# Patient Record
Sex: MALE | Race: WHITE | NOT HISPANIC OR LATINO | Employment: FULL TIME | URBAN - NONMETROPOLITAN AREA
[De-identification: names, ages, dates, MRNs, and addresses within clinical notes are randomized per-mention and may not be internally consistent; named-entity substitution may affect disease eponyms.]

---

## 2023-04-03 DIAGNOSIS — E55.9 VITAMIN D DEFICIENCY: ICD-10-CM

## 2023-04-03 DIAGNOSIS — E11.69 TYPE 2 DIABETES MELLITUS WITH OTHER SPECIFIED COMPLICATION, UNSPECIFIED WHETHER LONG TERM INSULIN USE (MULTI): ICD-10-CM

## 2023-04-03 DIAGNOSIS — F32.A DEPRESSION, UNSPECIFIED DEPRESSION TYPE: ICD-10-CM

## 2023-04-03 RX ORDER — MULTIVIT WITH MINERALS/HERBS
1 TABLET ORAL DAILY
COMMUNITY
Start: 2022-10-04

## 2023-04-03 RX ORDER — OMEGA-3 FATTY ACIDS/FISH OIL 340-1000MG
CAPSULE ORAL
COMMUNITY
End: 2023-04-26 | Stop reason: WASHOUT

## 2023-04-03 RX ORDER — AMITRIPTYLINE HYDROCHLORIDE 10 MG/1
10 TABLET, FILM COATED ORAL NIGHTLY
COMMUNITY
Start: 2023-03-06 | End: 2023-04-03 | Stop reason: SDUPTHER

## 2023-04-03 RX ORDER — INSULIN GLARGINE 100 [IU]/ML
110 INJECTION, SOLUTION SUBCUTANEOUS EVERY MORNING
COMMUNITY
Start: 2020-07-13 | End: 2023-04-03 | Stop reason: SDUPTHER

## 2023-04-03 RX ORDER — NYSTATIN 100000 [USP'U]/G
POWDER TOPICAL
COMMUNITY
End: 2023-06-28 | Stop reason: WASHOUT

## 2023-04-03 RX ORDER — INSULIN LISPRO 100 [IU]/ML
INJECTION, SOLUTION INTRAVENOUS; SUBCUTANEOUS
COMMUNITY
Start: 2020-07-13 | End: 2023-05-01 | Stop reason: SDUPTHER

## 2023-04-03 RX ORDER — PAROXETINE 30 MG/1
30 TABLET, FILM COATED ORAL EVERY MORNING
COMMUNITY
Start: 2021-03-16 | End: 2023-04-03 | Stop reason: ALTCHOICE

## 2023-04-03 RX ORDER — ROSUVASTATIN CALCIUM 40 MG/1
40 TABLET, COATED ORAL DAILY
COMMUNITY
Start: 2020-10-27 | End: 2023-05-01 | Stop reason: SDUPTHER

## 2023-04-03 RX ORDER — ACETAMINOPHEN 500 MG
TABLET ORAL
COMMUNITY
End: 2023-04-03 | Stop reason: SDUPTHER

## 2023-04-03 RX ORDER — GLIPIZIDE 10 MG/1
1 TABLET ORAL
COMMUNITY
Start: 2020-10-25 | End: 2023-05-01 | Stop reason: SDUPTHER

## 2023-04-03 RX ORDER — AMITRIPTYLINE HYDROCHLORIDE 25 MG/1
25 TABLET, FILM COATED ORAL NIGHTLY
COMMUNITY
End: 2023-04-03 | Stop reason: SDUPTHER

## 2023-04-03 RX ORDER — AZELASTINE 1 MG/ML
2 SPRAY, METERED NASAL DAILY
COMMUNITY
Start: 2020-11-19

## 2023-04-03 RX ORDER — FENOFIBRATE 200 MG/1
1 CAPSULE ORAL DAILY
COMMUNITY
Start: 2021-06-13 | End: 2023-05-01 | Stop reason: SDUPTHER

## 2023-04-03 RX ORDER — GABAPENTIN 300 MG/1
3 CAPSULE ORAL 3 TIMES DAILY
COMMUNITY
Start: 2020-12-01 | End: 2023-05-01 | Stop reason: SDUPTHER

## 2023-04-03 RX ORDER — ASPIRIN 81 MG/1
1 TABLET ORAL 2 TIMES DAILY
COMMUNITY
Start: 2022-10-04

## 2023-04-03 RX ORDER — METFORMIN HYDROCHLORIDE 1000 MG/1
1 TABLET ORAL EVERY 12 HOURS
COMMUNITY
Start: 2020-07-13 | End: 2023-05-01 | Stop reason: SDUPTHER

## 2023-04-03 RX ORDER — PAROXETINE 30 MG/1
60 TABLET, FILM COATED ORAL EVERY MORNING
COMMUNITY
End: 2023-04-03 | Stop reason: SDUPTHER

## 2023-04-04 RX ORDER — PAROXETINE 30 MG/1
60 TABLET, FILM COATED ORAL EVERY MORNING
Qty: 60 TABLET | Refills: 0 | Status: SHIPPED | OUTPATIENT
Start: 2023-04-04 | End: 2023-05-01 | Stop reason: SDUPTHER

## 2023-04-04 RX ORDER — ACETAMINOPHEN 500 MG
5000 TABLET ORAL DAILY
Qty: 30 TABLET | Refills: 0 | Status: SHIPPED | OUTPATIENT
Start: 2023-04-04 | End: 2023-05-04

## 2023-04-04 RX ORDER — INSULIN GLARGINE 100 [IU]/ML
110 INJECTION, SOLUTION SUBCUTANEOUS EVERY MORNING
Qty: 10 EACH | Refills: 0 | Status: SHIPPED | OUTPATIENT
Start: 2023-04-04 | End: 2023-05-02

## 2023-04-04 RX ORDER — AMITRIPTYLINE HYDROCHLORIDE 10 MG/1
10 TABLET, FILM COATED ORAL NIGHTLY
Qty: 30 TABLET | Refills: 0 | Status: SHIPPED | OUTPATIENT
Start: 2023-04-04 | End: 2023-05-01 | Stop reason: SDUPTHER

## 2023-04-26 ENCOUNTER — OFFICE VISIT (OUTPATIENT)
Dept: PRIMARY CARE | Facility: CLINIC | Age: 53
End: 2023-04-26
Payer: COMMERCIAL

## 2023-04-26 VITALS
BODY MASS INDEX: 43.42 KG/M2 | OXYGEN SATURATION: 93 % | HEART RATE: 74 BPM | WEIGHT: 294 LBS | DIASTOLIC BLOOD PRESSURE: 72 MMHG | SYSTOLIC BLOOD PRESSURE: 118 MMHG

## 2023-04-26 DIAGNOSIS — E78.00 HYPERCHOLESTEREMIA: ICD-10-CM

## 2023-04-26 DIAGNOSIS — Z00.00 ANNUAL PHYSICAL EXAM: Primary | ICD-10-CM

## 2023-04-26 DIAGNOSIS — I25.10 CORONARY ARTERY DISEASE INVOLVING NATIVE CORONARY ARTERY OF NATIVE HEART WITHOUT ANGINA PECTORIS: ICD-10-CM

## 2023-04-26 DIAGNOSIS — E66.01 CLASS 3 SEVERE OBESITY DUE TO EXCESS CALORIES WITH SERIOUS COMORBIDITY AND BODY MASS INDEX (BMI) OF 40.0 TO 44.9 IN ADULT (MULTI): ICD-10-CM

## 2023-04-26 DIAGNOSIS — E11.69 TYPE 2 DIABETES MELLITUS WITH OTHER SPECIFIED COMPLICATION, WITH LONG-TERM CURRENT USE OF INSULIN (MULTI): ICD-10-CM

## 2023-04-26 DIAGNOSIS — R53.83 OTHER FATIGUE: ICD-10-CM

## 2023-04-26 DIAGNOSIS — K21.9 GASTROESOPHAGEAL REFLUX DISEASE WITHOUT ESOPHAGITIS: ICD-10-CM

## 2023-04-26 DIAGNOSIS — F32.A DEPRESSION, UNSPECIFIED DEPRESSION TYPE: ICD-10-CM

## 2023-04-26 DIAGNOSIS — Z79.4 TYPE 2 DIABETES MELLITUS WITH OTHER SPECIFIED COMPLICATION, WITH LONG-TERM CURRENT USE OF INSULIN (MULTI): ICD-10-CM

## 2023-04-26 DIAGNOSIS — E55.9 VITAMIN D DEFICIENCY: ICD-10-CM

## 2023-04-26 PROBLEM — G47.33 OSA ON CPAP: Status: ACTIVE | Noted: 2023-04-26

## 2023-04-26 PROBLEM — I73.9 PAD (PERIPHERAL ARTERY DISEASE) (CMS-HCC): Status: ACTIVE | Noted: 2023-04-26

## 2023-04-26 PROBLEM — K11.7 DISTURBANCE OF SALIVARY SECRETION: Status: ACTIVE | Noted: 2023-04-26

## 2023-04-26 PROBLEM — E11.40 DIABETES MELLITUS WITH NEUROPATHY (MULTI): Status: ACTIVE | Noted: 2023-04-26

## 2023-04-26 PROBLEM — J30.9 ALLERGIC RHINITIS: Status: ACTIVE | Noted: 2023-04-26

## 2023-04-26 PROBLEM — I65.29 CAROTID ATHEROSCLEROSIS: Status: ACTIVE | Noted: 2023-04-26

## 2023-04-26 PROBLEM — E29.1 HYPOGONADISM MALE: Status: ACTIVE | Noted: 2023-04-26

## 2023-04-26 PROBLEM — R49.0 HOARSE VOICE QUALITY: Status: ACTIVE | Noted: 2023-04-26

## 2023-04-26 PROBLEM — R09.82 POSTNASAL DRIP: Status: ACTIVE | Noted: 2023-04-26

## 2023-04-26 PROBLEM — R93.1 ABNORMAL SCREENING CARDIAC CT: Status: ACTIVE | Noted: 2023-04-26

## 2023-04-26 PROBLEM — B37.2 CANDIDAL INTERTRIGO: Status: ACTIVE | Noted: 2023-04-26

## 2023-04-26 PROBLEM — R09.81 NASAL CONGESTION: Status: ACTIVE | Noted: 2023-04-26

## 2023-04-26 PROBLEM — A18.01 POTTS DISEASE: Status: ACTIVE | Noted: 2023-04-26

## 2023-04-26 PROBLEM — I87.8 STASIS, VENOUS: Status: ACTIVE | Noted: 2023-04-26
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
## 2023-04-26 PROCEDURE — 3074F SYST BP LT 130 MM HG: CPT | Performed by: INTERNAL MEDICINE

## 2023-04-26 PROCEDURE — 99396 PREV VISIT EST AGE 40-64: CPT | Performed by: INTERNAL MEDICINE

## 2023-04-26 PROCEDURE — 99215 OFFICE O/P EST HI 40 MIN: CPT | Performed by: INTERNAL MEDICINE

## 2023-04-26 PROCEDURE — 3008F BODY MASS INDEX DOCD: CPT | Performed by: INTERNAL MEDICINE

## 2023-04-26 PROCEDURE — 3078F DIAST BP <80 MM HG: CPT | Performed by: INTERNAL MEDICINE

## 2023-04-26 PROCEDURE — 93000 ELECTROCARDIOGRAM COMPLETE: CPT | Performed by: INTERNAL MEDICINE

## 2023-04-26 PROCEDURE — 1036F TOBACCO NON-USER: CPT | Performed by: INTERNAL MEDICINE

## 2023-04-26 RX ORDER — FLASH GLUCOSE SCANNING READER
EACH MISCELLANEOUS
COMMUNITY
Start: 2022-10-05 | End: 2023-11-28 | Stop reason: ALTCHOICE

## 2023-04-26 RX ORDER — FLASH GLUCOSE SENSOR
KIT MISCELLANEOUS
COMMUNITY
Start: 2023-04-16 | End: 2023-05-01 | Stop reason: SDUPTHER

## 2023-04-26 RX ORDER — ICOSAPENT ETHYL 1 G/1
2 CAPSULE ORAL 2 TIMES DAILY
COMMUNITY
Start: 2023-04-03

## 2023-04-26 NOTE — PROGRESS NOTES
Subjective   Patient ID: Jono Quinn is a 52 y.o. male who presents for yearly physical / Follow-up Shortness of Breath (Increased short of breath, mid march felt fatigue, weakness, lethargic, constipated,/Heavy in chest, feels warm and overheats/shuts down).    HPI  Yearly physical    Prostate 8-22  Colonoscopy 2018 polyp  CT chest lung cancer screening n/a  immunizations rev'd  BMI 43.4    Follow up    Patient complained of shortness of breath fatigue chest heaviness localized lasting for few hours few weeks ago.  There was no relation to exertion or diet.  Patient noted possibly back related.  Resolved spontaneously.    DM type II -insulin-dependent  Continue meds  Follow blood sugars closely.     CAD / Hypercholesterolemia on therapy no side effects    GERD stable on diet    Depression stable on therapy no side effects    Diet and exercise reviewed        Review of Systems   All other systems reviewed and are negative.      Objective   Physical Exam  Vitals reviewed.   Constitutional:       Appearance: Normal appearance. He is obese.   HENT:      Head: Normocephalic and atraumatic.      Mouth/Throat:      Pharynx: No posterior oropharyngeal erythema.   Eyes:      General: No scleral icterus.     Conjunctiva/sclera: Conjunctivae normal.      Pupils: Pupils are equal, round, and reactive to light.   Cardiovascular:      Rate and Rhythm: Normal rate and regular rhythm.      Heart sounds: Normal heart sounds.   Pulmonary:      Effort: No respiratory distress.      Breath sounds: No wheezing.   Abdominal:      General: Abdomen is flat. Bowel sounds are normal. There is no distension.      Palpations: Abdomen is soft. There is no mass.      Tenderness: There is no abdominal tenderness. There is no rebound.   Musculoskeletal:         General: Normal range of motion.      Cervical back: Normal range of motion and neck supple.   Skin:     General: Skin is warm and dry.   Neurological:      General: No focal deficit  present.      Mental Status: He is alert and oriented to person, place, and time. Mental status is at baseline.   Psychiatric:         Mood and Affect: Mood normal.         Behavior: Behavior normal.         Thought Content: Thought content normal.         Judgment: Judgment normal.         Assessment/Plan   Problem List Items Addressed This Visit          Digestive    GERD (gastroesophageal reflux disease)       Endocrine/Metabolic    Vitamin D deficiency    Relevant Orders    Vitamin D 25 hydroxy       Other    Depression     Other Visit Diagnoses       Annual physical exam    -  Primary    Relevant Orders    Comprehensive Metabolic Panel    Coronary artery disease involving native coronary artery of native heart without angina pectoris        Type 2 diabetes mellitus with other specified complication, with long-term current use of insulin (CMS/[facility])        Relevant Orders    Hemoglobin A1C    Albumin , Urine Random    Hypercholesteremia        Relevant Orders    Lipid Panel    Other fatigue        Relevant Orders    CBC and Auto Differential    TSH with reflex to Free T4 if abnormal    Class 3 severe obesity due to excess calories with serious comorbidity and body mass index (BMI) of 40.0 to 44.9 in adult (CMS/[facility])              Yearly physical    Prostate 8-22  Colonoscopy 2018 polyp  CT chest lung cancer screening n/a  immunizations rev'd  BMI 43.4    Follow up    Patient complained of shortness of breath fatigue chest heaviness localized lasting for few hours few weeks ago.  There was no relation to exertion or diet.  Patient noted possibly back related.  Resolved spontaneously.    DM type II -insulin-dependent  Continue meds  Follow blood sugars closely.     CAD / Hypercholesterolemia on therapy no side effects  Follow up cardio  EKG RBBB / old SR 76    GERD stable on diet    Depression stable on therapy no side effects    Check labs  Colonoscopy due after cardio follow up    Diet and exercise  reviewed    Follow up 2 months

## 2023-05-01 DIAGNOSIS — E11.69 TYPE 2 DIABETES MELLITUS WITH OTHER SPECIFIED COMPLICATION, UNSPECIFIED WHETHER LONG TERM INSULIN USE (MULTI): ICD-10-CM

## 2023-05-01 DIAGNOSIS — F32.A DEPRESSION, UNSPECIFIED DEPRESSION TYPE: ICD-10-CM

## 2023-05-01 DIAGNOSIS — I65.29 CAROTID ATHEROSCLEROSIS, UNSPECIFIED LATERALITY: ICD-10-CM

## 2023-05-01 DIAGNOSIS — I73.9 PAD (PERIPHERAL ARTERY DISEASE) (CMS-HCC): ICD-10-CM

## 2023-05-01 RX ORDER — INSULIN GLARGINE 100 [IU]/ML
110 INJECTION, SOLUTION SUBCUTANEOUS EVERY MORNING
Qty: 99 ML | Refills: 3 | Status: CANCELLED | OUTPATIENT
Start: 2023-05-01 | End: 2023-07-30

## 2023-05-02 DIAGNOSIS — F32.A DEPRESSION, UNSPECIFIED DEPRESSION TYPE: ICD-10-CM

## 2023-05-02 RX ORDER — METFORMIN HYDROCHLORIDE 1000 MG/1
1000 TABLET ORAL EVERY 12 HOURS
Qty: 180 TABLET | Refills: 0 | Status: SHIPPED | OUTPATIENT
Start: 2023-05-02 | End: 2023-08-10 | Stop reason: SDUPTHER

## 2023-05-02 RX ORDER — INSULIN DETEMIR 100 [IU]/ML
110 INJECTION, SOLUTION SUBCUTANEOUS EVERY MORNING
Qty: 10 ML | Refills: 12 | Status: SHIPPED | OUTPATIENT
Start: 2023-05-02 | End: 2023-05-03 | Stop reason: SDUPTHER

## 2023-05-02 RX ORDER — FENOFIBRATE 200 MG/1
200 CAPSULE ORAL DAILY
Qty: 90 CAPSULE | Refills: 0 | Status: SHIPPED | OUTPATIENT
Start: 2023-05-02 | End: 2023-07-30

## 2023-05-02 RX ORDER — ROSUVASTATIN CALCIUM 40 MG/1
40 TABLET, COATED ORAL DAILY
Qty: 90 TABLET | Refills: 0 | Status: SHIPPED | OUTPATIENT
Start: 2023-05-02 | End: 2023-07-30

## 2023-05-02 RX ORDER — AMITRIPTYLINE HYDROCHLORIDE 10 MG/1
10 TABLET, FILM COATED ORAL NIGHTLY
Qty: 90 TABLET | Refills: 0 | Status: SHIPPED | OUTPATIENT
Start: 2023-05-02 | End: 2023-05-02 | Stop reason: SDUPTHER

## 2023-05-02 RX ORDER — INSULIN LISPRO 100 [IU]/ML
25 INJECTION, SOLUTION INTRAVENOUS; SUBCUTANEOUS DAILY
Qty: 30 EACH | Refills: 2 | Status: SHIPPED | OUTPATIENT
Start: 2023-05-02 | End: 2023-06-28

## 2023-05-02 RX ORDER — AMITRIPTYLINE HYDROCHLORIDE 10 MG/1
10 TABLET, FILM COATED ORAL NIGHTLY
Qty: 30 TABLET | Refills: 1 | Status: SHIPPED | OUTPATIENT
Start: 2023-05-02 | End: 2023-08-10 | Stop reason: SDUPTHER

## 2023-05-02 RX ORDER — PAROXETINE 30 MG/1
60 TABLET, FILM COATED ORAL EVERY MORNING
Qty: 60 TABLET | Refills: 1 | Status: SHIPPED | OUTPATIENT
Start: 2023-05-02 | End: 2023-10-02

## 2023-05-02 RX ORDER — GLIPIZIDE 10 MG/1
10 TABLET ORAL
Qty: 180 TABLET | Refills: 0 | Status: SHIPPED | OUTPATIENT
Start: 2023-05-02 | End: 2023-08-10 | Stop reason: SDUPTHER

## 2023-05-02 RX ORDER — PAROXETINE 30 MG/1
60 TABLET, FILM COATED ORAL EVERY MORNING
Qty: 180 TABLET | Refills: 0 | Status: SHIPPED | OUTPATIENT
Start: 2023-05-02 | End: 2023-05-02 | Stop reason: SDUPTHER

## 2023-05-02 RX ORDER — FLASH GLUCOSE SENSOR
KIT MISCELLANEOUS
Qty: 6 EACH | Refills: 3 | Status: SHIPPED | OUTPATIENT
Start: 2023-05-02 | End: 2023-10-26

## 2023-05-02 RX ORDER — GABAPENTIN 300 MG/1
900 CAPSULE ORAL 3 TIMES DAILY
Qty: 810 CAPSULE | Refills: 0 | Status: SHIPPED | OUTPATIENT
Start: 2023-05-02 | End: 2023-08-10 | Stop reason: SDUPTHER

## 2023-05-03 DIAGNOSIS — E11.40 TYPE 2 DIABETES MELLITUS WITH DIABETIC NEUROPATHY, UNSPECIFIED WHETHER LONG TERM INSULIN USE (MULTI): ICD-10-CM

## 2023-05-03 DIAGNOSIS — E11.69 TYPE 2 DIABETES MELLITUS WITH OTHER SPECIFIED COMPLICATION, UNSPECIFIED WHETHER LONG TERM INSULIN USE (MULTI): ICD-10-CM

## 2023-05-03 PROBLEM — I15.2 HYPERTENSION ASSOCIATED WITH DIABETES (MULTI): Status: ACTIVE | Noted: 2023-05-03

## 2023-05-03 PROBLEM — E11.42 DIABETIC POLYNEUROPATHY ASSOCIATED WITH TYPE 2 DIABETES MELLITUS (MULTI): Status: ACTIVE | Noted: 2018-01-08

## 2023-05-03 PROBLEM — E78.5 HYPERLIPIDEMIA ASSOCIATED WITH TYPE 2 DIABETES MELLITUS (MULTI): Status: ACTIVE | Noted: 2023-05-03

## 2023-05-03 PROBLEM — K80.00 ACUTE CALCULOUS CHOLECYSTITIS: Status: ACTIVE | Noted: 2018-10-26

## 2023-05-03 PROBLEM — K75.81 NASH (NONALCOHOLIC STEATOHEPATITIS): Status: ACTIVE | Noted: 2023-05-03

## 2023-05-03 PROBLEM — G51.0 BELL'S PALSY: Status: ACTIVE | Noted: 2017-12-21

## 2023-05-03 PROBLEM — E11.59 HYPERTENSION ASSOCIATED WITH DIABETES (MULTI): Status: ACTIVE | Noted: 2023-05-03

## 2023-06-28 ENCOUNTER — OFFICE VISIT (OUTPATIENT)
Dept: PRIMARY CARE | Facility: CLINIC | Age: 53
End: 2023-06-28
Payer: COMMERCIAL

## 2023-06-28 VITALS
BODY MASS INDEX: 43.36 KG/M2 | WEIGHT: 293.6 LBS | OXYGEN SATURATION: 94 % | DIASTOLIC BLOOD PRESSURE: 66 MMHG | HEART RATE: 98 BPM | SYSTOLIC BLOOD PRESSURE: 134 MMHG

## 2023-06-28 DIAGNOSIS — I25.10 CORONARY ARTERY DISEASE INVOLVING NATIVE CORONARY ARTERY OF NATIVE HEART WITHOUT ANGINA PECTORIS: ICD-10-CM

## 2023-06-28 DIAGNOSIS — E66.01 CLASS 3 SEVERE OBESITY DUE TO EXCESS CALORIES WITH SERIOUS COMORBIDITY AND BODY MASS INDEX (BMI) OF 40.0 TO 44.9 IN ADULT (MULTI): ICD-10-CM

## 2023-06-28 DIAGNOSIS — F33.2 MDD (MAJOR DEPRESSIVE DISORDER), RECURRENT SEVERE, WITHOUT PSYCHOSIS (MULTI): ICD-10-CM

## 2023-06-28 DIAGNOSIS — K21.9 GASTROESOPHAGEAL REFLUX DISEASE WITHOUT ESOPHAGITIS: ICD-10-CM

## 2023-06-28 DIAGNOSIS — E78.00 HYPERCHOLESTEREMIA: ICD-10-CM

## 2023-06-28 DIAGNOSIS — E11.311 DIABETIC RETINOPATHY OF BOTH EYES WITH MACULAR EDEMA ASSOCIATED WITH TYPE 2 DIABETES MELLITUS, UNSPECIFIED RETINOPATHY SEVERITY (MULTI): Primary | ICD-10-CM

## 2023-06-28 PROCEDURE — 3075F SYST BP GE 130 - 139MM HG: CPT | Performed by: INTERNAL MEDICINE

## 2023-06-28 PROCEDURE — 3008F BODY MASS INDEX DOCD: CPT | Performed by: INTERNAL MEDICINE

## 2023-06-28 PROCEDURE — 1036F TOBACCO NON-USER: CPT | Performed by: INTERNAL MEDICINE

## 2023-06-28 PROCEDURE — 3078F DIAST BP <80 MM HG: CPT | Performed by: INTERNAL MEDICINE

## 2023-06-28 PROCEDURE — 99215 OFFICE O/P EST HI 40 MIN: CPT | Performed by: INTERNAL MEDICINE

## 2023-06-28 RX ORDER — OMEPRAZOLE 40 MG/1
40 CAPSULE, DELAYED RELEASE ORAL
COMMUNITY
Start: 2019-05-06 | End: 2023-11-28 | Stop reason: ALTCHOICE

## 2023-06-28 RX ORDER — TESTOSTERONE CYPIONATE 200 MG/ML
INJECTION, SOLUTION INTRAMUSCULAR
COMMUNITY
Start: 2020-03-09 | End: 2023-11-28 | Stop reason: ALTCHOICE

## 2023-06-28 RX ORDER — GINKGO BILOBA LEAF EXTRACT 60 MG
200 CAPSULE ORAL
COMMUNITY
Start: 2014-10-30

## 2023-06-28 RX ORDER — INSULIN LISPRO 100 [IU]/ML
INJECTION, SOLUTION INTRAVENOUS; SUBCUTANEOUS
Qty: 90 EACH | Refills: 3 | Status: SHIPPED | OUTPATIENT
Start: 2023-06-28 | End: 2024-01-02 | Stop reason: SDUPTHER

## 2023-06-28 RX ORDER — INSULIN LISPRO 100 [IU]/ML
40 INJECTION, SOLUTION INTRAVENOUS; SUBCUTANEOUS
COMMUNITY
Start: 2019-08-20 | End: 2023-07-13 | Stop reason: SDUPTHER

## 2023-06-28 NOTE — PROGRESS NOTES
Subjective   Patient ID: Jono Quinn is a 53 y.o. male who presents for Diabetes Mellitus, Hyperlipidemia, and Follow-up (Light sensitive, macular degeneration,  DM retinopathy/Stopped drinking [employer] drinks on 6-15-23, ).  Hyperlipidemia    follow up    Pt experienced vision changes today and was dx with retinopathy with macular edema  Retinal specialist needed asap    Patient complained of shortness of breath fatigue  no chest heaviness intermittent.  There was no relation to exertion or diet.     DM type II -insulin-dependent   this am  Continue meds  Follow blood sugars closely.      CAD / Hypercholesterolemia on therapy no side effects  Follow up cardio    GERD stable on diet     Depression stable on therapy no side effects     Check labs  Colonoscopy due after cardio follow up     Diet and exercise reviewed    Review of Systems   All other systems reviewed and are negative.      Objective   /66   Pulse 98   Wt 133 kg (293 lb 9.6 oz)   SpO2 94%   BMI 43.36 kg/m²   Lab Results   Component Value Date    WBC 5.9 08/06/2022    HGB 13.1 (L) 08/06/2022    HCT 39.3 (L) 08/06/2022     08/06/2022    CHOL 179 08/06/2022    TRIG 1,886 (H) 08/06/2022    HDL 27.0 (A) 08/06/2022    LDLDIRECT 32 03/05/2022    ALT 36 08/06/2022    AST 25 08/06/2022     08/06/2022    K 4.7 08/06/2022    CL 99 08/06/2022    CREATININE 1.15 08/06/2022    BUN 22 08/06/2022    CO2 26 08/06/2022    TSH 1.36 08/06/2022    INR 1.1 04/29/2021    HGBA1C 6.3 (A) 08/06/2022           Physical Exam  Vitals reviewed.   Constitutional:       Appearance: Normal appearance. He is obese.   HENT:      Head: Normocephalic and atraumatic.      Mouth/Throat:      Pharynx: No posterior oropharyngeal erythema.   Eyes:      General: No scleral icterus.     Conjunctiva/sclera: Conjunctivae normal.      Pupils: Pupils are equal, round, and reactive to light.   Cardiovascular:      Rate and Rhythm: Normal rate and regular rhythm.      Heart  sounds: Normal heart sounds.   Pulmonary:      Effort: No respiratory distress.      Breath sounds: No wheezing.   Abdominal:      General: Abdomen is flat. Bowel sounds are normal. There is no distension.      Palpations: Abdomen is soft. There is no mass.      Tenderness: There is no abdominal tenderness. There is no rebound.   Musculoskeletal:         General: Normal range of motion.      Cervical back: Normal range of motion and neck supple.   Skin:     General: Skin is warm and dry.   Neurological:      General: No focal deficit present.      Mental Status: He is alert and oriented to person, place, and time. Mental status is at baseline.   Psychiatric:         Mood and Affect: Mood normal.         Behavior: Behavior normal.         Thought Content: Thought content normal.         Judgment: Judgment normal.       Problem List Items Addressed This Visit          Gastrointestinal and Abdominal    GERD (gastroesophageal reflux disease)       Mental Health    Depression     Other Visit Diagnoses       Diabetic retinopathy of both eyes with macular edema associated with type 2 diabetes mellitus, unspecified retinopathy severity (CMS/HCC)    -  Primary    Relevant Orders    Referral to Ophthalmology    Coronary artery disease involving native coronary artery of native heart without angina pectoris        Hypercholesteremia        Class 3 severe obesity due to excess calories with serious comorbidity and body mass index (BMI) of 40.0 to 44.9 in adult (CMS/HCC)              Assessment/Plan     Pt experienced vision changes today and was dx with retinopathy with macular edema  Retinal specialist needed asap / done tomorrow pm    Patient complained of shortness of breath fatigue  no chest heaviness intermittent.  There was no relation to exertion or diet.   Cardio ASAP 7-17-23 / Dr Zavala  To ER if sx occur    DM type II -insulin-dependent   this am  Continue meds  Follow blood sugars closely.      CAD /  Hypercholesterolemia on therapy no side effects  Follow up cardio     GERD stable on diet     Depression stable on therapy no side effects     Check labs  Colonoscopy due after cardio follow up     Diet and exercise reviewed    Follow up 3 weeks

## 2023-07-13 DIAGNOSIS — E11.311 DIABETIC RETINOPATHY OF BOTH EYES WITH MACULAR EDEMA ASSOCIATED WITH TYPE 2 DIABETES MELLITUS, UNSPECIFIED RETINOPATHY SEVERITY (MULTI): ICD-10-CM

## 2023-07-13 DIAGNOSIS — E11.42 DIABETIC POLYNEUROPATHY ASSOCIATED WITH TYPE 2 DIABETES MELLITUS (MULTI): Primary | ICD-10-CM

## 2023-07-13 RX ORDER — INSULIN LISPRO 100 [IU]/ML
40 INJECTION, SOLUTION INTRAVENOUS; SUBCUTANEOUS
Qty: 108 ML | Refills: 0 | Status: SHIPPED | OUTPATIENT
Start: 2023-07-13 | End: 2023-10-16 | Stop reason: SDUPTHER

## 2023-07-17 DIAGNOSIS — E11.43 DIABETIC AUTONOMIC NEUROPATHY ASSOCIATED WITH TYPE 2 DIABETES MELLITUS (MULTI): Primary | ICD-10-CM

## 2023-07-18 RX ORDER — BLOOD-GLUCOSE SENSOR
3 EACH MISCELLANEOUS EVERY 6 HOURS
Qty: 3 EACH | Refills: 3 | Status: SHIPPED | OUTPATIENT
Start: 2023-07-18 | End: 2023-07-22 | Stop reason: SDUPTHER

## 2023-07-19 ENCOUNTER — OFFICE VISIT (OUTPATIENT)
Dept: PRIMARY CARE | Facility: CLINIC | Age: 53
End: 2023-07-19
Payer: COMMERCIAL

## 2023-07-19 VITALS
OXYGEN SATURATION: 98 % | BODY MASS INDEX: 41.85 KG/M2 | DIASTOLIC BLOOD PRESSURE: 84 MMHG | HEART RATE: 92 BPM | SYSTOLIC BLOOD PRESSURE: 124 MMHG | WEIGHT: 283.4 LBS

## 2023-07-19 DIAGNOSIS — I25.10 CORONARY ARTERY DISEASE INVOLVING NATIVE CORONARY ARTERY OF NATIVE HEART WITHOUT ANGINA PECTORIS: ICD-10-CM

## 2023-07-19 DIAGNOSIS — E11.43 DIABETIC AUTONOMIC NEUROPATHY ASSOCIATED WITH TYPE 2 DIABETES MELLITUS (MULTI): ICD-10-CM

## 2023-07-19 DIAGNOSIS — E11.311 DIABETIC RETINOPATHY OF BOTH EYES WITH MACULAR EDEMA ASSOCIATED WITH TYPE 2 DIABETES MELLITUS, UNSPECIFIED RETINOPATHY SEVERITY (MULTI): ICD-10-CM

## 2023-07-19 DIAGNOSIS — E66.01 CLASS 3 SEVERE OBESITY DUE TO EXCESS CALORIES WITH SERIOUS COMORBIDITY AND BODY MASS INDEX (BMI) OF 40.0 TO 44.9 IN ADULT (MULTI): ICD-10-CM

## 2023-07-19 DIAGNOSIS — Z79.4 TYPE 2 DIABETES MELLITUS WITH OTHER SPECIFIED COMPLICATION, WITH LONG-TERM CURRENT USE OF INSULIN (MULTI): Primary | ICD-10-CM

## 2023-07-19 DIAGNOSIS — E78.00 HYPERCHOLESTEREMIA: ICD-10-CM

## 2023-07-19 DIAGNOSIS — E11.69 TYPE 2 DIABETES MELLITUS WITH OTHER SPECIFIED COMPLICATION, WITH LONG-TERM CURRENT USE OF INSULIN (MULTI): Primary | ICD-10-CM

## 2023-07-19 DIAGNOSIS — K21.9 GASTROESOPHAGEAL REFLUX DISEASE WITHOUT ESOPHAGITIS: ICD-10-CM

## 2023-07-19 PROCEDURE — 3079F DIAST BP 80-89 MM HG: CPT | Performed by: INTERNAL MEDICINE

## 2023-07-19 PROCEDURE — 1036F TOBACCO NON-USER: CPT | Performed by: INTERNAL MEDICINE

## 2023-07-19 PROCEDURE — 3074F SYST BP LT 130 MM HG: CPT | Performed by: INTERNAL MEDICINE

## 2023-07-19 PROCEDURE — 3008F BODY MASS INDEX DOCD: CPT | Performed by: INTERNAL MEDICINE

## 2023-07-19 PROCEDURE — 99214 OFFICE O/P EST MOD 30 MIN: CPT | Performed by: INTERNAL MEDICINE

## 2023-07-19 ASSESSMENT — ENCOUNTER SYMPTOMS: DEPRESSION: 1

## 2023-07-19 NOTE — PROGRESS NOTES
Subjective   Patient ID: Jono Quinn is a 53 y.o. male who presents for Med Management, Diabetes Mellitus, Hyperlipidemia, and Depression.  Hyperlipidemia    Depression  Follow up bs    retinopathy with macular edema  Retinal specialist following  Getting eye shots     Patient complained of shortness of breath fatigue  no chest heaviness intermittent.  There was no relation to exertion or diet.   Cardio following / Dr Zavala  Echo and stress test pending     DM type II -insulin-dependent  FBS not checked  this am / no meter  Continue meds  Follow blood sugars closely.  Endo consult     CAD / Hypercholesterolemia on therapy no side effects  Follow up cardio     GERD stable on diet     Depression stable on therapy no side effects     Check labs  Colonoscopy due after cardio follow up     Diet and exercise reviewed    Review of Systems   Psychiatric/Behavioral:  Positive for depression.    All other systems reviewed and are negative.      Objective   /84   Pulse 92   Wt 129 kg (283 lb 6.4 oz)   SpO2 98%   BMI 41.85 kg/m²   Lab Results   Component Value Date    WBC 5.9 08/06/2022    HGB 13.1 (L) 08/06/2022    HCT 39.3 (L) 08/06/2022     08/06/2022    CHOL 179 08/06/2022    TRIG 1,886 (H) 08/06/2022    HDL 27.0 (A) 08/06/2022    LDLDIRECT 32 03/05/2022    ALT 36 08/06/2022    AST 25 08/06/2022     08/06/2022    K 4.7 08/06/2022    CL 99 08/06/2022    CREATININE 1.15 08/06/2022    BUN 22 08/06/2022    CO2 26 08/06/2022    TSH 1.36 08/06/2022    INR 1.1 04/29/2021    HGBA1C 6.3 (A) 08/06/2022           Physical Exam  Vitals reviewed.   Constitutional:       Appearance: Normal appearance. He is obese.   HENT:      Head: Normocephalic and atraumatic.      Mouth/Throat:      Pharynx: No posterior oropharyngeal erythema.   Eyes:      General: No scleral icterus.     Conjunctiva/sclera: Conjunctivae normal.      Pupils: Pupils are equal, round, and reactive to light.   Cardiovascular:      Rate and  Rhythm: Normal rate and regular rhythm.      Heart sounds: Normal heart sounds.   Pulmonary:      Effort: No respiratory distress.      Breath sounds: No wheezing.   Abdominal:      General: Abdomen is flat. Bowel sounds are normal. There is no distension.      Palpations: Abdomen is soft. There is no mass.      Tenderness: There is no abdominal tenderness. There is no rebound.   Musculoskeletal:         General: Normal range of motion.      Cervical back: Normal range of motion and neck supple.   Skin:     General: Skin is warm and dry.   Neurological:      General: No focal deficit present.      Mental Status: He is alert and oriented to person, place, and time. Mental status is at baseline.   Psychiatric:         Mood and Affect: Mood normal.         Behavior: Behavior normal.         Thought Content: Thought content normal.         Judgment: Judgment normal.       Problem List Items Addressed This Visit          Gastrointestinal and Abdominal    GERD (gastroesophageal reflux disease)     Other Visit Diagnoses       Type 2 diabetes mellitus with other specified complication, with long-term current use of insulin (CMS/[facility])    -  Primary    Relevant Orders    Referral to Endocrinology    Diabetic retinopathy of both eyes with macular edema associated with type 2 diabetes mellitus, unspecified retinopathy severity (CMS/[facility])        Coronary artery disease involving native coronary artery of native heart without angina pectoris        Hypercholesteremia        Class 3 severe obesity due to excess calories with serious comorbidity and body mass index (BMI) of 40.0 to 44.9 in adult (CMS/[facility])              Assessment/Plan     Follow up bs    retinopathy with macular edema  Retinal specialist following  Getting eye shots     Patient complained of shortness of breath fatigue  no chest heaviness intermittent.  There was no relation to exertion or diet.   Cardio following / Dr Zavala  Echo and stress test pending     DM  type II -insulin-dependent  FBS not checked  this am / no meter  Freestyle jonny 3 given  Continue meds  Follow blood sugars closely.     CAD / Hypercholesterolemia on therapy no side effects  Follow up cardio     GERD stable on diet     Depression stable on therapy no side effects     Check labs not done  Colonoscopy due after cardio follow up     Diet and exercise reviewed    Follow up 6 weeks / routine

## 2023-07-22 ENCOUNTER — LAB (OUTPATIENT)
Dept: LAB | Facility: LAB | Age: 53
End: 2023-07-22
Payer: COMMERCIAL

## 2023-07-22 DIAGNOSIS — Z00.00 ANNUAL PHYSICAL EXAM: ICD-10-CM

## 2023-07-22 DIAGNOSIS — E11.69 TYPE 2 DIABETES MELLITUS WITH OTHER SPECIFIED COMPLICATION, WITH LONG-TERM CURRENT USE OF INSULIN (MULTI): ICD-10-CM

## 2023-07-22 DIAGNOSIS — Z79.4 TYPE 2 DIABETES MELLITUS WITH OTHER SPECIFIED COMPLICATION, WITH LONG-TERM CURRENT USE OF INSULIN (MULTI): ICD-10-CM

## 2023-07-22 DIAGNOSIS — E11.43 DIABETIC AUTONOMIC NEUROPATHY ASSOCIATED WITH TYPE 2 DIABETES MELLITUS (MULTI): ICD-10-CM

## 2023-07-22 DIAGNOSIS — R53.83 OTHER FATIGUE: ICD-10-CM

## 2023-07-22 DIAGNOSIS — E55.9 VITAMIN D DEFICIENCY: ICD-10-CM

## 2023-07-22 DIAGNOSIS — E78.00 HYPERCHOLESTEREMIA: ICD-10-CM

## 2023-07-22 LAB
ALANINE AMINOTRANSFERASE (SGPT) (U/L) IN SER/PLAS: 22 U/L (ref 10–52)
ALBUMIN (G/DL) IN SER/PLAS: 4.4 G/DL (ref 3.4–5)
ALBUMIN (MG/L) IN URINE: <7 MG/L
ALBUMIN/CREATININE (UG/MG) IN URINE: NORMAL UG/MG CRT (ref 0–30)
ALKALINE PHOSPHATASE (U/L) IN SER/PLAS: 119 U/L (ref 33–120)
ANION GAP IN SER/PLAS: 18 MMOL/L (ref 10–20)
ASPARTATE AMINOTRANSFERASE (SGOT) (U/L) IN SER/PLAS: 23 U/L (ref 9–39)
BASOPHILS (10*3/UL) IN BLOOD BY AUTOMATED COUNT: 0.04 X10E9/L (ref 0–0.1)
BASOPHILS/100 LEUKOCYTES IN BLOOD BY AUTOMATED COUNT: 0.8 % (ref 0–2)
BILIRUBIN TOTAL (MG/DL) IN SER/PLAS: 0.5 MG/DL (ref 0–1.2)
CALCIDIOL (25 OH VITAMIN D3) (NG/ML) IN SER/PLAS: 18 NG/ML
CALCIUM (MG/DL) IN SER/PLAS: 9.3 MG/DL (ref 8.6–10.3)
CARBON DIOXIDE, TOTAL (MMOL/L) IN SER/PLAS: 20 MMOL/L (ref 21–32)
CHLORIDE (MMOL/L) IN SER/PLAS: 101 MMOL/L (ref 98–107)
CHOLESTEROL (MG/DL) IN SER/PLAS: 161 MG/DL (ref 0–199)
CHOLESTEROL IN HDL (MG/DL) IN SER/PLAS: 22.1 MG/DL
CHOLESTEROL/HDL RATIO: 7.3
CREATININE (MG/DL) IN SER/PLAS: 0.85 MG/DL (ref 0.5–1.3)
CREATININE (MG/DL) IN URINE: 42.3 MG/DL (ref 20–370)
EOSINOPHILS (10*3/UL) IN BLOOD BY AUTOMATED COUNT: 0.12 X10E9/L (ref 0–0.7)
EOSINOPHILS/100 LEUKOCYTES IN BLOOD BY AUTOMATED COUNT: 2.3 % (ref 0–6)
ERYTHROCYTE DISTRIBUTION WIDTH (RATIO) BY AUTOMATED COUNT: 15.7 % (ref 11.5–14.5)
ERYTHROCYTE MEAN CORPUSCULAR HEMOGLOBIN CONCENTRATION (G/DL) BY AUTOMATED: 35.6 G/DL (ref 32–36)
ERYTHROCYTE MEAN CORPUSCULAR VOLUME (FL) BY AUTOMATED COUNT: 95 FL (ref 80–100)
ERYTHROCYTES (10*6/UL) IN BLOOD BY AUTOMATED COUNT: 4.15 X10E12/L (ref 4.5–5.9)
ESTIMATED AVERAGE GLUCOSE FOR HBA1C: 146 MG/DL
GFR MALE: >90 ML/MIN/1.73M2
GLUCOSE (MG/DL) IN SER/PLAS: 178 MG/DL (ref 74–99)
HEMATOCRIT (%) IN BLOOD BY AUTOMATED COUNT: 39.3 % (ref 41–52)
HEMOGLOBIN (G/DL) IN BLOOD: 14 G/DL (ref 13.5–17.5)
HEMOGLOBIN A1C/HEMOGLOBIN TOTAL IN BLOOD: 6.7 %
IMMATURE GRANULOCYTES/100 LEUKOCYTES IN BLOOD BY AUTOMATED COUNT: 0.4 % (ref 0–0.9)
LDL: ABNORMAL MG/DL (ref 0–99)
LEUKOCYTES (10*3/UL) IN BLOOD BY AUTOMATED COUNT: 5.2 X10E9/L (ref 4.4–11.3)
LYMPHOCYTES (10*3/UL) IN BLOOD BY AUTOMATED COUNT: 1.83 X10E9/L (ref 1.2–4.8)
LYMPHOCYTES/100 LEUKOCYTES IN BLOOD BY AUTOMATED COUNT: 35.1 % (ref 13–44)
MONOCYTES (10*3/UL) IN BLOOD BY AUTOMATED COUNT: 0.32 X10E9/L (ref 0.1–1)
MONOCYTES/100 LEUKOCYTES IN BLOOD BY AUTOMATED COUNT: 6.1 % (ref 2–10)
NEUTROPHILS (10*3/UL) IN BLOOD BY AUTOMATED COUNT: 2.89 X10E9/L (ref 1.2–7.7)
NEUTROPHILS/100 LEUKOCYTES IN BLOOD BY AUTOMATED COUNT: 55.3 % (ref 40–80)
PLATELETS (10*3/UL) IN BLOOD AUTOMATED COUNT: 231 X10E9/L (ref 150–450)
POTASSIUM (MMOL/L) IN SER/PLAS: 4.3 MMOL/L (ref 3.5–5.3)
PROTEIN TOTAL: 6.5 G/DL (ref 6.4–8.2)
SODIUM (MMOL/L) IN SER/PLAS: 135 MMOL/L (ref 136–145)
THYROTROPIN (MIU/L) IN SER/PLAS BY DETECTION LIMIT <= 0.05 MIU/L: 1.21 MIU/L (ref 0.44–3.98)
TRIGLYCERIDE (MG/DL) IN SER/PLAS: 1570 MG/DL (ref 0–149)
UREA NITROGEN (MG/DL) IN SER/PLAS: 15 MG/DL (ref 6–23)
VLDL: ABNORMAL MG/DL (ref 0–40)

## 2023-07-22 PROCEDURE — 85025 COMPLETE CBC W/AUTO DIFF WBC: CPT

## 2023-07-22 PROCEDURE — 82043 UR ALBUMIN QUANTITATIVE: CPT

## 2023-07-22 PROCEDURE — 84443 ASSAY THYROID STIM HORMONE: CPT

## 2023-07-22 PROCEDURE — 82306 VITAMIN D 25 HYDROXY: CPT

## 2023-07-22 PROCEDURE — 36415 COLL VENOUS BLD VENIPUNCTURE: CPT

## 2023-07-22 PROCEDURE — 80061 LIPID PANEL: CPT

## 2023-07-22 PROCEDURE — 80053 COMPREHEN METABOLIC PANEL: CPT

## 2023-07-22 PROCEDURE — 82570 ASSAY OF URINE CREATININE: CPT

## 2023-07-22 PROCEDURE — 83036 HEMOGLOBIN GLYCOSYLATED A1C: CPT

## 2023-07-22 RX ORDER — BLOOD-GLUCOSE SENSOR
EACH MISCELLANEOUS
Qty: 3 EACH | Refills: 3 | Status: SHIPPED | OUTPATIENT
Start: 2023-07-22 | End: 2023-11-28 | Stop reason: SDUPTHER

## 2023-07-22 RX ORDER — BLOOD-GLUCOSE SENSOR
1 EACH MISCELLANEOUS
Qty: 6 EACH | Refills: 3 | Status: SHIPPED | OUTPATIENT
Start: 2023-07-22 | End: 2023-12-19 | Stop reason: SDUPTHER

## 2023-07-24 NOTE — RESULT ENCOUNTER NOTE
Please call the patient regarding his abnormal result  Triglycerides still too high (1500)  Follow up with cardio as planned  Start vit D3 50,000 units a week #12/ no refill  Continue meds as are  Follow up as scheduled

## 2023-07-27 DIAGNOSIS — E11.40 TYPE 2 DIABETES MELLITUS WITH DIABETIC NEUROPATHY, UNSPECIFIED WHETHER LONG TERM INSULIN USE (MULTI): ICD-10-CM

## 2023-07-27 DIAGNOSIS — I65.29 CAROTID ATHEROSCLEROSIS, UNSPECIFIED LATERALITY: ICD-10-CM

## 2023-07-30 RX ORDER — INSULIN DETEMIR 100 [IU]/ML
INJECTION, SOLUTION SUBCUTANEOUS
Qty: 90 ML | Refills: 0 | Status: SHIPPED | OUTPATIENT
Start: 2023-07-30 | End: 2023-10-03

## 2023-07-30 RX ORDER — FENOFIBRATE 200 MG/1
200 CAPSULE ORAL DAILY
Qty: 90 CAPSULE | Refills: 0 | Status: SHIPPED | OUTPATIENT
Start: 2023-07-30 | End: 2023-10-09

## 2023-07-30 RX ORDER — ROSUVASTATIN CALCIUM 40 MG/1
40 TABLET, COATED ORAL DAILY
Qty: 90 TABLET | Refills: 0 | Status: SHIPPED | OUTPATIENT
Start: 2023-07-30 | End: 2023-10-09

## 2023-08-03 DIAGNOSIS — F32.A DEPRESSION, UNSPECIFIED DEPRESSION TYPE: ICD-10-CM

## 2023-08-04 RX ORDER — PAROXETINE HYDROCHLORIDE 20 MG/1
TABLET, FILM COATED ORAL
Qty: 180 TABLET | Refills: 0 | Status: SHIPPED | OUTPATIENT
Start: 2023-08-04 | End: 2023-08-10

## 2023-08-09 DIAGNOSIS — E11.69 TYPE 2 DIABETES MELLITUS WITH OTHER SPECIFIED COMPLICATION, UNSPECIFIED WHETHER LONG TERM INSULIN USE (MULTI): ICD-10-CM

## 2023-08-09 DIAGNOSIS — I73.9 PAD (PERIPHERAL ARTERY DISEASE) (CMS-HCC): ICD-10-CM

## 2023-08-09 PROBLEM — E11.3413: Status: ACTIVE | Noted: 2023-06-28

## 2023-08-10 ENCOUNTER — OFFICE VISIT (OUTPATIENT)
Dept: PRIMARY CARE | Facility: CLINIC | Age: 53
End: 2023-08-10
Payer: COMMERCIAL

## 2023-08-10 VITALS
OXYGEN SATURATION: 98 % | SYSTOLIC BLOOD PRESSURE: 114 MMHG | WEIGHT: 310.6 LBS | HEART RATE: 98 BPM | DIASTOLIC BLOOD PRESSURE: 66 MMHG | BODY MASS INDEX: 45.87 KG/M2

## 2023-08-10 DIAGNOSIS — E78.00 HYPERCHOLESTEREMIA: ICD-10-CM

## 2023-08-10 DIAGNOSIS — E11.69 TYPE 2 DIABETES MELLITUS WITH OTHER SPECIFIED COMPLICATION, UNSPECIFIED WHETHER LONG TERM INSULIN USE (MULTI): ICD-10-CM

## 2023-08-10 DIAGNOSIS — E66.01 CLASS 3 SEVERE OBESITY DUE TO EXCESS CALORIES WITH SERIOUS COMORBIDITY AND BODY MASS INDEX (BMI) OF 45.0 TO 49.9 IN ADULT (MULTI): ICD-10-CM

## 2023-08-10 DIAGNOSIS — I73.9 PAD (PERIPHERAL ARTERY DISEASE) (CMS-HCC): ICD-10-CM

## 2023-08-10 DIAGNOSIS — F41.9 ANXIETY DISORDER, UNSPECIFIED TYPE: Primary | ICD-10-CM

## 2023-08-10 DIAGNOSIS — E55.9 VITAMIN D DEFICIENCY: ICD-10-CM

## 2023-08-10 DIAGNOSIS — K21.9 GASTROESOPHAGEAL REFLUX DISEASE WITHOUT ESOPHAGITIS: ICD-10-CM

## 2023-08-10 DIAGNOSIS — I25.10 CORONARY ARTERY DISEASE INVOLVING NATIVE CORONARY ARTERY OF NATIVE HEART WITHOUT ANGINA PECTORIS: ICD-10-CM

## 2023-08-10 PROBLEM — R06.02 SOB (SHORTNESS OF BREATH): Status: ACTIVE | Noted: 2023-08-10

## 2023-08-10 PROBLEM — R06.02 SHORT OF BREATH ON EXERTION: Status: ACTIVE | Noted: 2023-08-10

## 2023-08-10 PROBLEM — R07.9 CHEST PAIN: Status: ACTIVE | Noted: 2023-08-10

## 2023-08-10 PROCEDURE — 99215 OFFICE O/P EST HI 40 MIN: CPT | Performed by: INTERNAL MEDICINE

## 2023-08-10 PROCEDURE — 3074F SYST BP LT 130 MM HG: CPT | Performed by: INTERNAL MEDICINE

## 2023-08-10 PROCEDURE — 3044F HG A1C LEVEL LT 7.0%: CPT | Performed by: INTERNAL MEDICINE

## 2023-08-10 PROCEDURE — 3078F DIAST BP <80 MM HG: CPT | Performed by: INTERNAL MEDICINE

## 2023-08-10 PROCEDURE — 1036F TOBACCO NON-USER: CPT | Performed by: INTERNAL MEDICINE

## 2023-08-10 PROCEDURE — 3008F BODY MASS INDEX DOCD: CPT | Performed by: INTERNAL MEDICINE

## 2023-08-10 RX ORDER — ACETAMINOPHEN 500 MG
TABLET ORAL
COMMUNITY
End: 2023-10-17 | Stop reason: SDUPTHER

## 2023-08-10 RX ORDER — METFORMIN HYDROCHLORIDE 1000 MG/1
1000 TABLET ORAL EVERY 12 HOURS
Qty: 180 TABLET | Refills: 0 | Status: SHIPPED | OUTPATIENT
Start: 2023-08-10 | End: 2023-09-05 | Stop reason: SDUPTHER

## 2023-08-10 RX ORDER — AMITRIPTYLINE HYDROCHLORIDE 10 MG/1
10 TABLET, FILM COATED ORAL NIGHTLY
Qty: 90 TABLET | Refills: 0 | Status: SHIPPED | OUTPATIENT
Start: 2023-08-10 | End: 2023-10-17 | Stop reason: SDUPTHER

## 2023-08-10 RX ORDER — GABAPENTIN 300 MG/1
900 CAPSULE ORAL 3 TIMES DAILY
Qty: 810 CAPSULE | Refills: 0 | Status: SHIPPED | OUTPATIENT
Start: 2023-08-10 | End: 2023-09-05 | Stop reason: SDUPTHER

## 2023-08-10 RX ORDER — GLIPIZIDE 10 MG/1
10 TABLET ORAL
Qty: 180 TABLET | Refills: 0 | Status: SHIPPED | OUTPATIENT
Start: 2023-08-10 | End: 2023-11-14

## 2023-08-10 NOTE — PROGRESS NOTES
Subjective   Patient ID: Jono Quinn is a 53 y.o. male who presents for ER Follow-up and Anxiety.  ER Follow-up    Anxiety        Post ER follow up / tests rev'd    Chest pain lt side sharp, no radiation, sharp   No association to diet or exercise  No nausea or diaphoresis  Worsening anxiety lately  Was forgetting elavil    retinopathy with macular edema  Retinal specialist following  Getting eye shots     Patient complained of shortness of breath fatigue  no chest heaviness intermittent.  There was no relation to exertion or diet.   Cardio following / Dr Zavala  Echo and stress test rev'd     DM type II -insulin-dependent   this am  Freestyle jonny 3 given  Continue meds  Follow blood sugars closely.  HBA1C 6.7  7-23     CAD / Hypercholesterolemia on therapy no side effects  Follow up cardio     GERD stable on diet     Depression  on therapy no side effects    Vitamin D deficiency on suplementation     Colonoscopy due after cardio follow up     Diet and exercise reviewed    Review of Systems   All other systems reviewed and are negative.      Objective   /66   Pulse 98   Wt 141 kg (310 lb 9.6 oz)   SpO2 98%   BMI 45.87 kg/m²   Lab Results   Component Value Date    WBC 6.1 08/09/2023    HGB 13.1 (L) 08/09/2023    HCT 39.1 (L) 08/09/2023     08/09/2023    CHOL 161 07/22/2023    TRIG 1570 (H) 07/22/2023    HDL 22.1 (A) 07/22/2023    LDLDIRECT 32 03/05/2022    ALT 22 07/22/2023    AST 23 07/22/2023     08/09/2023    K 4.2 08/09/2023     08/09/2023    CREATININE 0.82 08/09/2023    BUN 12 08/09/2023    CO2 27 08/09/2023    TSH 1.21 07/22/2023    INR 1.0 08/09/2023    HGBA1C 6.7 (A) 07/22/2023           Physical Exam  Vitals reviewed.   Constitutional:       Appearance: Normal appearance. He is obese.   HENT:      Head: Normocephalic and atraumatic.      Mouth/Throat:      Pharynx: No posterior oropharyngeal erythema.   Eyes:      General: No scleral icterus.     Conjunctiva/sclera:  Conjunctivae normal.      Pupils: Pupils are equal, round, and reactive to light.   Cardiovascular:      Rate and Rhythm: Normal rate and regular rhythm.      Heart sounds: Normal heart sounds.   Pulmonary:      Effort: No respiratory distress.      Breath sounds: No wheezing.   Abdominal:      General: Abdomen is flat. Bowel sounds are normal. There is no distension.      Palpations: Abdomen is soft. There is no mass.      Tenderness: There is no abdominal tenderness. There is no rebound.   Musculoskeletal:         General: Normal range of motion.      Cervical back: Normal range of motion and neck supple.   Skin:     General: Skin is warm and dry.   Neurological:      General: No focal deficit present.      Mental Status: He is alert and oriented to person, place, and time. Mental status is at baseline.   Psychiatric:         Mood and Affect: Mood normal.         Behavior: Behavior normal.         Thought Content: Thought content normal.         Judgment: Judgment normal.         Problem List Items Addressed This Visit          Cardiac and Vasculature    PAD (peripheral artery disease) (CMS/[facility])    Relevant Medications    gabapentin (Neurontin) 300 mg capsule       Endocrine/Metabolic    Vitamin D deficiency    Relevant Medications    cholecalciferol (Vitamin D-3) 5,000 Units tablet       Gastrointestinal and Abdominal    GERD (gastroesophageal reflux disease)       Mental Health    Anxiety disorder - Primary    Relevant Medications    amitriptyline (Elavil) 10 mg tablet    Other Relevant Orders    Referral to Psychiatry     Other Visit Diagnoses       Coronary artery disease involving native coronary artery of native heart without angina pectoris        Type 2 diabetes mellitus with other specified complication, unspecified whether long term insulin use (CMS/[facility])        Relevant Medications    empagliflozin (Jardiance) 25 mg    glipiZIDE (Glucotrol) 10 mg tablet    metFORMIN (Glucophage) 1,000 mg tablet     Hypercholesteremia        Class 3 severe obesity due to excess calories with serious comorbidity and body mass index (BMI) of 45.0 to 49.9 in adult (CMS/[facility])              Assessment/Plan       Post ER follow up / tests rev'd    Chest pain lt side sharp, no radiation, sharp   No association to diet or exercise  No nausea or diaphoresis  Worsening anxiety lately  Psych consult  Was forgetting elavil told to restart    retinopathy with macular edema  Retinal specialist following  Getting eye shots     Patient complained of shortness of breath fatigue  no chest heaviness intermittent.  There was no relation to exertion or diet.   Cardio following / Dr Zavala  Echo and stress test rev'd     DM type II -insulin-dependent  FBS not checked  this am / no meter  Freestyle jonny 3 given  Continue meds  Follow blood sugars closely.  HBA1C 6.7  7-23     CAD / Hypercholesterolemia on therapy no side effects  Follow up cardio     GERD stable on diet     Depression  on therapy no side effects    Vitamin D deficiency on suplementation     Colonoscopy due after cardio follow up     Diet and exercise reviewed    Follow up as scheduled

## 2023-08-11 RX ORDER — METFORMIN HYDROCHLORIDE 1000 MG/1
1000 TABLET ORAL EVERY 12 HOURS
Qty: 180 TABLET | Refills: 0 | Status: SHIPPED | OUTPATIENT
Start: 2023-08-11 | End: 2023-10-17 | Stop reason: SDUPTHER

## 2023-08-11 RX ORDER — GABAPENTIN 300 MG/1
900 CAPSULE ORAL 3 TIMES DAILY
Qty: 270 CAPSULE | Refills: 2 | Status: SHIPPED | OUTPATIENT
Start: 2023-08-11 | End: 2023-08-14 | Stop reason: SDUPTHER

## 2023-08-14 ENCOUNTER — PATIENT OUTREACH (OUTPATIENT)
Dept: CARE COORDINATION | Facility: CLINIC | Age: 53
End: 2023-08-14
Payer: COMMERCIAL

## 2023-08-14 NOTE — PROGRESS NOTES
08/14/2023 Outreached patient who went to the ED for chest pain, he was told it is not cardiac in nature but anxiety. He is in the process of finding a provider. No needs at this time hank

## 2023-08-15 RX ORDER — GABAPENTIN 300 MG/1
900 CAPSULE ORAL 3 TIMES DAILY
Qty: 270 CAPSULE | Refills: 2 | Status: SHIPPED | OUTPATIENT
Start: 2023-08-15 | End: 2024-04-22 | Stop reason: SDUPTHER

## 2023-08-24 ENCOUNTER — TELEPHONE (OUTPATIENT)
Dept: PRIMARY CARE | Facility: CLINIC | Age: 53
End: 2023-08-24
Payer: COMMERCIAL

## 2023-08-24 ENCOUNTER — HOSPITAL ENCOUNTER (OUTPATIENT)
Dept: DATA CONVERSION | Facility: HOSPITAL | Age: 53
Discharge: HOME | End: 2023-08-26
Payer: COMMERCIAL

## 2023-08-24 DIAGNOSIS — R51.9 HEADACHE, UNSPECIFIED: ICD-10-CM

## 2023-08-24 DIAGNOSIS — H53.2 DIPLOPIA: ICD-10-CM

## 2023-08-24 DIAGNOSIS — R06.01 ORTHOPNEA: ICD-10-CM

## 2023-08-24 DIAGNOSIS — M79.89 OTHER SPECIFIED SOFT TISSUE DISORDERS: ICD-10-CM

## 2023-08-24 DIAGNOSIS — Z79.84 LONG TERM (CURRENT) USE OF ORAL HYPOGLYCEMIC DRUGS: ICD-10-CM

## 2023-08-24 DIAGNOSIS — Z79.4 LONG TERM (CURRENT) USE OF INSULIN (MULTI): ICD-10-CM

## 2023-08-24 DIAGNOSIS — G47.33 OBSTRUCTIVE SLEEP APNEA (ADULT) (PEDIATRIC): ICD-10-CM

## 2023-08-24 DIAGNOSIS — Z79.82 LONG TERM (CURRENT) USE OF ASPIRIN: ICD-10-CM

## 2023-08-24 DIAGNOSIS — I10 ESSENTIAL (PRIMARY) HYPERTENSION: ICD-10-CM

## 2023-08-24 DIAGNOSIS — H53.47 HETERONYMOUS BILATERAL FIELD DEFECTS: ICD-10-CM

## 2023-08-24 DIAGNOSIS — I25.10 ATHEROSCLEROTIC HEART DISEASE OF NATIVE CORONARY ARTERY WITHOUT ANGINA PECTORIS: ICD-10-CM

## 2023-08-24 DIAGNOSIS — F41.9 ANXIETY DISORDER, UNSPECIFIED: ICD-10-CM

## 2023-08-24 DIAGNOSIS — E11.319 TYPE 2 DIABETES MELLITUS WITH UNSPECIFIED DIABETIC RETINOPATHY WITHOUT MACULAR EDEMA (MULTI): ICD-10-CM

## 2023-08-24 DIAGNOSIS — E11.40 TYPE 2 DIABETES MELLITUS WITH DIABETIC NEUROPATHY, UNSPECIFIED (MULTI): ICD-10-CM

## 2023-08-24 DIAGNOSIS — E66.01 MORBID (SEVERE) OBESITY DUE TO EXCESS CALORIES (MULTI): ICD-10-CM

## 2023-08-24 DIAGNOSIS — Z79.899 OTHER LONG TERM (CURRENT) DRUG THERAPY: ICD-10-CM

## 2023-08-24 LAB
ANION GAP SERPL CALCULATED.3IONS-SCNC: 17 MMOL/L (ref 0–19)
ANTICOAGULANT: NORMAL
APTT PPP: 25.6 SEC (ref 22–32.5)
BASOPHILS # BLD AUTO: 0.05 K/UL (ref 0–0.22)
BASOPHILS NFR BLD AUTO: 0.8 % (ref 0–1)
BUN SERPL-MCNC: 21 MG/DL (ref 8–25)
BUN/CREAT SERPL: 21 RATIO (ref 8–21)
CALCIUM SERPL-MCNC: 9.7 MG/DL (ref 8.5–10.4)
CHLORIDE SERPL-SCNC: 96 MMOL/L (ref 97–107)
CO2 SERPL-SCNC: 21 MMOL/L (ref 24–31)
CREAT SERPL-MCNC: 1 MG/DL (ref 0.4–1.6)
DEPRECATED RDW RBC AUTO: 46.8 FL (ref 37–54)
DIFFERENTIAL METHOD BLD: ABNORMAL
EOSINOPHIL # BLD AUTO: 0.09 K/UL (ref 0–0.45)
EOSINOPHIL NFR BLD: 1.4 % (ref 0–3)
ERYTHROCYTE [DISTWIDTH] IN BLOOD BY AUTOMATED COUNT: 14.5 % (ref 11.7–15)
GFR SERPL CREATININE-BSD FRML MDRD: 89 ML/MIN/1.73 M2
GLUCOSE SERPL-MCNC: 208 MG/DL (ref 65–99)
HBA1C MFR BLD: 6.9 % (ref 4–6)
HCT VFR BLD AUTO: 37.2 % (ref 41–50)
HGB BLD-MCNC: 13.7 GM/DL (ref 13.5–16.5)
HS TROPONIN T DELTA: 0 (ref 0–4)
HS TROPONIN T DELTA: ABNORMAL (ref 0–4)
IMM GRANULOCYTES # BLD AUTO: 0.07 K/UL (ref 0–0.1)
INR PPP: 0.9 (ref 0.86–1.16)
LYMPHOCYTES # BLD AUTO: 2.35 K/UL (ref 1.2–3.2)
LYMPHOCYTES NFR BLD MANUAL: 36.7 % (ref 20–40)
MCH RBC QN AUTO: 32.7 PG (ref 26–34)
MCHC RBC AUTO-ENTMCNC: 36.8 % (ref 31–37)
MCV RBC AUTO: 88.8 FL (ref 80–100)
MONOCYTES # BLD AUTO: 0.58 K/UL (ref 0–0.8)
MONOCYTES NFR BLD MANUAL: 9.1 % (ref 0–8)
NEUTROPHILS # BLD AUTO: 3.26 K/UL
NEUTROPHILS # BLD AUTO: 3.26 K/UL (ref 1.8–7.7)
NEUTROPHILS.IMMATURE NFR BLD: 1.1 % (ref 0–1)
NEUTS SEG NFR BLD: 50.9 % (ref 50–70)
NRBC BLD-RTO: 0 /100 WBC
NT-PROBNP SERPL-MCNC: 36 PG/ML (ref 0–177)
PLATELET # BLD AUTO: 198 K/UL (ref 150–450)
PMV BLD AUTO: 10.6 CU (ref 7–12.6)
POTASSIUM SERPL-SCNC: 3.9 MMOL/L (ref 3.4–5.1)
PROTHROMBIN TIME: 9.8 SEC (ref 9.3–12.7)
RBC # BLD AUTO: 4.19 M/UL (ref 4.5–5.5)
SODIUM SERPL-SCNC: 134 MMOL/L (ref 133–145)
TROPONIN T SERPL-MCNC: 19 NG/L
TROPONIN T SERPL-MCNC: 19 NG/L
WBC # BLD AUTO: 6.4 K/UL (ref 4.5–11)

## 2023-08-24 NOTE — TELEPHONE ENCOUNTER
Jono wu' retina doctor called us to let us know patient is having double vision and upon examination is not eye related she is suggesting that he have a stroke work up. MARTIN advised me to advised the patient to go to ER right away.

## 2023-08-25 LAB
ALBUMIN SERPL-MCNC: 4.5 GM/DL (ref 3.5–5)
ALBUMIN/GLOB SERPL: 1.9 RATIO (ref 1.5–3)
ALP BLD-CCNC: 99 U/L (ref 35–125)
ALT SERPL-CCNC: 21 U/L (ref 5–40)
ANION GAP SERPL CALCULATED.3IONS-SCNC: 16 MMOL/L (ref 0–19)
AST SERPL-CCNC: 23 U/L (ref 5–40)
BILIRUB SERPL-MCNC: 0.5 MG/DL (ref 0.1–1.2)
BUN SERPL-MCNC: 19 MG/DL (ref 8–25)
BUN/CREAT SERPL: 21.1 RATIO (ref 8–21)
CALCIUM SERPL-MCNC: 9.5 MG/DL (ref 8.5–10.4)
CHLORIDE SERPL-SCNC: 101 MMOL/L (ref 97–107)
CO2 SERPL-SCNC: 19 MMOL/L (ref 24–31)
CREAT SERPL-MCNC: 0.9 MG/DL (ref 0.4–1.6)
DEPRECATED RDW RBC AUTO: 48 FL (ref 37–54)
ERYTHROCYTE [DISTWIDTH] IN BLOOD BY AUTOMATED COUNT: 14.8 % (ref 11.7–15)
GFR SERPL CREATININE-BSD FRML MDRD: 101 ML/MIN/1.73 M2
GLOBULIN SER-MCNC: 2.4 G/DL (ref 1.9–3.7)
GLUCOSE BLD STRIP.AUTO-MCNC: 130 MG/DL (ref 65–99)
GLUCOSE BLD STRIP.AUTO-MCNC: 165 MG/DL (ref 65–99)
GLUCOSE BLD STRIP.AUTO-MCNC: 219 MG/DL (ref 65–99)
GLUCOSE BLD STRIP.AUTO-MCNC: 241 MG/DL (ref 65–99)
GLUCOSE SERPL-MCNC: 224 MG/DL (ref 65–99)
HBA1C MFR BLD: 6.8 % (ref 4–6)
HCT VFR BLD AUTO: 37.2 % (ref 41–50)
HGB BLD-MCNC: 12.7 GM/DL (ref 13.5–16.5)
MCH RBC QN AUTO: 30.6 PG (ref 26–34)
MCHC RBC AUTO-ENTMCNC: 34.1 % (ref 31–37)
MCV RBC AUTO: 89.6 FL (ref 80–100)
NRBC BLD-RTO: 0 /100 WBC
PLATELET # BLD AUTO: 179 K/UL (ref 150–450)
PMV BLD AUTO: 11 CU (ref 7–12.6)
POTASSIUM SERPL-SCNC: 4.2 MMOL/L (ref 3.4–5.1)
PROT SERPL-MCNC: 6.9 G/DL (ref 5.9–7.9)
RBC # BLD AUTO: 4.15 M/UL (ref 4.5–5.5)
SODIUM SERPL-SCNC: 136 MMOL/L (ref 133–145)
WBC # BLD AUTO: 6.2 K/UL (ref 4.5–11)

## 2023-08-26 LAB
CRP SERPL-MCNC: 0.4 MG/DL (ref 0–2)
ERYTHROCYTE [SEDIMENTATION RATE] IN BLOOD BY WESTERGREN METHOD: 5 MM/HR (ref 0–20)
GLUCOSE BLD STRIP.AUTO-MCNC: 189 MG/DL (ref 65–99)
GLUCOSE BLD STRIP.AUTO-MCNC: 222 MG/DL (ref 65–99)
VIT B12 SERPL-MCNC: 646 PG/ML (ref 211–946)

## 2023-08-29 LAB
ACHR BIND AB SER-SCNC: NORMAL NMOL/L
ACHR BLOCK AB/ACHR TOTAL SFR SER: NORMAL %

## 2023-08-30 LAB
ACHR MOD AB/ACHR TOTAL SFR SER: NORMAL %
B BURGDOR.VLSE1+PEPC10 AB SER IA-ACNC: NORMAL

## 2023-08-31 RX ORDER — BUSPIRONE HYDROCHLORIDE 15 MG/1
15 TABLET ORAL 3 TIMES DAILY
COMMUNITY
Start: 2023-08-18

## 2023-09-05 ENCOUNTER — OFFICE VISIT (OUTPATIENT)
Dept: PRIMARY CARE | Facility: CLINIC | Age: 53
End: 2023-09-05
Payer: COMMERCIAL

## 2023-09-05 VITALS
WEIGHT: 298.4 LBS | SYSTOLIC BLOOD PRESSURE: 116 MMHG | HEART RATE: 95 BPM | OXYGEN SATURATION: 96 % | DIASTOLIC BLOOD PRESSURE: 72 MMHG | BODY MASS INDEX: 44.07 KG/M2

## 2023-09-05 DIAGNOSIS — E55.9 VITAMIN D DEFICIENCY: ICD-10-CM

## 2023-09-05 DIAGNOSIS — E66.01 CLASS 3 SEVERE OBESITY DUE TO EXCESS CALORIES WITH SERIOUS COMORBIDITY AND BODY MASS INDEX (BMI) OF 40.0 TO 44.9 IN ADULT (MULTI): ICD-10-CM

## 2023-09-05 DIAGNOSIS — K21.9 GASTROESOPHAGEAL REFLUX DISEASE WITHOUT ESOPHAGITIS: ICD-10-CM

## 2023-09-05 DIAGNOSIS — I25.10 CORONARY ARTERY DISEASE INVOLVING NATIVE CORONARY ARTERY OF NATIVE HEART WITHOUT ANGINA PECTORIS: ICD-10-CM

## 2023-09-05 DIAGNOSIS — E11.69 TYPE 2 DIABETES MELLITUS WITH OTHER SPECIFIED COMPLICATION, UNSPECIFIED WHETHER LONG TERM INSULIN USE (MULTI): Primary | ICD-10-CM

## 2023-09-05 DIAGNOSIS — F32.A DEPRESSION, UNSPECIFIED DEPRESSION TYPE: ICD-10-CM

## 2023-09-05 DIAGNOSIS — E78.00 HYPERCHOLESTEREMIA: ICD-10-CM

## 2023-09-05 DIAGNOSIS — E11.311 DIABETIC RETINOPATHY OF BOTH EYES WITH MACULAR EDEMA ASSOCIATED WITH TYPE 2 DIABETES MELLITUS, UNSPECIFIED RETINOPATHY SEVERITY (MULTI): ICD-10-CM

## 2023-09-05 PROCEDURE — 3044F HG A1C LEVEL LT 7.0%: CPT | Performed by: INTERNAL MEDICINE

## 2023-09-05 PROCEDURE — 99214 OFFICE O/P EST MOD 30 MIN: CPT | Performed by: INTERNAL MEDICINE

## 2023-09-05 PROCEDURE — 3074F SYST BP LT 130 MM HG: CPT | Performed by: INTERNAL MEDICINE

## 2023-09-05 PROCEDURE — 1036F TOBACCO NON-USER: CPT | Performed by: INTERNAL MEDICINE

## 2023-09-05 PROCEDURE — 3078F DIAST BP <80 MM HG: CPT | Performed by: INTERNAL MEDICINE

## 2023-09-05 PROCEDURE — 3008F BODY MASS INDEX DOCD: CPT | Performed by: INTERNAL MEDICINE

## 2023-09-05 NOTE — PROGRESS NOTES
Subjective   Patient ID: Jono Quinn is a 53 y.o. male who presents for Med Management, Anxiety, Diabetes Mellitus, and Hyperlipidemia.  Anxiety        Hyperlipidemia    Post ED follow up    Dx CAD / hypercholesterolemia on rx   Cardio following  Cath pending    retinopathy with macular edema  Retinal specialist following  Prism pending  Getting eye shots     DM type II -insulin-dependent   this am  Continue meds  Follow blood sugars closely.  HBA1C 6.7  7-23     GERD stable on diet     Depression  on therapy no side effects     Vitamin D deficiency on suplementation     Colonoscopy due after cardio follow up     Diet and exercise reviewed    Review of Systems   All other systems reviewed and are negative.      Objective   /72   Pulse 95   Wt 135 kg (298 lb 6.4 oz)   SpO2 96%   BMI 44.07 kg/m²   Lab Results   Component Value Date    WBC 6.1 08/09/2023    HGB 13.1 (L) 08/09/2023    HCT 39.1 (L) 08/09/2023     08/09/2023    CHOL 161 07/22/2023    TRIG 1570 (H) 07/22/2023    HDL 22.1 (A) 07/22/2023    LDLDIRECT 32 03/05/2022    ALT 22 07/22/2023    AST 23 07/22/2023     08/09/2023    K 4.2 08/09/2023     08/09/2023    CREATININE 0.82 08/09/2023    BUN 12 08/09/2023    CO2 27 08/09/2023    TSH 1.21 07/22/2023    INR 1.0 08/09/2023    HGBA1C 6.7 (A) 07/22/2023           Physical Exam  Vitals reviewed.   Constitutional:       Appearance: Normal appearance. He is obese.   HENT:      Head: Normocephalic and atraumatic.      Mouth/Throat:      Pharynx: No posterior oropharyngeal erythema.   Eyes:      General: No scleral icterus.     Conjunctiva/sclera: Conjunctivae normal.      Pupils: Pupils are equal, round, and reactive to light.   Cardiovascular:      Rate and Rhythm: Normal rate and regular rhythm.      Heart sounds: Normal heart sounds.   Pulmonary:      Effort: No respiratory distress.      Breath sounds: No wheezing.   Abdominal:      General: Abdomen is flat. Bowel sounds are  normal. There is no distension.      Palpations: Abdomen is soft. There is no mass.      Tenderness: There is no abdominal tenderness. There is no rebound.   Musculoskeletal:         General: Normal range of motion.      Cervical back: Normal range of motion and neck supple.   Skin:     General: Skin is warm and dry.   Neurological:      General: No focal deficit present.      Mental Status: He is alert and oriented to person, place, and time. Mental status is at baseline.   Psychiatric:         Mood and Affect: Mood normal.         Behavior: Behavior normal.         Thought Content: Thought content normal.         Judgment: Judgment normal.       Problem List Items Addressed This Visit          Endocrine/Metabolic    Vitamin D deficiency       Gastrointestinal and Abdominal    GERD (gastroesophageal reflux disease)       Mental Health    Depression     Other Visit Diagnoses       Type 2 diabetes mellitus with other specified complication, unspecified whether long term insulin use (CMS/[facility])    -  Primary    Coronary artery disease involving native coronary artery of native heart without angina pectoris        Hypercholesteremia        Diabetic retinopathy of both eyes with macular edema associated with type 2 diabetes mellitus, unspecified retinopathy severity (CMS/[facility])        Class 3 severe obesity due to excess calories with serious comorbidity and body mass index (BMI) of 40.0 to 44.9 in adult (CMS/[facility])              Assessment/Plan     follow up    CAD / hypercholesterolemia on rx   Cardio following  Needs repatha will defer to cardio  Cath pending    retinopathy with macular edema  Retinal specialist following  Prism pending  Getting eye shots     DM type II -insulin-dependent   this am  Continue meds  Follow blood sugars closely.  HBA1C 6.7  7-23     GERD stable on diet     Depression  on therapy no side effects     Vitamin D deficiency on suplementation     Colonoscopy due after cardio follow up      Diet and exercise reviewed    Follow up 6 weeks

## 2023-09-08 ENCOUNTER — TELEPHONE (OUTPATIENT)
Dept: PRIMARY CARE | Facility: CLINIC | Age: 53
End: 2023-09-08
Payer: COMMERCIAL

## 2023-09-08 LAB — TSI ACT/NOR SER: NORMAL

## 2023-09-08 NOTE — TELEPHONE ENCOUNTER
Patient called for the return to work letter to be emailed to himself.  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    Is there a return to work date for this patient?    Please advise

## 2023-09-12 NOTE — TELEPHONE ENCOUNTER
Per Dr. Gordon, RTW was pending when prism was going to be available thru ophtho     Patient notified and verbalized understanding.

## 2023-09-13 ENCOUNTER — DOCUMENTATION (OUTPATIENT)
Dept: CARE COORDINATION | Facility: CLINIC | Age: 53
End: 2023-09-13
Payer: COMMERCIAL

## 2023-09-13 NOTE — PROGRESS NOTES
Outreach call to patient to check in 30 days after hospital discharge to support smooth transition of care.  Left a voice mail for Jono.  hank

## 2023-09-29 DIAGNOSIS — F32.A DEPRESSION, UNSPECIFIED DEPRESSION TYPE: ICD-10-CM

## 2023-09-29 DIAGNOSIS — E11.40 TYPE 2 DIABETES MELLITUS WITH DIABETIC NEUROPATHY, UNSPECIFIED WHETHER LONG TERM INSULIN USE (MULTI): ICD-10-CM

## 2023-10-02 RX ORDER — PAROXETINE 30 MG/1
30 TABLET, FILM COATED ORAL DAILY
Qty: 30 TABLET | Refills: 0 | Status: SHIPPED | OUTPATIENT
Start: 2023-10-02 | End: 2023-11-14

## 2023-10-03 RX ORDER — INSULIN DETEMIR 100 [IU]/ML
INJECTION, SOLUTION SUBCUTANEOUS
Qty: 30 ML | Refills: 1 | Status: SHIPPED | OUTPATIENT
Start: 2023-10-03 | End: 2023-10-09 | Stop reason: SDUPTHER

## 2023-10-03 NOTE — TELEPHONE ENCOUNTER
PT CALLED AND STATED CT HAS BEEN CANCELED AND THERE IS NOT A RESCHEDULE YET. ALSO, PT SAID [facility]S OFFICE FOUND A NODULE ON THYROID. THERE IS A OFFICE NOTE FROM THEIR OFFICE IN IMAGING FROM 09/07. PT WANTED TO TELL YOU HE IS GETTING PACEMAKER ON 10/18 AT THE [facility].

## 2023-10-04 DIAGNOSIS — E11.40 TYPE 2 DIABETES MELLITUS WITH DIABETIC NEUROPATHY, UNSPECIFIED WHETHER LONG TERM INSULIN USE (MULTI): ICD-10-CM

## 2023-10-04 RX ORDER — INSULIN DETEMIR 100 [IU]/ML
INJECTION, SOLUTION SUBCUTANEOUS
Qty: 90 ML | Refills: 1 | OUTPATIENT
Start: 2023-10-04

## 2023-10-06 PROBLEM — E11.319 DIABETIC RETINOPATHY (MULTI): Status: ACTIVE | Noted: 2023-10-06

## 2023-10-06 PROBLEM — I10 BENIGN ESSENTIAL HYPERTENSION: Status: ACTIVE | Noted: 2023-10-06

## 2023-10-06 PROBLEM — E66.9 DIABETES MELLITUS TYPE 2 IN OBESE: Status: ACTIVE | Noted: 2023-10-06

## 2023-10-06 PROBLEM — E11.69 DIABETES MELLITUS TYPE 2 IN OBESE: Status: ACTIVE | Noted: 2023-10-06

## 2023-10-06 PROBLEM — H53.2 DIPLOPIA: Status: ACTIVE | Noted: 2023-10-06

## 2023-10-07 DIAGNOSIS — I65.29 CAROTID ATHEROSCLEROSIS, UNSPECIFIED LATERALITY: ICD-10-CM

## 2023-10-07 DIAGNOSIS — E11.40 TYPE 2 DIABETES MELLITUS WITH DIABETIC NEUROPATHY, UNSPECIFIED WHETHER LONG TERM INSULIN USE (MULTI): ICD-10-CM

## 2023-10-09 RX ORDER — FENOFIBRATE 200 MG/1
200 CAPSULE ORAL DAILY
Qty: 30 CAPSULE | Refills: 0 | Status: SHIPPED | OUTPATIENT
Start: 2023-10-09 | End: 2023-11-14

## 2023-10-09 RX ORDER — ROSUVASTATIN CALCIUM 40 MG/1
40 TABLET, COATED ORAL DAILY
Qty: 30 TABLET | Refills: 0 | Status: SHIPPED | OUTPATIENT
Start: 2023-10-09 | End: 2023-11-14

## 2023-10-09 RX ORDER — INSULIN DETEMIR 100 [IU]/ML
INJECTION, SOLUTION SUBCUTANEOUS
Qty: 90 ML | Refills: 0 | Status: SHIPPED | OUTPATIENT
Start: 2023-10-09 | End: 2024-04-16 | Stop reason: SDUPTHER

## 2023-10-11 ENCOUNTER — APPOINTMENT (OUTPATIENT)
Dept: PRIMARY CARE | Facility: CLINIC | Age: 53
End: 2023-10-11
Payer: COMMERCIAL

## 2023-10-16 DIAGNOSIS — E11.69 TYPE 2 DIABETES MELLITUS WITH OTHER SPECIFIED COMPLICATION, UNSPECIFIED WHETHER LONG TERM INSULIN USE (MULTI): ICD-10-CM

## 2023-10-16 DIAGNOSIS — E11.42 DIABETIC POLYNEUROPATHY ASSOCIATED WITH TYPE 2 DIABETES MELLITUS (MULTI): ICD-10-CM

## 2023-10-16 DIAGNOSIS — E11.311 DIABETIC RETINOPATHY OF BOTH EYES WITH MACULAR EDEMA ASSOCIATED WITH TYPE 2 DIABETES MELLITUS, UNSPECIFIED RETINOPATHY SEVERITY (MULTI): ICD-10-CM

## 2023-10-17 DIAGNOSIS — E55.9 VITAMIN D DEFICIENCY: ICD-10-CM

## 2023-10-17 DIAGNOSIS — F41.9 ANXIETY DISORDER, UNSPECIFIED TYPE: ICD-10-CM

## 2023-10-17 DIAGNOSIS — E11.69 TYPE 2 DIABETES MELLITUS WITH OTHER SPECIFIED COMPLICATION, UNSPECIFIED WHETHER LONG TERM INSULIN USE (MULTI): ICD-10-CM

## 2023-10-17 RX ORDER — METFORMIN HYDROCHLORIDE 1000 MG/1
1000 TABLET ORAL EVERY 12 HOURS
Qty: 60 TABLET | Refills: 0 | Status: SHIPPED | OUTPATIENT
Start: 2023-10-17 | End: 2023-11-13 | Stop reason: SDUPTHER

## 2023-10-17 RX ORDER — ACETAMINOPHEN 500 MG
5000 TABLET ORAL DAILY
Qty: 90 TABLET | Refills: 0 | Status: SHIPPED | OUTPATIENT
Start: 2023-10-17 | End: 2024-01-15

## 2023-10-17 RX ORDER — AMITRIPTYLINE HYDROCHLORIDE 10 MG/1
10 TABLET, FILM COATED ORAL NIGHTLY
Qty: 30 TABLET | Refills: 0 | Status: SHIPPED | OUTPATIENT
Start: 2023-10-17 | End: 2023-11-28 | Stop reason: SDUPTHER

## 2023-10-17 RX ORDER — INSULIN LISPRO 100 [IU]/ML
40 INJECTION, SOLUTION INTRAVENOUS; SUBCUTANEOUS
Qty: 108 ML | Refills: 0 | Status: SHIPPED | OUTPATIENT
Start: 2023-10-17 | End: 2023-11-28 | Stop reason: SDUPTHER

## 2023-10-17 RX ORDER — EMPAGLIFLOZIN 25 MG/1
25 TABLET, FILM COATED ORAL DAILY
Qty: 30 TABLET | Refills: 0 | Status: SHIPPED | OUTPATIENT
Start: 2023-10-17 | End: 2023-10-26 | Stop reason: SDUPTHER

## 2023-10-23 DIAGNOSIS — F32.A DEPRESSION, UNSPECIFIED DEPRESSION TYPE: ICD-10-CM

## 2023-10-23 DIAGNOSIS — E55.9 VITAMIN D DEFICIENCY: ICD-10-CM

## 2023-10-23 RX ORDER — ACETAMINOPHEN 500 MG
5000 TABLET ORAL DAILY
Qty: 90 TABLET | Refills: 0 | Status: CANCELLED | OUTPATIENT
Start: 2023-10-23 | End: 2024-01-21

## 2023-10-24 ENCOUNTER — PREP FOR PROCEDURE (OUTPATIENT)
Dept: RADIOLOGY | Facility: HOSPITAL | Age: 53
End: 2023-10-24

## 2023-10-24 ENCOUNTER — HOSPITAL ENCOUNTER (OUTPATIENT)
Dept: RADIOLOGY | Facility: HOSPITAL | Age: 53
Discharge: HOME | End: 2023-10-24
Payer: COMMERCIAL

## 2023-10-24 VITALS
BODY MASS INDEX: 41.52 KG/M2 | TEMPERATURE: 98 F | WEIGHT: 290 LBS | OXYGEN SATURATION: 98 % | DIASTOLIC BLOOD PRESSURE: 71 MMHG | RESPIRATION RATE: 18 BRPM | SYSTOLIC BLOOD PRESSURE: 106 MMHG | HEIGHT: 70 IN | HEART RATE: 66 BPM

## 2023-10-24 DIAGNOSIS — I25.119 ATHEROSCLEROTIC HEART DISEASE OF NATIVE CORONARY ARTERY WITH UNSPECIFIED ANGINA PECTORIS (CMS-HCC): ICD-10-CM

## 2023-10-24 DIAGNOSIS — R93.1 ABNORMAL FINDINGS ON DIAGNOSTIC IMAGING OF HEART AND CORONARY CIRCULATION: ICD-10-CM

## 2023-10-24 DIAGNOSIS — R07.9 CHEST PAIN, UNSPECIFIED: ICD-10-CM

## 2023-10-24 LAB
CREAT SERPL-MCNC: 0.8 MG/DL (ref 0.6–1.3)
GFR SERPL CREATININE-BSD FRML MDRD: >90 ML/MIN/1.73M*2

## 2023-10-24 PROCEDURE — 82565 ASSAY OF CREATININE: CPT

## 2023-10-24 PROCEDURE — 0504T CT ANGIO HEART CORONARY: CPT | Mod: LIO | Performed by: RADIOLOGY

## 2023-10-24 PROCEDURE — 2500000001 HC RX 250 WO HCPCS SELF ADMINISTERED DRUGS (ALT 637 FOR MEDICARE OP): Performed by: RADIOLOGY

## 2023-10-24 PROCEDURE — 75880 VEIN X-RAY EYE SOCKET: CPT

## 2023-10-24 PROCEDURE — 75574 CT ANGIO HRT W/3D IMAGE: CPT | Mod: LIO

## 2023-10-24 PROCEDURE — 2500000005 HC RX 250 GENERAL PHARMACY W/O HCPCS: Performed by: RADIOLOGY

## 2023-10-24 PROCEDURE — 2550000001 HC RX 255 CONTRASTS: Performed by: INTERNAL MEDICINE

## 2023-10-24 PROCEDURE — 75574 CT ANGIO HRT W/3D IMAGE: CPT | Mod: LIO | Performed by: RADIOLOGY

## 2023-10-24 RX ORDER — NITROGLYCERIN 0.4 MG/1
0.8 TABLET SUBLINGUAL ONCE
Status: CANCELLED | OUTPATIENT
Start: 2023-10-24 | End: 2023-10-24

## 2023-10-24 RX ORDER — METOPROLOL TARTRATE 100 MG/1
100 TABLET ORAL ONCE
Status: CANCELLED | OUTPATIENT
Start: 2023-10-24 | End: 2023-10-24

## 2023-10-24 RX ORDER — METOPROLOL TARTRATE 1 MG/ML
5 INJECTION, SOLUTION INTRAVENOUS ONCE AS NEEDED
Status: CANCELLED | OUTPATIENT
Start: 2023-10-24

## 2023-10-24 RX ORDER — METOPROLOL TARTRATE 1 MG/ML
5 INJECTION, SOLUTION INTRAVENOUS ONCE
Status: COMPLETED | OUTPATIENT
Start: 2023-10-24 | End: 2023-10-24

## 2023-10-24 RX ORDER — METOPROLOL TARTRATE 50 MG/1
100 TABLET ORAL ONCE AS NEEDED
Status: DISCONTINUED | OUTPATIENT
Start: 2023-10-24 | End: 2023-10-25 | Stop reason: HOSPADM

## 2023-10-24 RX ORDER — METOPROLOL TARTRATE 1 MG/ML
5 INJECTION, SOLUTION INTRAVENOUS ONCE AS NEEDED
Status: COMPLETED | OUTPATIENT
Start: 2023-10-24 | End: 2023-10-24

## 2023-10-24 RX ORDER — LORAZEPAM 2 MG/ML
0.5 INJECTION INTRAMUSCULAR EVERY 5 MIN PRN
Status: CANCELLED | OUTPATIENT
Start: 2023-10-24

## 2023-10-24 RX ORDER — METOPROLOL TARTRATE 100 MG/1
100 TABLET ORAL ONCE AS NEEDED
Status: CANCELLED | OUTPATIENT
Start: 2023-10-24

## 2023-10-24 RX ORDER — METOPROLOL TARTRATE 1 MG/ML
5 INJECTION, SOLUTION INTRAVENOUS ONCE
Status: CANCELLED | OUTPATIENT
Start: 2023-10-24 | End: 2023-10-24

## 2023-10-24 RX ORDER — LORAZEPAM 2 MG/ML
0.5 INJECTION INTRAMUSCULAR EVERY 5 MIN PRN
Status: DISCONTINUED | OUTPATIENT
Start: 2023-10-24 | End: 2023-10-25 | Stop reason: HOSPADM

## 2023-10-24 RX ORDER — METOPROLOL TARTRATE 50 MG/1
100 TABLET ORAL ONCE
Status: DISCONTINUED | OUTPATIENT
Start: 2023-10-24 | End: 2023-10-25 | Stop reason: HOSPADM

## 2023-10-24 RX ORDER — NITROGLYCERIN 0.4 MG/1
0.8 TABLET SUBLINGUAL ONCE
Status: COMPLETED | OUTPATIENT
Start: 2023-10-24 | End: 2023-10-24

## 2023-10-24 RX ADMIN — METOPROLOL TARTRATE 5 MG: 1 INJECTION, SOLUTION INTRAVENOUS at 13:30

## 2023-10-24 RX ADMIN — METOPROLOL TARTRATE 5 MG: 1 INJECTION, SOLUTION INTRAVENOUS at 13:20

## 2023-10-24 RX ADMIN — NITROGLYCERIN 0.8 MG: 0.4 TABLET SUBLINGUAL at 13:40

## 2023-10-24 RX ADMIN — METOPROLOL TARTRATE 5 MG: 1 INJECTION, SOLUTION INTRAVENOUS at 13:10

## 2023-10-24 RX ADMIN — METOPROLOL TARTRATE 5 MG: 1 INJECTION, SOLUTION INTRAVENOUS at 13:15

## 2023-10-24 RX ADMIN — IOHEXOL 75 ML: 350 INJECTION, SOLUTION INTRAVENOUS at 13:40

## 2023-10-24 RX ADMIN — METOPROLOL TARTRATE 5 MG: 1 INJECTION, SOLUTION INTRAVENOUS at 13:25

## 2023-10-26 ENCOUNTER — OFFICE VISIT (OUTPATIENT)
Dept: PRIMARY CARE | Facility: CLINIC | Age: 53
End: 2023-10-26
Payer: COMMERCIAL

## 2023-10-26 VITALS
OXYGEN SATURATION: 100 % | DIASTOLIC BLOOD PRESSURE: 64 MMHG | SYSTOLIC BLOOD PRESSURE: 116 MMHG | HEART RATE: 95 BPM | BODY MASS INDEX: 41.96 KG/M2 | WEIGHT: 292.4 LBS

## 2023-10-26 DIAGNOSIS — E04.1 THYROID NODULE: ICD-10-CM

## 2023-10-26 DIAGNOSIS — E66.01 CLASS 3 SEVERE OBESITY DUE TO EXCESS CALORIES WITH SERIOUS COMORBIDITY AND BODY MASS INDEX (BMI) OF 40.0 TO 44.9 IN ADULT (MULTI): ICD-10-CM

## 2023-10-26 DIAGNOSIS — I25.10 CORONARY ARTERY DISEASE INVOLVING NATIVE CORONARY ARTERY OF NATIVE HEART WITHOUT ANGINA PECTORIS: ICD-10-CM

## 2023-10-26 DIAGNOSIS — E11.311 DIABETIC RETINOPATHY OF BOTH EYES WITH MACULAR EDEMA ASSOCIATED WITH TYPE 2 DIABETES MELLITUS, UNSPECIFIED RETINOPATHY SEVERITY (MULTI): Primary | ICD-10-CM

## 2023-10-26 DIAGNOSIS — E78.00 HYPERCHOLESTEREMIA: ICD-10-CM

## 2023-10-26 DIAGNOSIS — F33.2 MDD (MAJOR DEPRESSIVE DISORDER), RECURRENT SEVERE, WITHOUT PSYCHOSIS (MULTI): ICD-10-CM

## 2023-10-26 PROCEDURE — 3044F HG A1C LEVEL LT 7.0%: CPT | Performed by: INTERNAL MEDICINE

## 2023-10-26 PROCEDURE — 3008F BODY MASS INDEX DOCD: CPT | Performed by: INTERNAL MEDICINE

## 2023-10-26 PROCEDURE — 99214 OFFICE O/P EST MOD 30 MIN: CPT | Performed by: INTERNAL MEDICINE

## 2023-10-26 PROCEDURE — 1036F TOBACCO NON-USER: CPT | Performed by: INTERNAL MEDICINE

## 2023-10-26 PROCEDURE — 3074F SYST BP LT 130 MM HG: CPT | Performed by: INTERNAL MEDICINE

## 2023-10-26 PROCEDURE — 3078F DIAST BP <80 MM HG: CPT | Performed by: INTERNAL MEDICINE

## 2023-10-26 NOTE — PROGRESS NOTES
Subjective   Patient ID: Jono Quinn is a 53 y.o. male who presents for Med Management, Anxiety, Diabetes Mellitus, and Diabetic Retinopathy.  Anxiety          Follow up    CAD / hypercholesterolemia on rx   Cardio following  Needs repatha will defer to cardio  Cath not done  CT angio coronary art flow abnormal no cath approved by insurance  Check lipid  Carotid ultrasound revealed thyroid nodule check ultrasound    retinopathy with macular edema  Retinal specialist following  [facility]   Getting eye shots     DM type II -insulin-dependent   this am  Continue meds  Follow blood sugars closely.  HBA1C 6.7  7-23  Check HBA1C     GERD stable on diet     Depression  better on therapy no side effects     Vitamin D deficiency on suplementation     Colonoscopy due after cardio follow up     Diet and exercise reviewed    Review of Systems   All other systems reviewed and are negative.      Objective   /64   Pulse 95   Wt 133 kg (292 lb 6.4 oz)   SpO2 100%   BMI 41.96 kg/m²   Lab Results   Component Value Date    WBC 6.2 08/25/2023    HGB 12.7 (L) 08/25/2023    HCT 37.2 (L) 08/25/2023     08/25/2023    CHOL 161 07/22/2023    TRIG 1570 (H) 07/22/2023    HDL 22.1 (A) 07/22/2023    LDLDIRECT 32 03/05/2022    ALT 21 08/25/2023    AST 23 08/25/2023     08/25/2023    K 4.2 08/25/2023     08/25/2023    CREATININE 0.9 08/25/2023    BUN 19 08/25/2023    CO2 19 (L) 08/25/2023    TSH 1.21 07/22/2023    INR 0.9 08/24/2023    HGBA1C 6.8 (H) 08/25/2023           Physical Exam  Vitals reviewed.   Constitutional:       Appearance: Normal appearance. He is obese.   HENT:      Head: Normocephalic and atraumatic.      Mouth/Throat:      Pharynx: No posterior oropharyngeal erythema.   Eyes:      General: No scleral icterus.     Conjunctiva/sclera: Conjunctivae normal.      Pupils: Pupils are equal, round, and reactive to light.      Comments: Lt eye patch   Cardiovascular:      Rate and Rhythm: Normal rate and  regular rhythm.      Heart sounds: Normal heart sounds.   Pulmonary:      Effort: No respiratory distress.      Breath sounds: No wheezing.   Abdominal:      General: Abdomen is flat. Bowel sounds are normal. There is no distension.      Palpations: Abdomen is soft. There is no mass.      Tenderness: There is no abdominal tenderness. There is no rebound.   Musculoskeletal:         General: Normal range of motion.      Cervical back: Normal range of motion and neck supple.   Skin:     General: Skin is warm and dry.   Neurological:      General: No focal deficit present.      Mental Status: He is alert and oriented to person, place, and time. Mental status is at baseline.   Psychiatric:         Mood and Affect: Mood normal.         Behavior: Behavior normal.         Thought Content: Thought content normal.         Judgment: Judgment normal.         Problem List Items Addressed This Visit             ICD-10-CM       Eye    Diabetic retinopathy (CMS/[facility]) - Primary E11.319    Relevant Orders    Hemoglobin A1C       Mental Health    MDD (major depressive disorder), recurrent severe, without psychosis (CMS/[facility]) F33.2     Other Visit Diagnoses         Codes    Coronary artery disease involving native coronary artery of native heart without angina pectoris     I25.10    Hypercholesteremia     E78.00    Relevant Orders    Lipid Panel    Comprehensive Metabolic Panel    Thyroid nodule     E04.1    Relevant Orders    US thyroid    Class 3 severe obesity due to excess calories with serious comorbidity and body mass index (BMI) of 40.0 to 44.9 in adult (CMS/[facility])     E66.01, Z68.41          Assessment/Plan     Follow up    CAD / hypercholesterolemia on rx   Cardio following  Needs repatha will defer to cardio  Cath not done  CT angio coronary art flow abnormal no cath approved by insurance  Check lipid  Carotid ultrasound revealed thyroid nodule check ultrasound    retinopathy with macular edema  Retinal specialist  following  [facility]   Getting eye shots     DM type II -insulin-dependent   this am  Continue meds  Follow blood sugars closely.  HBA1C 6.7  7-23  Check HBA1C     GERD stable on diet     Depression  better on therapy no side effects     Vitamin D deficiency on suplementation     Colonoscopy due after cardio follow up     Diet and exercise reviewed    Follow up 1 month

## 2023-10-31 ENCOUNTER — ANCILLARY PROCEDURE (OUTPATIENT)
Dept: RADIOLOGY | Facility: CLINIC | Age: 53
End: 2023-10-31
Payer: COMMERCIAL

## 2023-10-31 ENCOUNTER — LAB (OUTPATIENT)
Dept: LAB | Facility: LAB | Age: 53
End: 2023-10-31
Payer: COMMERCIAL

## 2023-10-31 DIAGNOSIS — E78.00 HYPERCHOLESTEREMIA: ICD-10-CM

## 2023-10-31 DIAGNOSIS — E11.311 DIABETIC RETINOPATHY OF BOTH EYES WITH MACULAR EDEMA ASSOCIATED WITH TYPE 2 DIABETES MELLITUS, UNSPECIFIED RETINOPATHY SEVERITY (MULTI): ICD-10-CM

## 2023-10-31 DIAGNOSIS — E04.1 THYROID NODULE: ICD-10-CM

## 2023-10-31 PROCEDURE — 36415 COLL VENOUS BLD VENIPUNCTURE: CPT

## 2023-10-31 PROCEDURE — 76536 US EXAM OF HEAD AND NECK: CPT | Performed by: RADIOLOGY

## 2023-10-31 PROCEDURE — 76536 US EXAM OF HEAD AND NECK: CPT

## 2023-11-01 DIAGNOSIS — E11.69 TYPE 2 DIABETES MELLITUS WITH OTHER SPECIFIED COMPLICATION, UNSPECIFIED WHETHER LONG TERM INSULIN USE (MULTI): Primary | ICD-10-CM

## 2023-11-01 DIAGNOSIS — E78.00 HYPERCHOLESTEREMIA: Primary | ICD-10-CM

## 2023-11-12 DIAGNOSIS — E11.69 TYPE 2 DIABETES MELLITUS WITH OTHER SPECIFIED COMPLICATION, UNSPECIFIED WHETHER LONG TERM INSULIN USE (MULTI): ICD-10-CM

## 2023-11-12 DIAGNOSIS — I65.29 CAROTID ATHEROSCLEROSIS, UNSPECIFIED LATERALITY: ICD-10-CM

## 2023-11-12 DIAGNOSIS — F32.A DEPRESSION, UNSPECIFIED DEPRESSION TYPE: ICD-10-CM

## 2023-11-14 RX ORDER — PAROXETINE 30 MG/1
TABLET, FILM COATED ORAL
Qty: 30 TABLET | Refills: 0 | Status: SHIPPED | OUTPATIENT
Start: 2023-11-14 | End: 2023-12-20

## 2023-11-14 RX ORDER — METFORMIN HYDROCHLORIDE 1000 MG/1
1000 TABLET ORAL EVERY 12 HOURS
Qty: 180 TABLET | Refills: 0 | Status: SHIPPED | OUTPATIENT
Start: 2023-11-14 | End: 2023-12-19 | Stop reason: SDUPTHER

## 2023-11-14 RX ORDER — ROSUVASTATIN CALCIUM 40 MG/1
40 TABLET, COATED ORAL DAILY
Qty: 30 TABLET | Refills: 0 | Status: SHIPPED | OUTPATIENT
Start: 2023-11-14 | End: 2024-02-29 | Stop reason: SDUPTHER

## 2023-11-14 RX ORDER — FENOFIBRATE 200 MG/1
200 CAPSULE ORAL DAILY
Qty: 30 CAPSULE | Refills: 0 | Status: SHIPPED | OUTPATIENT
Start: 2023-11-14 | End: 2023-11-28 | Stop reason: SDUPTHER

## 2023-11-14 RX ORDER — GLIPIZIDE 10 MG/1
10 TABLET ORAL
Qty: 60 TABLET | Refills: 0 | Status: SHIPPED | OUTPATIENT
Start: 2023-11-14 | End: 2023-11-28 | Stop reason: SDUPTHER

## 2023-11-18 ENCOUNTER — LAB (OUTPATIENT)
Dept: LAB | Facility: LAB | Age: 53
End: 2023-11-18
Payer: COMMERCIAL

## 2023-11-18 DIAGNOSIS — E78.00 HYPERCHOLESTEREMIA: ICD-10-CM

## 2023-11-18 DIAGNOSIS — E11.69 TYPE 2 DIABETES MELLITUS WITH OTHER SPECIFIED COMPLICATION, UNSPECIFIED WHETHER LONG TERM INSULIN USE (MULTI): ICD-10-CM

## 2023-11-18 LAB
ALBUMIN SERPL BCP-MCNC: 4.7 G/DL (ref 3.4–5)
ALP SERPL-CCNC: 77 U/L (ref 33–120)
ALT SERPL W P-5'-P-CCNC: 33 U/L (ref 10–52)
ANION GAP SERPL CALC-SCNC: 13 MMOL/L (ref 10–20)
AST SERPL W P-5'-P-CCNC: 32 U/L (ref 9–39)
BILIRUB SERPL-MCNC: 0.6 MG/DL (ref 0–1.2)
BUN SERPL-MCNC: 12 MG/DL (ref 6–23)
CALCIUM SERPL-MCNC: 10.7 MG/DL (ref 8.6–10.3)
CHLORIDE SERPL-SCNC: 100 MMOL/L (ref 98–107)
CHOLEST SERPL-MCNC: 126 MG/DL (ref 0–199)
CHOLESTEROL/HDL RATIO: 5.3
CO2 SERPL-SCNC: 28 MMOL/L (ref 21–32)
CREAT SERPL-MCNC: 0.92 MG/DL (ref 0.5–1.3)
EST. AVERAGE GLUCOSE BLD GHB EST-MCNC: 123 MG/DL
GFR SERPL CREATININE-BSD FRML MDRD: >90 ML/MIN/1.73M*2
GLUCOSE SERPL-MCNC: 139 MG/DL (ref 74–99)
HBA1C MFR BLD: 5.9 %
HDLC SERPL-MCNC: 24 MG/DL
LDLC SERPL CALC-MCNC: ABNORMAL MG/DL
NON HDL CHOLESTEROL: 102 MG/DL (ref 0–149)
POTASSIUM SERPL-SCNC: 4.7 MMOL/L (ref 3.5–5.3)
PROT SERPL-MCNC: 7 G/DL (ref 6.4–8.2)
SODIUM SERPL-SCNC: 136 MMOL/L (ref 136–145)
TRIGL SERPL-MCNC: 1102 MG/DL (ref 0–149)
VLDL: ABNORMAL

## 2023-11-18 PROCEDURE — 83036 HEMOGLOBIN GLYCOSYLATED A1C: CPT

## 2023-11-18 PROCEDURE — 36415 COLL VENOUS BLD VENIPUNCTURE: CPT

## 2023-11-22 ENCOUNTER — TELEPHONE (OUTPATIENT)
Dept: PRIMARY CARE | Facility: CLINIC | Age: 53
End: 2023-11-22
Payer: COMMERCIAL

## 2023-11-22 NOTE — TELEPHONE ENCOUNTER
Pt is schedule for Heart Cath with Dr. Zavala on 12/6.  He is asking what vaccines do you recommend beforehand.  Please advise

## 2023-11-25 ENCOUNTER — LAB (OUTPATIENT)
Dept: LAB | Facility: LAB | Age: 53
End: 2023-11-25
Payer: COMMERCIAL

## 2023-11-25 DIAGNOSIS — R94.31 ABNORMAL ELECTROCARDIOGRAM (ECG) (EKG): ICD-10-CM

## 2023-11-25 DIAGNOSIS — I25.119 ATHEROSCLEROTIC HEART DISEASE OF NATIVE CORONARY ARTERY WITH UNSPECIFIED ANGINA PECTORIS (CMS-HCC): Primary | ICD-10-CM

## 2023-11-25 LAB
ANION GAP SERPL CALC-SCNC: 11 MMOL/L (ref 10–20)
APTT PPP: 32 SECONDS (ref 27–38)
BUN SERPL-MCNC: 15 MG/DL (ref 6–23)
CALCIUM SERPL-MCNC: 10.2 MG/DL (ref 8.6–10.3)
CHLORIDE SERPL-SCNC: 103 MMOL/L (ref 98–107)
CO2 SERPL-SCNC: 26 MMOL/L (ref 21–32)
CREAT SERPL-MCNC: 0.93 MG/DL (ref 0.5–1.3)
ERYTHROCYTE [DISTWIDTH] IN BLOOD BY AUTOMATED COUNT: 14.9 % (ref 11.5–14.5)
GFR SERPL CREATININE-BSD FRML MDRD: >90 ML/MIN/1.73M*2
GLUCOSE SERPL-MCNC: 141 MG/DL (ref 74–99)
HCT VFR BLD AUTO: 39.9 % (ref 41–52)
HGB BLD-MCNC: 13.2 G/DL (ref 13.5–17.5)
INR PPP: 0.9 (ref 0.9–1.1)
MCH RBC QN AUTO: 29.9 PG (ref 26–34)
MCHC RBC AUTO-ENTMCNC: 33.1 G/DL (ref 32–36)
MCV RBC AUTO: 91 FL (ref 80–100)
NRBC BLD-RTO: 0 /100 WBCS (ref 0–0)
PLATELET # BLD AUTO: 180 X10*3/UL (ref 150–450)
POTASSIUM SERPL-SCNC: 4.3 MMOL/L (ref 3.5–5.3)
PROTHROMBIN TIME: 10.1 SECONDS (ref 9.8–12.8)
RBC # BLD AUTO: 4.41 X10*6/UL (ref 4.5–5.9)
SODIUM SERPL-SCNC: 136 MMOL/L (ref 136–145)
WBC # BLD AUTO: 5.7 X10*3/UL (ref 4.4–11.3)

## 2023-11-25 PROCEDURE — 36415 COLL VENOUS BLD VENIPUNCTURE: CPT

## 2023-11-26 DIAGNOSIS — F41.9 ANXIETY DISORDER, UNSPECIFIED TYPE: ICD-10-CM

## 2023-11-26 DIAGNOSIS — E11.69 TYPE 2 DIABETES MELLITUS WITH OTHER SPECIFIED COMPLICATION, UNSPECIFIED WHETHER LONG TERM INSULIN USE (MULTI): ICD-10-CM

## 2023-11-28 ENCOUNTER — OFFICE VISIT (OUTPATIENT)
Dept: PRIMARY CARE | Facility: CLINIC | Age: 53
End: 2023-11-28
Payer: COMMERCIAL

## 2023-11-28 VITALS
BODY MASS INDEX: 42.53 KG/M2 | SYSTOLIC BLOOD PRESSURE: 106 MMHG | WEIGHT: 296.4 LBS | DIASTOLIC BLOOD PRESSURE: 64 MMHG | HEART RATE: 91 BPM | OXYGEN SATURATION: 98 %

## 2023-11-28 DIAGNOSIS — E04.1 THYROID NODULE: ICD-10-CM

## 2023-11-28 DIAGNOSIS — I25.10 CORONARY ARTERY DISEASE INVOLVING NATIVE CORONARY ARTERY OF NATIVE HEART WITHOUT ANGINA PECTORIS: ICD-10-CM

## 2023-11-28 DIAGNOSIS — Z71.2 ENCOUNTER TO DISCUSS TEST RESULTS: Primary | ICD-10-CM

## 2023-11-28 DIAGNOSIS — F32.A DEPRESSION, UNSPECIFIED DEPRESSION TYPE: ICD-10-CM

## 2023-11-28 DIAGNOSIS — E78.00 HYPERCHOLESTEROLEMIA: ICD-10-CM

## 2023-11-28 DIAGNOSIS — E66.01 CLASS 3 SEVERE OBESITY DUE TO EXCESS CALORIES WITH SERIOUS COMORBIDITY AND BODY MASS INDEX (BMI) OF 40.0 TO 44.9 IN ADULT (MULTI): ICD-10-CM

## 2023-11-28 DIAGNOSIS — E11.311 DIABETIC RETINOPATHY OF BOTH EYES WITH MACULAR EDEMA ASSOCIATED WITH TYPE 2 DIABETES MELLITUS, UNSPECIFIED RETINOPATHY SEVERITY (MULTI): ICD-10-CM

## 2023-11-28 DIAGNOSIS — E11.69 TYPE 2 DIABETES MELLITUS WITH OTHER SPECIFIED COMPLICATION, UNSPECIFIED WHETHER LONG TERM INSULIN USE (MULTI): ICD-10-CM

## 2023-11-28 DIAGNOSIS — F41.9 ANXIETY DISORDER, UNSPECIFIED TYPE: ICD-10-CM

## 2023-11-28 PROCEDURE — 99214 OFFICE O/P EST MOD 30 MIN: CPT | Performed by: INTERNAL MEDICINE

## 2023-11-28 PROCEDURE — 3078F DIAST BP <80 MM HG: CPT | Performed by: INTERNAL MEDICINE

## 2023-11-28 PROCEDURE — 3044F HG A1C LEVEL LT 7.0%: CPT | Performed by: INTERNAL MEDICINE

## 2023-11-28 PROCEDURE — 1036F TOBACCO NON-USER: CPT | Performed by: INTERNAL MEDICINE

## 2023-11-28 PROCEDURE — 3008F BODY MASS INDEX DOCD: CPT | Performed by: INTERNAL MEDICINE

## 2023-11-28 PROCEDURE — 3074F SYST BP LT 130 MM HG: CPT | Performed by: INTERNAL MEDICINE

## 2023-11-28 RX ORDER — FENOFIBRATE 200 MG/1
200 CAPSULE ORAL DAILY
Qty: 90 CAPSULE | Refills: 0 | Status: SHIPPED | OUTPATIENT
Start: 2023-11-28 | End: 2024-02-29 | Stop reason: SDUPTHER

## 2023-11-28 RX ORDER — GLIPIZIDE 10 MG/1
10 TABLET ORAL
Qty: 180 TABLET | Refills: 0 | Status: SHIPPED | OUTPATIENT
Start: 2023-11-28 | End: 2024-01-16 | Stop reason: SDUPTHER

## 2023-11-28 RX ORDER — BUSPIRONE HYDROCHLORIDE 15 MG/1
15 TABLET ORAL 3 TIMES DAILY
Status: CANCELLED | OUTPATIENT
Start: 2023-11-28

## 2023-11-28 RX ORDER — AMITRIPTYLINE HYDROCHLORIDE 10 MG/1
10 TABLET, FILM COATED ORAL NIGHTLY
Qty: 90 TABLET | Refills: 0 | Status: SHIPPED | OUTPATIENT
Start: 2023-11-28 | End: 2024-05-03

## 2023-11-28 RX ORDER — AMITRIPTYLINE HYDROCHLORIDE 10 MG/1
10 TABLET, FILM COATED ORAL NIGHTLY
Qty: 30 TABLET | Refills: 0 | OUTPATIENT
Start: 2023-11-28 | End: 2023-12-27

## 2023-11-28 RX ORDER — GLIPIZIDE 10 MG/1
10 TABLET ORAL
Qty: 60 TABLET | Refills: 1 | OUTPATIENT
Start: 2023-11-28

## 2023-11-28 NOTE — PROGRESS NOTES
Subjective   Patient ID: Jono Quinn is a 53 y.o. male who presents for 1 month med management, Results (Review Cardio results (Dr. Zavala)), and reaction at injection site (Insulin inj. Site is hard, redness).  HPI    Follow up tests    CAD / hypercholesterolemia on rx   Cardio following  Cath 12-6 -23    thyroid nodule   Thyroid ultrasound neg 10-23    retinopathy with macular edema  Retinal specialist following  [facility]   Getting eye shots     DM type II -insulin-dependent   this am  Continue meds  Follow blood sugars closely.  HBA1C 5.9   11-23     GERD stable on diet     Depression  better on therapy no side effects     Vitamin D deficiency on suplementation     Colonoscopy due after cardio follow up     Diet and exercise reviewed    Review of Systems   All other systems reviewed and are negative.      Objective   /64   Pulse 91   Wt 134 kg (296 lb 6.4 oz)   SpO2 98%   BMI 42.53 kg/m²   Lab Results   Component Value Date    WBC 5.7 11/25/2023    HGB 13.2 (L) 11/25/2023    HCT 39.9 (L) 11/25/2023     11/25/2023    CHOL 126 11/18/2023    TRIG 1,102 (H) 11/18/2023    HDL 24.0 11/18/2023    LDLDIRECT 32 03/05/2022    ALT 33 11/18/2023    AST 32 11/18/2023     11/25/2023    K 4.3 11/25/2023     11/25/2023    CREATININE 0.93 11/25/2023    BUN 15 11/25/2023    CO2 26 11/25/2023    TSH 1.21 07/22/2023    INR 0.9 11/25/2023    HGBA1C 5.9 (H) 11/18/2023           Physical Exam  Vitals reviewed.   Constitutional:       Appearance: Normal appearance. He is obese.   HENT:      Head: Normocephalic and atraumatic.      Mouth/Throat:      Pharynx: No posterior oropharyngeal erythema.   Eyes:      General: No scleral icterus.     Conjunctiva/sclera: Conjunctivae normal.      Pupils: Pupils are equal, round, and reactive to light.      Comments: Left eye prism   Cardiovascular:      Rate and Rhythm: Normal rate and regular rhythm.      Heart sounds: Normal heart sounds.   Pulmonary:       Effort: No respiratory distress.      Breath sounds: No wheezing.   Abdominal:      General: Abdomen is flat. Bowel sounds are normal. There is no distension.      Palpations: Abdomen is soft. There is no mass.      Tenderness: There is no abdominal tenderness. There is no rebound.   Musculoskeletal:         General: Normal range of motion.      Cervical back: Normal range of motion and neck supple.   Skin:     General: Skin is warm and dry.   Neurological:      General: No focal deficit present.      Mental Status: He is alert and oriented to person, place, and time. Mental status is at baseline.   Psychiatric:         Mood and Affect: Mood normal.         Behavior: Behavior normal.         Thought Content: Thought content normal.         Judgment: Judgment normal.         Problem List Items Addressed This Visit             ICD-10-CM       Cardiac and Vasculature    Hypercholesterolemia E78.00    Relevant Medications    fenofibrate micronized (Lofibra) 200 mg capsule       Eye    Diabetic retinopathy (CMS/[facility]) E11.319       Mental Health    Depression F32.A     Other Visit Diagnoses         Codes    Encounter to discuss test results    -  Primary Z71.2    Thyroid nodule     E04.1    Coronary artery disease involving native coronary artery of native heart without angina pectoris     I25.10    Class 3 severe obesity due to excess calories with serious comorbidity and body mass index (BMI) of 40.0 to 44.9 in adult (CMS/[facility])     E66.01, Z68.41          Assessment/Plan     Follow up    CAD / hypercholesterolemia on rx   Cardio following  Cath 12-6 -23    thyroid nodule   Thyroid ultrasound neg 10-23    retinopathy with macular edema  Retinal specialist following  [facility]   Getting eye shots     DM type II -insulin-dependent   this am  Continue meds  Follow blood sugars closely.  HBA1C 5.9   11-23     GERD stable on diet     Depression  better on therapy no side effects     Vitamin D deficiency on suplementation      Colonoscopy due after cardio follow up     Diet and exercise reviewed    Follow up 3 months

## 2023-11-28 NOTE — H&P (VIEW-ONLY)
Subjective   Patient ID: Jono Quinn is a 53 y.o. male who presents for 1 month med management, Results (Review Cardio results (Dr. Zavala)), and reaction at injection site (Insulin inj. Site is hard, redness).  HPI    Follow up tests    CAD / hypercholesterolemia on rx   Cardio following  Cath 12-6 -23    thyroid nodule   Thyroid ultrasound neg 10-23    retinopathy with macular edema  Retinal specialist following  [facility]   Getting eye shots     DM type II -insulin-dependent   this am  Continue meds  Follow blood sugars closely.  HBA1C 5.9   11-23     GERD stable on diet     Depression  better on therapy no side effects     Vitamin D deficiency on suplementation     Colonoscopy due after cardio follow up     Diet and exercise reviewed    Review of Systems   All other systems reviewed and are negative.      Objective   /64   Pulse 91   Wt 134 kg (296 lb 6.4 oz)   SpO2 98%   BMI 42.53 kg/m²   Lab Results   Component Value Date    WBC 5.7 11/25/2023    HGB 13.2 (L) 11/25/2023    HCT 39.9 (L) 11/25/2023     11/25/2023    CHOL 126 11/18/2023    TRIG 1,102 (H) 11/18/2023    HDL 24.0 11/18/2023    LDLDIRECT 32 03/05/2022    ALT 33 11/18/2023    AST 32 11/18/2023     11/25/2023    K 4.3 11/25/2023     11/25/2023    CREATININE 0.93 11/25/2023    BUN 15 11/25/2023    CO2 26 11/25/2023    TSH 1.21 07/22/2023    INR 0.9 11/25/2023    HGBA1C 5.9 (H) 11/18/2023           Physical Exam  Vitals reviewed.   Constitutional:       Appearance: Normal appearance. He is obese.   HENT:      Head: Normocephalic and atraumatic.      Mouth/Throat:      Pharynx: No posterior oropharyngeal erythema.   Eyes:      General: No scleral icterus.     Conjunctiva/sclera: Conjunctivae normal.      Pupils: Pupils are equal, round, and reactive to light.      Comments: Left eye prism   Cardiovascular:      Rate and Rhythm: Normal rate and regular rhythm.      Heart sounds: Normal heart sounds.   Pulmonary:       Effort: No respiratory distress.      Breath sounds: No wheezing.   Abdominal:      General: Abdomen is flat. Bowel sounds are normal. There is no distension.      Palpations: Abdomen is soft. There is no mass.      Tenderness: There is no abdominal tenderness. There is no rebound.   Musculoskeletal:         General: Normal range of motion.      Cervical back: Normal range of motion and neck supple.   Skin:     General: Skin is warm and dry.   Neurological:      General: No focal deficit present.      Mental Status: He is alert and oriented to person, place, and time. Mental status is at baseline.   Psychiatric:         Mood and Affect: Mood normal.         Behavior: Behavior normal.         Thought Content: Thought content normal.         Judgment: Judgment normal.         Problem List Items Addressed This Visit             ICD-10-CM       Cardiac and Vasculature    Hypercholesterolemia E78.00    Relevant Medications    fenofibrate micronized (Lofibra) 200 mg capsule       Eye    Diabetic retinopathy (CMS/[facility]) E11.319       Mental Health    Depression F32.A     Other Visit Diagnoses         Codes    Encounter to discuss test results    -  Primary Z71.2    Thyroid nodule     E04.1    Coronary artery disease involving native coronary artery of native heart without angina pectoris     I25.10    Class 3 severe obesity due to excess calories with serious comorbidity and body mass index (BMI) of 40.0 to 44.9 in adult (CMS/[facility])     E66.01, Z68.41          Assessment/Plan     Follow up    CAD / hypercholesterolemia on rx   Cardio following  Cath 12-6 -23    thyroid nodule   Thyroid ultrasound neg 10-23    retinopathy with macular edema  Retinal specialist following  [facility]   Getting eye shots     DM type II -insulin-dependent   this am  Continue meds  Follow blood sugars closely.  HBA1C 5.9   11-23     GERD stable on diet     Depression  better on therapy no side effects     Vitamin D deficiency on suplementation      Colonoscopy due after cardio follow up     Diet and exercise reviewed    Follow up 3 months

## 2023-12-06 ENCOUNTER — HOSPITAL ENCOUNTER (OUTPATIENT)
Facility: HOSPITAL | Age: 53
Setting detail: OUTPATIENT SURGERY
Discharge: HOME | End: 2023-12-06
Attending: INTERNAL MEDICINE | Admitting: INTERNAL MEDICINE
Payer: COMMERCIAL

## 2023-12-06 VITALS
WEIGHT: 288 LBS | OXYGEN SATURATION: 99 % | DIASTOLIC BLOOD PRESSURE: 81 MMHG | HEART RATE: 77 BPM | HEIGHT: 69 IN | RESPIRATION RATE: 14 BRPM | SYSTOLIC BLOOD PRESSURE: 141 MMHG | BODY MASS INDEX: 42.65 KG/M2

## 2023-12-06 DIAGNOSIS — R93.1 ABNORMAL FINDINGS DIAGNOSTIC IMAGING OF HEART AND CORONARY CIRCULATION: ICD-10-CM

## 2023-12-06 DIAGNOSIS — I25.119 ATHEROSCLEROSIS OF NATIVE CORONARY ARTERY OF NATIVE HEART WITH ANGINA PECTORIS (CMS-HCC): ICD-10-CM

## 2023-12-06 PROCEDURE — C1894 INTRO/SHEATH, NON-LASER: HCPCS | Performed by: INTERNAL MEDICINE

## 2023-12-06 PROCEDURE — 7100000010 HC PHASE TWO TIME - EACH INCREMENTAL 1 MINUTE: Performed by: INTERNAL MEDICINE

## 2023-12-06 PROCEDURE — 2500000004 HC RX 250 GENERAL PHARMACY W/ HCPCS (ALT 636 FOR OP/ED): Performed by: INTERNAL MEDICINE

## 2023-12-06 PROCEDURE — G0269 OCCLUSIVE DEVICE IN VEIN ART: HCPCS | Mod: TC,59 | Performed by: INTERNAL MEDICINE

## 2023-12-06 PROCEDURE — 93454 CORONARY ARTERY ANGIO S&I: CPT | Performed by: INTERNAL MEDICINE

## 2023-12-06 PROCEDURE — 99152 MOD SED SAME PHYS/QHP 5/>YRS: CPT | Performed by: INTERNAL MEDICINE

## 2023-12-06 PROCEDURE — 7100000009 HC PHASE TWO TIME - INITIAL BASE CHARGE: Performed by: INTERNAL MEDICINE

## 2023-12-06 PROCEDURE — 2550000001 HC RX 255 CONTRASTS: Performed by: INTERNAL MEDICINE

## 2023-12-06 PROCEDURE — 2500000005 HC RX 250 GENERAL PHARMACY W/O HCPCS: Performed by: INTERNAL MEDICINE

## 2023-12-06 PROCEDURE — 2780000003 HC OR 278 NO HCPCS: Performed by: INTERNAL MEDICINE

## 2023-12-06 PROCEDURE — C1760 CLOSURE DEV, VASC: HCPCS | Performed by: INTERNAL MEDICINE

## 2023-12-06 PROCEDURE — 2720000007 HC OR 272 NO HCPCS: Performed by: INTERNAL MEDICINE

## 2023-12-06 RX ORDER — MIDAZOLAM HYDROCHLORIDE 1 MG/ML
INJECTION INTRAMUSCULAR; INTRAVENOUS AS NEEDED
Status: DISCONTINUED | OUTPATIENT
Start: 2023-12-06 | End: 2023-12-06 | Stop reason: HOSPADM

## 2023-12-06 RX ORDER — FENTANYL CITRATE 50 UG/ML
INJECTION, SOLUTION INTRAMUSCULAR; INTRAVENOUS AS NEEDED
Status: DISCONTINUED | OUTPATIENT
Start: 2023-12-06 | End: 2023-12-06 | Stop reason: HOSPADM

## 2023-12-06 RX ORDER — SODIUM CHLORIDE 9 MG/ML
INJECTION, SOLUTION INTRAVENOUS CONTINUOUS PRN
Status: DISCONTINUED | OUTPATIENT
Start: 2023-12-06 | End: 2023-12-06 | Stop reason: HOSPADM

## 2023-12-06 RX ORDER — LIDOCAINE HYDROCHLORIDE 20 MG/ML
INJECTION, SOLUTION INFILTRATION; PERINEURAL AS NEEDED
Status: DISCONTINUED | OUTPATIENT
Start: 2023-12-06 | End: 2023-12-06 | Stop reason: HOSPADM

## 2023-12-06 ASSESSMENT — PAIN SCALES - GENERAL

## 2023-12-06 ASSESSMENT — COLUMBIA-SUICIDE SEVERITY RATING SCALE - C-SSRS
2. HAVE YOU ACTUALLY HAD ANY THOUGHTS OF KILLING YOURSELF?: NO
1. IN THE PAST MONTH, HAVE YOU WISHED YOU WERE DEAD OR WISHED YOU COULD GO TO SLEEP AND NOT WAKE UP?: NO
6. HAVE YOU EVER DONE ANYTHING, STARTED TO DO ANYTHING, OR PREPARED TO DO ANYTHING TO END YOUR LIFE?: NO

## 2023-12-06 NOTE — PRE-SEDATION DOCUMENTATION
Sedation Plan    ASA 1     Mallampati class: I.    Risks, benefits, and alternatives discussed with patient.

## 2023-12-06 NOTE — POST-PROCEDURE NOTE
Physician Transition of Care Summary  Invasive Cardiovascular Lab    Procedure Date: 12/6/2023  Attending:    Ata Pino - Primary  Resident/Fellow/Other Assistant: Surgeon(s) and Role:    Indications:   Pre-op Diagnosis     * Atherosclerosis of native coronary artery of native heart with angina pectoris (CMS/HCC) [I25.119]     * Abnormal findings diagnostic imaging of heart and coronary circulation [R93.1]    Post-procedure diagnosis:   Post-op Diagnosis     * Atherosclerosis of native coronary artery of native heart with angina pectoris (CMS/HCC) [I25.119]     * Abnormal findings diagnostic imaging of heart and coronary circulation [R93.1]    Procedure(s):     * Left Heart Cath      Procedure Findings:   MODERATE LAD DISEASE, MODERATE OM DISEASE, MILD RCA DISEASE MEDICAL THERAPY    Description of the Procedure:   [facility]    Complications:   NONE    Stents/Implants:       Anticoagulation/Antiplatelet Plan:   NA    Estimated Blood Loss:   5 mL    Anesthesia: Moderate Sedation Anesthesia Staff: No anesthesia staff entered.    Any Specimen(s) Removed:   No specimens collected during this procedure.    Disposition:   NA      Electronically signed by: Lucas Pino MD, 12/6/2023 12:00 PM

## 2023-12-07 ENCOUNTER — TELEPHONE (OUTPATIENT)
Dept: PRIMARY CARE | Facility: CLINIC | Age: 53
End: 2023-12-07
Payer: COMMERCIAL

## 2023-12-07 NOTE — TELEPHONE ENCOUNTER
PT CALLED REQUESTING A RETURN TO WORK NOTE FOR 12/06 TO 12/7 AND RETURN TO WORK 12/8. IS THAT OK IF I WRITE THAT UP FOR JULIO?

## 2023-12-19 DIAGNOSIS — F32.A DEPRESSION, UNSPECIFIED DEPRESSION TYPE: ICD-10-CM

## 2023-12-19 DIAGNOSIS — E11.43 DIABETIC AUTONOMIC NEUROPATHY ASSOCIATED WITH TYPE 2 DIABETES MELLITUS (MULTI): ICD-10-CM

## 2023-12-19 DIAGNOSIS — E11.69 TYPE 2 DIABETES MELLITUS WITH OTHER SPECIFIED COMPLICATION, UNSPECIFIED WHETHER LONG TERM INSULIN USE (MULTI): ICD-10-CM

## 2023-12-20 RX ORDER — PAROXETINE 30 MG/1
TABLET, FILM COATED ORAL
Qty: 90 TABLET | Refills: 1 | Status: SHIPPED | OUTPATIENT
Start: 2023-12-20

## 2023-12-20 RX ORDER — METFORMIN HYDROCHLORIDE 1000 MG/1
1000 TABLET ORAL EVERY 12 HOURS
Qty: 180 TABLET | Refills: 0 | Status: SHIPPED | OUTPATIENT
Start: 2023-12-20 | End: 2024-02-29 | Stop reason: SDUPTHER

## 2023-12-20 RX ORDER — BLOOD-GLUCOSE SENSOR
1 EACH MISCELLANEOUS
Qty: 1 EACH | Refills: 3 | Status: SHIPPED | OUTPATIENT
Start: 2023-12-20 | End: 2024-01-16 | Stop reason: SDUPTHER

## 2023-12-21 DIAGNOSIS — E11.311 DIABETIC RETINOPATHY OF BOTH EYES WITH MACULAR EDEMA ASSOCIATED WITH TYPE 2 DIABETES MELLITUS, UNSPECIFIED RETINOPATHY SEVERITY (MULTI): ICD-10-CM

## 2023-12-21 DIAGNOSIS — E11.40 TYPE 2 DIABETES MELLITUS WITH DIABETIC NEUROPATHY, UNSPECIFIED WHETHER LONG TERM INSULIN USE (MULTI): ICD-10-CM

## 2023-12-30 ENCOUNTER — HOSPITAL ENCOUNTER (EMERGENCY)
Facility: HOSPITAL | Age: 53
Discharge: HOME | End: 2023-12-30
Attending: EMERGENCY MEDICINE
Payer: COMMERCIAL

## 2023-12-30 VITALS
HEART RATE: 77 BPM | SYSTOLIC BLOOD PRESSURE: 151 MMHG | TEMPERATURE: 97.6 F | WEIGHT: 295 LBS | HEIGHT: 70 IN | DIASTOLIC BLOOD PRESSURE: 88 MMHG | RESPIRATION RATE: 18 BRPM | OXYGEN SATURATION: 95 % | BODY MASS INDEX: 42.23 KG/M2

## 2023-12-30 DIAGNOSIS — T38.3X5A HYPOGLYCEMIA DUE TO INSULIN: Primary | ICD-10-CM

## 2023-12-30 DIAGNOSIS — E16.0 HYPOGLYCEMIA DUE TO INSULIN: Primary | ICD-10-CM

## 2023-12-30 LAB
ALBUMIN SERPL BCP-MCNC: 4.6 G/DL (ref 3.4–5)
ALP SERPL-CCNC: 126 U/L (ref 33–120)
ALT SERPL W P-5'-P-CCNC: 19 U/L (ref 10–52)
ANION GAP SERPL CALC-SCNC: 17 MMOL/L (ref 10–20)
APPEARANCE UR: CLEAR
AST SERPL W P-5'-P-CCNC: 20 U/L (ref 9–39)
BASOPHILS # BLD AUTO: 0.05 X10*3/UL (ref 0–0.1)
BASOPHILS NFR BLD AUTO: 0.8 %
BILIRUB SERPL-MCNC: 0.5 MG/DL (ref 0–1.2)
BILIRUB UR STRIP.AUTO-MCNC: NEGATIVE MG/DL
BUN SERPL-MCNC: 19 MG/DL (ref 6–23)
CALCIUM SERPL-MCNC: 10 MG/DL (ref 8.6–10.3)
CHLORIDE SERPL-SCNC: 95 MMOL/L (ref 98–107)
CO2 SERPL-SCNC: 24 MMOL/L (ref 21–32)
COLOR UR: YELLOW
CREAT SERPL-MCNC: 1.11 MG/DL (ref 0.5–1.3)
EOSINOPHIL # BLD AUTO: 0.15 X10*3/UL (ref 0–0.7)
EOSINOPHIL NFR BLD AUTO: 2.3 %
ERYTHROCYTE [DISTWIDTH] IN BLOOD BY AUTOMATED COUNT: 14.5 % (ref 11.5–14.5)
GFR SERPL CREATININE-BSD FRML MDRD: 78 ML/MIN/1.73M*2
GLUCOSE BLD MANUAL STRIP-MCNC: 167 MG/DL (ref 74–99)
GLUCOSE BLD MANUAL STRIP-MCNC: 323 MG/DL (ref 74–99)
GLUCOSE SERPL-MCNC: 303 MG/DL (ref 74–99)
GLUCOSE UR STRIP.AUTO-MCNC: ABNORMAL MG/DL
HCT VFR BLD AUTO: 40.1 % (ref 41–52)
HGB BLD-MCNC: 14.4 G/DL (ref 13.5–17.5)
IMM GRANULOCYTES # BLD AUTO: 0.05 X10*3/UL (ref 0–0.7)
IMM GRANULOCYTES NFR BLD AUTO: 0.8 % (ref 0–0.9)
KETONES UR STRIP.AUTO-MCNC: NEGATIVE MG/DL
LACTATE SERPL-SCNC: 1.8 MMOL/L (ref 0.4–2)
LEUKOCYTE ESTERASE UR QL STRIP.AUTO: NEGATIVE
LIPASE SERPL-CCNC: 70 U/L (ref 9–82)
LYMPHOCYTES # BLD AUTO: 2.3 X10*3/UL (ref 1.2–4.8)
LYMPHOCYTES NFR BLD AUTO: 35.5 %
MCH RBC QN AUTO: 31.3 PG (ref 26–34)
MCHC RBC AUTO-ENTMCNC: 35.9 G/DL (ref 32–36)
MCV RBC AUTO: 87 FL (ref 80–100)
MONOCYTES # BLD AUTO: 0.4 X10*3/UL (ref 0.1–1)
MONOCYTES NFR BLD AUTO: 6.2 %
NEUTROPHILS # BLD AUTO: 3.53 X10*3/UL (ref 1.2–7.7)
NEUTROPHILS NFR BLD AUTO: 54.4 %
NITRITE UR QL STRIP.AUTO: NEGATIVE
NRBC BLD-RTO: 0 /100 WBCS (ref 0–0)
PH UR STRIP.AUTO: 7 [PH]
PLATELET # BLD AUTO: 199 X10*3/UL (ref 150–450)
POTASSIUM SERPL-SCNC: 3.7 MMOL/L (ref 3.5–5.3)
PROT SERPL-MCNC: 7 G/DL (ref 6.4–8.2)
PROT UR STRIP.AUTO-MCNC: NEGATIVE MG/DL
RBC # BLD AUTO: 4.6 X10*6/UL (ref 4.5–5.9)
RBC # UR STRIP.AUTO: NEGATIVE /UL
SODIUM SERPL-SCNC: 132 MMOL/L (ref 136–145)
SP GR UR STRIP.AUTO: 1.03
UROBILINOGEN UR STRIP.AUTO-MCNC: <2 MG/DL
WBC # BLD AUTO: 6.5 X10*3/UL (ref 4.4–11.3)

## 2023-12-30 PROCEDURE — 82010 KETONE BODYS QUAN: CPT | Mod: GENLAB | Performed by: EMERGENCY MEDICINE

## 2023-12-30 PROCEDURE — 36415 COLL VENOUS BLD VENIPUNCTURE: CPT | Performed by: EMERGENCY MEDICINE

## 2023-12-30 PROCEDURE — 2500000004 HC RX 250 GENERAL PHARMACY W/ HCPCS (ALT 636 FOR OP/ED): Performed by: EMERGENCY MEDICINE

## 2023-12-30 PROCEDURE — 80053 COMPREHEN METABOLIC PANEL: CPT | Performed by: EMERGENCY MEDICINE

## 2023-12-30 PROCEDURE — 87086 URINE CULTURE/COLONY COUNT: CPT | Mod: GENLAB | Performed by: EMERGENCY MEDICINE

## 2023-12-30 PROCEDURE — 83690 ASSAY OF LIPASE: CPT | Performed by: EMERGENCY MEDICINE

## 2023-12-30 PROCEDURE — 96365 THER/PROPH/DIAG IV INF INIT: CPT

## 2023-12-30 PROCEDURE — 82947 ASSAY GLUCOSE BLOOD QUANT: CPT | Mod: 59

## 2023-12-30 PROCEDURE — 83605 ASSAY OF LACTIC ACID: CPT | Performed by: EMERGENCY MEDICINE

## 2023-12-30 PROCEDURE — 96366 THER/PROPH/DIAG IV INF ADDON: CPT

## 2023-12-30 PROCEDURE — 99284 EMERGENCY DEPT VISIT MOD MDM: CPT | Performed by: EMERGENCY MEDICINE

## 2023-12-30 PROCEDURE — 82947 ASSAY GLUCOSE BLOOD QUANT: CPT | Mod: 59,91

## 2023-12-30 PROCEDURE — 85025 COMPLETE CBC W/AUTO DIFF WBC: CPT | Performed by: EMERGENCY MEDICINE

## 2023-12-30 PROCEDURE — 81003 URINALYSIS AUTO W/O SCOPE: CPT | Performed by: EMERGENCY MEDICINE

## 2023-12-30 PROCEDURE — 99284 EMERGENCY DEPT VISIT MOD MDM: CPT | Mod: 25

## 2023-12-30 RX ORDER — SODIUM CHLORIDE 9 MG/ML
125 INJECTION, SOLUTION INTRAVENOUS CONTINUOUS
Status: DISCONTINUED | OUTPATIENT
Start: 2023-12-30 | End: 2023-12-31 | Stop reason: HOSPADM

## 2023-12-30 RX ORDER — DEXTROSE MONOHYDRATE AND SODIUM CHLORIDE 5; .9 G/100ML; G/100ML
125 INJECTION, SOLUTION INTRAVENOUS CONTINUOUS
Status: DISCONTINUED | OUTPATIENT
Start: 2023-12-30 | End: 2023-12-31 | Stop reason: HOSPADM

## 2023-12-30 RX ADMIN — DEXTROSE AND SODIUM CHLORIDE 125 ML/HR: 5; 900 INJECTION, SOLUTION INTRAVENOUS at 17:33

## 2023-12-30 ASSESSMENT — PAIN SCALES - GENERAL
PAINLEVEL_OUTOF10: 2
PAINLEVEL_OUTOF10: 0 - NO PAIN

## 2023-12-30 ASSESSMENT — PAIN DESCRIPTION - DESCRIPTORS: DESCRIPTORS: ACHING

## 2023-12-30 ASSESSMENT — COLUMBIA-SUICIDE SEVERITY RATING SCALE - C-SSRS
2. HAVE YOU ACTUALLY HAD ANY THOUGHTS OF KILLING YOURSELF?: NO
6. HAVE YOU EVER DONE ANYTHING, STARTED TO DO ANYTHING, OR PREPARED TO DO ANYTHING TO END YOUR LIFE?: NO
1. IN THE PAST MONTH, HAVE YOU WISHED YOU WERE DEAD OR WISHED YOU COULD GO TO SLEEP AND NOT WAKE UP?: NO

## 2023-12-30 ASSESSMENT — PAIN DESCRIPTION - PAIN TYPE: TYPE: ACUTE PAIN

## 2023-12-30 ASSESSMENT — PAIN DESCRIPTION - FREQUENCY: FREQUENCY: CONSTANT/CONTINUOUS

## 2023-12-30 ASSESSMENT — PAIN DESCRIPTION - LOCATION: LOCATION: HEAD

## 2023-12-30 ASSESSMENT — PAIN - FUNCTIONAL ASSESSMENT: PAIN_FUNCTIONAL_ASSESSMENT: 0-10

## 2023-12-30 NOTE — ED PROVIDER NOTES
[facility]  ED  Provider Note  12/30/2023  4:31 PM  AC06/AC06                          History of Present Illness:   Jono Quinn is a 53 y.o. male presenting to the ED for hyperglycemia, beginning just prior to arrival.  The complaint has been remittent, moderate in severity, and worsened by eating.  Patient went to a friend's house earlier today and had a cherry dessert.  He was concerned his sugar might raise too high so he took 40 units of regular insulin.  Over the next 2 hours took an additional 60 units followed by additional 40 units an hour later.  He is planned to go to a birthday party have cake was concerned his sugar again will get out of control.  He feels somewhat dizzy and lightheaded at the birthday party with some blurring of vision and his sugar was over 400.  He is starting to feel better now sugar is below 400 on his  monitor.      Review of Systems:   Pertinent positives and review of systems as noted above.  Remaining 10 review of systems is negative or noncontributory to today's episode of care.  Review of Systems       --------------------------------------------- PAST HISTORY ---------------------------------------------  Past Medical History:  has a past medical history of COVID-19 (11/23/2020), Depression, unspecified (10/26/2022), Diabetes mellitus (CMS/[facility]), Obstructive sleep apnea (adult) (pediatric) (07/11/2022), Other conditions influencing health status (05/16/2022), Personal history of other diseases of the digestive system (11/19/2021), Personal history of other diseases of the nervous system and sense organs, Pure hypercholesterolemia, unspecified (10/26/2022), Testicular hypofunction (07/01/2021), and 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    Past Surgical History:  has a past surgical history that includes Other surgical history (07/13/2020); Other surgical history (07/13/2020); Other surgical history (07/13/2020); Other surgical history (07/13/2020); Other surgical  history (03/01/2022); Other surgical history (03/01/2022); CT angio coronary art with heartflow if score >30% (10/24/2023); and Cardiac catheterization (N/A, 12/6/2023).    Social History:  reports that he has never smoked. He has never been exposed to tobacco smoke. He has never used smokeless tobacco. He reports that he does not drink alcohol and does not use drugs.    Family History: family history is not on file. Unless otherwise noted, family history is non contributory    Patient's Medications   New Prescriptions    No medications on file   Previous Medications    AMITRIPTYLINE (ELAVIL) 10 MG TABLET    Take 1 tablet (10 mg) by mouth once daily at bedtime.    ASPIRIN 81 MG EC TABLET    Take 1 tablet (81 mg) by mouth 2 times a day.    AZELASTINE (ASTELIN) 137 MCG (0.1 %) NASAL SPRAY    Administer 2 sprays into affected nostril(s) once daily.    BLOOD-GLUCOSE SENSOR (XookerSTYLE MOOK 3 SENSOR) DEVICE    1 kit by subcutaneous (via wearable injector) route every 14 (fourteen) days.    BUSPIRONE (BUSPAR) 15 MG TABLET    Take 1 tablet (15 mg) by mouth 3 times a day.    CHOLECALCIFEROL (VITAMIN D-3) 5,000 UNITS TABLET    Take 1 tablet (5,000 Units) by mouth once daily.    EMPAGLIFLOZIN (JARDIANCE) 25 MG    Take 1 tablet (25 mg) by mouth once daily.    FENOFIBRATE MICRONIZED (LOFIBRA) 200 MG CAPSULE    Take 1 capsule (200 mg) by mouth once daily.    GABAPENTIN (NEURONTIN) 300 MG CAPSULE    Take 3 capsules (900 mg) by mouth 3 times a day.    GINSENG 100 MG CAPSULE    Take 200 mg by mouth once daily.    GLIPIZIDE (GLUCOTROL) 10 MG TABLET    Take 1 tablet (10 mg) by mouth 2 times a day before meals.    ICOSAPENT ETHYL (VASCEPA) 1 GRAM CAPSULE    Take 2 capsules (2 g) by mouth 2 times a day.    INSULIN DETEMIR (LEVEMIR FLEXPEN) 100 UNIT/ML (3 ML) PEN    INJECT 110 UNITS UNDER THE SKIN ONCE DAILY IN THE MORNING AS DIRECTED PER INSULIN INSTRUCTIONS    INSULIN LISPRO (HUMALOG KWIKPEN INSULIN) 100 UNIT/ML INJECTION    Max 50  units tid per sliding scale    METFORMIN (GLUCOPHAGE) 1,000 MG TABLET    Take 1 tablet (1,000 mg) by mouth every 12 hours.    MULTIVITAMIN CAPSULE    Take 1 capsule by mouth once daily.    PAROXETINE (PAXIL) 30 MG TABLET    TAKE 1 TABLET EARLY IN THE MORNING (NEED TO SCHEDULE WITH PSYCH FOR REFILLS)    ROSUVASTATIN (CRESTOR) 40 MG TABLET    TAKE 1 TABLET DAILY    VITAMIN B COMPLEX TABLET    Take 1 tablet by mouth once daily.   Modified Medications    No medications on file   Discontinued Medications    No medications on file      The patient’s home medications have been reviewed.    Allergies: Fluticasone propionate, Liraglutide, Other, and Pollen extracts    -------------------------------------------------- RESULTS -------------------------------------------------  All laboratory and radiology results have been personally reviewed by myself   LABS:  Labs Reviewed   CBC WITH AUTO DIFFERENTIAL - Abnormal       Result Value    WBC 6.5      nRBC 0.0      RBC 4.60      Hemoglobin 14.4      Hematocrit 40.1 (*)     MCV 87      MCH 31.3      MCHC 35.9      RDW 14.5      Platelets 199      Neutrophils % 54.4      Immature Granulocytes %, Automated 0.8      Lymphocytes % 35.5      Monocytes % 6.2      Eosinophils % 2.3      Basophils % 0.8      Neutrophils Absolute 3.53      Immature Granulocytes Absolute, Automated 0.05      Lymphocytes Absolute 2.30      Monocytes Absolute 0.40      Eosinophils Absolute 0.15      Basophils Absolute 0.05     POCT GLUCOSE - Abnormal    POCT Glucose 323 (*)    LACTATE - Normal    Lactate 1.8      Narrative:     Venipuncture immediately after or during the administration of Metamizole may lead to falsely low results. Testing should be performed immediately  prior to Metamizole dosing.   URINE CULTURE   LIPASE   COMPREHENSIVE METABOLIC PANEL   URINALYSIS WITH REFLEX CULTURE AND MICROSCOPIC    Narrative:     The following orders were created for panel order Urinalysis with Reflex Culture and  Microscopic.  Procedure                               Abnormality         Status                     ---------                               -----------         ------                     Urinalysis with Reflex C...[[id number]]                                                 Extra Urine Gray Tube[[id number]]                                                         Please view results for these tests on the individual orders.   BETA HYDROXYBUTYRATE   URINALYSIS WITH REFLEX CULTURE AND MICROSCOPIC   EXTRA URINE GRAY TUBE   POCT FINGERSTICK GLUCOSE         RADIOLOGY:  Interpreted by Radiologist.  No orders to display       No results found for this or any previous visit (from the past 4464 hour(s)).  ------------------------- NURSING NOTES AND VITALS REVIEWED ---------------------------   The nursing notes within the ED encounter and vital signs as below have been reviewed.   There were no vitals taken for this visit.  Oxygen Saturation Interpretation: Normal      ---------------------------------------------------PHYSICAL EXAM--------------------------------------  Physical Exam   Constitutional/General: Alert and oriented x3, well appearing, non toxic in NAD  Head: Normocephalic and atraumatic  Eyes: PERRL, EOMI, conjunctiva normal, sclera non icteric  Mouth: Oropharynx clear, handling secretions, no trismus, no asymmetry of the posterior oropharynx or uvular edema  Neck: Supple, full ROM, non tender to palpation in the midline, no stridor, no crepitus, no meningeal signs  Respiratory: Lungs clear to auscultation bilaterally, no wheezes, rales, or rhonchi. Not in respiratory distress  Cardiovascular:  Regular rate. Regular rhythm. No murmurs, gallops, or rubs. 2+ distal pulses  Chest: No chest wall tenderness  GI:  Abdomen Soft, Non tender, Non distended.  +BS. No organomegaly, no palpable masses,  No rebound, guarding, or rigidity.   Musculoskeletal: Moves all extremities x 4. Warm and well perfused, no clubbing,  cyanosis, or edema. Capillary refill <3 seconds  Integument: skin warm and dry. No rashes.   Lymphatic: no lymphadenopathy noted  Neurologic: GCS 15, no focal deficits, symmetric strength 5/5 in the upper and lower extremities bilaterally  Psychiatric: Normal Affect    Procedures    ------------------------------ ED COURSE/MEDICAL DECISION MAKING----------------------    Medical Decision Making:   Patient to be admitted for further care and treatment.    Diagnoses as of 01/13/24 1732   Hypoglycemia due to insulin      Counseling:   The emergency provider has spoken with the patient and discussed today’s results, in addition to providing specific details for the plan of care and counseling regarding the diagnosis and prognosis.  Questions are answered at this time and they are agreeable with the plan.      --------------------------------- IMPRESSION AND DISPOSITION ---------------------------------        IMPRESSION  1. Hypoglycemia due to insulin        DISPOSITION  Disposition: Admit to telemetry  Patient condition is fair      Billing Provider Critical Care Time: 0 minutes     Marek Mike MD  01/13/24 1733       Marek Mike MD  01/13/24 1733

## 2023-12-31 LAB
B-OH-BUTYR SERPL-SCNC: 0.19 MMOL/L (ref 0.02–0.27)
HOLD SPECIMEN: NORMAL

## 2023-12-31 NOTE — PROGRESS NOTES
Emergency Medicine Transition of Care Note.    I received Jono Quinn in signout from Dr. Dr. Mike.  Please see the previous ED provider note for all HPI, PE and MDM up to the time of signout at 7 PM. This is in addition to the primary record.    In brief Jono Quinn is an 53 y.o. male presenting for   Chief Complaint   Patient presents with    Hyperglycemia     Patient was eating a lot more than usual today and he took his 40 units of regular insulin that he usually takes at 1:00, he then states he was going to go eat cake so he took 60 more units at 2:00, at 4:00 he checked his sugar and it was reading high so he took 60 more units of humalog. He then thought about this and decided that was way to much insulin and decided to come in to monitor it.      At the time of signout we were awaiting: Repeat blood sugars and reassessment.    Diagnoses as of 12/30/23 2214   Hypoglycemia due to insulin       Medical Decision Making  Patient presents to the emergency department with low blood sugars after taking too much of his insulin.  This was intentional to prevent hyperglycemia but not with intent of self-harm.  He was placed on D5 normal saline and was maintained while rechecking the sugars.  His sugars were in the 180s.  At 9:00 PM I requested the D5 to be stopped.  His sugar had been in the 180s and went down to 167.  At 930 and 10:00 the blood sugar remained on the same level.  These were checked with the patient's blood glucose monitoring through his phone since he did not want to be poked anymore discussed with patient insulin use and his night time dosage of long-acting.  Patient discharged in improved condition.    Final diagnoses:   [E16.0, T38.3X5A] Hypoglycemia due to insulin           Procedure  Procedures    MD Parrish Walker MD

## 2024-01-01 LAB — BACTERIA UR CULT: NO GROWTH

## 2024-01-02 NOTE — TELEPHONE ENCOUNTER
I know the preferred method is to get an endocrinologist and have them order my insulin. However I have been unable to get one yet. And I am out of insulin so please place an order with [facility] for four boxes of five pens each that should be a 30-day Supply.

## 2024-01-04 RX ORDER — INSULIN LISPRO 100 [IU]/ML
INJECTION, SOLUTION INTRAVENOUS; SUBCUTANEOUS
Qty: 20 EACH | Refills: 0 | Status: SHIPPED | OUTPATIENT
Start: 2024-01-04 | End: 2024-01-16 | Stop reason: SDUPTHER

## 2024-01-04 NOTE — TELEPHONE ENCOUNTER
Pt called asking status of Insulin request.    Pt states that he is out of insulin and is in need of it.      Pt was also in ED on 12/30/23 for hypoglycemia due to insulin.  He is scheduled on 1/25/24 for office visit.    Please advise if I can help in any way

## 2024-01-16 DIAGNOSIS — F41.9 ANXIETY DISORDER, UNSPECIFIED TYPE: ICD-10-CM

## 2024-01-16 DIAGNOSIS — E11.43 DIABETIC AUTONOMIC NEUROPATHY ASSOCIATED WITH TYPE 2 DIABETES MELLITUS (MULTI): ICD-10-CM

## 2024-01-16 DIAGNOSIS — E11.311 DIABETIC RETINOPATHY OF BOTH EYES WITH MACULAR EDEMA ASSOCIATED WITH TYPE 2 DIABETES MELLITUS, UNSPECIFIED RETINOPATHY SEVERITY (MULTI): ICD-10-CM

## 2024-01-16 DIAGNOSIS — E11.69 TYPE 2 DIABETES MELLITUS WITH OTHER SPECIFIED COMPLICATION, UNSPECIFIED WHETHER LONG TERM INSULIN USE (MULTI): ICD-10-CM

## 2024-01-18 RX ORDER — INSULIN LISPRO 100 [IU]/ML
INJECTION, SOLUTION INTRAVENOUS; SUBCUTANEOUS
Qty: 20 EACH | Refills: 2 | Status: SHIPPED | OUTPATIENT
Start: 2024-01-18 | End: 2024-02-15 | Stop reason: SDUPTHER

## 2024-01-18 RX ORDER — AMITRIPTYLINE HYDROCHLORIDE 10 MG/1
10 TABLET, FILM COATED ORAL NIGHTLY
Qty: 90 TABLET | Refills: 0 | OUTPATIENT
Start: 2024-01-18 | End: 2024-04-17

## 2024-01-18 RX ORDER — GLIPIZIDE 10 MG/1
10 TABLET ORAL
Qty: 60 TABLET | Refills: 0 | Status: SHIPPED | OUTPATIENT
Start: 2024-01-18 | End: 2024-02-29 | Stop reason: SDUPTHER

## 2024-01-18 RX ORDER — BLOOD-GLUCOSE SENSOR
1 EACH MISCELLANEOUS
Qty: 6 EACH | Refills: 0 | Status: SHIPPED | OUTPATIENT
Start: 2024-01-18 | End: 2024-05-23

## 2024-01-18 RX ORDER — BUSPIRONE HYDROCHLORIDE 15 MG/1
15 TABLET ORAL 3 TIMES DAILY
Qty: 270 TABLET | Refills: 0 | OUTPATIENT
Start: 2024-01-18

## 2024-01-25 ENCOUNTER — APPOINTMENT (OUTPATIENT)
Dept: PRIMARY CARE | Facility: CLINIC | Age: 54
End: 2024-01-25
Payer: COMMERCIAL

## 2024-02-15 DIAGNOSIS — E11.311 DIABETIC RETINOPATHY OF BOTH EYES WITH MACULAR EDEMA ASSOCIATED WITH TYPE 2 DIABETES MELLITUS, UNSPECIFIED RETINOPATHY SEVERITY (MULTI): ICD-10-CM

## 2024-02-15 RX ORDER — INSULIN LISPRO 100 [IU]/ML
INJECTION, SOLUTION INTRAVENOUS; SUBCUTANEOUS
Qty: 20 EACH | Refills: 1 | Status: SHIPPED | OUTPATIENT
Start: 2024-02-15 | End: 2024-03-13 | Stop reason: SDUPTHER

## 2024-02-23 PROBLEM — R51.9 HEADACHE: Status: ACTIVE | Noted: 2024-02-23

## 2024-02-23 PROBLEM — E66.9 OBESITY WITH BODY MASS INDEX 30 OR GREATER: Status: ACTIVE | Noted: 2024-02-23

## 2024-02-23 PROBLEM — M79.9 DISORDER OF SOFT TISSUE: Status: ACTIVE | Noted: 2024-02-23

## 2024-02-23 PROBLEM — I25.10 ARTERIOSCLEROSIS OF CORONARY ARTERY: Status: ACTIVE | Noted: 2023-11-25

## 2024-02-23 PROBLEM — G47.00 INSOMNIA: Status: ACTIVE | Noted: 2024-02-23

## 2024-02-23 PROBLEM — Z20.822 SEVERE ACUTE RESPIRATORY SYNDROME CORONAVIRUS 2 (SARS-COV-2) INFECTION RULED OUT: Status: ACTIVE | Noted: 2024-02-23

## 2024-02-23 PROBLEM — E66.9 OBESITY DUE TO ENERGY IMBALANCE: Status: ACTIVE | Noted: 2024-02-23

## 2024-02-23 PROBLEM — K85.90 ACUTE PANCREATITIS (HHS-HCC): Status: ACTIVE | Noted: 2024-02-23

## 2024-02-23 PROBLEM — E66.01 SEVERE OBESITY (MULTI): Status: ACTIVE | Noted: 2024-02-23

## 2024-02-23 PROBLEM — R10.9 ABDOMINAL PAIN: Status: ACTIVE | Noted: 2024-02-23

## 2024-02-23 PROBLEM — E04.1 THYROID NODULE: Status: ACTIVE | Noted: 2024-02-23

## 2024-02-23 PROBLEM — B37.2 INTERTRIGINOUS CANDIDIASIS: Status: ACTIVE | Noted: 2023-04-26

## 2024-02-23 PROBLEM — K80.00 ACUTE CHOLECYSTITIS DUE TO BILIARY CALCULUS: Status: ACTIVE | Noted: 2018-10-26

## 2024-02-23 PROBLEM — R93.89 ABNORMAL COMPUTED TOMOGRAPHY SCAN: Status: ACTIVE | Noted: 2023-04-26

## 2024-02-23 PROBLEM — R53.83 FATIGUE: Status: ACTIVE | Noted: 2023-07-22

## 2024-02-29 ENCOUNTER — OFFICE VISIT (OUTPATIENT)
Dept: PRIMARY CARE | Facility: CLINIC | Age: 54
End: 2024-02-29
Payer: COMMERCIAL

## 2024-02-29 VITALS
HEART RATE: 90 BPM | DIASTOLIC BLOOD PRESSURE: 72 MMHG | WEIGHT: 298 LBS | BODY MASS INDEX: 42.76 KG/M2 | SYSTOLIC BLOOD PRESSURE: 126 MMHG | OXYGEN SATURATION: 100 %

## 2024-02-29 DIAGNOSIS — E11.311 DIABETIC RETINOPATHY OF BOTH EYES WITH MACULAR EDEMA ASSOCIATED WITH TYPE 2 DIABETES MELLITUS, UNSPECIFIED RETINOPATHY SEVERITY (MULTI): Primary | ICD-10-CM

## 2024-02-29 DIAGNOSIS — E78.00 HYPERCHOLESTEREMIA: ICD-10-CM

## 2024-02-29 DIAGNOSIS — I25.10 CORONARY ARTERY DISEASE INVOLVING NATIVE CORONARY ARTERY OF NATIVE HEART WITHOUT ANGINA PECTORIS: ICD-10-CM

## 2024-02-29 DIAGNOSIS — K21.9 GASTROESOPHAGEAL REFLUX DISEASE WITHOUT ESOPHAGITIS: ICD-10-CM

## 2024-02-29 DIAGNOSIS — E66.01 CLASS 3 SEVERE OBESITY DUE TO EXCESS CALORIES WITH SERIOUS COMORBIDITY AND BODY MASS INDEX (BMI) OF 40.0 TO 44.9 IN ADULT (MULTI): ICD-10-CM

## 2024-02-29 DIAGNOSIS — F33.2 MDD (MAJOR DEPRESSIVE DISORDER), RECURRENT SEVERE, WITHOUT PSYCHOSIS (MULTI): ICD-10-CM

## 2024-02-29 DIAGNOSIS — E55.9 VITAMIN D DEFICIENCY: ICD-10-CM

## 2024-02-29 PROCEDURE — 1036F TOBACCO NON-USER: CPT | Performed by: INTERNAL MEDICINE

## 2024-02-29 PROCEDURE — 3074F SYST BP LT 130 MM HG: CPT | Performed by: INTERNAL MEDICINE

## 2024-02-29 PROCEDURE — 99214 OFFICE O/P EST MOD 30 MIN: CPT | Performed by: INTERNAL MEDICINE

## 2024-02-29 PROCEDURE — 3008F BODY MASS INDEX DOCD: CPT | Performed by: INTERNAL MEDICINE

## 2024-02-29 PROCEDURE — 3078F DIAST BP <80 MM HG: CPT | Performed by: INTERNAL MEDICINE

## 2024-02-29 RX ORDER — ROSUVASTATIN CALCIUM 40 MG/1
40 TABLET, COATED ORAL DAILY
Qty: 90 TABLET | Refills: 0 | Status: SHIPPED | OUTPATIENT
Start: 2024-02-29 | End: 2024-04-24

## 2024-02-29 RX ORDER — GLIPIZIDE 10 MG/1
10 TABLET ORAL
Qty: 180 TABLET | Refills: 0 | Status: SHIPPED | OUTPATIENT
Start: 2024-02-29 | End: 2024-05-03 | Stop reason: ALTCHOICE

## 2024-02-29 RX ORDER — FENOFIBRATE 200 MG/1
200 CAPSULE ORAL DAILY
Qty: 90 CAPSULE | Refills: 0 | Status: SHIPPED | OUTPATIENT
Start: 2024-02-29 | End: 2024-04-25

## 2024-02-29 RX ORDER — METFORMIN HYDROCHLORIDE 1000 MG/1
1000 TABLET ORAL EVERY 12 HOURS
Qty: 180 TABLET | Refills: 0 | Status: SHIPPED | OUTPATIENT
Start: 2024-02-29

## 2024-02-29 ASSESSMENT — ENCOUNTER SYMPTOMS
DEPRESSION: 1
OCCASIONAL FEELINGS OF UNSTEADINESS: 0
LOSS OF SENSATION IN FEET: 1

## 2024-02-29 NOTE — PROGRESS NOTES
Subjective   Patient ID: Jono Quinn is a 53 y.o. male who presents for Follow-up (3 mth) and Med Refill (2 meds are not covered under coverage  with insuraance).  Med Refill      Routine follow up    No complaints     CAD / hypercholesterolemia on rx   Cardio following  Cath 12-6 -23      thyroid nodule   Thyroid ultrasound neg 10-23    retinopathy with macular edema  Retinal specialist following  [facility]   Getting eye shots     DM type II -insulin-dependent  FBS varying  this am  Continue meds  Follow blood sugars closely.  HBA1C 5.9   11-23  Endo pending     GERD stable on diet     Depression  better on therapy no side effects  Psych following     Vitamin D deficiency on suplementation     Colonoscopy on follow up     Diet and exercise reviewed    Review of Systems   All other systems reviewed and are negative.      Objective   /72   Pulse 90   Wt 135 kg (298 lb)   SpO2 100%   BMI 42.76 kg/m²   Lab Results   Component Value Date    WBC 6.5 12/30/2023    HGB 14.4 12/30/2023    HCT 40.1 (L) 12/30/2023     12/30/2023    CHOL 126 11/18/2023    TRIG 1,102 (H) 11/18/2023    HDL 24.0 11/18/2023    LDLDIRECT 32 03/05/2022    ALT 19 12/30/2023    AST 20 12/30/2023     (L) 12/30/2023    K 3.7 12/30/2023    CL 95 (L) 12/30/2023    CREATININE 1.11 12/30/2023    BUN 19 12/30/2023    CO2 24 12/30/2023    TSH 1.21 07/22/2023    INR 0.9 11/25/2023    HGBA1C 5.9 (H) 11/18/2023           Physical Exam  Vitals reviewed.   Constitutional:       Appearance: Normal appearance. He is obese.   HENT:      Head: Normocephalic and atraumatic.      Mouth/Throat:      Pharynx: No posterior oropharyngeal erythema.   Eyes:      General: No scleral icterus.     Conjunctiva/sclera: Conjunctivae normal.      Pupils: Pupils are equal, round, and reactive to light.   Cardiovascular:      Rate and Rhythm: Normal rate and regular rhythm.      Heart sounds: Normal heart sounds.   Pulmonary:      Effort: No respiratory distress.       Breath sounds: No wheezing.   Abdominal:      General: Abdomen is flat. Bowel sounds are normal. There is no distension.      Palpations: Abdomen is soft. There is no mass.      Tenderness: There is no abdominal tenderness. There is no rebound.   Musculoskeletal:         General: Normal range of motion.      Cervical back: Normal range of motion and neck supple.   Skin:     General: Skin is warm and dry.   Neurological:      General: No focal deficit present.      Mental Status: He is alert and oriented to person, place, and time. Mental status is at baseline.   Psychiatric:         Mood and Affect: Mood normal.         Behavior: Behavior normal.         Thought Content: Thought content normal.         Judgment: Judgment normal.         Problem List Items Addressed This Visit             ICD-10-CM    GERD (gastroesophageal reflux disease) K21.9    Vitamin D deficiency E55.9    MDD (major depressive disorder), recurrent severe, without psychosis (CMS/[facility]) F33.2    Diabetic retinopathy (CMS/[facility]) - Primary E11.319    Relevant Medications    empagliflozin (Jardiance) 25 mg    glipiZIDE (Glucotrol) 10 mg tablet    metFORMIN (Glucophage) 1,000 mg tablet    rosuvastatin (Crestor) 40 mg tablet     Other Visit Diagnoses         Codes    Coronary artery disease involving native coronary artery of native heart without angina pectoris     I25.10    Relevant Medications    rosuvastatin (Crestor) 40 mg tablet    Hypercholesteremia     E78.00    Relevant Medications    fenofibrate micronized (Lofibra) 200 mg capsule    Class 3 severe obesity due to excess calories with serious comorbidity and body mass index (BMI) of 40.0 to 44.9 in adult (CMS/[facility])     E66.01, Z68.41          Assessment/Plan     Patient was identified as a fall risk. Risk prevention instructions provided.    No complaints     CAD / hypercholesterolemia on rx   Cardio following  Cath 12-6 -23      thyroid nodule   Thyroid ultrasound neg 10-23    retinopathy with  macular edema  Retinal specialist following  [facility]   Getting eye shots     DM type II -insulin-dependent  FBS varying  this am  Continue meds  Follow blood sugars closely.  HBA1C 5.9   11-23  Endo pending     GERD stable on diet     Depression  better on therapy no side effects  Psych following     Vitamin D deficiency on suplementation     Colonoscopy on follow up     Diet and exercise reviewed    Follow up 3 months

## 2024-03-07 ENCOUNTER — TELEPHONE (OUTPATIENT)
Dept: PRIMARY CARE | Facility: CLINIC | Age: 54
End: 2024-03-07
Payer: COMMERCIAL

## 2024-03-07 DIAGNOSIS — E11.69 TYPE 2 DIABETES MELLITUS WITH OTHER SPECIFIED COMPLICATION, UNSPECIFIED WHETHER LONG TERM INSULIN USE (MULTI): ICD-10-CM

## 2024-03-07 NOTE — TELEPHONE ENCOUNTER
Humalog is no longer covered.   He takes: per SS- Max 50 Units TID  Insulin Lispro Protamine IS covered.    OK to change??

## 2024-03-13 DIAGNOSIS — E11.311 DIABETIC RETINOPATHY OF BOTH EYES WITH MACULAR EDEMA ASSOCIATED WITH TYPE 2 DIABETES MELLITUS, UNSPECIFIED RETINOPATHY SEVERITY (MULTI): ICD-10-CM

## 2024-03-13 RX ORDER — INSULIN ASPART 100 [IU]/ML
INJECTION, SOLUTION INTRAVENOUS; SUBCUTANEOUS
Qty: 10 ML | Refills: 1 | Status: SHIPPED | OUTPATIENT
Start: 2024-03-13 | End: 2024-05-03 | Stop reason: CLARIF

## 2024-03-14 ENCOUNTER — TELEPHONE (OUTPATIENT)
Dept: PRIMARY CARE | Facility: CLINIC | Age: 54
End: 2024-03-14
Payer: COMMERCIAL

## 2024-03-14 NOTE — TELEPHONE ENCOUNTER
Spoke with Romulo, pharmacist and gave him Rx for Insulin aspart (Novolog Penfill U-100 insulin) per insurance change.    Pt was notified

## 2024-04-16 DIAGNOSIS — E11.40 TYPE 2 DIABETES MELLITUS WITH DIABETIC NEUROPATHY, UNSPECIFIED WHETHER LONG TERM INSULIN USE (MULTI): ICD-10-CM

## 2024-04-16 RX ORDER — INSULIN DETEMIR 100 [IU]/ML
INJECTION, SOLUTION SUBCUTANEOUS
Qty: 3 ML | Refills: 0 | Status: SHIPPED | OUTPATIENT
Start: 2024-04-16 | End: 2024-05-03 | Stop reason: WASHOUT

## 2024-04-16 RX ORDER — INSULIN DETEMIR 100 [IU]/ML
INJECTION, SOLUTION SUBCUTANEOUS
Qty: 90 ML | Refills: 0 | Status: CANCELLED | OUTPATIENT
Start: 2024-04-16

## 2024-04-16 RX ORDER — FLASH GLUCOSE SENSOR
1 KIT MISCELLANEOUS ONCE
Qty: 2 EACH | Refills: 0 | Status: SHIPPED | OUTPATIENT
Start: 2024-04-16 | End: 2024-04-25

## 2024-04-16 RX ORDER — FLASH GLUCOSE SENSOR
1 KIT MISCELLANEOUS AS NEEDED
COMMUNITY
End: 2024-04-16 | Stop reason: SDUPTHER

## 2024-04-16 RX ORDER — FLASH GLUCOSE SCANNING READER
1 EACH MISCELLANEOUS AS NEEDED
COMMUNITY
End: 2024-04-16 | Stop reason: SDUPTHER

## 2024-04-16 RX ORDER — FLASH GLUCOSE SCANNING READER
1 EACH MISCELLANEOUS ONCE
Qty: 1 EACH | Refills: 0 | Status: SHIPPED | OUTPATIENT
Start: 2024-04-16 | End: 2024-04-16

## 2024-04-17 DIAGNOSIS — E11.69 TYPE 2 DIABETES MELLITUS WITH OTHER SPECIFIED COMPLICATION, UNSPECIFIED WHETHER LONG TERM INSULIN USE (MULTI): ICD-10-CM

## 2024-04-17 RX ORDER — BLOOD-GLUCOSE SENSOR
1 EACH MISCELLANEOUS
COMMUNITY
End: 2024-04-17 | Stop reason: SDUPTHER

## 2024-04-17 RX ORDER — BLOOD-GLUCOSE SENSOR
1 EACH MISCELLANEOUS
Qty: 6 EACH | Refills: 0 | Status: SHIPPED | OUTPATIENT
Start: 2024-04-17 | End: 2024-05-23 | Stop reason: SDUPTHER

## 2024-04-17 RX ORDER — BLOOD-GLUCOSE,RECEIVER,CONT
1 EACH MISCELLANEOUS AS NEEDED
COMMUNITY
End: 2024-04-17 | Stop reason: SDUPTHER

## 2024-04-17 RX ORDER — BLOOD-GLUCOSE,RECEIVER,CONT
1 EACH MISCELLANEOUS ONCE
Qty: 1 EACH | Refills: 0 | Status: SHIPPED | OUTPATIENT
Start: 2024-04-17 | End: 2024-04-17

## 2024-04-18 ENCOUNTER — APPOINTMENT (OUTPATIENT)
Dept: ENDOCRINOLOGY | Facility: CLINIC | Age: 54
End: 2024-04-18
Payer: COMMERCIAL

## 2024-04-22 ENCOUNTER — TELEPHONE (OUTPATIENT)
Dept: PRIMARY CARE | Facility: CLINIC | Age: 54
End: 2024-04-22
Payer: COMMERCIAL

## 2024-04-22 DIAGNOSIS — I73.9 PAD (PERIPHERAL ARTERY DISEASE) (CMS-HCC): ICD-10-CM

## 2024-04-22 RX ORDER — GABAPENTIN 300 MG/1
900 CAPSULE ORAL 3 TIMES DAILY
Qty: 270 CAPSULE | Refills: 0 | Status: SHIPPED | OUTPATIENT
Start: 2024-04-22 | End: 2024-04-25

## 2024-04-22 NOTE — TELEPHONE ENCOUNTER
Patient has appt with endo in may, he is finding it difficult to find insulin that is covered by his insurance. Patient is out of [facility] denied not being covered.

## 2024-04-22 NOTE — TELEPHONE ENCOUNTER
----- Message from Trudy Gordon MD sent at 4/21/2024  9:15 PM EDT -----  Regarding: endo  Must follow up with endo  Why did he cancel his appt?

## 2024-04-23 DIAGNOSIS — E11.311 DIABETIC RETINOPATHY OF BOTH EYES WITH MACULAR EDEMA ASSOCIATED WITH TYPE 2 DIABETES MELLITUS, UNSPECIFIED RETINOPATHY SEVERITY (MULTI): ICD-10-CM

## 2024-04-23 DIAGNOSIS — I25.10 CORONARY ARTERY DISEASE INVOLVING NATIVE CORONARY ARTERY OF NATIVE HEART WITHOUT ANGINA PECTORIS: ICD-10-CM

## 2024-04-24 DIAGNOSIS — E11.40 TYPE 2 DIABETES MELLITUS WITH DIABETIC NEUROPATHY, UNSPECIFIED WHETHER LONG TERM INSULIN USE (MULTI): ICD-10-CM

## 2024-04-24 DIAGNOSIS — E78.00 HYPERCHOLESTEREMIA: ICD-10-CM

## 2024-04-24 DIAGNOSIS — I73.9 PAD (PERIPHERAL ARTERY DISEASE) (CMS-HCC): ICD-10-CM

## 2024-04-24 RX ORDER — ROSUVASTATIN CALCIUM 40 MG/1
40 TABLET, COATED ORAL DAILY
Qty: 30 TABLET | Refills: 0 | Status: SHIPPED | OUTPATIENT
Start: 2024-04-24

## 2024-04-25 RX ORDER — FLASH GLUCOSE SENSOR
1 KIT MISCELLANEOUS ONCE
Qty: 1 EACH | Refills: 0 | Status: SHIPPED | OUTPATIENT
Start: 2024-04-25 | End: 2024-04-25

## 2024-04-25 RX ORDER — FENOFIBRATE 200 MG/1
200 CAPSULE ORAL DAILY
Qty: 30 CAPSULE | Refills: 0 | Status: SHIPPED | OUTPATIENT
Start: 2024-04-25

## 2024-04-25 RX ORDER — GABAPENTIN 300 MG/1
900 CAPSULE ORAL 3 TIMES DAILY
Qty: 270 CAPSULE | Refills: 0 | Status: SHIPPED | OUTPATIENT
Start: 2024-04-25 | End: 2024-05-23 | Stop reason: SDUPTHER

## 2024-04-30 DIAGNOSIS — I25.10 CORONARY ARTERY DISEASE INVOLVING NATIVE CORONARY ARTERY OF NATIVE HEART WITHOUT ANGINA PECTORIS: ICD-10-CM

## 2024-04-30 DIAGNOSIS — E11.311 DIABETIC RETINOPATHY OF BOTH EYES WITH MACULAR EDEMA ASSOCIATED WITH TYPE 2 DIABETES MELLITUS, UNSPECIFIED RETINOPATHY SEVERITY (MULTI): ICD-10-CM

## 2024-05-01 RX ORDER — ROSUVASTATIN CALCIUM 40 MG/1
40 TABLET, COATED ORAL DAILY
Qty: 90 TABLET | Refills: 0 | OUTPATIENT
Start: 2024-05-01

## 2024-05-03 ENCOUNTER — OFFICE VISIT (OUTPATIENT)
Dept: ENDOCRINOLOGY | Facility: CLINIC | Age: 54
End: 2024-05-03
Payer: COMMERCIAL

## 2024-05-03 VITALS
DIASTOLIC BLOOD PRESSURE: 74 MMHG | SYSTOLIC BLOOD PRESSURE: 124 MMHG | HEART RATE: 91 BPM | BODY MASS INDEX: 41.9 KG/M2 | WEIGHT: 292 LBS

## 2024-05-03 DIAGNOSIS — E11.9 TYPE 2 DIABETES MELLITUS WITHOUT COMPLICATION, WITH LONG-TERM CURRENT USE OF INSULIN (MULTI): Primary | ICD-10-CM

## 2024-05-03 DIAGNOSIS — Z79.4 TYPE 2 DIABETES MELLITUS WITHOUT COMPLICATION, WITH LONG-TERM CURRENT USE OF INSULIN (MULTI): Primary | ICD-10-CM

## 2024-05-03 LAB — POC HEMOGLOBIN A1C: 7.2 % (ref 4.2–6.5)

## 2024-05-03 PROCEDURE — 99204 OFFICE O/P NEW MOD 45 MIN: CPT | Performed by: INTERNAL MEDICINE

## 2024-05-03 PROCEDURE — 83036 HEMOGLOBIN GLYCOSYLATED A1C: CPT | Performed by: INTERNAL MEDICINE

## 2024-05-03 PROCEDURE — 3074F SYST BP LT 130 MM HG: CPT | Performed by: INTERNAL MEDICINE

## 2024-05-03 PROCEDURE — 3078F DIAST BP <80 MM HG: CPT | Performed by: INTERNAL MEDICINE

## 2024-05-03 RX ORDER — INSULIN GLARGINE 300 U/ML
110 INJECTION, SOLUTION SUBCUTANEOUS NIGHTLY
Qty: 33 ML | Refills: 3 | Status: SHIPPED | OUTPATIENT
Start: 2024-05-03 | End: 2025-05-03

## 2024-05-03 RX ORDER — INSULIN ASPART INJECTION 100 [IU]/ML
40-60 INJECTION, SOLUTION SUBCUTANEOUS
Qty: 165 ML | Refills: 3 | Status: SHIPPED | OUTPATIENT
Start: 2024-05-03

## 2024-05-03 RX ORDER — FLASH GLUCOSE SCANNING READER
EACH MISCELLANEOUS
COMMUNITY
Start: 2024-04-17 | End: 2024-05-23 | Stop reason: ALTCHOICE

## 2024-05-03 ASSESSMENT — ENCOUNTER SYMPTOMS
FEVER: 0
APNEA: 1
UNEXPECTED WEIGHT CHANGE: 1
HEADACHES: 0
NAUSEA: 0
NUMBNESS: 1
WEAKNESS: 1
ABDOMINAL PAIN: 0
APPETITE CHANGE: 0
FREQUENCY: 0
VOMITING: 0
COUGH: 0
DYSPHORIC MOOD: 1
SHORTNESS OF BREATH: 1
BACK PAIN: 1
NERVOUS/ANXIOUS: 1
FATIGUE: 1
CONSTIPATION: 0
MYALGIAS: 1
DIARRHEA: 0
POLYDIPSIA: 0

## 2024-05-03 NOTE — PATIENT INSTRUCTIONS
RECOMMENDATIONS  TOUJEO 110 units at bedtime  FiAsp 40 units before meals, add 4 units per 50 over glucose over 150 mg/dl    You must stop drinking alcohol altogether, again.    Follow up 6 months  Review LibreView at all appointments.

## 2024-05-03 NOTE — PROGRESS NOTES
History Of Present Illness  Jono Quinn is a 53 y.o. male     Duration of type 2 diabetes mellitus:  15-20 years  Complications:  Retinopathy  Neuropathy  Cardiovascular disease  Dysautonomia    Ran out of Levemir 1 week ago, dose 110 units at bedtime  Problems with insurance for any glargine version    Aspart, no longer covered.  Taking 40 units QAC add 4:50 over glucose 150 mg/dl  Metformin 1000 mg BID  Jardiance 25 mg/day  Glipizide 10 mg BID    FreeStyle Delmi 2  Patient is testing glucose 288 times daily  Records reviewed, on file    Last eye exam:  April 2024  Injections  History of laser surgery right eye    Cardiology:  Dr. KATIE Pino  Hyperlipidemia  Hypertriglyceridemia  Hereditary per patient  Rosuvastatin 40 mg/day  Fenofibrate 200 mg/day    No history of pancreatitis    History of alcoholism, stopped drinking in 2003  Started drinking some beer   Icosapent ethyl 2 gm BID    Past Medical History  He has a past medical history of COVID-19 (11/23/2020), Depression, unspecified (10/26/2022), Diabetes mellitus (Multi), Obstructive sleep apnea (adult) (pediatric) (07/11/2022), Other conditions influencing health status (05/16/2022), Personal history of other diseases of the digestive system (11/19/2021), Personal history of other diseases of the nervous system and sense organs, Pure hypercholesterolemia, unspecified (10/26/2022), Testicular hypofunction (07/01/2021), and 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    Surgical History  He has a past surgical history that includes Other surgical history (07/13/2020); Other surgical history (07/13/2020); Other surgical history (07/13/2020); Other surgical history (07/13/2020); Other surgical history (03/01/2022); Other surgical history (03/01/2022); CT angio coronary art with heartflow if score >30% (10/24/2023); and Cardiac catheterization (N/A, 12/6/2023).     Social History  He reports that he has never smoked. He has never been exposed to tobacco smoke. He has  never used smokeless tobacco. He reports that he does not drink alcohol and does not use drugs.    Family History  No family history on file.    Medications  Current Outpatient Medications   Medication Instructions    amitriptyline (ELAVIL) 10 mg, oral, Nightly    aspirin 81 mg EC tablet 1 tablet, oral, 2 times daily    azelastine (Astelin) 137 mcg (0.1 %) nasal spray 2 sprays, nasal, Daily    blood-glucose sensor (FreeStyle Delmi 3 Sensor) device 1 kit, subcutaneous (via wearable injector), Every 14 days    busPIRone (BUSPAR) 15 mg, oral, 3 times daily    empagliflozin (JARDIANCE) 25 mg, oral, Daily    fenofibrate micronized (LOFIBRA) 200 mg, oral, Daily    FreeStyle Delmi 2 Ora misc Inject 1 each under the skin 1 time for 1 dose. Use as instructed    FreeStyle Delmi 3 Sensor device 1 kit, miscellaneous, Every 14 days    gabapentin (NEURONTIN) 900 mg, oral, 3 times daily    ginseng 200 mg, oral, Daily RT    glipiZIDE (GLUCOTROL) 10 mg, oral, 2 times daily before meals    icosapent ethyL (Vascepa) 1 gram capsule 2 capsules, oral, 2 times daily    insulin aspart (NovoLOG PenFill U-100 Insulin) 100 unit/mL pen cartridge As per Sliding Scale-  Max dose 50 units TID<BR>Take as directed per insulin instructions.<BR>Replacing Humalog- due to INS    metFORMIN (GLUCOPHAGE) 1,000 mg, oral, Every 12 hours    multivitamin capsule 1 tablet, oral, Daily    PARoxetine (Paxil) 30 mg tablet TAKE 1 TABLET EARLY IN THE MORNING (NEED TO SCHEDULE WITH PSYCH FOR REFILLS)    rosuvastatin (CRESTOR) 40 mg, oral, Daily    vitamin B complex tablet 1 tablet, oral, Daily       Allergies  Fluticasone propionate, Liraglutide, Lisinopril, Other, and Pollen extracts    Review of Systems   Constitutional:  Positive for fatigue and unexpected weight change. Negative for appetite change and fever.   HENT:  Positive for tinnitus.         Denies dry mouth   Eyes:  Positive for visual disturbance.   Respiratory:  Positive for apnea (sleep) and  shortness of breath. Negative for cough.    Cardiovascular:  Negative for chest pain.   Gastrointestinal:  Negative for abdominal pain, constipation, diarrhea, nausea and vomiting.   Endocrine: Negative for polydipsia and polyuria.   Genitourinary:  Negative for frequency.   Musculoskeletal:  Positive for back pain and myalgias.   Skin:  Positive for rash.   Neurological:  Positive for weakness and numbness. Negative for headaches.   Psychiatric/Behavioral:  Positive for dysphoric mood. The patient is nervous/anxious.          Last Recorded Vitals  Blood pressure 124/74, pulse 91, weight 132 kg (292 lb).    Physical Exam  Constitutional:       General: He is not in acute distress.     Appearance: He is obese.   HENT:      Head: Normocephalic.      Mouth/Throat:      Mouth: Mucous membranes are moist.   Eyes:      Extraocular Movements: Extraocular movements intact.   Neck:      Thyroid: No thyroid mass or thyromegaly.   Cardiovascular:      Pulses:           Radial pulses are 2+ on the right side and 2+ on the left side.   Musculoskeletal:      Right lower leg: No edema.      Left lower leg: No edema.   Lymphadenopathy:      Cervical: No cervical adenopathy.   Neurological:      Mental Status: He is alert.      Motor: No tremor.   Psychiatric:         Mood and Affect: Affect normal.          Relevant Results  Glucose (mg/dL)   Date Value   12/30/2023 303 (H)   11/25/2023 141 (H)   11/18/2023 139 (H)     Hemoglobin A1C (%)   Date Value   11/18/2023 5.9 (H)   08/25/2023 6.8 (H)   08/24/2023 6.9 (H)   07/22/2023 6.7 (A)     Bicarbonate (mmol/L)   Date Value   12/30/2023 24   11/25/2023 26   11/18/2023 28     Urea Nitrogen (mg/dL)   Date Value   12/30/2023 19   11/25/2023 15   11/18/2023 12     Creatinine (mg/dL)   Date Value   12/30/2023 1.11   11/25/2023 0.93   11/18/2023 0.92     Lab Results   Component Value Date    CHOL 126 11/18/2023    CHOL 161 07/22/2023    CHOL 179 08/06/2022     Lab Results   Component Value  Date    HDL 24.0 11/18/2023    HDL 22.1 (A) 07/22/2023    HDL 27.0 (A) 08/06/2022     Lab Results   Component Value Date    LDLCALC  11/18/2023      Comment:      The calculation of LDL and VLDL are inaccurate when the Triglycerides are greater than 400 mg/dL or when the patient is non-fasting. If LDL measurement is necessary contact the testing laboratory for an alternative LDL assay.                                  Near   Borderline      AGE      Desirable  Optimal    High     High     Very High     0-19 Y     0 - 109     ---    110-129   >/= 130     ----    20-24 Y     0 - 119     ---    120-159   >/= 160     ----      >24 Y     0 -  99   100-129  130-159   160-189     >/=190       Lab Results   Component Value Date    TRIG 1,102 (H) 11/18/2023    TRIG 1570 (H) 07/22/2023    TRIG 1,886 (H) 08/06/2022     Lab Results   Component Value Date    TSH 1.21 07/22/2023         IMPRESSION  TYPE 2 DIABETES MELLITUS   LONG TERM CURRENT INSULIN USE      RECOMMENDATIONS  TOUJEO 110 units at bedtime  FiAsp 40 units before meals, add 4 units per 50 over glucose over 150 mg/dl    You must stop drinking alcohol altogether, again.    Follow up 6 months  Review LibreView at all appointments.

## 2024-05-22 DIAGNOSIS — I73.9 PAD (PERIPHERAL ARTERY DISEASE) (CMS-HCC): ICD-10-CM

## 2024-05-23 ENCOUNTER — OFFICE VISIT (OUTPATIENT)
Dept: PRIMARY CARE | Facility: CLINIC | Age: 54
End: 2024-05-23
Payer: COMMERCIAL

## 2024-05-23 VITALS
TEMPERATURE: 96.6 F | SYSTOLIC BLOOD PRESSURE: 114 MMHG | HEART RATE: 90 BPM | DIASTOLIC BLOOD PRESSURE: 74 MMHG | WEIGHT: 292 LBS | BODY MASS INDEX: 41.9 KG/M2 | OXYGEN SATURATION: 99 %

## 2024-05-23 DIAGNOSIS — E55.9 VITAMIN D DEFICIENCY: ICD-10-CM

## 2024-05-23 DIAGNOSIS — F32.A DEPRESSION, UNSPECIFIED DEPRESSION TYPE: ICD-10-CM

## 2024-05-23 DIAGNOSIS — I25.10 CORONARY ARTERY DISEASE INVOLVING NATIVE CORONARY ARTERY OF NATIVE HEART WITHOUT ANGINA PECTORIS: ICD-10-CM

## 2024-05-23 DIAGNOSIS — Z79.4 TYPE 2 DIABETES MELLITUS WITH OTHER SPECIFIED COMPLICATION, WITH LONG-TERM CURRENT USE OF INSULIN (MULTI): ICD-10-CM

## 2024-05-23 DIAGNOSIS — E11.69 TYPE 2 DIABETES MELLITUS WITH OTHER SPECIFIED COMPLICATION, WITH LONG-TERM CURRENT USE OF INSULIN (MULTI): ICD-10-CM

## 2024-05-23 DIAGNOSIS — G51.0 LEFT-SIDED BELL'S PALSY: Primary | ICD-10-CM

## 2024-05-23 DIAGNOSIS — E66.01 CLASS 3 SEVERE OBESITY DUE TO EXCESS CALORIES WITH SERIOUS COMORBIDITY AND BODY MASS INDEX (BMI) OF 40.0 TO 44.9 IN ADULT (MULTI): ICD-10-CM

## 2024-05-23 DIAGNOSIS — K21.9 GASTROESOPHAGEAL REFLUX DISEASE WITHOUT ESOPHAGITIS: ICD-10-CM

## 2024-05-23 DIAGNOSIS — E11.311 DIABETIC RETINOPATHY OF BOTH EYES WITH MACULAR EDEMA ASSOCIATED WITH TYPE 2 DIABETES MELLITUS, UNSPECIFIED RETINOPATHY SEVERITY (MULTI): ICD-10-CM

## 2024-05-23 DIAGNOSIS — I73.9 PAD (PERIPHERAL ARTERY DISEASE) (CMS-HCC): ICD-10-CM

## 2024-05-23 PROCEDURE — 3008F BODY MASS INDEX DOCD: CPT | Performed by: INTERNAL MEDICINE

## 2024-05-23 PROCEDURE — 1036F TOBACCO NON-USER: CPT | Performed by: INTERNAL MEDICINE

## 2024-05-23 PROCEDURE — 93000 ELECTROCARDIOGRAM COMPLETE: CPT | Performed by: INTERNAL MEDICINE

## 2024-05-23 PROCEDURE — 99214 OFFICE O/P EST MOD 30 MIN: CPT | Performed by: INTERNAL MEDICINE

## 2024-05-23 PROCEDURE — 3074F SYST BP LT 130 MM HG: CPT | Performed by: INTERNAL MEDICINE

## 2024-05-23 PROCEDURE — 3078F DIAST BP <80 MM HG: CPT | Performed by: INTERNAL MEDICINE

## 2024-05-23 RX ORDER — GABAPENTIN 300 MG/1
CAPSULE ORAL
Qty: 270 CAPSULE | Refills: 0 | OUTPATIENT
Start: 2024-05-23

## 2024-05-23 RX ORDER — GABAPENTIN 300 MG/1
900 CAPSULE ORAL 3 TIMES DAILY
Qty: 270 CAPSULE | Refills: 2 | Status: SHIPPED | OUTPATIENT
Start: 2024-05-23

## 2024-05-23 RX ORDER — BLOOD-GLUCOSE SENSOR
1 EACH MISCELLANEOUS
Qty: 6 EACH | Refills: 0 | Status: SHIPPED | OUTPATIENT
Start: 2024-05-23

## 2024-05-23 RX ORDER — BLOOD-GLUCOSE,RECEIVER,CONT
1 EACH MISCELLANEOUS DAILY
Qty: 1 EACH | Refills: 0 | Status: SHIPPED | OUTPATIENT
Start: 2024-05-23

## 2024-05-23 RX ORDER — BLOOD-GLUCOSE,RECEIVER,CONT
1 EACH MISCELLANEOUS AS NEEDED
COMMUNITY
End: 2024-05-23 | Stop reason: SDUPTHER

## 2024-05-23 RX ORDER — PREDNISONE 20 MG/1
20 TABLET ORAL DAILY
Qty: 5 TABLET | Refills: 0 | Status: SHIPPED | OUTPATIENT
Start: 2024-05-23 | End: 2024-05-28

## 2024-05-23 RX ORDER — GLIPIZIDE 10 MG/1
10 TABLET, FILM COATED, EXTENDED RELEASE ORAL DAILY
COMMUNITY

## 2024-05-23 RX ORDER — ACETAMINOPHEN 500 MG
TABLET ORAL DAILY
COMMUNITY

## 2024-05-23 NOTE — PROGRESS NOTES
Subjective   Patient ID: Jono Quinn is a 54 y.o. male who presents for 3 month follow up and bells palsy  (X 4 days - left side of face, mouth, speech, taste, eye /Left arm - some tingling /Small pain of right side of head /Worsening ).  HPI    Sick visit    Pt had left sided facial weakness and tingling  x 4 days  Has had Bell's palsy before twice  Acute left Bell's palsy  Prednisone 20 mg daily x 5 days  Risk / benefits rev'd  PT eval and treat     CAD / hypercholesterolemia on rx   EKG SR 78 RBBB / old  Cardio following  Cath 12-6 -23      thyroid nodule   Thyroid ultrasound neg 10-23    retinopathy with macular edema  Retinal specialist following  [facility]   Getting eye shots     DM type II -insulin-dependent  FBS varying  this am  Continue meds  Follow blood sugars closely.  HBA1C 5.9   11-23  Endo following     GERD stable on diet     Depression  better on therapy no side effects  Psych following     Vitamin D deficiency on suplementation     Colonoscopy on follow up     Diet and exercise reviewed    Review of Systems   All other systems reviewed and are negative.      Objective   /74   Pulse 90   Temp 35.9 °C (96.6 °F)   Wt 132 kg (292 lb)   SpO2 99%   BMI 41.90 kg/m²   Lab Results   Component Value Date    WBC 6.5 12/30/2023    HGB 14.4 12/30/2023    HCT 40.1 (L) 12/30/2023     12/30/2023    CHOL 126 11/18/2023    TRIG 1,102 (H) 11/18/2023    HDL 24.0 11/18/2023    LDLDIRECT 32 03/05/2022    ALT 19 12/30/2023    AST 20 12/30/2023     (L) 12/30/2023    K 3.7 12/30/2023    CL 95 (L) 12/30/2023    CREATININE 1.11 12/30/2023    BUN 19 12/30/2023    CO2 24 12/30/2023    TSH 1.21 07/22/2023    INR 0.9 11/25/2023    HGBA1C 7.2 (A) 05/03/2024           Physical Exam  Vitals reviewed.   Constitutional:       Appearance: Normal appearance. He is obese.   HENT:      Head: Normocephalic and atraumatic.      Mouth/Throat:      Pharynx: No posterior oropharyngeal erythema.   Eyes:      General: No  scleral icterus.     Conjunctiva/sclera: Conjunctivae normal.      Pupils: Pupils are equal, round, and reactive to light.   Cardiovascular:      Rate and Rhythm: Normal rate and regular rhythm.      Heart sounds: Normal heart sounds.   Pulmonary:      Effort: No respiratory distress.      Breath sounds: No wheezing.   Abdominal:      General: Abdomen is flat. Bowel sounds are normal. There is no distension.      Palpations: Abdomen is soft. There is no mass.      Tenderness: There is no abdominal tenderness. There is no rebound.   Musculoskeletal:         General: Normal range of motion.      Cervical back: Normal range of motion and neck supple.   Skin:     General: Skin is warm and dry.   Neurological:      Mental Status: He is alert and oriented to person, place, and time. Mental status is at baseline.      Motor: Weakness present.      Comments: Left upper and lower face weak  Strength 2+/= upper extremities  Normal gait   Psychiatric:         Mood and Affect: Mood normal.         Behavior: Behavior normal.         Thought Content: Thought content normal.         Judgment: Judgment normal.         Problem List Items Addressed This Visit             ICD-10-CM    Depression F32.A    GERD (gastroesophageal reflux disease) K21.9    PAD (peripheral artery disease) (CMS-HCC) I73.9    Relevant Medications    gabapentin (Neurontin) 300 mg capsule    Vitamin D deficiency E55.9    Diabetic retinopathy (Multi) E11.319     Other Visit Diagnoses         Codes    Left-sided Bell's palsy    -  Primary G51.0    Relevant Medications    predniSONE (Deltasone) 20 mg tablet    Other Relevant Orders    ECG 12 lead (Clinic Performed)    Referral to Physical Medicine Rehab    Type 2 diabetes mellitus with other specified complication, with long-term current use of insulin (Multi)     E11.69, Z79.4    Relevant Medications    FreeStyle Delmi 3 Drummond misc    FreeStyle Delmi 3 Sensor device    Coronary artery disease involving native  coronary artery of native heart without angina pectoris     I25.10    Class 3 severe obesity due to excess calories with serious comorbidity and body mass index (BMI) of 40.0 to 44.9 in adult (Multi)     E66.01, Z68.41          Assessment/Plan     Sick visit    Pt had left sided facial weakness and tingling  x 4 days  Has had Bell's palsy before twice  Acute left Bell's palsy  Prednisone 20 mg daily x 5 days  Risk / benefits rev'd  PT eval and treat     CAD / hypercholesterolemia on rx   Cardio following  Cath 12-6 -23      thyroid nodule   Thyroid ultrasound neg 10-23    retinopathy with macular edema  Retinal specialist following  [facility]   Getting eye shots     DM type II -insulin-dependent  FBS varying  this am  Continue meds  Follow blood sugars closely.  HBA1C 5.9   11-23  Endo following     GERD stable on diet     Depression  better on therapy no side effects  Psych following     Vitamin D deficiency on suplementation     Colonoscopy on follow up     Diet and exercise reviewed    Patient was identified as a fall risk. Risk prevention instructions provided.    Follow up 3 months

## 2024-05-24 ENCOUNTER — PATIENT MESSAGE (OUTPATIENT)
Dept: PRIMARY CARE | Facility: CLINIC | Age: 54
End: 2024-05-24
Payer: COMMERCIAL

## 2024-05-24 ENCOUNTER — TELEPHONE (OUTPATIENT)
Dept: PHYSICAL MEDICINE AND REHAB | Facility: CLINIC | Age: 54
End: 2024-05-24
Payer: COMMERCIAL

## 2024-05-24 DIAGNOSIS — G51.0 LEFT-SIDED BELL'S PALSY: Primary | ICD-10-CM

## 2024-05-24 NOTE — TELEPHONE ENCOUNTER
New pt LVM for an appt for bells palsy  Per Dr. Leahy secure chat message, she doesn't do anything for bells palsy.  Pt should ensure he's not having a stroke and refer to neurology.  I called him back, he saw his PCP r/o stroke.  Advised to call them back for a referral to neurology if he wishes.

## 2024-05-28 RX ORDER — PREDNISONE 20 MG/1
20 TABLET ORAL DAILY
Qty: 5 TABLET | Refills: 0 | Status: SHIPPED | OUTPATIENT
Start: 2024-05-28 | End: 2024-06-02

## 2024-06-17 ENCOUNTER — APPOINTMENT (OUTPATIENT)
Dept: PHYSICAL MEDICINE AND REHAB | Facility: CLINIC | Age: 54
End: 2024-06-17
Payer: COMMERCIAL

## 2024-06-20 DIAGNOSIS — E11.311 DIABETIC RETINOPATHY OF BOTH EYES WITH MACULAR EDEMA ASSOCIATED WITH TYPE 2 DIABETES MELLITUS, UNSPECIFIED RETINOPATHY SEVERITY (MULTI): ICD-10-CM

## 2024-06-20 RX ORDER — METFORMIN HYDROCHLORIDE 1000 MG/1
1000 TABLET ORAL EVERY 12 HOURS
Qty: 60 TABLET | Refills: 0 | Status: SHIPPED | OUTPATIENT
Start: 2024-06-20

## 2024-06-27 ENCOUNTER — TELEMEDICINE (OUTPATIENT)
Dept: PRIMARY CARE | Facility: CLINIC | Age: 54
End: 2024-06-27
Payer: COMMERCIAL

## 2024-06-27 DIAGNOSIS — E11.69 TYPE 2 DIABETES MELLITUS WITH OTHER SPECIFIED COMPLICATION, WITH LONG-TERM CURRENT USE OF INSULIN (MULTI): ICD-10-CM

## 2024-06-27 DIAGNOSIS — J01.90 ACUTE NON-RECURRENT SINUSITIS, UNSPECIFIED LOCATION: ICD-10-CM

## 2024-06-27 DIAGNOSIS — K21.9 GASTROESOPHAGEAL REFLUX DISEASE WITHOUT ESOPHAGITIS: ICD-10-CM

## 2024-06-27 DIAGNOSIS — I25.10 CORONARY ARTERY DISEASE INVOLVING NATIVE CORONARY ARTERY OF NATIVE HEART WITHOUT ANGINA PECTORIS: ICD-10-CM

## 2024-06-27 DIAGNOSIS — E66.01 CLASS 3 SEVERE OBESITY DUE TO EXCESS CALORIES WITH SERIOUS COMORBIDITY AND BODY MASS INDEX (BMI) OF 40.0 TO 44.9 IN ADULT (MULTI): ICD-10-CM

## 2024-06-27 DIAGNOSIS — Z79.4 TYPE 2 DIABETES MELLITUS WITH OTHER SPECIFIED COMPLICATION, WITH LONG-TERM CURRENT USE OF INSULIN (MULTI): ICD-10-CM

## 2024-06-27 DIAGNOSIS — J20.9 ACUTE BRONCHITIS, UNSPECIFIED ORGANISM: Primary | ICD-10-CM

## 2024-06-27 PROCEDURE — 3008F BODY MASS INDEX DOCD: CPT | Performed by: INTERNAL MEDICINE

## 2024-06-27 PROCEDURE — 1036F TOBACCO NON-USER: CPT | Performed by: INTERNAL MEDICINE

## 2024-06-27 PROCEDURE — 99214 OFFICE O/P EST MOD 30 MIN: CPT | Performed by: INTERNAL MEDICINE

## 2024-06-27 RX ORDER — AMOXICILLIN AND CLAVULANATE POTASSIUM 875; 125 MG/1; MG/1
875 TABLET, FILM COATED ORAL 2 TIMES DAILY
Qty: 20 TABLET | Refills: 0 | Status: SHIPPED | OUTPATIENT
Start: 2024-06-27 | End: 2024-07-07

## 2024-06-27 RX ORDER — ALBUTEROL SULFATE 90 UG/1
2 AEROSOL, METERED RESPIRATORY (INHALATION) EVERY 4 HOURS PRN
Qty: 18 G | Refills: 0 | Status: SHIPPED | OUTPATIENT
Start: 2024-06-27 | End: 2025-06-27

## 2024-06-27 RX ORDER — BENZONATATE 100 MG/1
100 CAPSULE ORAL 3 TIMES DAILY PRN
Qty: 30 CAPSULE | Refills: 0 | Status: SHIPPED | OUTPATIENT
Start: 2024-06-27 | End: 2024-07-07

## 2024-06-27 ASSESSMENT — ENCOUNTER SYMPTOMS
SWOLLEN GLANDS: 1
HEADACHES: 1
COUGH: 1
HOARSE VOICE: 1
TROUBLE SWALLOWING: 1
SORE THROAT: 1
STRIDOR: 1
NECK PAIN: 1

## 2024-06-27 NOTE — PROGRESS NOTES
Subjective   Patient ID: Jono Quinn is a 54 y.o. male who presents for URI, Cough, Sore Throat, and chest congestion .  URI   Associated symptoms include congestion, coughing, ear pain, headaches, neck pain, a plugged ear sensation, a sore throat and swollen glands.   Cough  Associated symptoms include ear pain, headaches and a sore throat.   Sore Throat   This is a new problem. The current episode started yesterday. The problem has been rapidly worsening. Neither side of throat is experiencing more pain than the other. The pain is at a severity of 6/10. Associated symptoms include congestion, coughing, ear pain, headaches, a hoarse voice, a plugged ear sensation, neck pain, stridor, swollen glands and trouble swallowing. He has had no exposure to strep or mono.       Tele visit    Same day sick    Patient became ill yesterday with sore throat and sinus pressure.  He did feel warm but has no thermometer.  Today he noticed that he is having chest congestion as well as a cough which is productive of a milky sputum.  He denied shortness of breath however stated he was experiencing chest tightness.  Acute sinusitis, bronchitis  Augmentin 875 mg twice daily #20 no refill  Tessalon Perles as directed  Albuterol inhaler as directed  Risk benefits reviewed  Rest increase fluids  Check a COVID test.    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  HBA1C 5.9   11-23  Endo following     GERD stable on diet     Depression  better on therapy no side effects  Psych following     Vitamin D deficiency on suplementation     Colonoscopy on follow up     Diet and exercise reviewed    Review of Systems   HENT:  Positive for congestion, ear pain, hoarse voice,  sore throat and trouble swallowing.    Respiratory:  Positive for cough and stridor.    Musculoskeletal:  Positive for neck pain.   Neurological:  Positive for headaches.   All other systems reviewed and are negative.      Objective   There were no vitals taken for this visit.  Lab Results   Component Value Date    WBC 6.5 12/30/2023    HGB 14.4 12/30/2023    HCT 40.1 (L) 12/30/2023     12/30/2023    CHOL 126 11/18/2023    TRIG 1,102 (H) 11/18/2023    HDL 24.0 11/18/2023    LDLDIRECT 32 03/05/2022    ALT 19 12/30/2023    AST 20 12/30/2023     (L) 12/30/2023    K 3.7 12/30/2023    CL 95 (L) 12/30/2023    CREATININE 1.11 12/30/2023    BUN 19 12/30/2023    CO2 24 12/30/2023    TSH 1.21 07/22/2023    INR 0.9 11/25/2023    HGBA1C 7.2 (A) 05/03/2024           Physical Exam  Constitutional:       Appearance: Normal appearance. He is obese.      Comments: tired   Pulmonary:      Effort: Pulmonary effort is normal.   Neurological:      Mental Status: He is alert.   Psychiatric:         Mood and Affect: Mood normal.         Problem List Items Addressed This Visit             ICD-10-CM    GERD (gastroesophageal reflux disease) K21.9     Other Visit Diagnoses         Codes    Acute bronchitis, unspecified organism    -  Primary J20.9    Relevant Medications    amoxicillin-pot clavulanate (Augmentin) 875-125 mg tablet    benzonatate (Tessalon Perles) 100 mg capsule    albuterol 90 mcg/actuation inhaler    Acute non-recurrent sinusitis, unspecified location     J01.90    Relevant Medications    amoxicillin-pot clavulanate (Augmentin) 875-125 mg tablet    Coronary artery disease involving native coronary artery of native heart without angina pectoris     I25.10    Type 2 diabetes mellitus with other specified complication, with long-term current use of insulin (Multi)     E11.69, Z79.4    Class 3 severe obesity due to excess calories with serious comorbidity and body mass index (BMI) of 40.0 to 44.9 in adult (Multi)      E66.01, Z68.41          Assessment/Plan     Tele visit    Same day sick    Patient became ill yesterday with sore throat and sinus pressure.  He did feel warm but has no thermometer.  Today he noticed that he is having chest congestion as well as a cough which is productive of a milky sputum.  He denied shortness of breath however stated he was experiencing chest tightness, no CP  Acute sinusitis, bronchitis  Augmentin 875 mg twice daily #20 no refill  Tessalon Perles as directed  Albuterol inhaler as directed  Risk benefits reviewed  Rest increase fluids  Check a COVID test.    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  HBA1C 5.9   11-23  Endo following     GERD stable on diet     Depression  better on therapy no side effects  Psych following     Vitamin D deficiency on suplementation     Colonoscopy on follow up     Diet and exercise reviewed    Follow up as scheduled

## 2024-07-01 DIAGNOSIS — E78.00 HYPERCHOLESTEREMIA: ICD-10-CM

## 2024-07-01 RX ORDER — FENOFIBRATE 200 MG/1
200 CAPSULE ORAL DAILY
Qty: 30 CAPSULE | Refills: 1 | Status: SHIPPED | OUTPATIENT
Start: 2024-07-01

## 2024-07-16 DIAGNOSIS — E11.9 TYPE 2 DIABETES MELLITUS WITHOUT COMPLICATION, WITH LONG-TERM CURRENT USE OF INSULIN (MULTI): Primary | ICD-10-CM

## 2024-07-16 DIAGNOSIS — E11.311 DIABETIC RETINOPATHY OF BOTH EYES WITH MACULAR EDEMA ASSOCIATED WITH TYPE 2 DIABETES MELLITUS, UNSPECIFIED RETINOPATHY SEVERITY (MULTI): ICD-10-CM

## 2024-07-16 DIAGNOSIS — Z79.4 TYPE 2 DIABETES MELLITUS WITHOUT COMPLICATION, WITH LONG-TERM CURRENT USE OF INSULIN (MULTI): Primary | ICD-10-CM

## 2024-07-16 DIAGNOSIS — I25.10 CORONARY ARTERY DISEASE INVOLVING NATIVE CORONARY ARTERY OF NATIVE HEART WITHOUT ANGINA PECTORIS: ICD-10-CM

## 2024-07-16 RX ORDER — ROSUVASTATIN CALCIUM 40 MG/1
40 TABLET, COATED ORAL DAILY
Qty: 30 TABLET | Refills: 0 | Status: SHIPPED | OUTPATIENT
Start: 2024-07-16

## 2024-07-16 RX ORDER — EMPAGLIFLOZIN 25 MG/1
25 TABLET, FILM COATED ORAL DAILY
Qty: 60 TABLET | Refills: 0 | OUTPATIENT
Start: 2024-07-16

## 2024-07-16 RX ORDER — INSULIN ASPART 100 [IU]/ML
40-60 INJECTION, SOLUTION INTRAVENOUS; SUBCUTANEOUS
Qty: 60 ML | Refills: 5 | Status: SHIPPED | OUTPATIENT
Start: 2024-07-16 | End: 2024-07-18 | Stop reason: SDUPTHER

## 2024-07-18 DIAGNOSIS — Z79.4 TYPE 2 DIABETES MELLITUS WITHOUT COMPLICATION, WITH LONG-TERM CURRENT USE OF INSULIN (MULTI): ICD-10-CM

## 2024-07-18 DIAGNOSIS — E11.9 TYPE 2 DIABETES MELLITUS WITHOUT COMPLICATION, WITH LONG-TERM CURRENT USE OF INSULIN (MULTI): ICD-10-CM

## 2024-07-18 RX ORDER — INSULIN ASPART 100 [IU]/ML
40-60 INJECTION, SOLUTION INTRAVENOUS; SUBCUTANEOUS
Qty: 60 ML | Refills: 5 | Status: SHIPPED | OUTPATIENT
Start: 2024-07-18

## 2024-07-19 ENCOUNTER — TELEPHONE (OUTPATIENT)
Dept: ENDOCRINOLOGY | Facility: CLINIC | Age: 54
End: 2024-07-19
Payer: COMMERCIAL

## 2024-07-19 NOTE — TELEPHONE ENCOUNTER
Pharmacy called and asked for an alternative to Aspart flex pen due to needing a new Rx for Novolog. Pharmacy informed us the Pt had a insulin Rx for pickup in the form of NovoLOG as the Pt's insurance started coving the Rx last night.

## 2024-07-31 RX ORDER — GLIPIZIDE 10 MG/1
10 TABLET ORAL
Qty: 180 TABLET | Refills: 0 | OUTPATIENT
Start: 2024-07-31

## 2024-08-07 ENCOUNTER — APPOINTMENT (OUTPATIENT)
Dept: PRIMARY CARE | Facility: CLINIC | Age: 54
End: 2024-08-07
Payer: COMMERCIAL

## 2024-08-07 VITALS
DIASTOLIC BLOOD PRESSURE: 72 MMHG | HEART RATE: 92 BPM | SYSTOLIC BLOOD PRESSURE: 114 MMHG | OXYGEN SATURATION: 96 % | BODY MASS INDEX: 42.01 KG/M2 | TEMPERATURE: 97.7 F | WEIGHT: 292.8 LBS

## 2024-08-07 DIAGNOSIS — Z12.5 ENCOUNTER FOR PROSTATE CANCER SCREENING: ICD-10-CM

## 2024-08-07 DIAGNOSIS — E66.01 CLASS 3 SEVERE OBESITY DUE TO EXCESS CALORIES WITH SERIOUS COMORBIDITY AND BODY MASS INDEX (BMI) OF 40.0 TO 44.9 IN ADULT (MULTI): ICD-10-CM

## 2024-08-07 DIAGNOSIS — E55.9 VITAMIN D DEFICIENCY: ICD-10-CM

## 2024-08-07 DIAGNOSIS — Z00.00 ANNUAL PHYSICAL EXAM: Primary | ICD-10-CM

## 2024-08-07 DIAGNOSIS — I25.10 CORONARY ARTERY DISEASE INVOLVING NATIVE CORONARY ARTERY OF NATIVE HEART WITHOUT ANGINA PECTORIS: ICD-10-CM

## 2024-08-07 DIAGNOSIS — F32.A DEPRESSION, UNSPECIFIED DEPRESSION TYPE: ICD-10-CM

## 2024-08-07 DIAGNOSIS — E11.311 DIABETIC RETINOPATHY OF BOTH EYES WITH MACULAR EDEMA ASSOCIATED WITH TYPE 2 DIABETES MELLITUS, UNSPECIFIED RETINOPATHY SEVERITY (MULTI): ICD-10-CM

## 2024-08-07 DIAGNOSIS — K21.9 GASTROESOPHAGEAL REFLUX DISEASE WITHOUT ESOPHAGITIS: ICD-10-CM

## 2024-08-07 DIAGNOSIS — E78.00 HYPERCHOLESTEREMIA: ICD-10-CM

## 2024-08-07 PROCEDURE — 3074F SYST BP LT 130 MM HG: CPT | Performed by: INTERNAL MEDICINE

## 2024-08-07 PROCEDURE — 99214 OFFICE O/P EST MOD 30 MIN: CPT | Performed by: INTERNAL MEDICINE

## 2024-08-07 PROCEDURE — 1036F TOBACCO NON-USER: CPT | Performed by: INTERNAL MEDICINE

## 2024-08-07 PROCEDURE — 3078F DIAST BP <80 MM HG: CPT | Performed by: INTERNAL MEDICINE

## 2024-08-07 PROCEDURE — 99396 PREV VISIT EST AGE 40-64: CPT | Performed by: INTERNAL MEDICINE

## 2024-08-07 ASSESSMENT — ENCOUNTER SYMPTOMS
HEADACHES: 1
STRESS: 1

## 2024-08-07 NOTE — PROGRESS NOTES
Subjective   Patient ID: Jono Quinn is a 54 y.o. male who presents for yearly physical / 3 month follow up  (Pt is not sleeping well //Did have tooth extracted ), Stress (A lot ), Headache (Mild - might be from the tooth being pulled pt says ), and Sinusitis (Feels like there might be a sinus infection - feels a lump in his throat ).  Stress  Associated symptoms include headaches.   Headache     Sinusitis  Associated symptoms include headaches.     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  HBA1C 7.2   5-24  Endo following     GERD stable on diet     Depression  better on therapy no side effects  Psych following     Vitamin D deficiency on suplementation     Colonoscopy on follow up     Diet and exercise reviewed  Stop eating pizza and junk foods       Review of Systems   Neurological:  Positive for headaches.   All other systems reviewed and are negative.      Objective   /72   Pulse 92   Temp 36.5 °C (97.7 °F)   Wt 133 kg (292 lb 12.8 oz)   SpO2 96%   BMI 42.01 kg/m²   Lab Results   Component Value Date    WBC 6.5 12/30/2023    HGB 14.4 12/30/2023    HCT 40.1 (L) 12/30/2023     12/30/2023    CHOL 126 11/18/2023    TRIG 1,102 (H) 11/18/2023    HDL 24.0 11/18/2023    LDLDIRECT 32 03/05/2022    ALT 19 12/30/2023    AST 20 12/30/2023     (L) 12/30/2023    K 3.7 12/30/2023    CL 95 (L) 12/30/2023    CREATININE 1.11 12/30/2023    BUN 19 12/30/2023    CO2 24 12/30/2023    TSH 1.21 07/22/2023    INR 0.9 11/25/2023    HGBA1C 7.2  (A) 05/03/2024           Physical Exam  Vitals reviewed.   Constitutional:       Appearance: Normal appearance. He is obese.   HENT:      Head: Normocephalic and atraumatic.      Mouth/Throat:      Pharynx: No posterior oropharyngeal erythema.   Eyes:      General: No scleral icterus.     Conjunctiva/sclera: Conjunctivae normal.      Pupils: Pupils are equal, round, and reactive to light.   Cardiovascular:      Rate and Rhythm: Normal rate and regular rhythm.      Heart sounds: Normal heart sounds.   Pulmonary:      Effort: No respiratory distress.      Breath sounds: No wheezing.   Abdominal:      General: Abdomen is flat. Bowel sounds are normal. There is no distension.      Palpations: Abdomen is soft. There is no mass.      Tenderness: There is no abdominal tenderness. There is no rebound.   Musculoskeletal:         General: Normal range of motion.      Cervical back: Normal range of motion and neck supple.   Skin:     General: Skin is warm and dry.   Neurological:      General: No focal deficit present.      Mental Status: He is alert and oriented to person, place, and time. Mental status is at baseline.   Psychiatric:         Mood and Affect: Mood normal.         Behavior: Behavior normal.         Thought Content: Thought content normal.         Judgment: Judgment normal.         Problem List Items Addressed This Visit             ICD-10-CM    Depression F32.A    GERD (gastroesophageal reflux disease) K21.9    Vitamin D deficiency E55.9    Relevant Orders    Vitamin D 25-Hydroxy,Total (for eval of Vitamin D levels)    Diabetic retinopathy (Multi) E11.319    Relevant Medications    empagliflozin (Jardiance) 25 mg    Other Relevant Orders    Albumin-Creatinine Ratio, Urine Random     Other Visit Diagnoses         Codes    Annual physical exam    -  Primary Z00.00    Relevant Orders    CBC and Auto Differential    Comprehensive Metabolic Panel    Lipid Panel    TSH with reflex to Free T4 if abnormal    Coronary  artery disease involving native coronary artery of native heart without angina pectoris     I25.10    Hypercholesteremia     E78.00    Encounter for prostate cancer screening     Z12.5    Relevant Orders    Prostate Specific Antigen, Screen    Class 3 severe obesity due to excess calories with serious comorbidity and body mass index (BMI) of 40.0 to 44.9 in adult (Multi)     E66.01, Z68.41          Assessment/Plan     Yearly physical    Prostate 8-22  Colonoscopy none  CT chest lung cancer screening n/a  immunizations rev'd UTD  BMI 42    Follow up    On antibiotics for dental issues  Follow sinuses    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  HBA1C 7.2   5-24  Endo following     GERD stable on diet     Depression  better on therapy no side effects  Psych following     Vitamin D deficiency on suplementation     Diet and exercise reviewed  Stop eating pizza and junk foods    Check labs before appt    Colonoscopy on follow up    Follow up 3 months

## 2024-08-11 DIAGNOSIS — E11.311 DIABETIC RETINOPATHY OF BOTH EYES WITH MACULAR EDEMA ASSOCIATED WITH TYPE 2 DIABETES MELLITUS, UNSPECIFIED RETINOPATHY SEVERITY (MULTI): ICD-10-CM

## 2024-08-11 RX ORDER — EMPAGLIFLOZIN 25 MG/1
25 TABLET, FILM COATED ORAL DAILY
Qty: 90 TABLET | Refills: 0 | Status: SHIPPED | OUTPATIENT
Start: 2024-08-11

## 2024-08-25 DIAGNOSIS — E11.311 DIABETIC RETINOPATHY OF BOTH EYES WITH MACULAR EDEMA ASSOCIATED WITH TYPE 2 DIABETES MELLITUS, UNSPECIFIED RETINOPATHY SEVERITY (MULTI): ICD-10-CM

## 2024-08-25 DIAGNOSIS — E78.00 HYPERCHOLESTEREMIA: ICD-10-CM

## 2024-08-25 DIAGNOSIS — I25.10 CORONARY ARTERY DISEASE INVOLVING NATIVE CORONARY ARTERY OF NATIVE HEART WITHOUT ANGINA PECTORIS: ICD-10-CM

## 2024-08-25 RX ORDER — FENOFIBRATE 200 MG/1
200 CAPSULE ORAL DAILY
Qty: 90 CAPSULE | Refills: 0 | Status: SHIPPED | OUTPATIENT
Start: 2024-08-25

## 2024-08-25 RX ORDER — ROSUVASTATIN CALCIUM 40 MG/1
40 TABLET, COATED ORAL DAILY
Qty: 90 TABLET | Refills: 0 | Status: SHIPPED | OUTPATIENT
Start: 2024-08-25

## 2024-08-27 ENCOUNTER — HOSPITAL ENCOUNTER (OUTPATIENT)
Facility: HOSPITAL | Age: 54
Setting detail: OBSERVATION
Discharge: HOME | End: 2024-08-28
Attending: INTERNAL MEDICINE | Admitting: INTERNAL MEDICINE
Payer: COMMERCIAL

## 2024-08-27 ENCOUNTER — APPOINTMENT (OUTPATIENT)
Dept: RADIOLOGY | Facility: HOSPITAL | Age: 54
End: 2024-08-27
Payer: COMMERCIAL

## 2024-08-27 ENCOUNTER — APPOINTMENT (OUTPATIENT)
Dept: CARDIOLOGY | Facility: HOSPITAL | Age: 54
End: 2024-08-27
Payer: COMMERCIAL

## 2024-08-27 DIAGNOSIS — E78.1 HYPERTRIGLYCERIDEMIA: ICD-10-CM

## 2024-08-27 DIAGNOSIS — R55 SYNCOPE AND COLLAPSE: Primary | ICD-10-CM

## 2024-08-27 LAB
ALBUMIN SERPL BCP-MCNC: 3.8 G/DL (ref 3.4–5)
ALBUMIN SERPL BCP-MCNC: 3.9 G/DL (ref 3.4–5)
ALP SERPL-CCNC: 110 U/L (ref 33–120)
ALP SERPL-CCNC: 112 U/L (ref 33–120)
ALT SERPL W P-5'-P-CCNC: 20 U/L (ref 10–52)
ALT SERPL W P-5'-P-CCNC: 21 U/L (ref 10–52)
ANION GAP SERPL CALC-SCNC: 19 MMOL/L (ref 10–20)
APTT PPP: 32 SECONDS (ref 27–38)
AST SERPL W P-5'-P-CCNC: 34 U/L (ref 9–39)
AST SERPL W P-5'-P-CCNC: 39 U/L (ref 9–39)
BASOPHILS # BLD AUTO: 0.05 X10*3/UL (ref 0–0.1)
BASOPHILS NFR BLD AUTO: 1 %
BILIRUB DIRECT SERPL-MCNC: 0.1 MG/DL (ref 0–0.3)
BILIRUB SERPL-MCNC: 0.4 MG/DL (ref 0–1.2)
BILIRUB SERPL-MCNC: 0.4 MG/DL (ref 0–1.2)
BNP SERPL-MCNC: 19 PG/ML (ref 0–99)
BUN SERPL-MCNC: 11 MG/DL (ref 6–23)
CALCIUM SERPL-MCNC: 9.8 MG/DL (ref 8.6–10.3)
CARDIAC TROPONIN I PNL SERPL HS: 3 NG/L (ref 0–20)
CHLORIDE SERPL-SCNC: 98 MMOL/L (ref 98–107)
CHOLEST SERPL-MCNC: 200 MG/DL (ref 0–199)
CHOLESTEROL/HDL RATIO: 11.6
CO2 SERPL-SCNC: 21 MMOL/L (ref 21–32)
CREAT SERPL-MCNC: 0.93 MG/DL (ref 0.5–1.3)
EGFRCR SERPLBLD CKD-EPI 2021: >90 ML/MIN/1.73M*2
EOSINOPHIL # BLD AUTO: 0.16 X10*3/UL (ref 0–0.7)
EOSINOPHIL NFR BLD AUTO: 3.1 %
ERYTHROCYTE [DISTWIDTH] IN BLOOD BY AUTOMATED COUNT: 14.1 % (ref 11.5–14.5)
GLUCOSE BLD MANUAL STRIP-MCNC: 257 MG/DL (ref 74–99)
GLUCOSE BLD MANUAL STRIP-MCNC: 284 MG/DL (ref 74–99)
GLUCOSE SERPL-MCNC: 279 MG/DL (ref 74–99)
HCT VFR BLD AUTO: 35.1 % (ref 41–52)
HDLC SERPL-MCNC: 17.2 MG/DL
HGB BLD-MCNC: 13.4 G/DL (ref 13.5–17.5)
IMM GRANULOCYTES # BLD AUTO: 0.08 X10*3/UL (ref 0–0.7)
IMM GRANULOCYTES NFR BLD AUTO: 1.6 % (ref 0–0.9)
INR PPP: 0.9 (ref 0.9–1.1)
LDLC SERPL CALC-MCNC: ABNORMAL MG/DL
LYMPHOCYTES # BLD AUTO: 1.64 X10*3/UL (ref 1.2–4.8)
LYMPHOCYTES NFR BLD AUTO: 32.3 %
MCH RBC QN AUTO: 34.9 PG (ref 26–34)
MCHC RBC AUTO-ENTMCNC: 38.2 G/DL (ref 32–36)
MCV RBC AUTO: 91 FL (ref 80–100)
MONOCYTES # BLD AUTO: 0.4 X10*3/UL (ref 0.1–1)
MONOCYTES NFR BLD AUTO: 7.9 %
NEUTROPHILS # BLD AUTO: 2.75 X10*3/UL (ref 1.2–7.7)
NEUTROPHILS NFR BLD AUTO: 54.1 %
NON HDL CHOLESTEROL: 183 MG/DL (ref 0–149)
NRBC BLD-RTO: 0 /100 WBCS (ref 0–0)
PLATELET # BLD AUTO: 206 X10*3/UL (ref 150–450)
POTASSIUM SERPL-SCNC: 4.5 MMOL/L (ref 3.5–5.3)
PROT SERPL-MCNC: 6.9 G/DL (ref 6.4–8.2)
PROT SERPL-MCNC: 6.9 G/DL (ref 6.4–8.2)
PROTHROMBIN TIME: 10.4 SECONDS (ref 9.8–12.8)
RBC # BLD AUTO: 3.84 X10*6/UL (ref 4.5–5.9)
SODIUM SERPL-SCNC: 133 MMOL/L (ref 136–145)
TRIGL SERPL-MCNC: 1814 MG/DL (ref 0–149)
VLDL: ABNORMAL
WBC # BLD AUTO: 5.1 X10*3/UL (ref 4.4–11.3)

## 2024-08-27 PROCEDURE — 82947 ASSAY GLUCOSE BLOOD QUANT: CPT

## 2024-08-27 PROCEDURE — 96372 THER/PROPH/DIAG INJ SC/IM: CPT | Performed by: NURSE PRACTITIONER

## 2024-08-27 PROCEDURE — 85610 PROTHROMBIN TIME: CPT | Performed by: PHYSICIAN ASSISTANT

## 2024-08-27 PROCEDURE — 80053 COMPREHEN METABOLIC PANEL: CPT | Performed by: PHYSICIAN ASSISTANT

## 2024-08-27 PROCEDURE — 83718 ASSAY OF LIPOPROTEIN: CPT | Performed by: NURSE PRACTITIONER

## 2024-08-27 PROCEDURE — G0378 HOSPITAL OBSERVATION PER HR: HCPCS

## 2024-08-27 PROCEDURE — 84075 ASSAY ALKALINE PHOSPHATASE: CPT | Performed by: NURSE PRACTITIONER

## 2024-08-27 PROCEDURE — 83036 HEMOGLOBIN GLYCOSYLATED A1C: CPT | Mod: GENLAB | Performed by: NURSE PRACTITIONER

## 2024-08-27 PROCEDURE — 94660 CPAP INITIATION&MGMT: CPT

## 2024-08-27 PROCEDURE — 70551 MRI BRAIN STEM W/O DYE: CPT | Performed by: STUDENT IN AN ORGANIZED HEALTH CARE EDUCATION/TRAINING PROGRAM

## 2024-08-27 PROCEDURE — 2500000001 HC RX 250 WO HCPCS SELF ADMINISTERED DRUGS (ALT 637 FOR MEDICARE OP): Performed by: NURSE PRACTITIONER

## 2024-08-27 PROCEDURE — 36415 COLL VENOUS BLD VENIPUNCTURE: CPT | Performed by: PHYSICIAN ASSISTANT

## 2024-08-27 PROCEDURE — 72125 CT NECK SPINE W/O DYE: CPT

## 2024-08-27 PROCEDURE — 84484 ASSAY OF TROPONIN QUANT: CPT | Performed by: PHYSICIAN ASSISTANT

## 2024-08-27 PROCEDURE — 2500000005 HC RX 250 GENERAL PHARMACY W/O HCPCS: Performed by: NURSE PRACTITIONER

## 2024-08-27 PROCEDURE — 85730 THROMBOPLASTIN TIME PARTIAL: CPT | Performed by: PHYSICIAN ASSISTANT

## 2024-08-27 PROCEDURE — 70450 CT HEAD/BRAIN W/O DYE: CPT

## 2024-08-27 PROCEDURE — 99285 EMERGENCY DEPT VISIT HI MDM: CPT

## 2024-08-27 PROCEDURE — 85025 COMPLETE CBC W/AUTO DIFF WBC: CPT | Performed by: PHYSICIAN ASSISTANT

## 2024-08-27 PROCEDURE — 2500000002 HC RX 250 W HCPCS SELF ADMINISTERED DRUGS (ALT 637 FOR MEDICARE OP, ALT 636 FOR OP/ED): Performed by: NURSE PRACTITIONER

## 2024-08-27 PROCEDURE — 93005 ELECTROCARDIOGRAM TRACING: CPT

## 2024-08-27 PROCEDURE — 9420000001 HC RT PATIENT EDUCATION 5 MIN

## 2024-08-27 PROCEDURE — 83880 ASSAY OF NATRIURETIC PEPTIDE: CPT | Performed by: NURSE PRACTITIONER

## 2024-08-27 PROCEDURE — 2500000004 HC RX 250 GENERAL PHARMACY W/ HCPCS (ALT 636 FOR OP/ED): Performed by: NURSE PRACTITIONER

## 2024-08-27 PROCEDURE — 70551 MRI BRAIN STEM W/O DYE: CPT

## 2024-08-27 RX ORDER — ACETAMINOPHEN 160 MG/5ML
650 SOLUTION ORAL EVERY 4 HOURS PRN
Status: DISCONTINUED | OUTPATIENT
Start: 2024-08-27 | End: 2024-08-28 | Stop reason: HOSPADM

## 2024-08-27 RX ORDER — ONDANSETRON 4 MG/1
4 TABLET, FILM COATED ORAL EVERY 8 HOURS PRN
Status: DISCONTINUED | OUTPATIENT
Start: 2024-08-27 | End: 2024-08-28 | Stop reason: HOSPADM

## 2024-08-27 RX ORDER — INSULIN GLARGINE 100 [IU]/ML
88 INJECTION, SOLUTION SUBCUTANEOUS NIGHTLY
Status: DISCONTINUED | OUTPATIENT
Start: 2024-08-27 | End: 2024-08-28 | Stop reason: HOSPADM

## 2024-08-27 RX ORDER — PAROXETINE HYDROCHLORIDE 20 MG/1
30 TABLET, FILM COATED ORAL DAILY
Status: DISCONTINUED | OUTPATIENT
Start: 2024-08-27 | End: 2024-08-28 | Stop reason: HOSPADM

## 2024-08-27 RX ORDER — ROSUVASTATIN CALCIUM 10 MG/1
40 TABLET, COATED ORAL DAILY
Status: DISCONTINUED | OUTPATIENT
Start: 2024-08-27 | End: 2024-08-28 | Stop reason: HOSPADM

## 2024-08-27 RX ORDER — FENOFIBRATE 160 MG/1
160 TABLET ORAL DAILY
Status: DISCONTINUED | OUTPATIENT
Start: 2024-08-27 | End: 2024-08-28 | Stop reason: HOSPADM

## 2024-08-27 RX ORDER — PANTOPRAZOLE SODIUM 40 MG/10ML
40 INJECTION, POWDER, LYOPHILIZED, FOR SOLUTION INTRAVENOUS
Status: DISCONTINUED | OUTPATIENT
Start: 2024-08-28 | End: 2024-08-28 | Stop reason: HOSPADM

## 2024-08-27 RX ORDER — ACETAMINOPHEN 325 MG/1
650 TABLET ORAL EVERY 4 HOURS PRN
Status: DISCONTINUED | OUTPATIENT
Start: 2024-08-27 | End: 2024-08-28 | Stop reason: HOSPADM

## 2024-08-27 RX ORDER — GABAPENTIN 300 MG/1
900 CAPSULE ORAL 3 TIMES DAILY
Status: DISCONTINUED | OUTPATIENT
Start: 2024-08-27 | End: 2024-08-28 | Stop reason: HOSPADM

## 2024-08-27 RX ORDER — ACETAMINOPHEN 500 MG
5 TABLET ORAL NIGHTLY PRN
Status: DISCONTINUED | OUTPATIENT
Start: 2024-08-27 | End: 2024-08-28 | Stop reason: HOSPADM

## 2024-08-27 RX ORDER — PANTOPRAZOLE SODIUM 40 MG/1
40 TABLET, DELAYED RELEASE ORAL
Status: DISCONTINUED | OUTPATIENT
Start: 2024-08-28 | End: 2024-08-28 | Stop reason: HOSPADM

## 2024-08-27 RX ORDER — ACETAMINOPHEN 650 MG/1
650 SUPPOSITORY RECTAL EVERY 4 HOURS PRN
Status: DISCONTINUED | OUTPATIENT
Start: 2024-08-27 | End: 2024-08-28 | Stop reason: HOSPADM

## 2024-08-27 RX ORDER — ONDANSETRON HYDROCHLORIDE 2 MG/ML
4 INJECTION, SOLUTION INTRAVENOUS EVERY 8 HOURS PRN
Status: DISCONTINUED | OUTPATIENT
Start: 2024-08-27 | End: 2024-08-28 | Stop reason: HOSPADM

## 2024-08-27 RX ORDER — CALCIUM CARBONATE 200(500)MG
500 TABLET,CHEWABLE ORAL 4 TIMES DAILY PRN
Status: DISCONTINUED | OUTPATIENT
Start: 2024-08-27 | End: 2024-08-28 | Stop reason: HOSPADM

## 2024-08-27 RX ORDER — METFORMIN HYDROCHLORIDE 500 MG/1
1000 TABLET ORAL EVERY 12 HOURS
Status: DISCONTINUED | OUTPATIENT
Start: 2024-08-27 | End: 2024-08-28 | Stop reason: HOSPADM

## 2024-08-27 RX ORDER — ASPIRIN 81 MG/1
81 TABLET ORAL 2 TIMES DAILY
Status: DISCONTINUED | OUTPATIENT
Start: 2024-08-27 | End: 2024-08-28 | Stop reason: HOSPADM

## 2024-08-27 RX ORDER — INSULIN LISPRO 100 [IU]/ML
0-20 INJECTION, SOLUTION INTRAVENOUS; SUBCUTANEOUS
Status: DISCONTINUED | OUTPATIENT
Start: 2024-08-28 | End: 2024-08-28 | Stop reason: HOSPADM

## 2024-08-27 RX ORDER — AMOXICILLIN 250 MG
1 CAPSULE ORAL 2 TIMES DAILY
Status: DISCONTINUED | OUTPATIENT
Start: 2024-08-27 | End: 2024-08-28 | Stop reason: HOSPADM

## 2024-08-27 RX ORDER — ENOXAPARIN SODIUM 100 MG/ML
40 INJECTION SUBCUTANEOUS EVERY 12 HOURS SCHEDULED
Status: DISCONTINUED | OUTPATIENT
Start: 2024-08-27 | End: 2024-08-28 | Stop reason: HOSPADM

## 2024-08-27 SDOH — SOCIAL STABILITY: SOCIAL INSECURITY: DO YOU FEEL UNSAFE GOING BACK TO THE PLACE WHERE YOU ARE LIVING?: NO

## 2024-08-27 SDOH — SOCIAL STABILITY: SOCIAL INSECURITY: DOES ANYONE TRY TO KEEP YOU FROM HAVING/CONTACTING OTHER FRIENDS OR DOING THINGS OUTSIDE YOUR HOME?: NO

## 2024-08-27 SDOH — SOCIAL STABILITY: SOCIAL INSECURITY: ABUSE: ADULT

## 2024-08-27 SDOH — SOCIAL STABILITY: SOCIAL INSECURITY: ARE YOU OR HAVE YOU BEEN THREATENED OR ABUSED PHYSICALLY, EMOTIONALLY, OR SEXUALLY BY ANYONE?: YES

## 2024-08-27 SDOH — SOCIAL STABILITY: SOCIAL INSECURITY: HAVE YOU HAD THOUGHTS OF HARMING ANYONE ELSE?: NO

## 2024-08-27 SDOH — SOCIAL STABILITY: SOCIAL INSECURITY: HAS ANYONE EVER THREATENED TO HURT YOUR FAMILY OR YOUR PETS?: NO

## 2024-08-27 SDOH — SOCIAL STABILITY: SOCIAL INSECURITY: HAVE YOU HAD ANY THOUGHTS OF HARMING ANYONE ELSE?: NO

## 2024-08-27 SDOH — SOCIAL STABILITY: SOCIAL INSECURITY: WERE YOU ABLE TO COMPLETE ALL THE BEHAVIORAL HEALTH SCREENINGS?: YES

## 2024-08-27 SDOH — SOCIAL STABILITY: SOCIAL INSECURITY: ARE THERE ANY APPARENT SIGNS OF INJURIES/BEHAVIORS THAT COULD BE RELATED TO ABUSE/NEGLECT?: NO

## 2024-08-27 SDOH — SOCIAL STABILITY: SOCIAL INSECURITY: DO YOU FEEL ANYONE HAS EXPLOITED OR TAKEN ADVANTAGE OF YOU FINANCIALLY OR OF YOUR PERSONAL PROPERTY?: NO

## 2024-08-27 ASSESSMENT — COGNITIVE AND FUNCTIONAL STATUS - GENERAL
MOBILITY SCORE: 22
WALKING IN HOSPITAL ROOM: A LITTLE
PATIENT BASELINE BEDBOUND: NO
DAILY ACTIVITIY SCORE: 24
CLIMB 3 TO 5 STEPS WITH RAILING: A LITTLE

## 2024-08-27 ASSESSMENT — PAIN DESCRIPTION - ORIENTATION: ORIENTATION: LEFT

## 2024-08-27 ASSESSMENT — PAIN - FUNCTIONAL ASSESSMENT
PAIN_FUNCTIONAL_ASSESSMENT: 0-10

## 2024-08-27 ASSESSMENT — ACTIVITIES OF DAILY LIVING (ADL)
HEARING - RIGHT EAR: FUNCTIONAL
HEARING - LEFT EAR: FUNCTIONAL
LACK_OF_TRANSPORTATION: NO
ADEQUATE_TO_COMPLETE_ADL: YES
GROOMING: INDEPENDENT
FEEDING YOURSELF: INDEPENDENT
DRESSING YOURSELF: INDEPENDENT
WALKS IN HOME: INDEPENDENT
BATHING: INDEPENDENT
JUDGMENT_ADEQUATE_SAFELY_COMPLETE_DAILY_ACTIVITIES: YES
PATIENT'S MEMORY ADEQUATE TO SAFELY COMPLETE DAILY ACTIVITIES?: YES
TOILETING: INDEPENDENT

## 2024-08-27 ASSESSMENT — PAIN SCALES - GENERAL
PAINLEVEL_OUTOF10: 0 - NO PAIN
PAINLEVEL_OUTOF10: 3
PAINLEVEL_OUTOF10: 2

## 2024-08-27 ASSESSMENT — LIFESTYLE VARIABLES
SKIP TO QUESTIONS 9-10: 1
HOW OFTEN DO YOU HAVE A DRINK CONTAINING ALCOHOL: NEVER
HOW MANY STANDARD DRINKS CONTAINING ALCOHOL DO YOU HAVE ON A TYPICAL DAY: PATIENT DOES NOT DRINK
HOW OFTEN DO YOU HAVE 6 OR MORE DRINKS ON ONE OCCASION: NEVER
AUDIT-C TOTAL SCORE: 0
AUDIT-C TOTAL SCORE: 0

## 2024-08-27 ASSESSMENT — PAIN DESCRIPTION - LOCATION: LOCATION: HEAD

## 2024-08-27 ASSESSMENT — COLUMBIA-SUICIDE SEVERITY RATING SCALE - C-SSRS
6. HAVE YOU EVER DONE ANYTHING, STARTED TO DO ANYTHING, OR PREPARED TO DO ANYTHING TO END YOUR LIFE?: NO
1. IN THE PAST MONTH, HAVE YOU WISHED YOU WERE DEAD OR WISHED YOU COULD GO TO SLEEP AND NOT WAKE UP?: NO
2. HAVE YOU ACTUALLY HAD ANY THOUGHTS OF KILLING YOURSELF?: NO

## 2024-08-27 ASSESSMENT — PATIENT HEALTH QUESTIONNAIRE - PHQ9
2. FEELING DOWN, DEPRESSED OR HOPELESS: NOT AT ALL
SUM OF ALL RESPONSES TO PHQ9 QUESTIONS 1 & 2: 0
1. LITTLE INTEREST OR PLEASURE IN DOING THINGS: NOT AT ALL

## 2024-08-27 ASSESSMENT — PAIN DESCRIPTION - PAIN TYPE: TYPE: ACUTE PAIN

## 2024-08-27 NOTE — ED TRIAGE NOTES
Pt to ED for c/o fall, hit head, no thinners at 0800. Pt states he is having trouble concentrating. States left sided lip numbness began at 1000, MD aware. Pt unsure if he passed out before or after fall. MD aware, stroke alert called. No numbness or tingling in extremities,  equal. No drift noted. Pt taken straight to CT.

## 2024-08-28 ENCOUNTER — APPOINTMENT (OUTPATIENT)
Dept: CARDIOLOGY | Facility: HOSPITAL | Age: 54
End: 2024-08-28
Payer: COMMERCIAL

## 2024-08-28 ENCOUNTER — APPOINTMENT (OUTPATIENT)
Dept: PRIMARY CARE | Facility: CLINIC | Age: 54
End: 2024-08-28
Payer: COMMERCIAL

## 2024-08-28 VITALS
TEMPERATURE: 97.3 F | SYSTOLIC BLOOD PRESSURE: 132 MMHG | WEIGHT: 285.27 LBS | HEART RATE: 79 BPM | RESPIRATION RATE: 17 BRPM | BODY MASS INDEX: 40.84 KG/M2 | HEIGHT: 70 IN | DIASTOLIC BLOOD PRESSURE: 75 MMHG | OXYGEN SATURATION: 94 %

## 2024-08-28 DIAGNOSIS — E11.311 DIABETIC RETINOPATHY OF BOTH EYES WITH MACULAR EDEMA ASSOCIATED WITH TYPE 2 DIABETES MELLITUS, UNSPECIFIED RETINOPATHY SEVERITY (MULTI): ICD-10-CM

## 2024-08-28 PROBLEM — E78.1 HYPERTRIGLYCERIDEMIA: Status: ACTIVE | Noted: 2023-05-03

## 2024-08-28 PROBLEM — R20.0 LEFT FACIAL NUMBNESS: Status: ACTIVE | Noted: 2024-08-28

## 2024-08-28 LAB
AORTIC VALVE PEAK VELOCITY: 1.28 M/S
ATRIAL RATE: 75 BPM
AV PEAK GRADIENT: 6.6 MMHG
AVA (PEAK VEL): 3.9 CM2
EJECTION FRACTION APICAL 4 CHAMBER: 63.1
EJECTION FRACTION: 68 %
EST. AVERAGE GLUCOSE BLD GHB EST-MCNC: 183 MG/DL
GLUCOSE BLD MANUAL STRIP-MCNC: 199 MG/DL (ref 74–99)
GLUCOSE BLD MANUAL STRIP-MCNC: 221 MG/DL (ref 74–99)
HBA1C MFR BLD: 8 %
LEFT ATRIUM VOLUME AREA LENGTH INDEX BSA: 20.6 ML/M2
LEFT VENTRICLE INTERNAL DIMENSION DIASTOLE: 5.02 CM (ref 3.5–6)
LEFT VENTRICULAR OUTFLOW TRACT DIAMETER: 2.3 CM
MITRAL VALVE E/A RATIO: 1.39
P AXIS: 32 DEGREES
P OFFSET: 212 MS
P ONSET: 160 MS
PR INTERVAL: 126 MS
Q ONSET: 223 MS
QRS COUNT: 12 BEATS
QRS DURATION: 140 MS
QT INTERVAL: 434 MS
QTC CALCULATION(BAZETT): 484 MS
QTC FREDERICIA: 467 MS
R AXIS: 35 DEGREES
RIGHT VENTRICLE FREE WALL PEAK S': 14.4 CM/S
T AXIS: 25 DEGREES
T OFFSET: 440 MS
TRICUSPID ANNULAR PLANE SYSTOLIC EXCURSION: 1.6 CM
VENTRICULAR RATE: 75 BPM

## 2024-08-28 PROCEDURE — 2500000001 HC RX 250 WO HCPCS SELF ADMINISTERED DRUGS (ALT 637 FOR MEDICARE OP): Performed by: NURSE PRACTITIONER

## 2024-08-28 PROCEDURE — 94660 CPAP INITIATION&MGMT: CPT

## 2024-08-28 PROCEDURE — 93306 TTE W/DOPPLER COMPLETE: CPT

## 2024-08-28 PROCEDURE — 2500000004 HC RX 250 GENERAL PHARMACY W/ HCPCS (ALT 636 FOR OP/ED): Performed by: NURSE PRACTITIONER

## 2024-08-28 PROCEDURE — G0378 HOSPITAL OBSERVATION PER HR: HCPCS

## 2024-08-28 PROCEDURE — 97161 PT EVAL LOW COMPLEX 20 MIN: CPT | Mod: GP | Performed by: PHYSICAL THERAPIST

## 2024-08-28 PROCEDURE — 2500000002 HC RX 250 W HCPCS SELF ADMINISTERED DRUGS (ALT 637 FOR MEDICARE OP, ALT 636 FOR OP/ED): Performed by: NURSE PRACTITIONER

## 2024-08-28 PROCEDURE — 94760 N-INVAS EAR/PLS OXIMETRY 1: CPT

## 2024-08-28 PROCEDURE — 82947 ASSAY GLUCOSE BLOOD QUANT: CPT

## 2024-08-28 PROCEDURE — 99239 HOSP IP/OBS DSCHRG MGMT >30: CPT

## 2024-08-28 RX ORDER — ICOSAPENT ETHYL 1 G/1
2 CAPSULE ORAL
Status: DISCONTINUED | OUTPATIENT
Start: 2024-08-28 | End: 2024-08-28 | Stop reason: HOSPADM

## 2024-08-28 RX ORDER — ICOSAPENT ETHYL 1 G/1
2 CAPSULE ORAL
Qty: 120 CAPSULE | Refills: 1 | Status: SHIPPED | OUTPATIENT
Start: 2024-08-28 | End: 2024-10-27

## 2024-08-28 RX ORDER — SODIUM CHLORIDE 9 MG/ML
10 INJECTION, SOLUTION INTRAVENOUS CONTINUOUS PRN
Status: DISCONTINUED | OUTPATIENT
Start: 2024-08-28 | End: 2024-08-28 | Stop reason: HOSPADM

## 2024-08-28 ASSESSMENT — COGNITIVE AND FUNCTIONAL STATUS - GENERAL
MOVING TO AND FROM BED TO CHAIR: A LITTLE
CLIMB 3 TO 5 STEPS WITH RAILING: A LITTLE
DAILY ACTIVITIY SCORE: 24
MOBILITY SCORE: 22
STANDING UP FROM CHAIR USING ARMS: A LITTLE
WALKING IN HOSPITAL ROOM: A LITTLE
WALKING IN HOSPITAL ROOM: A LITTLE
MOBILITY SCORE: 20
CLIMB 3 TO 5 STEPS WITH RAILING: A LITTLE

## 2024-08-28 ASSESSMENT — ENCOUNTER SYMPTOMS
HEMATOLOGIC/LYMPHATIC NEGATIVE: 1
ALLERGIC/IMMUNOLOGIC NEGATIVE: 1
EYES NEGATIVE: 1
CONSTITUTIONAL NEGATIVE: 1
CARDIOVASCULAR NEGATIVE: 1
GASTROINTESTINAL NEGATIVE: 1
RESPIRATORY NEGATIVE: 1
MUSCULOSKELETAL NEGATIVE: 1
PSYCHIATRIC NEGATIVE: 1
NUMBNESS: 1
ENDOCRINE NEGATIVE: 1

## 2024-08-28 ASSESSMENT — PAIN SCALES - GENERAL
PAINLEVEL_OUTOF10: 0 - NO PAIN

## 2024-08-28 ASSESSMENT — PAIN - FUNCTIONAL ASSESSMENT
PAIN_FUNCTIONAL_ASSESSMENT: 0-10

## 2024-08-28 ASSESSMENT — ACTIVITIES OF DAILY LIVING (ADL): LACK_OF_TRANSPORTATION: NO

## 2024-08-28 NOTE — PROGRESS NOTES
Physical Therapy    Physical Therapy Evaluation    Patient Name: Jono Quinn  MRN: [id number]  Today's Date: 8/28/2024   Time Calculation  Start Time: 0855  Stop Time: 0915  Time Calculation (min): 20 min    Assessment/Plan   PT Assessment  PT Assessment Results: Decreased strength, Decreased endurance, Impaired balance, Impaired sensation, Obesity  Rehab Prognosis: Good  Barriers to Discharge: n/a  Evaluation/Treatment Tolerance: Patient tolerated treatment well  Strengths: Ability to acquire knowledge  Barriers to Participation:  (n/a)  Assessment Comment: Pleasant 54 y.o presents with generalized weakness and impaired balance. Pt. lives alone and is normally IND. Recommend cane at this time with increased assist at home for cooking/cleaning. Will cont. to follow while in hospital to prevent further decline and recommend LOW intensity upon d/c.  End of Session Patient Position: Up in chair, Alarm on  IP OR SWING BED PT PLAN  Inpatient or Swing Bed: Inpatient  PT Plan  Treatment/Interventions: Transfer training, Gait training, Stair training, Balance training, Strengthening, Therapeutic exercise, Therapeutic activity, Home exercise program  PT Plan: Ongoing PT  PT Frequency: 3 times per week  PT Discharge Recommendations: Low intensity level of continued care  Equipment Recommended upon Discharge:  (issued cane)  PT Recommended Transfer Status: Assist x1  PT - OK to Discharge: Yes Based on completed evaluation and care plan recommendations, no barriers to discharge to next site of care        Subjective   General Visit Information:  General  Reason for Referral: impaired mobility, syncope and collapse  Referred By: BETH Cabral-CNP  Past Medical History Relevant to Rehab: hx of [place] palsy on the left with complaints of left arm numbness at other times as well. DM, CAD, GERD, dysautonomia, pt. reported neuropathy and B knee OA  Home Living:  Home Living  Type of Home: Apartment  Lives With: Alone  Home  "Adaptive Equipment: Reacher  Home Layout: One level  Home Access: Stairs to enter without rails  Entrance Stairs-Rails: None (recommend install rail)  Entrance Stairs-Number of Steps: 3  Bathroom Shower/Tub: Tub/shower unit  Bathroom Equipment:  (grab bar)  Prior Level of Function:  Prior Function Per Pt/Caregiver Report  Level of [place]: Independent with ADLs and functional transfers, Independent with homemaking with ambulation (states he is having difficulty with cooking/cleaning)  Homemaking Assistance: Independent  Ambulatory Assistance: Independent  Vocational:  (works from home)  Prior Function Comments: (+) drives  Precautions:  Precautions  Medical Precautions: Fall precautions (+dysautonomia)      Objective   Pain:  Pain Assessment  Pain Assessment: 0-10  0-10 (Numeric) Pain Score: 0 - No pain  Cognition:  Cognition  Overall Cognitive Status: Within Functional Limits  Orientation Level: Oriented X4    General Assessments:     Activity Tolerance  Endurance: Decreased tolerance for upright activites (states he is only able to tolerate 19 min of standing before he \"passes out\")    Sensation  Sensation Comment: reports chronic B feet neuropathy    Strength  Strength Comments: BLE: grossly 4-/5  Coordination  Movements are Fluid and Coordinated: Yes    Static Standing Balance  Static Standing-Comment/Number of Minutes: good-; recommend AD  Dynamic Standing Balance  Dynamic Standing-Comments: good-; had 1 LOB. Able to self-correct. Recommend AD (pt. declining WW but agreeable to SC)    Functional Assessments:  Bed Mobility  Bed Mobility: Yes  Bed Mobility 1  Bed Mobility 1: Supine to sitting  Level of Assistance 1: Modified independent    Transfers  Transfer: Yes  Transfer 1  Technique 1: Sit to stand, Stand to sit  Transfer Device 1:  (with and without SC)  Transfer Level of Assistance 1: Close supervision    Ambulation/Gait Training  Ambulation/Gait Training Performed: Yes  Ambulation/Gait Training " 1  Gait Support Devices:  (15' with no AD; 30' with SC. Recommend SC for amb.)  Assistance 1: Close supervision  Quality of Gait 1: Wide base of support, Decreased step length (increased lateral sway.)  Extremity/Trunk Assessments:  RLE   RLE : Within Functional Limits  LLE   LLE : Within Functional Limits  Outcome Measures:  [facility] Basic Mobility  Turning from your back to your side while in a flat bed without using bedrails: None  Moving from lying on your back to sitting on the side of a flat bed without using bedrails: None  Moving to and from bed to chair (including a wheelchair): A little  Standing up from a chair using your arms (e.g. wheelchair or bedside chair): A little  To walk in hospital room: A little  Climbing 3-5 steps with railing: A little  Basic Mobility - Total Score: 20    Encounter Problems       Encounter Problems (Active)       Balance       STG - Maintains dynamic standing balance without upper extremity support with good balance        Start:  08/28/24    Expected End:  09/12/24               Mobility       LTG - Patient will ambulate community distance IHSAN with cane        Start:  08/28/24    Expected End:  09/12/24               Mobility       LTG - Patient will navigate 3 steps with cane IND       Start:  08/28/24    Expected End:  09/12/24               PT Transfers       STG - Patient will transfer sit to and from stand IHSAN with cane       Start:  08/28/24    Expected End:  09/12/24                   Education Documentation  Mobility Training, taught by Concepcion Palacios, PT at 8/28/2024 10:27 AM.  Learner: Patient  Readiness: Acceptance  Method: Explanation  Response: Verbalizes Understanding  Comment: Educated pt. on benefits of AD    Education Comments  No comments found.

## 2024-08-28 NOTE — CARE PLAN
The patient's goals for the shift include eating something and getting sleep.     The clinical goals for the shift include Neuro checks every four hours during this shift.    Over the shift, neuro checks were completed every four hours. Pt at the beginning of this shift at admission reported numbness and tingling in the left side of face. During med pass nurse gave tylenol as he reported he got a tooth extracted on that side about two weeks ago. Pt reports that the numbness and tingling on the left side stopped. Pt ambulates to bathroom with standby assist as he is a fall risk. Pt currently on cpap resting bed alarm on and call light within reach for assistance.

## 2024-08-28 NOTE — CONSULTS
Inpatient consult to Cardiology  Consult performed by: BENNETT Junior  Consult ordered by: BENNETT Young  Reason for consult: elevated triglycerides, syncope          History Of Present Illness  Jono Quinn is a 54 y.o. male presenting with complaints of syncope. He states he has issues with orthostasis and normally gets up slowly. This time, he stood up too fast and got dizzy and started to fall. He put his hands out but hit the side of this face on the wall. Denies any chest pain or shortness of breath.     Lab work in the ER showed glucose 279, sodium 133, potassium 4.5, BUN/creatinine 11/0.93, troponin negative, BNP 19, hemoglobin A1c 8.0%, WBCs 5.1, H&H 13.4/35.1, CT of the cervical spine negative, CT of the head negative, MRI of the brain negative, lipid panel showed cholesterol 200, triglycerides 1814.    EKG showed sinus rhythm with right bundle branch block, no acute ischemic changes.      Past Medical History  Past Medical History:   Diagnosis Date    COVID-19 11/23/2020    Pneumonia due to COVID-19 virus    Depression, unspecified 10/26/2022    Depression    Diabetes mellitus (Multi)     Obstructive sleep apnea (adult) (pediatric) 07/11/2022    LITZY on CPAP    Other conditions influencing health status 05/16/2022    Diabetes type 2, uncontrolled    Personal history of other diseases of the digestive system 11/19/2021    H/O acute pancreatitis    Personal history of other diseases of the nervous system and sense organs     History of sleep apnea    Pure hypercholesterolemia, unspecified 10/26/2022    Hypercholesterolemia    Testicular hypofunction 07/01/2021    Hypogonadism male    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       Surgical History  Past Surgical History:   Procedure Laterality Date    CARDIAC CATHETERIZATION N/A 12/6/2023    Procedure: Left Heart Cath;  Surgeon: Lucas Pino MD;  Location: [facility] Cardiac Cath Lab;  Service: Cardiovascular;  Laterality:  N/A;  12/6/ am for [facility] [address] for CAD with angina I25.119 and abnormal cardiac CTA R93.1  Auth # for [employer]:  [id number] 11/10/23-05/08/24    CT ANGIO CORONARY ART WITH HEARTFLOW IF SCORE >30%  10/24/2023    CT ANGIO CORONARY ART WITH HEARTFLOW IF SCORE >30% 10/24/2023 [facility] CT    OTHER SURGICAL HISTORY  07/13/2020    Cholecystectomy    OTHER SURGICAL HISTORY  07/13/2020    Cranioplasty    OTHER SURGICAL HISTORY  07/13/2020    Tonsillectomy with adenoidectomy    OTHER SURGICAL HISTORY  07/13/2020    Complete colonoscopy    OTHER SURGICAL HISTORY  03/01/2022    Nasal septoplasty    OTHER SURGICAL HISTORY  03/01/2022    Submucous resection of nasal turbinate        Social History  He reports that he has never smoked. He has never been exposed to tobacco smoke. He has never used smokeless tobacco. He reports that he does not drink alcohol and does not use drugs.    Family History  No family history on file.     Allergies  Fluticasone propionate, Liraglutide, Lisinopril, Other, and Pollen extracts    Review of Systems  Review of Systems       Physical Exam  Constitutional: Well developed, awake/alert/oriented x3, no distress, alert and cooperative  Eyes: PERRL, EOMI, clear sclera  ENMT: mucous membranes moist, no apparent injury, no lesions seen  Head/Neck: Neck supple, no apparent injury, thyroid without mass or tenderness, No JVD, trachea midline, no bruits  Respiratory/Thorax: Patent airways, CTAB, normal breath sounds with good chest expansion, thorax symmetric  Cardiovascular: Regular, rate and rhythm, no murmurs, 2+ equal pulses of the extremities, normal S 1and S 2  Gastrointestinal: Nondistended, soft, non-tender, no rebound tenderness or guarding, no masses palpable, no organomegaly, +BS, no bruits  Musculoskeletal: ROM intact, no joint swelling, normal strength  Extremities: normal extremities, no cyanosis edema, contusions or wounds, no clubbing  Neurological: alert and oriented x3, intact senses,  "motor, response and reflexes, normal strength  Lymphatic: No significant lymphadenopathy  Psychological: Appropriate mood and behavior  Skin: Warm and dry, no lesions, no rashes       Last Recorded Vitals  Blood pressure 127/72, pulse 72, temperature 36.4 °C (97.6 °F), temperature source Temporal, resp. rate 18, height 1.778 m (5' 10\"), weight 129 kg (285 lb 4.4 oz), SpO2 93%.    Relevant Results    Scheduled medications  aspirin, 81 mg, oral, BID  busPIRone, 15 mg, oral, TID  empagliflozin, 25 mg, oral, Daily  enoxaparin, 40 mg, subcutaneous, q12h CHRISTOPHER  fenofibrate, 160 mg, oral, Daily  gabapentin, 900 mg, oral, TID  icosapent ethyL, 2 g, oral, BID  insulin glargine, 88 Units, subcutaneous, Nightly  insulin lispro, 0-20 Units, subcutaneous, TID  metFORMIN, 1,000 mg, oral, q12h  pantoprazole, 40 mg, oral, Daily before breakfast   Or  pantoprazole, 40 mg, intravenous, Daily before breakfast  PARoxetine, 30 mg, oral, Daily  rosuvastatin, 40 mg, oral, Daily  sennosides-docusate sodium, 1 tablet, oral, BID      Continuous medications  sodium chloride 0.9%, 10 mL/hr      PRN medications  PRN medications: acetaminophen **OR** acetaminophen **OR** acetaminophen, acetaminophen **OR** acetaminophen **OR** acetaminophen, calcium carbonate, melatonin, ondansetron **OR** ondansetron, oxygen, sodium chloride 0.9%    Results for orders placed or performed during the hospital encounter of 08/27/24 (from the past 24 hour(s))   ECG 12 lead   Result Value Ref Range    Ventricular Rate 75 BPM    Atrial Rate 75 BPM    LA Interval 126 ms    QRS Duration 140 ms    QT Interval 434 ms    QTC Calculation(Bazett) 484 ms    P Axis 32 degrees    R Axis 35 degrees    T Axis 25 degrees    QRS Count 12 beats    Q Onset 223 ms    P Onset 160 ms    P Offset 212 ms    T Offset 440 ms    QTC Fredericia 467 ms   POCT GLUCOSE   Result Value Ref Range    POCT Glucose 284 (H) 74 - 99 mg/dL   CBC and Auto Differential   Result Value Ref Range    WBC 5.1 " 4.4 - 11.3 x10*3/uL    nRBC 0.0 0.0 - 0.0 /100 WBCs    RBC 3.84 (L) 4.50 - 5.90 x10*6/uL    Hemoglobin 13.4 (L) 13.5 - 17.5 g/dL    Hematocrit 35.1 (L) 41.0 - 52.0 %    MCV 91 80 - 100 fL    MCH 34.9 (H) 26.0 - 34.0 pg    MCHC 38.2 (H) 32.0 - 36.0 g/dL    RDW 14.1 11.5 - 14.5 %    Platelets 206 150 - 450 x10*3/uL    Neutrophils % 54.1 40.0 - 80.0 %    Immature Granulocytes %, Automated 1.6 (H) 0.0 - 0.9 %    Lymphocytes % 32.3 13.0 - 44.0 %    Monocytes % 7.9 2.0 - 10.0 %    Eosinophils % 3.1 0.0 - 6.0 %    Basophils % 1.0 0.0 - 2.0 %    Neutrophils Absolute 2.75 1.20 - 7.70 x10*3/uL    Immature Granulocytes Absolute, Automated 0.08 0.00 - 0.70 x10*3/uL    Lymphocytes Absolute 1.64 1.20 - 4.80 x10*3/uL    Monocytes Absolute 0.40 0.10 - 1.00 x10*3/uL    Eosinophils Absolute 0.16 0.00 - 0.70 x10*3/uL    Basophils Absolute 0.05 0.00 - 0.10 x10*3/uL   Comprehensive metabolic panel   Result Value Ref Range    Glucose 279 (H) 74 - 99 mg/dL    Sodium 133 (L) 136 - 145 mmol/L    Potassium 4.5 3.5 - 5.3 mmol/L    Chloride 98 98 - 107 mmol/L    Bicarbonate 21 21 - 32 mmol/L    Anion Gap 19 10 - 20 mmol/L    Urea Nitrogen 11 6 - 23 mg/dL    Creatinine 0.93 0.50 - 1.30 mg/dL    eGFR >90 >60 mL/min/1.73m*2    Calcium 9.8 8.6 - 10.3 mg/dL    Albumin 3.8 3.4 - 5.0 g/dL    Alkaline Phosphatase 112 33 - 120 U/L    Total Protein 6.9 6.4 - 8.2 g/dL    AST 34 9 - 39 U/L    Bilirubin, Total 0.4 0.0 - 1.2 mg/dL    ALT 21 10 - 52 U/L   Troponin I, High Sensitivity   Result Value Ref Range    Troponin I, High Sensitivity 3 0 - 20 ng/L   Protime-INR   Result Value Ref Range    Protime 10.4 9.8 - 12.8 seconds    INR 0.9 0.9 - 1.1   APTT   Result Value Ref Range    aPTT 32 27 - 38 seconds   Hepatic Function Panel   Result Value Ref Range    Albumin 3.9 3.4 - 5.0 g/dL    Bilirubin, Total 0.4 0.0 - 1.2 mg/dL    Bilirubin, Direct 0.1 0.0 - 0.3 mg/dL    Alkaline Phosphatase 110 33 - 120 U/L    ALT 20 10 - 52 U/L    AST 39 9 - 39 U/L    Total  Protein 6.9 6.4 - 8.2 g/dL   B-Type Natriuretic Peptide   Result Value Ref Range    BNP 19 0 - 99 pg/mL   Lipid Panel   Result Value Ref Range    Cholesterol 200 (H) 0 - 199 mg/dL    HDL-Cholesterol 17.2 mg/dL    Cholesterol/HDL Ratio 11.6     LDL Calculated      VLDL      Triglycerides 1,814 (H) 0 - 149 mg/dL    Non HDL Cholesterol 183 (H) 0 - 149 mg/dL   Hemoglobin A1C   Result Value Ref Range    Hemoglobin A1C 8.0 (H) see below %    Estimated Average Glucose 183 Not Established mg/dL   POCT GLUCOSE   Result Value Ref Range    POCT Glucose 257 (H) 74 - 99 mg/dL   POCT GLUCOSE   Result Value Ref Range    POCT Glucose 199 (H) 74 - 99 mg/dL   Transthoracic Echo (TTE) Complete   Result Value Ref Range    BSA 2.53 m2       Transthoracic Echo (TTE) Complete         MR brain wo IV contrast   Final Result   No acute ischemic infarct, acute hemorrhage, or intracranial mass   effect.             MACRO:   None.        Signed by: Favian Bar 8/27/2024 6:13 PM   Dictation workstation:   [id number]      CT brain attack head wo IV contrast   Final Result   No evidence of acute cortical infarct or intracranial hemorrhage.        No evidence of intracranial hemorrhage or displaced skull fracture.        MACRO:   Linda Miranda discussed the significance and urgency of this   critical finding by telephone with  DIXON ZAMUDIOJAMES on 8/27/2024 at   1:25 pm.  (**-RCF-**) Findings:  See findings.             Signed by: Linda Miranda 8/27/2024 1:25 PM   Dictation workstation:   [id number]      CT cervical spine wo IV contrast   Final Result   No evidence for an acute fracture or subluxation of the cervical   spine.        Upper thoracic foraminal stenosis bilaterally        MACRO:   None        Signed by: Linda Miranda 8/27/2024 1:29 PM   Dictation workstation:   [id number]          No echocardiogram results found for the past 12 months       Assessment/Plan   Syncope  -MRI brain negative  -CT head negative  -Most likely  from orthostatic hypotension  -Check orthostatic VS  -Telemetry reviewed, no arrhythmias  -EKG showed SR, RBBB  -Check echo    2. Elevated triglycerides  -Pt with known hx of  -Was taking Fish oil but then stopped  -Cont statin and fenofibrate  -Start Vescepa 2gm PO BID  -Needs better control of DM-hgb A1C 8.0%    3. CAD  -Denies ACS symptoms  -LHC in Dec 2023 showed moderate non obstructive CAD-medical therapy advised  -Cont asa, statin    4. Diabetes Mellitus  -hgb A1C 8.0%  -Cont jardiance and insulin  -ADA diet      Cheyenne Roblero, APRN-CNP

## 2024-08-28 NOTE — DISCHARGE SUMMARY
Discharge Diagnosis  Syncope and collapse    Issues Requiring Follow-Up  Follow up with PCP, Cards    Discharge Meds     Your medication list        CHANGE how you take these medications        Instructions Last Dose Given Next Dose Due   icosapent ethyL 1 gram capsule  Commonly known as: Vascepa  What changed: Another medication with the same name was added. Make sure you understand how and when to take each.           icosapent ethyL 1 gram capsule  Commonly known as: Vascepa  What changed: You were already taking a medication with the same name, and this prescription was added. Make sure you understand how and when to take each.      Take 2 capsules (2 g) by mouth 2 times daily (morning and late afternoon).              CONTINUE taking these medications        Instructions Last Dose Given Next Dose Due   amitriptyline 10 mg tablet  Commonly known as: Elavil      Take 1 tablet (10 mg) by mouth once daily at bedtime.       aspirin 81 mg EC tablet           azelastine 137 mcg (0.1 %) nasal spray  Commonly known as: Astelin           busPIRone 15 mg tablet  Commonly known as: Buspar           fenofibrate micronized 200 mg capsule  Commonly known as: Lofibra      TAKE 1 CAPSULE BY MOUTH EVERY DAY       Fiasp FlexTouch U-100 Insulin 100 unit/mL (3 mL) injection  Generic drug: insulin aspart (with niacinamide)      Inject 40-60 Units under the skin 3 times a day with meals. Take as directed per insulin instructions.       FreeStyle Delmi 3 Hadley misc  Generic drug: blood-glucose meter,continuous      Inject 1 Device under the skin once daily. Use as instructed       FreeStyle Delmi 3 Sensor device  Generic drug: blood-glucose sensor      1 kit every 14 (fourteen) days.       gabapentin 300 mg capsule  Commonly known as: Neurontin      Take 3 capsules (900 mg) by mouth 3 times a day.       ginseng 100 mg capsule           glipiZIDE XL 10 mg 24 hr tablet  Commonly known as: Glucotrol XL           Jardiance 25 mg  Generic  drug: empagliflozin      TAKE 1 TABLET BY MOUTH EVERY DAY       metFORMIN 1,000 mg tablet  Commonly known as: Glucophage      TAKE 1 TABLET BY MOUTH EVERY 12 HOURS       multivitamin capsule           PARoxetine 30 mg tablet  Commonly known as: Paxil      TAKE 1 TABLET EARLY IN THE MORNING (NEED TO SCHEDULE WITH PSYCH FOR REFILLS)       rosuvastatin 40 mg tablet  Commonly known as: Crestor      TAKE 1 TABLET BY MOUTH EVERY DAY       Toujeo Max U-300 SoloStar 300 unit/mL (3 mL) injection  Generic drug: insulin glargine      Inject 110 Units under the skin once daily at bedtime.       vitamin B complex tablet           Vitamin D3 50 mcg (2,000 unit) capsule  Generic drug: cholecalciferol                  STOP taking these medications      albuterol 90 mcg/actuation inhaler        insulin aspart 100 unit/mL (3 mL) pen  Commonly known as: NovoLOG Flexpen U-100 Insulin                  Where to Get Your Medications        These medications were sent to [id number] - [place], [place] - [address]  [address], [facility] [address]      Phone: [phone]   icosapent ethyL 1 gram capsule         Test Results Pending At Discharge  Pending Labs       No current pending labs.            Hospital Course   Jono Quinn is a 54 y.o. male presenting with facial numbness. Pt states he was getting up out of bed too fast and felt himself going down. He put his hands out to catch himself but he ended up hitting the left side of his face on the wall. A few hours later, he started to notice numbness in the left side of his face and upper lip. Pt denies any numbness or tingling in the extremities, any dizziness, or confusion. 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 Neuro exam is unremarkable. He does have some mild decrease in sensation of the left side of the top of lip.      ED VS: T36.9, HR 79, RR 16, /73, Sp02 98%RA     Imaging: CT brain- No evidence of acute cortical  infarct or intracranial hemorrhage.  No evidence of intracranial hemorrhage or displaced skull fracture.     MRI brain- No acute ischemic infarct, acute hemorrhage, or intracranial mass  effect.     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  No evidence of intracranial hemorrhage or displaced skull fracture.  -CT C-Spine: No evidence for an acute fracture or subluxation of the cervical  spine. Upper thoracic foraminal stenosis bilaterally  -MRI brain: No acute ischemic infarct, acute hemorrhage, or intracranial mass  effect.  -Echo pending   -Lipid panel: Veronica 200, HDL 17.2, Trig 1814  -BNP 19   -Trop 3  -Hgb A1C 8.0  -Hx of bells palsy 3 times, feels similar  -No indication for steroids at this time, as symptoms have grossly resolved   -Cardiac monitoring  -Cardiology consult, appreciate recs  -Start icosapent ethyl   -Continue fenofibrate, rosuvastation, ASA  -Encourage diet changes      #DM Type II with neuropathy   -Hgb A1C 8.0  -Continue empagliflozin, metformin, gabapentin, lantus    -SSI with hypoglycemia protocol  -Monitor BG      #LITZY with CPAP  -Continue with CPAP at bedtime      #Depression  #Anxiety  -Continue buspirone, paroxetine      DVT ppx  -lovenox     PUD ppx  -pantoprazole     F: PRN  E: Replete per protocol  N: ADA  A: PIV     Disposition: Pt stable for discharge. Stroke ruled out. Likely recurrent episode of bells palsy. However, with almost complete resolution of symptoms, no indication for treatment at this time. Cards added icosapent ethyl for hypertriglyceridemia. Will follow up in  office.     Code Status: Full Code    Total cumulative time spent in preparation of this discharge including documentation review, coordination of care with the medical team including PT/SW/care coordinators and treating consultants, discussion with patient and pertinent family members and finalization of prescriptions, followup appointments and this discharge summary was approximately  45 minutes. Over 50% of the time was spent face-to-face counseling the patient on diagnosis, prescriptions, and follow up appointments.     Pertinent Physical Exam At Time of Discharge  Please see H&P for this date     Outpatient Follow-Up  Future Appointments   Date Time Provider Department Center   11/6/2024  4:00 PM Vazquez Marquez MD [id number] [facility]   11/12/2024  3:30 PM Trudy Gordon MD [id number] [facility]         Nidhi Jose APRN-CNP

## 2024-08-28 NOTE — PROGRESS NOTES
08/28/24 1218   Discharge Planning   Living Arrangements Alone   Support Systems Family members   Assistance Needed Independent in ADL's and iADLs. DME: cane, grabber, grab bars in shower, CPap.He stated that he has difficulty preparing his meals. Information for Meals on Wheels and the  [employer] given to patient. Patient denies any further needs on discharge. PCP Trudy Gordon last seen 8/7/24. DC Secure   Type of Residence Private residence  (apartment)   Number of Stairs to Enter Residence 4   Number of Stairs Within Residence 0   Do you have animals or pets at home? No   Who is requesting discharge planning? Provider   Home or Post Acute Services None   Expected Discharge Disposition Home   Does the patient need discharge transport arranged? No   Financial Resource Strain   How hard is it for you to pay for the very basics like food, housing, medical care, and heating? Not very   Housing Stability   In the last 12 months, was there a time when you were not able to pay the mortgage or rent on time? N   In the past 12 months, how many times have you moved where you were living? 0   At any time in the past 12 months, were you homeless or living in a shelter (including now)? N   Transportation Needs   In the past 12 months, has lack of transportation kept you from medical appointments or from getting medications? no   In the past 12 months, has lack of transportation kept you from meetings, work, or from getting things needed for daily living? No

## 2024-08-28 NOTE — DISCHARGE INSTRUCTIONS
"High triglycerides    The Basics  Written by the doctors and editors at [facility]  What are high triglycerides? -- These are fat-like substances in the blood. Everyone has them, but some people have too much of them. This can cause high levels of triglycerides in the blood, also called \"high triglycerides.\"  People with high triglycerides have a higher risk of heart attack, stroke, and other health problems than people with normal triglycerides.  People with very high triglycerides can get inflammation in the pancreas. The pancreas is an organ that makes hormones and fluids to help the body break down food. When the pancreas gets inflamed, it can cause serious health problems.  What should my triglyceride level be? -- Ask your doctor or nurse what your triglyceride level should be. In general, levels are:  ? Normal - Less than 150 mg/dL. (If you live outside of the , triglycerides are measured differently. The normal level is less than 1.7 mmol/L.)  ? A little high - 150 to 499 mg/dL (1.7 to 5.6 mmol/L).  ? Moderately high - 500 to 999 mg/dL (5.6 to 11.3 mmol/L).  ? Very high - Greater than 1000 mg/dL (>11.3 mmol/L).  Am I at higher risk for heart attack or stroke? -- Yes. Having high triglycerides increases your risk of having a heart attack or stroke. But this is just one of many things that can increase your risk. You are also at higher risk if you:  ? Have a high level of \"LDL\" cholesterol - Your LDL level affects your risk of a heart attack or stroke even more than your triglycerides.  ? Smoke cigarettes  ? Have high blood pressure  ? Have excess body weight  ? Have a parent or sibling who got heart disease at a young age. (Young, in this case, means younger than 55 for males and younger than 65 for females.)  ? Are male - Females are at risk too, but males have a higher risk.  ? Are older  ? Have diabetes, especially if you have trouble managing your blood sugar  Your doctor can talk to you about your " personal risk of having a heart attack or stroke. There are things you can do to lower your risk.  Should I take medicine to lower my triglycerides? -- Not everyone who has high triglycerides needs to take medicine to lower them. Your doctor or nurse will decide if you need medicine. It depends on how high your triglycerides are and your age, family history, and other health concerns.  Medicines can include:  ? Statins - Many people with high triglycerides also have high LDL cholesterol. Medicines called statins lower LDL cholesterol levels and can reduce the risk of having a heart attack or stroke.  ? Medicines to lower triglyceride levels - These include fenofibrate (sample brand names: Antara, Lopid), fish oil (brand name: Lovaza), or, rarely, nicotinic acid (sample brand names: Niacor, Niaspan).  The medicine you take will depend on your triglyceride levels and other factors. If your triglycerides are very high, you might need more than 1 medicine.  Can I lower my triglycerides without medicines? -- Yes, you might be able to lower high triglycerides if you:  ? Lose weight (if you have excess body weight) - Your doctor or nurse can help you do this in a healthy way.  ? Get regular exercise.  ? Make healthy diet changes - For example, you can:  ? Avoid foods and drinks with a lot of sugar and carbohydrates. These include white bread, fruit juice, soda, and sweets.  ? Limit red meat, butter, fried foods, cheese, oils, and nuts.  ? Eat fish that contain high levels of omega-3 fatty acids, such as salmon, herring, or anchovies.  ? Manage your blood sugar - If you have diabetes, keeping your blood sugar in a healthy range can help lower your triglycerides.  ? Limit alcohol - This generally means no more than 2 drinks a day for males, and no more than 1 drink a day for females. If your triglycerides are over 500 mg/dL, ask your doctor or nurse if it is safe to drink alcohol.  All topics are updated as new evidence  becomes available and our peer review process is complete.  This topic retrieved from [employer] on: May 30, 2024.  Topic 64245 Version 17.0  Release: 32.5.3 - C32.150  © 2024 Up[employer]Date, Inc. and/or its affiliates. All rights reserved.

## 2024-08-28 NOTE — H&P
History Of Present Illness  Jono Quinn is a 54 y.o. male presenting with facial numbness. Pt states he was getting up out of bed too fast and felt himself going down. He put his hands out to catch himself but he ended up hitting the left side of his face on the wall. A few hours later, he started to notice numbness in the left side of his face and upper lip. Pt denies any numbness or tingling in the extremities, any dizziness, or confusion. 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 Neuro exam is unremarkable. He does have some mild decrease in sensation of the left side of the top of lip.     ED VS: T36.9, HR 79, RR 16, /73, Sp02 98%RA    Imaging: CT brain- No evidence of acute cortical infarct or intracranial hemorrhage.  No evidence of intracranial hemorrhage or displaced skull fracture.    MRI brain- No acute ischemic infarct, acute hemorrhage, or intracranial mass  effect.    Labs: Glu 279, Na 133, K 4.5, Bun/creat 11/0.93, Trig 1814, BNP 19, Trop 3, WBC 5.1, H/H 13.4/35.1, Plt 206      Past Medical History  Past Medical History:   Diagnosis Date    COVID-19 11/23/2020    Pneumonia due to COVID-19 virus    Depression, unspecified 10/26/2022    Depression    Diabetes mellitus (Multi)     Obstructive sleep apnea (adult) (pediatric) 07/11/2022    LITZY on CPAP    Other conditions influencing health status 05/16/2022    Diabetes type 2, uncontrolled    Personal history of other diseases of the digestive system 11/19/2021    H/O acute pancreatitis    Personal history of other diseases of the nervous system and sense organs     History of sleep apnea    Pure hypercholesterolemia, unspecified 10/26/2022    Hypercholesterolemia    Testicular hypofunction 07/01/2021    Hypogonadism male    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       Surgical History  Past Surgical History:   Procedure Laterality Date    CARDIAC CATHETERIZATION N/A 12/6/2023    Procedure: Left Heart  Cath;  Surgeon: Lucas Pino MD;  Location: [facility] Cardiac Cath Lab;  Service: Cardiovascular;  Laterality: N/A;  12/6/ am for [facility] [address] for CAD with angina I25.119 and abnormal cardiac CTA R93.1  Auth # for [employer]:  [id number] 11/10/23-05/08/24    CT ANGIO CORONARY ART WITH HEARTFLOW IF SCORE >30%  10/24/2023    CT ANGIO CORONARY ART WITH HEARTFLOW IF SCORE >30% 10/24/2023 [facility] CT    OTHER SURGICAL HISTORY  07/13/2020    Cholecystectomy    OTHER SURGICAL HISTORY  07/13/2020    Cranioplasty    OTHER SURGICAL HISTORY  07/13/2020    Tonsillectomy with adenoidectomy    OTHER SURGICAL HISTORY  07/13/2020    Complete colonoscopy    OTHER SURGICAL HISTORY  03/01/2022    Nasal septoplasty    OTHER SURGICAL HISTORY  03/01/2022    Submucous resection of nasal turbinate        Social History  He reports that he has never smoked. He has never been exposed to tobacco smoke. He has never used smokeless tobacco. He reports that he does not drink alcohol and does not use drugs.    Family History  No family history on file.     Allergies  Fluticasone propionate, Liraglutide, Lisinopril, Other, and Pollen extracts    Review of Systems   Constitutional: Negative.    HENT: Negative.     Eyes: Negative.    Respiratory: Negative.     Cardiovascular: Negative.    Gastrointestinal: Negative.    Endocrine: Negative.    Genitourinary: Negative.    Musculoskeletal: Negative.    Skin: Negative.    Allergic/Immunologic: Negative.    Neurological:  Positive for numbness.   Hematological: Negative.    Psychiatric/Behavioral: Negative.          Physical Exam  Vitals reviewed.   Constitutional:       Appearance: He is obese.   HENT:      Head: Normocephalic and atraumatic.      Right Ear: External ear normal.      Left Ear: External ear normal.      Nose: Nose normal.      Mouth/Throat:      Pharynx: Oropharynx is clear.   Eyes:      Conjunctiva/sclera: Conjunctivae normal.   Cardiovascular:      Rate and Rhythm: Normal rate and  "regular rhythm.      Pulses: Normal pulses.      Heart sounds: Normal heart sounds.   Pulmonary:      Effort: Pulmonary effort is normal.      Breath sounds: Normal breath sounds.   Abdominal:      General: Bowel sounds are normal.      Palpations: Abdomen is soft.   Musculoskeletal:         General: Normal range of motion.      Cervical back: Normal range of motion and neck supple.   Skin:     General: Skin is dry.   Neurological:      General: No focal deficit present.      Mental Status: He is alert and oriented to person, place, and time.      Comments: 1A: LOC  -0 Alert; Keenly responsive    1B: Ask month and age  0 Both questions right     1C: Blink eyes and squeeze hands  0 performs both tasks    2: Horizontal EOM  0 normal    3: Visual Fields  0 no visual loss    4: Facial Palsy  +1 minor paralysis (Flat nasolabial fold, smile assymetry)    5A: L arm Motor drift  0 no drift after 10 seconds    5B: R Arm motor drift   0 no drift after 10 seconds    6A: L leg motor drift   0 no drift after 5 seconds    6B: R Leg motor drift  0 no drift after 5 seconds    7: Limb ataxia (FNF; heel-shin)  0 no ataxia    8: Sensation  0 Normal no sensory loss    9: Language/Aphasia  0 normal, no aphasia    10: Dysarthria   0 Normal    11: Extinction/Inattention  0 No abnormality      Total: 1     Psychiatric:         Mood and Affect: Mood normal.         Behavior: Behavior normal.          Last Recorded Vitals  Blood pressure 127/72, pulse 72, temperature 36.4 °C (97.6 °F), temperature source Temporal, resp. rate 18, height 1.778 m (5' 10\"), weight 129 kg (285 lb 4.4 oz), SpO2 93%.    Relevant Results  Scheduled medications  aspirin, 81 mg, oral, BID  busPIRone, 15 mg, oral, TID  empagliflozin, 25 mg, oral, Daily  enoxaparin, 40 mg, subcutaneous, q12h CHRISTOPHER  fenofibrate, 160 mg, oral, Daily  gabapentin, 900 mg, oral, TID  icosapent ethyL, 2 g, oral, BID  insulin glargine, 88 Units, subcutaneous, Nightly  insulin lispro, 0-20 Units, " subcutaneous, TID  metFORMIN, 1,000 mg, oral, q12h  pantoprazole, 40 mg, oral, Daily before breakfast   Or  pantoprazole, 40 mg, intravenous, Daily before breakfast  PARoxetine, 30 mg, oral, Daily  rosuvastatin, 40 mg, oral, Daily  sennosides-docusate sodium, 1 tablet, oral, BID      Continuous medications  sodium chloride 0.9%, 10 mL/hr      PRN medications  PRN medications: acetaminophen **OR** acetaminophen **OR** acetaminophen, acetaminophen **OR** acetaminophen **OR** acetaminophen, calcium carbonate, melatonin, ondansetron **OR** ondansetron, oxygen, sodium chloride 0.9%    Results for orders placed or performed during the hospital encounter of 08/27/24 (from the past 24 hour(s))   ECG 12 lead   Result Value Ref Range    Ventricular Rate 75 BPM    Atrial Rate 75 BPM    DE Interval 126 ms    QRS Duration 140 ms    QT Interval 434 ms    QTC Calculation(Bazett) 484 ms    P Axis 32 degrees    R Axis 35 degrees    T Axis 25 degrees    QRS Count 12 beats    Q Onset 223 ms    P Onset 160 ms    P Offset 212 ms    T Offset 440 ms    QTC Fredericia 467 ms   POCT GLUCOSE   Result Value Ref Range    POCT Glucose 284 (H) 74 - 99 mg/dL   CBC and Auto Differential   Result Value Ref Range    WBC 5.1 4.4 - 11.3 x10*3/uL    nRBC 0.0 0.0 - 0.0 /100 WBCs    RBC 3.84 (L) 4.50 - 5.90 x10*6/uL    Hemoglobin 13.4 (L) 13.5 - 17.5 g/dL    Hematocrit 35.1 (L) 41.0 - 52.0 %    MCV 91 80 - 100 fL    MCH 34.9 (H) 26.0 - 34.0 pg    MCHC 38.2 (H) 32.0 - 36.0 g/dL    RDW 14.1 11.5 - 14.5 %    Platelets 206 150 - 450 x10*3/uL    Neutrophils % 54.1 40.0 - 80.0 %    Immature Granulocytes %, Automated 1.6 (H) 0.0 - 0.9 %    Lymphocytes % 32.3 13.0 - 44.0 %    Monocytes % 7.9 2.0 - 10.0 %    Eosinophils % 3.1 0.0 - 6.0 %    Basophils % 1.0 0.0 - 2.0 %    Neutrophils Absolute 2.75 1.20 - 7.70 x10*3/uL    Immature Granulocytes Absolute, Automated 0.08 0.00 - 0.70 x10*3/uL    Lymphocytes Absolute 1.64 1.20 - 4.80 x10*3/uL    Monocytes Absolute 0.40  0.10 - 1.00 x10*3/uL    Eosinophils Absolute 0.16 0.00 - 0.70 x10*3/uL    Basophils Absolute 0.05 0.00 - 0.10 x10*3/uL   Comprehensive metabolic panel   Result Value Ref Range    Glucose 279 (H) 74 - 99 mg/dL    Sodium 133 (L) 136 - 145 mmol/L    Potassium 4.5 3.5 - 5.3 mmol/L    Chloride 98 98 - 107 mmol/L    Bicarbonate 21 21 - 32 mmol/L    Anion Gap 19 10 - 20 mmol/L    Urea Nitrogen 11 6 - 23 mg/dL    Creatinine 0.93 0.50 - 1.30 mg/dL    eGFR >90 >60 mL/min/1.73m*2    Calcium 9.8 8.6 - 10.3 mg/dL    Albumin 3.8 3.4 - 5.0 g/dL    Alkaline Phosphatase 112 33 - 120 U/L    Total Protein 6.9 6.4 - 8.2 g/dL    AST 34 9 - 39 U/L    Bilirubin, Total 0.4 0.0 - 1.2 mg/dL    ALT 21 10 - 52 U/L   Troponin I, High Sensitivity   Result Value Ref Range    Troponin I, High Sensitivity 3 0 - 20 ng/L   Protime-INR   Result Value Ref Range    Protime 10.4 9.8 - 12.8 seconds    INR 0.9 0.9 - 1.1   APTT   Result Value Ref Range    aPTT 32 27 - 38 seconds   Hepatic Function Panel   Result Value Ref Range    Albumin 3.9 3.4 - 5.0 g/dL    Bilirubin, Total 0.4 0.0 - 1.2 mg/dL    Bilirubin, Direct 0.1 0.0 - 0.3 mg/dL    Alkaline Phosphatase 110 33 - 120 U/L    ALT 20 10 - 52 U/L    AST 39 9 - 39 U/L    Total Protein 6.9 6.4 - 8.2 g/dL   B-Type Natriuretic Peptide   Result Value Ref Range    BNP 19 0 - 99 pg/mL   Lipid Panel   Result Value Ref Range    Cholesterol 200 (H) 0 - 199 mg/dL    HDL-Cholesterol 17.2 mg/dL    Cholesterol/HDL Ratio 11.6     LDL Calculated      VLDL      Triglycerides 1,814 (H) 0 - 149 mg/dL    Non HDL Cholesterol 183 (H) 0 - 149 mg/dL   Hemoglobin A1C   Result Value Ref Range    Hemoglobin A1C 8.0 (H) see below %    Estimated Average Glucose 183 Not Established mg/dL   POCT GLUCOSE   Result Value Ref Range    POCT Glucose 257 (H) 74 - 99 mg/dL   POCT GLUCOSE   Result Value Ref Range    POCT Glucose 199 (H) 74 - 99 mg/dL   Transthoracic Echo (TTE) Complete   Result Value Ref Range    BSA 2.53 m2   POCT GLUCOSE    Result Value Ref Range    POCT Glucose 221 (H) 74 - 99 mg/dL        Assessment/Plan   Assessment & Plan  Syncope and collapse    Left facial numbness    Hypertriglyceridemia    Coronary artery disease involving native coronary artery of native heart without angina pectoris    Diabetes mellitus with neuropathy (Multi)    LITZY (obstructive sleep apnea)    Depression    Anxiety disorder      #Facial numbness  #Concern for recurrent bells palsy  #Syncope  #Hypertriglyceridemia  #CAD  -Pt states he got up too fast and went to catch himself but fell into the wall, hitting the left side of his face  -Left tooth extraction ~2 weeks ago   -CT brain: No evidence of acute cortical infarct or intracranial hemorrhage.  No evidence of intracranial hemorrhage or displaced skull fracture.  -CT C-Spine: No evidence for an acute fracture or subluxation of the cervical  spine. Upper thoracic foraminal stenosis bilaterally  -MRI brain: No acute ischemic infarct, acute hemorrhage, or intracranial mass  effect.  -Echo pending   -Lipid panel: Veronica 200, HDL 17.2, Trig 1814  -BNP 19   -Trop 3  -Hgb A1C 8.0  -Hx of bells palsy 3 times, feels similar  -No indication for steroids at this time, as symptoms have grossly resolved   -Cardiac monitoring  -Cardiology consult, appreciate recs  -Start icosapent ethyl   -Continue fenofibrate, rosuvastation, ASA  -Encourage diet changes     #DM Type II with neuropathy   -Hgb A1C 8.0  -Continue empagliflozin, metformin, gabapentin, lantus    -SSI with hypoglycemia protocol  -Monitor BG     #LITZY with CPAP  -Continue with CPAP at bedtime     #Depression  #Anxiety  -Continue buspirone, paroxetine     DVT ppx  -lovenox    PUD ppx  -pantoprazole    F: PRN  E: Replete per protocol  N: ADA  A: PIV    Disposition: Pt requires less than 2 inpatient days at this time   Code Status: Full Code         Nidhi Jose, APRN-CNP

## 2024-08-28 NOTE — NURSING NOTE
Discharge instructions verbally reviewed with teach back. Vitals obtained and IV removed. No needs at this time.

## 2024-08-28 NOTE — DISCHARGE INSTR - OTHER ORDERS
Thank you for choosing [facility] for your Health Care needs. As you transition from the hospital back to home, we hope we took your preferences into account on how you manage your health needs so you can manage your health at home.     You may receive a survey in the mail within the next couple weeks. Please take the time to complete it and return it. Your input is ALWAYS important to us. Thank you!  Your Care Transition Team - [employer]ista [employer] - [phone]    For questions about your medications listed on your discharge instructions, please call the Nurses Station at [phone].

## 2024-08-28 NOTE — ED PROVIDER NOTES
HPI   Chief Complaint   Patient presents with    Syncope       History of present illness:  54-year-old male presents emergency room for complaints of possible syncope.  The patient states that he has a history of autonomic dysfunction.  He states that sometimes when he stands up too quickly his blood pressure does not regulate and he will collapse.  He states that he was in his bathroom and he states that after he got up off the toilet he states he was washing his hands and as he went to leave the bathroom he states he got very lightheaded suddenly and he fell and struck his head on the wall before sliding down.  He states he did not fully lose consciousness and he states that he laid on the ground for a few minutes until his vision cleared and he seemed to be better was able to get up.  He states he struck the left side of his head and he states that he has had numbness and tingling in his left arm and left leg since then.  He states he does have a neuropathy in his legs which is not common but it feels different than his normal.  He also states that he has some numbness in his left arm which is uncommon for him.  He does have a past medical history of type 2 diabetes is not well-controlled hyperlipidemia hypertension and obstructive sleep apnea.     Social history: Negative for alcohol and drug use.    Review of systems:   Gen.: No weight loss, fatigue, anorexia, insomnia, fever.   Eyes: No vision loss, double vision  ENT: No pharyngitis, neck pain  Cardiac: No chest pain, palpitations, syncope  Pulmonary: No shortness of breath, cough, hemoptysis.   Heme/lymph: No swollen glands, fever, bleeding.   GI: No abdominal pain, change in bowel habits, melena, hematemesis, hematochezia, nausea, vomiting, diarrhea.   : No discharge, dysuria, frequency, urgency, hematuria.   Musculoskeletal: No limb pain, joint pain, joint swelling.   Skin: No rashes.   Review of systems is otherwise negative unless stated above or in  history of present illness.      Physical exam:  General: Vitals noted, no distress. Afebrile.   EENT: No lymphadenopathy appreciated  Cardiac: Regular, rate, rhythm, no murmur.   Pulmonary: Lungs clear bilaterally with good aeration. No adventitious breath sounds.   Abdomen: Soft, nonsurgical. Nontender. No peritoneal signs. Normoactive bowel sounds.   Extremities: No peripheral edema. Negative Homans bilaterally, no cords.   Skin: No rash.   Neuro: No focal neurologic deficits, NIH score of 2, negative test of skew, patient had nasolabial flattening slightly on the right side compared to the left and he had decreased sensation to pinprick on the left arm compared to the right, no other deficits noted        Medical decision making:   Testing: CT scan of the head did not show any acute findings at this time as interpreted by radiology CT scan cervical spine was also performed which was also unremarkable as interpreted by radiology laboratory testing was unremarkable for any acute findings as well CBC CMP unremarkable with exception of hyperglycemia  Plan: 54-year-old male presents emergency room for complaints of possible syncope.  The patient states that he has a history of autonomic dysfunction.  He states that sometimes when he stands up too quickly his blood pressure does not regulate and he will collapse.  He states that he was in his bathroom and he states that after he got up off the toilet he states he was washing his hands and as he went to leave the bathroom he states he got very lightheaded suddenly and he fell and struck his head on the wall before sliding down.  He states he did not fully lose consciousness and he states that he laid on the ground for a few minutes until his vision cleared and he seemed to be better was able to get up.  He states he struck the left side of his head and he states that he has had numbness and tingling in his left arm and left leg since then.  He states he does have a  neuropathy in his legs which is not common but it feels different than his normal.  He also states that he has some numbness in his left arm which is uncommon for him.  He does have a past medical history of type 2 diabetes is not well-controlled hyperlipidemia hypertension and obstructive sleep apnea.  Neuro: No focal neurologic deficits, NIH score of 2, negative test of skew, patient had nasolabial flattening slightly on the right side compared to the left and he had decreased sensation to pinprick on the left arm compared to the right, no other deficits noted.  I explained to the patient the test results at this time and explained to him that I did speak with Dr. Hickman of neurology who reviewed the case remotely as the BAT attending.  She did not feel that the patient was suffering from an acute stroke at this time and felt that the patient should undergo an MRI.  The patient was admitted to the care of the hospitalist team at this time for an MRI to be performed in the morning and further workup of his near syncope.  Impression:   1.  Near syncope                    EKG taken on August 27, 2024 1307 shows normal sinus rhythm at 75 right bundle branch block, no STEMI no T wave inversion      History provided by:  Patient   used: No            Patient History   Past Medical History:   Diagnosis Date    COVID-19 11/23/2020    Pneumonia due to COVID-19 virus    Depression, unspecified 10/26/2022    Depression    Diabetes mellitus (Multi)     Obstructive sleep apnea (adult) (pediatric) 07/11/2022    LITZY on CPAP    Other conditions influencing health status 05/16/2022    Diabetes type 2, uncontrolled    Personal history of other diseases of the digestive system 11/19/2021    H/O acute pancreatitis    Personal history of other diseases of the nervous system and sense organs     History of sleep apnea    Pure hypercholesterolemia, unspecified 10/26/2022    Hypercholesterolemia    Testicular  hypofunction 07/01/2021    Hypogonadism male    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     Past Surgical History:   Procedure Laterality Date    CARDIAC CATHETERIZATION N/A 12/6/2023    Procedure: Left Heart Cath;  Surgeon: Lucas Pino MD;  Location: [facility] Cardiac Cath Lab;  Service: Cardiovascular;  Laterality: N/A;  12/6/ am for [facility] [address] for CAD with angina I25.119 and abnormal cardiac CTA R93.1  Auth # for [employer]:  [id number] 11/10/23-05/08/24    CT ANGIO CORONARY ART WITH HEARTFLOW IF SCORE >30%  10/24/2023    CT ANGIO CORONARY ART WITH HEARTFLOW IF SCORE >30% 10/24/2023 [facility] CT    OTHER SURGICAL HISTORY  07/13/2020    Cholecystectomy    OTHER SURGICAL HISTORY  07/13/2020    Cranioplasty    OTHER SURGICAL HISTORY  07/13/2020    Tonsillectomy with adenoidectomy    OTHER SURGICAL HISTORY  07/13/2020    Complete colonoscopy    OTHER SURGICAL HISTORY  03/01/2022    Nasal septoplasty    OTHER SURGICAL HISTORY  03/01/2022    Submucous resection of nasal turbinate     No family history on file.  Social History     Tobacco Use    Smoking status: Never     Passive exposure: Never    Smokeless tobacco: Never   Vaping Use    Vaping status: Never Used   Substance Use Topics    Alcohol use: Never    Drug use: Never       Physical Exam   ED Triage Vitals   Temp Heart Rate Resp BP   08/27/24 1258 08/27/24 1258 08/27/24 1258 08/27/24 1258   36.9 °C (98.5 °F) 79 16 118/73      SpO2 Temp Source Heart Rate Source Patient Position   08/27/24 1258 08/27/24 1258 08/27/24 1258 08/27/24 1258   98 % Oral Monitor Lying      BP Location FiO2 (%)     08/27/24 1258 08/27/24 2135     Left arm 21 %       Physical Exam      ED Course & MDM   Diagnoses as of 08/27/24 2213   Syncope and collapse                 No data recorded     [place] Coma Scale Score: 15 (08/27/24 1944 : Adrienne Castellanos, RN)       NIH Stroke Scale: 1 (08/27/24 1944 : Adrienne Castellanos RN)                   Medical Decision  Making      Procedure  Procedures     Kevyn Marienlli PA-C  08/28/24 0383

## 2024-08-29 ENCOUNTER — APPOINTMENT (OUTPATIENT)
Dept: CARDIOLOGY | Facility: HOSPITAL | Age: 54
DRG: 439 | End: 2024-08-29
Payer: COMMERCIAL

## 2024-08-29 ENCOUNTER — APPOINTMENT (OUTPATIENT)
Dept: RADIOLOGY | Facility: HOSPITAL | Age: 54
DRG: 439 | End: 2024-08-29
Payer: COMMERCIAL

## 2024-08-29 ENCOUNTER — HOSPITAL ENCOUNTER (INPATIENT)
Facility: HOSPITAL | Age: 54
LOS: 3 days | Discharge: HOME | End: 2024-09-01
Attending: EMERGENCY MEDICINE | Admitting: INTERNAL MEDICINE
Payer: COMMERCIAL

## 2024-08-29 DIAGNOSIS — K85.90 ACUTE PANCREATITIS, UNSPECIFIED COMPLICATION STATUS, UNSPECIFIED PANCREATITIS TYPE (HHS-HCC): Primary | ICD-10-CM

## 2024-08-29 LAB
ALBUMIN SERPL BCP-MCNC: 4.1 G/DL (ref 3.4–5)
ALP SERPL-CCNC: 71 U/L (ref 33–120)
ALT SERPL W P-5'-P-CCNC: 19 U/L (ref 10–52)
ANION GAP SERPL CALC-SCNC: 23 MMOL/L (ref 10–20)
APPEARANCE UR: CLEAR
AST SERPL W P-5'-P-CCNC: 33 U/L (ref 9–39)
ATRIAL RATE: 103 BPM
BASOPHILS # BLD AUTO: 0.04 X10*3/UL (ref 0–0.1)
BASOPHILS NFR BLD AUTO: 0.4 %
BILIRUB SERPL-MCNC: 0.7 MG/DL (ref 0–1.2)
BILIRUB UR STRIP.AUTO-MCNC: NEGATIVE MG/DL
BUN SERPL-MCNC: 14 MG/DL (ref 6–23)
CALCIUM SERPL-MCNC: 8.7 MG/DL (ref 8.6–10.3)
CARDIAC TROPONIN I PNL SERPL HS: 5 NG/L (ref 0–20)
CHLORIDE SERPL-SCNC: 97 MMOL/L (ref 98–107)
CO2 SERPL-SCNC: 17 MMOL/L (ref 21–32)
COLOR UR: ABNORMAL
CREAT SERPL-MCNC: 0.9 MG/DL (ref 0.5–1.3)
EGFRCR SERPLBLD CKD-EPI 2021: >90 ML/MIN/1.73M*2
EOSINOPHIL # BLD AUTO: 0.02 X10*3/UL (ref 0–0.7)
EOSINOPHIL NFR BLD AUTO: 0.2 %
ERYTHROCYTE [DISTWIDTH] IN BLOOD BY AUTOMATED COUNT: 14 % (ref 11.5–14.5)
GLUCOSE BLD MANUAL STRIP-MCNC: 178 MG/DL (ref 74–99)
GLUCOSE BLD MANUAL STRIP-MCNC: 210 MG/DL (ref 74–99)
GLUCOSE SERPL-MCNC: 203 MG/DL (ref 74–99)
GLUCOSE UR STRIP.AUTO-MCNC: ABNORMAL MG/DL
HCT VFR BLD AUTO: 39.9 % (ref 41–52)
HGB BLD-MCNC: 14.8 G/DL (ref 13.5–17.5)
IMM GRANULOCYTES # BLD AUTO: 0.06 X10*3/UL (ref 0–0.7)
IMM GRANULOCYTES NFR BLD AUTO: 0.7 % (ref 0–0.9)
KETONES UR STRIP.AUTO-MCNC: ABNORMAL MG/DL
LEUKOCYTE ESTERASE UR QL STRIP.AUTO: NEGATIVE
LIPASE SERPL-CCNC: 1569 U/L (ref 9–82)
LYMPHOCYTES # BLD AUTO: 0.79 X10*3/UL (ref 1.2–4.8)
LYMPHOCYTES NFR BLD AUTO: 8.6 %
MCH RBC QN AUTO: 32.7 PG (ref 26–34)
MCHC RBC AUTO-ENTMCNC: 37.1 G/DL (ref 32–36)
MCV RBC AUTO: 88 FL (ref 80–100)
MONOCYTES # BLD AUTO: 0.63 X10*3/UL (ref 0.1–1)
MONOCYTES NFR BLD AUTO: 6.9 %
NEUTROPHILS # BLD AUTO: 7.6 X10*3/UL (ref 1.2–7.7)
NEUTROPHILS NFR BLD AUTO: 83.2 %
NITRITE UR QL STRIP.AUTO: NEGATIVE
NRBC BLD-RTO: 0 /100 WBCS (ref 0–0)
P AXIS: 28 DEGREES
P OFFSET: 214 MS
P ONSET: 154 MS
PH UR STRIP.AUTO: 5 [PH]
PLATELET # BLD AUTO: 239 X10*3/UL (ref 150–450)
POTASSIUM SERPL-SCNC: 4 MMOL/L (ref 3.5–5.3)
PR INTERVAL: 140 MS
PROT SERPL-MCNC: 7.3 G/DL (ref 6.4–8.2)
PROT UR STRIP.AUTO-MCNC: NEGATIVE MG/DL
Q ONSET: 224 MS
QRS COUNT: 17 BEATS
QRS DURATION: 132 MS
QT INTERVAL: 410 MS
QTC CALCULATION(BAZETT): 537 MS
QTC FREDERICIA: 491 MS
R AXIS: 12 DEGREES
RBC # BLD AUTO: 4.52 X10*6/UL (ref 4.5–5.9)
RBC # UR STRIP.AUTO: NEGATIVE /UL
SODIUM SERPL-SCNC: 133 MMOL/L (ref 136–145)
SP GR UR STRIP.AUTO: 1.04
T AXIS: 13 DEGREES
T OFFSET: 429 MS
UROBILINOGEN UR STRIP.AUTO-MCNC: NORMAL MG/DL
VENTRICULAR RATE: 103 BPM
WBC # BLD AUTO: 9.1 X10*3/UL (ref 4.4–11.3)

## 2024-08-29 PROCEDURE — 99285 EMERGENCY DEPT VISIT HI MDM: CPT

## 2024-08-29 PROCEDURE — 2500000002 HC RX 250 W HCPCS SELF ADMINISTERED DRUGS (ALT 637 FOR MEDICARE OP, ALT 636 FOR OP/ED): Mod: IPSPLIT

## 2024-08-29 PROCEDURE — 93005 ELECTROCARDIOGRAM TRACING: CPT

## 2024-08-29 PROCEDURE — 82947 ASSAY GLUCOSE BLOOD QUANT: CPT

## 2024-08-29 PROCEDURE — 82947 ASSAY GLUCOSE BLOOD QUANT: CPT | Mod: IPSPLIT

## 2024-08-29 PROCEDURE — 2550000001 HC RX 255 CONTRASTS: Performed by: EMERGENCY MEDICINE

## 2024-08-29 PROCEDURE — 85025 COMPLETE CBC W/AUTO DIFF WBC: CPT | Performed by: EMERGENCY MEDICINE

## 2024-08-29 PROCEDURE — 81003 URINALYSIS AUTO W/O SCOPE: CPT | Performed by: EMERGENCY MEDICINE

## 2024-08-29 PROCEDURE — 2500000001 HC RX 250 WO HCPCS SELF ADMINISTERED DRUGS (ALT 637 FOR MEDICARE OP): Mod: IPSPLIT | Performed by: NURSE PRACTITIONER

## 2024-08-29 PROCEDURE — 80053 COMPREHEN METABOLIC PANEL: CPT | Performed by: EMERGENCY MEDICINE

## 2024-08-29 PROCEDURE — 1200000002 HC GENERAL ROOM WITH TELEMETRY DAILY: Mod: IPSPLIT

## 2024-08-29 PROCEDURE — 74177 CT ABD & PELVIS W/CONTRAST: CPT

## 2024-08-29 PROCEDURE — 84484 ASSAY OF TROPONIN QUANT: CPT | Performed by: EMERGENCY MEDICINE

## 2024-08-29 PROCEDURE — 74177 CT ABD & PELVIS W/CONTRAST: CPT | Mod: FOREIGN READ | Performed by: RADIOLOGY

## 2024-08-29 PROCEDURE — 2500000004 HC RX 250 GENERAL PHARMACY W/ HCPCS (ALT 636 FOR OP/ED): Mod: IPSPLIT | Performed by: NURSE PRACTITIONER

## 2024-08-29 PROCEDURE — 2500000004 HC RX 250 GENERAL PHARMACY W/ HCPCS (ALT 636 FOR OP/ED)

## 2024-08-29 PROCEDURE — 71275 CT ANGIOGRAPHY CHEST: CPT | Mod: FOREIGN READ | Performed by: RADIOLOGY

## 2024-08-29 PROCEDURE — 2500000004 HC RX 250 GENERAL PHARMACY W/ HCPCS (ALT 636 FOR OP/ED): Performed by: EMERGENCY MEDICINE

## 2024-08-29 PROCEDURE — 71275 CT ANGIOGRAPHY CHEST: CPT

## 2024-08-29 PROCEDURE — 83690 ASSAY OF LIPASE: CPT | Performed by: EMERGENCY MEDICINE

## 2024-08-29 PROCEDURE — 36415 COLL VENOUS BLD VENIPUNCTURE: CPT | Performed by: EMERGENCY MEDICINE

## 2024-08-29 PROCEDURE — 99223 1ST HOSP IP/OBS HIGH 75: CPT

## 2024-08-29 PROCEDURE — 2500000004 HC RX 250 GENERAL PHARMACY W/ HCPCS (ALT 636 FOR OP/ED): Mod: IPSPLIT

## 2024-08-29 PROCEDURE — 2500000001 HC RX 250 WO HCPCS SELF ADMINISTERED DRUGS (ALT 637 FOR MEDICARE OP): Mod: IPSPLIT

## 2024-08-29 PROCEDURE — 94760 N-INVAS EAR/PLS OXIMETRY 1: CPT | Mod: IPSPLIT

## 2024-08-29 RX ORDER — ONDANSETRON HYDROCHLORIDE 2 MG/ML
4 INJECTION, SOLUTION INTRAVENOUS ONCE
Status: COMPLETED | OUTPATIENT
Start: 2024-08-29 | End: 2024-08-29

## 2024-08-29 RX ORDER — ONDANSETRON 4 MG/1
4 TABLET, FILM COATED ORAL EVERY 8 HOURS PRN
Status: DISCONTINUED | OUTPATIENT
Start: 2024-08-29 | End: 2024-09-01 | Stop reason: HOSPADM

## 2024-08-29 RX ORDER — METFORMIN HYDROCHLORIDE 500 MG/1
1000 TABLET ORAL EVERY 12 HOURS
Status: DISCONTINUED | OUTPATIENT
Start: 2024-08-29 | End: 2024-09-01 | Stop reason: HOSPADM

## 2024-08-29 RX ORDER — ACETAMINOPHEN 160 MG/5ML
650 SOLUTION ORAL EVERY 4 HOURS PRN
Status: DISCONTINUED | OUTPATIENT
Start: 2024-08-29 | End: 2024-08-29

## 2024-08-29 RX ORDER — HYDROMORPHONE HYDROCHLORIDE 1 MG/ML
1 INJECTION, SOLUTION INTRAMUSCULAR; INTRAVENOUS; SUBCUTANEOUS EVERY 4 HOURS PRN
Status: DISCONTINUED | OUTPATIENT
Start: 2024-08-29 | End: 2024-09-01 | Stop reason: HOSPADM

## 2024-08-29 RX ORDER — ACETAMINOPHEN 325 MG/1
650 TABLET ORAL EVERY 4 HOURS PRN
Status: DISCONTINUED | OUTPATIENT
Start: 2024-08-29 | End: 2024-08-29

## 2024-08-29 RX ORDER — ENOXAPARIN SODIUM 100 MG/ML
40 INJECTION SUBCUTANEOUS EVERY 12 HOURS SCHEDULED
Status: DISCONTINUED | OUTPATIENT
Start: 2024-08-29 | End: 2024-09-01 | Stop reason: HOSPADM

## 2024-08-29 RX ORDER — AMITRIPTYLINE HYDROCHLORIDE 10 MG/1
10 TABLET, FILM COATED ORAL NIGHTLY
Status: DISCONTINUED | OUTPATIENT
Start: 2024-08-29 | End: 2024-09-01 | Stop reason: HOSPADM

## 2024-08-29 RX ORDER — SODIUM CHLORIDE 9 MG/ML
100 INJECTION, SOLUTION INTRAVENOUS CONTINUOUS
Status: DISCONTINUED | OUTPATIENT
Start: 2024-08-29 | End: 2024-08-30

## 2024-08-29 RX ORDER — HYDROMORPHONE HYDROCHLORIDE 1 MG/ML
1 INJECTION, SOLUTION INTRAMUSCULAR; INTRAVENOUS; SUBCUTANEOUS ONCE
Status: COMPLETED | OUTPATIENT
Start: 2024-08-29 | End: 2024-08-29

## 2024-08-29 RX ORDER — ICOSAPENT ETHYL 1 G/1
2 CAPSULE ORAL
Status: DISCONTINUED | OUTPATIENT
Start: 2024-08-30 | End: 2024-09-01 | Stop reason: HOSPADM

## 2024-08-29 RX ORDER — GABAPENTIN 300 MG/1
900 CAPSULE ORAL 3 TIMES DAILY
Status: DISCONTINUED | OUTPATIENT
Start: 2024-08-29 | End: 2024-09-01 | Stop reason: HOSPADM

## 2024-08-29 RX ORDER — PAROXETINE HYDROCHLORIDE 20 MG/1
30 TABLET, FILM COATED ORAL DAILY
Status: DISCONTINUED | OUTPATIENT
Start: 2024-08-29 | End: 2024-09-01 | Stop reason: HOSPADM

## 2024-08-29 RX ORDER — CHOLECALCIFEROL (VITAMIN D3) 25 MCG
2000 TABLET ORAL DAILY
Status: DISCONTINUED | OUTPATIENT
Start: 2024-08-30 | End: 2024-09-01 | Stop reason: HOSPADM

## 2024-08-29 RX ORDER — ACETAMINOPHEN 325 MG/1
650 TABLET ORAL EVERY 4 HOURS PRN
Status: DISCONTINUED | OUTPATIENT
Start: 2024-08-29 | End: 2024-09-01 | Stop reason: HOSPADM

## 2024-08-29 RX ORDER — PANTOPRAZOLE SODIUM 40 MG/10ML
40 INJECTION, POWDER, LYOPHILIZED, FOR SOLUTION INTRAVENOUS
Status: DISCONTINUED | OUTPATIENT
Start: 2024-08-30 | End: 2024-08-30

## 2024-08-29 RX ORDER — ACETAMINOPHEN 500 MG
5 TABLET ORAL NIGHTLY PRN
Status: DISCONTINUED | OUTPATIENT
Start: 2024-08-29 | End: 2024-09-01 | Stop reason: HOSPADM

## 2024-08-29 RX ORDER — PANTOPRAZOLE SODIUM 40 MG/1
40 TABLET, DELAYED RELEASE ORAL
Status: DISCONTINUED | OUTPATIENT
Start: 2024-08-30 | End: 2024-09-01 | Stop reason: HOSPADM

## 2024-08-29 RX ORDER — CALCIUM CARBONATE 200(500)MG
500 TABLET,CHEWABLE ORAL 4 TIMES DAILY PRN
Status: DISCONTINUED | OUTPATIENT
Start: 2024-08-29 | End: 2024-09-01 | Stop reason: HOSPADM

## 2024-08-29 RX ORDER — FENOFIBRATE 160 MG/1
160 TABLET ORAL DAILY
Status: DISCONTINUED | OUTPATIENT
Start: 2024-08-29 | End: 2024-09-01 | Stop reason: HOSPADM

## 2024-08-29 RX ORDER — ROSUVASTATIN CALCIUM 10 MG/1
40 TABLET, COATED ORAL DAILY
Status: DISCONTINUED | OUTPATIENT
Start: 2024-08-29 | End: 2024-09-01 | Stop reason: HOSPADM

## 2024-08-29 RX ORDER — ACETAMINOPHEN 650 MG/1
650 SUPPOSITORY RECTAL EVERY 4 HOURS PRN
Status: DISCONTINUED | OUTPATIENT
Start: 2024-08-29 | End: 2024-08-29

## 2024-08-29 RX ORDER — WATER 1000 ML/1000ML
INJECTION, SOLUTION INTRAVENOUS
Status: DISPENSED
Start: 2024-08-29 | End: 2024-08-30

## 2024-08-29 RX ORDER — INSULIN GLARGINE 100 [IU]/ML
88 INJECTION, SOLUTION SUBCUTANEOUS NIGHTLY
Status: DISCONTINUED | OUTPATIENT
Start: 2024-08-29 | End: 2024-09-01 | Stop reason: HOSPADM

## 2024-08-29 RX ORDER — AZELASTINE 1 MG/ML
2 SPRAY, METERED NASAL DAILY
Status: DISCONTINUED | OUTPATIENT
Start: 2024-08-29 | End: 2024-09-01 | Stop reason: HOSPADM

## 2024-08-29 RX ORDER — ASPIRIN 81 MG/1
81 TABLET ORAL 2 TIMES DAILY
Status: DISCONTINUED | OUTPATIENT
Start: 2024-08-29 | End: 2024-09-01 | Stop reason: HOSPADM

## 2024-08-29 RX ORDER — AMOXICILLIN 250 MG
1 CAPSULE ORAL 2 TIMES DAILY
Status: DISCONTINUED | OUTPATIENT
Start: 2024-08-29 | End: 2024-09-01 | Stop reason: HOSPADM

## 2024-08-29 RX ORDER — ONDANSETRON HYDROCHLORIDE 2 MG/ML
4 INJECTION, SOLUTION INTRAVENOUS EVERY 8 HOURS PRN
Status: DISCONTINUED | OUTPATIENT
Start: 2024-08-29 | End: 2024-09-01 | Stop reason: HOSPADM

## 2024-08-29 RX ORDER — INSULIN LISPRO 100 [IU]/ML
0-20 INJECTION, SOLUTION INTRAVENOUS; SUBCUTANEOUS
Status: DISCONTINUED | OUTPATIENT
Start: 2024-08-30 | End: 2024-09-01 | Stop reason: HOSPADM

## 2024-08-29 RX ORDER — KETOROLAC TROMETHAMINE 15 MG/ML
15 INJECTION, SOLUTION INTRAMUSCULAR; INTRAVENOUS EVERY 6 HOURS PRN
Status: DISCONTINUED | OUTPATIENT
Start: 2024-08-29 | End: 2024-09-01 | Stop reason: HOSPADM

## 2024-08-29 RX ORDER — DEXTROSE 50 % IN WATER (D50W) INTRAVENOUS SYRINGE
25
Status: DISCONTINUED | OUTPATIENT
Start: 2024-08-29 | End: 2024-09-01 | Stop reason: HOSPADM

## 2024-08-29 SDOH — SOCIAL STABILITY: SOCIAL INSECURITY: WERE YOU ABLE TO COMPLETE ALL THE BEHAVIORAL HEALTH SCREENINGS?: YES

## 2024-08-29 SDOH — SOCIAL STABILITY: SOCIAL INSECURITY: HAVE YOU HAD ANY THOUGHTS OF HARMING ANYONE ELSE?: NO

## 2024-08-29 SDOH — SOCIAL STABILITY: SOCIAL INSECURITY: ARE YOU OR HAVE YOU BEEN THREATENED OR ABUSED PHYSICALLY, EMOTIONALLY, OR SEXUALLY BY ANYONE?: NO

## 2024-08-29 SDOH — SOCIAL STABILITY: SOCIAL INSECURITY: ARE THERE ANY APPARENT SIGNS OF INJURIES/BEHAVIORS THAT COULD BE RELATED TO ABUSE/NEGLECT?: NO

## 2024-08-29 SDOH — SOCIAL STABILITY: SOCIAL INSECURITY: DO YOU FEEL ANYONE HAS EXPLOITED OR TAKEN ADVANTAGE OF YOU FINANCIALLY OR OF YOUR PERSONAL PROPERTY?: NO

## 2024-08-29 SDOH — SOCIAL STABILITY: SOCIAL INSECURITY: ABUSE: ADULT

## 2024-08-29 SDOH — SOCIAL STABILITY: SOCIAL INSECURITY: DO YOU FEEL UNSAFE GOING BACK TO THE PLACE WHERE YOU ARE LIVING?: NO

## 2024-08-29 SDOH — SOCIAL STABILITY: SOCIAL INSECURITY: HAS ANYONE EVER THREATENED TO HURT YOUR FAMILY OR YOUR PETS?: NO

## 2024-08-29 SDOH — SOCIAL STABILITY: SOCIAL INSECURITY: HAVE YOU HAD THOUGHTS OF HARMING ANYONE ELSE?: NO

## 2024-08-29 SDOH — SOCIAL STABILITY: SOCIAL INSECURITY: DOES ANYONE TRY TO KEEP YOU FROM HAVING/CONTACTING OTHER FRIENDS OR DOING THINGS OUTSIDE YOUR HOME?: NO

## 2024-08-29 ASSESSMENT — ACTIVITIES OF DAILY LIVING (ADL)
BATHING: INDEPENDENT
FEEDING YOURSELF: INDEPENDENT
LACK_OF_TRANSPORTATION: NO
DRESSING YOURSELF: INDEPENDENT
ADEQUATE_TO_COMPLETE_ADL: YES
GROOMING: INDEPENDENT
TOILETING: INDEPENDENT
PATIENT'S MEMORY ADEQUATE TO SAFELY COMPLETE DAILY ACTIVITIES?: YES
JUDGMENT_ADEQUATE_SAFELY_COMPLETE_DAILY_ACTIVITIES: YES
HEARING - RIGHT EAR: FUNCTIONAL
WALKS IN HOME: INDEPENDENT
HEARING - LEFT EAR: FUNCTIONAL

## 2024-08-29 ASSESSMENT — COGNITIVE AND FUNCTIONAL STATUS - GENERAL
MOBILITY SCORE: 20
DAILY ACTIVITIY SCORE: 22
STANDING UP FROM CHAIR USING ARMS: A LITTLE
TOILETING: A LITTLE
MOVING TO AND FROM BED TO CHAIR: A LITTLE
PERSONAL GROOMING: A LITTLE
PATIENT BASELINE BEDBOUND: NO
MOVING FROM LYING ON BACK TO SITTING ON SIDE OF FLAT BED WITH BEDRAILS: A LITTLE
TURNING FROM BACK TO SIDE WHILE IN FLAT BAD: A LITTLE

## 2024-08-29 ASSESSMENT — LIFESTYLE VARIABLES
HOW OFTEN DURING THE LAST YEAR HAVE YOU HAD A FEELING OF GUILT OR REMORSE AFTER DRINKING: NEVER
AUDIT TOTAL SCORE: 2
PRESCIPTION_ABUSE_PAST_12_MONTHS: NO
HAS A RELATIVE, FRIEND, DOCTOR, OR ANOTHER HEALTH PROFESSIONAL EXPRESSED CONCERN ABOUT YOUR DRINKING OR SUGGESTED YOU CUT DOWN: NO
AUDIT TOTAL SCORE: 0
AUDIT-C TOTAL SCORE: 2
HOW OFTEN DURING THE LAST YEAR HAVE YOU BEEN UNABLE TO REMEMBER WHAT HAPPENED THE NIGHT BEFORE BECAUSE YOU HAD BEEN DRINKING: NEVER
HAVE YOU OR SOMEONE ELSE BEEN INJURED AS A RESULT OF YOUR DRINKING: NO
HOW OFTEN DURING THE LAST YEAR HAVE YOU FOUND THAT YOU WERE NOT ABLE TO STOP DRINKING ONCE YOU HAD STARTED: NEVER
SKIP TO QUESTIONS 9-10: 1
SUBSTANCE_ABUSE_PAST_12_MONTHS: NO
HOW OFTEN DO YOU HAVE 6 OR MORE DRINKS ON ONE OCCASION: NEVER
HOW MANY STANDARD DRINKS CONTAINING ALCOHOL DO YOU HAVE ON A TYPICAL DAY: 1 OR 2
HOW OFTEN DURING THE LAST YEAR HAVE YOU NEEDED AN ALCOHOLIC DRINK FIRST THING IN THE MORNING TO GET YOURSELF GOING AFTER A NIGHT OF HEAVY DRINKING: NEVER
HOW OFTEN DURING THE LAST YEAR HAVE YOU FAILED TO DO WHAT WAS NORMALLY EXPECTED FROM YOU BECAUSE OF DRINKING: NEVER
HOW OFTEN DO YOU HAVE A DRINK CONTAINING ALCOHOL: 2-4 TIMES A MONTH
AUDIT-C TOTAL SCORE: 2

## 2024-08-29 ASSESSMENT — PAIN DESCRIPTION - ORIENTATION
ORIENTATION: RIGHT;UPPER
ORIENTATION: RIGHT;UPPER

## 2024-08-29 ASSESSMENT — PAIN DESCRIPTION - LOCATION
LOCATION: ABDOMEN
LOCATION: BACK
LOCATION: ABDOMEN

## 2024-08-29 ASSESSMENT — PATIENT HEALTH QUESTIONNAIRE - PHQ9
SUM OF ALL RESPONSES TO PHQ9 QUESTIONS 1 & 2: 0
2. FEELING DOWN, DEPRESSED OR HOPELESS: NOT AT ALL
1. LITTLE INTEREST OR PLEASURE IN DOING THINGS: NOT AT ALL

## 2024-08-29 ASSESSMENT — PAIN - FUNCTIONAL ASSESSMENT
PAIN_FUNCTIONAL_ASSESSMENT: 0-10
PAIN_FUNCTIONAL_ASSESSMENT: 0-10

## 2024-08-29 ASSESSMENT — PAIN DESCRIPTION - PAIN TYPE: TYPE: ACUTE PAIN

## 2024-08-29 ASSESSMENT — PAIN SCALES - GENERAL
PAINLEVEL_OUTOF10: 5 - MODERATE PAIN
PAINLEVEL_OUTOF10: 0 - NO PAIN
PAINLEVEL_OUTOF10: 7
PAINLEVEL_OUTOF10: 8
PAINLEVEL_OUTOF10: 7
PAINLEVEL_OUTOF10: 8
PAINLEVEL_OUTOF10: 3

## 2024-08-29 ASSESSMENT — PAIN DESCRIPTION - FREQUENCY: FREQUENCY: CONSTANT/CONTINUOUS

## 2024-08-29 NOTE — ED TRIAGE NOTES
Abdominal pain that started last night, then he states he has bee vomiting and took a laxative but then had diarrhea.

## 2024-08-29 NOTE — ED PROVIDER NOTES
Abdominal Pain (Abdominal pain that started last night, then he states he has bee vomiting and took a laxative but then had diarrhea. )      HPI  Patient says at 11:00 last night he began a.m. generalized abdominal pain then had 3 episodes of vomiting and around the same amount of diarrhea since last night.  Has mild to moderate discomfort.  Also believes he has a low-grade fever.  He did hit his head a couple days ago was seen in the emergency department was medically cleared and he said that this was not related to go what is going on with him today he does not have a headache no neurologic deficits no chest pain no shortness of breath.  No neurologic deficits.  Patient is not vomiting blood nor blood in his stool.    Social history:  Not a Smoker    Physical Exam  Gen.: Vitals noted, no distress. Afebrile   Head: Normocephalic  ENT: Pupils equal, patent nares.  Normal oral mucosa   Neck: Supple.   Cardiac: Regular rate rhythm   Lungs: Clear to auscultation bilaterally with good aeration  Abdomen: Soft, generalized tenderness, nonsurgical.   Back: No midline or paraspinal tenderness.    Extremities:  Moves all extremities.  Skin: No rash.   Neuro: No focal neurologic deficits.    Past Medical History:   Diagnosis Date   • COVID-19 11/23/2020    Pneumonia due to COVID-19 virus   • Depression, unspecified 10/26/2022    Depression   • Diabetes mellitus (Multi)    • Obstructive sleep apnea (adult) (pediatric) 07/11/2022    LITZY on CPAP   • Other conditions influencing health status 05/16/2022    Diabetes type 2, uncontrolled   • Personal history of other diseases of the digestive system 11/19/2021    H/O acute pancreatitis   • Personal history of other diseases of the nervous system and sense organs     History of sleep apnea   • Pure hypercholesterolemia, unspecified 10/26/2022    Hypercholesterolemia   • Testicular hypofunction 07/01/2021    Hypogonadism male   • 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      Labs Reviewed   CBC WITH AUTO DIFFERENTIAL - Abnormal       Result Value    WBC 9.1      nRBC 0.0      RBC 4.52      Hemoglobin 14.8      Hematocrit 39.9 (*)     MCV 88      MCH 32.7      MCHC 37.1 (*)     RDW 14.0      Platelets 239      Neutrophils % 83.2      Immature Granulocytes %, Automated 0.7      Lymphocytes % 8.6      Monocytes % 6.9      Eosinophils % 0.2      Basophils % 0.4      Neutrophils Absolute 7.60      Immature Granulocytes Absolute, Automated 0.06      Lymphocytes Absolute 0.79 (*)     Monocytes Absolute 0.63      Eosinophils Absolute 0.02      Basophils Absolute 0.04     COMPREHENSIVE METABOLIC PANEL - Abnormal    Glucose 203 (*)     Sodium 133 (*)     Potassium 4.0      Chloride 97 (*)     Bicarbonate 17 (*)     Anion Gap 23 (*)     Urea Nitrogen 14      Creatinine 0.90      eGFR >90      Calcium 8.7      Albumin 4.1      Alkaline Phosphatase 71      Total Protein 7.3      AST 33      Bilirubin, Total 0.7      ALT 19     LIPASE - Abnormal    Lipase 1,569 (*)     Narrative:     Venipuncture immediately after or during the administration of Metamizole may lead to falsely low results. Testing should be performed immediately prior to Metamizole dosing.   URINALYSIS WITH REFLEX CULTURE AND MICROSCOPIC - Abnormal    Color, Urine Light-Yellow      Appearance, Urine Clear      Specific Gravity, Urine 1.036 (*)     pH, Urine 5.0      Protein, Urine NEGATIVE      Glucose, Urine OVER (4+) (*)     Blood, Urine NEGATIVE      Ketones, Urine 10 (1+) (*)     Bilirubin, Urine NEGATIVE      Urobilinogen, Urine Normal      Nitrite, Urine NEGATIVE      Leukocyte Esterase, Urine NEGATIVE      Narrative:     OVER is reported when the result is greater than the clinically reportable range.   POCT GLUCOSE - Abnormal    POCT Glucose 210 (*)    TROPONIN I, HIGH SENSITIVITY - Normal    Troponin I, High Sensitivity 5      Narrative:     Less than 99th percentile of normal range cutoff-  Female and children under 18  years old <14 ng/L; Male <21 ng/L: Negative  Repeat testing should be performed if clinically indicated.     Female and children under 18 years old 14-50 ng/L; Male 21-50 ng/L:  Consistent with possible cardiac damage and possible increased clinical   risk. Serial measurements may help to assess extent of myocardial damage.     >50 ng/L: Consistent with cardiac damage, increased clinical risk and  myocardial infarction. Serial measurements may help assess extent of   myocardial damage.      NOTE: Children less than 1 year old may have higher baseline troponin   levels and results should be interpreted in conjunction with the overall   clinical context.     NOTE: Troponin I testing is performed using a different   testing methodology at [facility] than at other   [facility]. Direct result comparisons should only   be made within the same method.   URINALYSIS WITH REFLEX CULTURE AND MICROSCOPIC    Narrative:     The following orders were created for panel order Urinalysis with Reflex Culture and Microscopic.  Procedure                               Abnormality         Status                     ---------                               -----------         ------                     Urinalysis with Reflex C...[[id number]]  Abnormal            Final result               Extra Urine Gray Tube[[id number]]                            In process                   Please view results for these tests on the individual orders.   EXTRA URINE GRAY TUBE      ED Course as of 08/29/24 1633   Thu Aug 29, 2024   1633 Spoke with AUSTEN Correa who will admit to Dr. Wang.  Will go ahead and give antibiotics [DD]      ED Course User Index  [DD] Leonel Chow MD         Medical Decision Making patient concerned he has food poisoning.  Will evaluate other causes of his abdominal pain vomiting diarrhea.  Do not believe we need to workup the recent concussion since he has no current symptoms and was already evaluated a couple  days ago in the emergency department.    EKG - S. Tach, rate 103, RBBB    DX: abdominal pain, vomiting     Leonel Chow MD  08/29/24 [id number]

## 2024-08-30 ENCOUNTER — TELEPHONE (OUTPATIENT)
Dept: PRIMARY CARE | Facility: CLINIC | Age: 54
End: 2024-08-30
Payer: COMMERCIAL

## 2024-08-30 PROBLEM — E87.1 HYPONATREMIA: Status: ACTIVE | Noted: 2024-08-30

## 2024-08-30 LAB
ANION GAP SERPL CALC-SCNC: 20 MMOL/L (ref 10–20)
BUN SERPL-MCNC: 17 MG/DL (ref 6–23)
CALCIUM SERPL-MCNC: 7.8 MG/DL (ref 8.6–10.3)
CHLORIDE SERPL-SCNC: 97 MMOL/L (ref 98–107)
CO2 SERPL-SCNC: 21 MMOL/L (ref 21–32)
CREAT SERPL-MCNC: 1.16 MG/DL (ref 0.5–1.3)
EGFRCR SERPLBLD CKD-EPI 2021: 74 ML/MIN/1.73M*2
ERYTHROCYTE [DISTWIDTH] IN BLOOD BY AUTOMATED COUNT: 14.8 % (ref 11.5–14.5)
GLUCOSE BLD MANUAL STRIP-MCNC: 159 MG/DL (ref 74–99)
GLUCOSE BLD MANUAL STRIP-MCNC: 177 MG/DL (ref 74–99)
GLUCOSE BLD MANUAL STRIP-MCNC: 232 MG/DL (ref 74–99)
GLUCOSE BLD MANUAL STRIP-MCNC: 254 MG/DL (ref 74–99)
GLUCOSE SERPL-MCNC: 145 MG/DL (ref 74–99)
HCT VFR BLD AUTO: 41.6 % (ref 41–52)
HGB BLD-MCNC: 14.4 G/DL (ref 13.5–17.5)
HOLD SPECIMEN: NORMAL
HOLD SPECIMEN: NORMAL
LIPASE SERPL-CCNC: 538 U/L (ref 9–82)
MAGNESIUM SERPL-MCNC: 2.35 MG/DL (ref 1.6–2.4)
MCH RBC QN AUTO: 31.5 PG (ref 26–34)
MCHC RBC AUTO-ENTMCNC: 34.6 G/DL (ref 32–36)
MCV RBC AUTO: 91 FL (ref 80–100)
NRBC BLD-RTO: 0 /100 WBCS (ref 0–0)
PLATELET # BLD AUTO: 245 X10*3/UL (ref 150–450)
POTASSIUM SERPL-SCNC: 4.1 MMOL/L (ref 3.5–5.3)
RBC # BLD AUTO: 4.57 X10*6/UL (ref 4.5–5.9)
SODIUM SERPL-SCNC: 134 MMOL/L (ref 136–145)
WBC # BLD AUTO: 7.3 X10*3/UL (ref 4.4–11.3)

## 2024-08-30 PROCEDURE — 2500000004 HC RX 250 GENERAL PHARMACY W/ HCPCS (ALT 636 FOR OP/ED): Mod: IPSPLIT | Performed by: NURSE PRACTITIONER

## 2024-08-30 PROCEDURE — 2500000002 HC RX 250 W HCPCS SELF ADMINISTERED DRUGS (ALT 637 FOR MEDICARE OP, ALT 636 FOR OP/ED): Mod: IPSPLIT

## 2024-08-30 PROCEDURE — 83735 ASSAY OF MAGNESIUM: CPT | Mod: IPSPLIT

## 2024-08-30 PROCEDURE — 82565 ASSAY OF CREATININE: CPT | Mod: IPSPLIT | Performed by: NURSE PRACTITIONER

## 2024-08-30 PROCEDURE — 2500000004 HC RX 250 GENERAL PHARMACY W/ HCPCS (ALT 636 FOR OP/ED): Mod: IPSPLIT

## 2024-08-30 PROCEDURE — 83690 ASSAY OF LIPASE: CPT | Mod: IPSPLIT

## 2024-08-30 PROCEDURE — 82947 ASSAY GLUCOSE BLOOD QUANT: CPT | Mod: IPSPLIT

## 2024-08-30 PROCEDURE — 94760 N-INVAS EAR/PLS OXIMETRY 1: CPT | Mod: IPSPLIT

## 2024-08-30 PROCEDURE — 85027 COMPLETE CBC AUTOMATED: CPT | Mod: IPSPLIT | Performed by: NURSE PRACTITIONER

## 2024-08-30 PROCEDURE — 2500000001 HC RX 250 WO HCPCS SELF ADMINISTERED DRUGS (ALT 637 FOR MEDICARE OP): Mod: IPSPLIT

## 2024-08-30 PROCEDURE — 2500000001 HC RX 250 WO HCPCS SELF ADMINISTERED DRUGS (ALT 637 FOR MEDICARE OP): Mod: IPSPLIT | Performed by: NURSE PRACTITIONER

## 2024-08-30 PROCEDURE — 99233 SBSQ HOSP IP/OBS HIGH 50: CPT | Performed by: NURSE PRACTITIONER

## 2024-08-30 PROCEDURE — 36415 COLL VENOUS BLD VENIPUNCTURE: CPT | Mod: IPSPLIT | Performed by: NURSE PRACTITIONER

## 2024-08-30 PROCEDURE — 94660 CPAP INITIATION&MGMT: CPT | Mod: IPSPLIT

## 2024-08-30 PROCEDURE — 1200000002 HC GENERAL ROOM WITH TELEMETRY DAILY: Mod: IPSPLIT

## 2024-08-30 RX ORDER — METFORMIN HYDROCHLORIDE 1000 MG/1
1000 TABLET ORAL EVERY 12 HOURS
Qty: 60 TABLET | Refills: 0 | Status: SHIPPED | OUTPATIENT
Start: 2024-08-30

## 2024-08-30 RX ORDER — SODIUM CHLORIDE 9 MG/ML
10 INJECTION, SOLUTION INTRAVENOUS CONTINUOUS PRN
Status: DISCONTINUED | OUTPATIENT
Start: 2024-08-30 | End: 2024-09-01 | Stop reason: HOSPADM

## 2024-08-30 RX ORDER — SODIUM CHLORIDE 9 MG/ML
INJECTION, SOLUTION INTRAVENOUS
Status: COMPLETED
Start: 2024-08-30 | End: 2024-08-30

## 2024-08-30 ASSESSMENT — PAIN DESCRIPTION - LOCATION
LOCATION: BACK
LOCATION: ABDOMEN

## 2024-08-30 ASSESSMENT — COGNITIVE AND FUNCTIONAL STATUS - GENERAL
TURNING FROM BACK TO SIDE WHILE IN FLAT BAD: A LITTLE
PERSONAL GROOMING: A LITTLE
MOBILITY SCORE: 20
MOVING FROM LYING ON BACK TO SITTING ON SIDE OF FLAT BED WITH BEDRAILS: A LITTLE
TOILETING: A LITTLE
MOVING TO AND FROM BED TO CHAIR: A LITTLE
STANDING UP FROM CHAIR USING ARMS: A LITTLE
DAILY ACTIVITIY SCORE: 22

## 2024-08-30 ASSESSMENT — ACTIVITIES OF DAILY LIVING (ADL): LACK_OF_TRANSPORTATION: NO

## 2024-08-30 ASSESSMENT — ENCOUNTER SYMPTOMS
ABDOMINAL PAIN: 1
VOMITING: 1
HEMATOLOGIC/LYMPHATIC NEGATIVE: 1
DIARRHEA: 1
CARDIOVASCULAR NEGATIVE: 1
ENDOCRINE NEGATIVE: 1
WEAKNESS: 1
ALLERGIC/IMMUNOLOGIC NEGATIVE: 1
MUSCULOSKELETAL NEGATIVE: 1
ACTIVITY CHANGE: 1
FATIGUE: 1
PSYCHIATRIC NEGATIVE: 1
RESPIRATORY NEGATIVE: 1
EYES NEGATIVE: 1
APPETITE CHANGE: 1
NAUSEA: 1
ABDOMINAL DISTENTION: 1

## 2024-08-30 ASSESSMENT — PAIN - FUNCTIONAL ASSESSMENT
PAIN_FUNCTIONAL_ASSESSMENT: 0-10

## 2024-08-30 ASSESSMENT — PAIN DESCRIPTION - ORIENTATION
ORIENTATION: RIGHT

## 2024-08-30 ASSESSMENT — PAIN SCALES - GENERAL
PAINLEVEL_OUTOF10: 5 - MODERATE PAIN
PAINLEVEL_OUTOF10: 5 - MODERATE PAIN
PAINLEVEL_OUTOF10: 7
PAINLEVEL_OUTOF10: 2
PAINLEVEL_OUTOF10: 3
PAINLEVEL_OUTOF10: 7
PAINLEVEL_OUTOF10: 7
PAINLEVEL_OUTOF10: 2
PAINLEVEL_OUTOF10: 5 - MODERATE PAIN

## 2024-08-30 NOTE — CARE PLAN
The patient's goals for the shift include rest    The clinical goals for the shift include patient will be free of nausea nad vomintng this shift and will tolerate clears      Problem: Skin  Goal: Decreased wound size/increased tissue granulation at next dressing change  Outcome: Progressing  Goal: Participates in plan/prevention/treatment measures  Outcome: Progressing  Goal: Prevent/manage excess moisture  Outcome: Progressing  Goal: Prevent/minimize sheer/friction injuries  Outcome: Progressing  Goal: Promote/optimize nutrition  Outcome: Progressing  Goal: Promote skin healing  Outcome: Progressing     Problem: Pain - Adult  Goal: Verbalizes/displays adequate comfort level or baseline comfort level  Outcome: Progressing     Problem: Safety - Adult  Goal: Free from fall injury  Outcome: Progressing     Problem: Discharge Planning  Goal: Discharge to home or other facility with appropriate resources  Outcome: Progressing     Problem: Chronic Conditions and Co-morbidities  Goal: Patient's chronic conditions and co-morbidity symptoms are monitored and maintained or improved  Outcome: Progressing     Problem: Fall/Injury  Goal: Not fall by end of shift  Outcome: Progressing  Goal: Be free from injury by end of the shift  Outcome: Progressing  Goal: Verbalize understanding of personal risk factors for fall in the hospital  Outcome: Progressing  Goal: Verbalize understanding of risk factor reduction measures to prevent injury from fall in the home  Outcome: Progressing  Goal: Use assistive devices by end of the shift  Outcome: Progressing  Goal: Pace activities to prevent fatigue by end of the shift  Outcome: Progressing     Problem: Respiratory  Goal: Clear secretions with interventions this shift  Outcome: Progressing  Goal: Minimize anxiety/maximize coping throughout shift  Outcome: Progressing  Goal: Minimal/no exertional discomfort or dyspnea this shift  Outcome: Progressing  Goal: No signs of respiratory distress  (eg. Use of accessory muscles. Peds grunting)  Outcome: Progressing  Goal: Patent airway maintained this shift  Outcome: Progressing  Goal: Tolerate mechanical ventilation evidenced by VS/agitation level this shift  Outcome: Progressing  Goal: Tolerate pulmonary toileting this shift  Outcome: Progressing  Goal: Verbalize decreased shortness of breath this shift  Outcome: Progressing  Goal: Wean oxygen to maintain O2 saturation per order/standard this shift  Outcome: Progressing  Goal: Increase self care and/or family involvement in next 24 hours  Outcome: Progressing     Problem: Diabetes  Goal: Achieve decreasing blood glucose levels by end of shift  Outcome: Progressing  Goal: Increase stability of blood glucose readings by end of shift  Outcome: Progressing  Goal: Decrease in ketones present in urine by end of shift  Outcome: Progressing  Goal: Maintain electrolyte levels within acceptable range throughout shift  Outcome: Progressing  Goal: Maintain glucose levels >70mg/dl to <250mg/dl throughout shift  Outcome: Progressing  Goal: No changes in neurological exam by end of shift  Outcome: Progressing  Goal: Learn about and adhere to nutrition recommendations by end of shift  Outcome: Progressing  Goal: Vital signs within normal range for age by end of shift  Outcome: Progressing  Goal: Increase self care and/or family involovement by end of shift  Outcome: Progressing  Goal: Receive DSME education by end of shift  Outcome: Progressing     Problem: Pain  Goal: Takes deep breaths with improved pain control throughout the shift  Outcome: Progressing  Goal: Turns in bed with improved pain control throughout the shift  Outcome: Progressing  Goal: Walks with improved pain control throughout the shift  Outcome: Progressing  Goal: Performs ADL's with improved pain control throughout shift  Outcome: Progressing  Goal: Participates in PT with improved pain control throughout the shift  Outcome: Progressing  Goal: Free from  opioid side effects throughout the shift  Outcome: Progressing  Goal: Free from acute confusion related to pain meds throughout the shift  Outcome: Progressing

## 2024-08-30 NOTE — CARE PLAN
The patient's goals for the shift include rest    The clinical goals for the shift include patient will report a tolerable pain level by end of shift    Over the shift, the patient did make progress toward the following goals. IV fluids and IV zosyn discontinued. Patient continues with pain and medicated as needed throughout shift

## 2024-08-30 NOTE — PROGRESS NOTES
"Jono Quinn is a 54 y.o. male on day 1 of admission presenting with Acute pancreatitis, unspecified complication status, unspecified pancreatitis type (HHS-HCC).    Subjective   He is resting in bed this morning. A&OX3, states his abdominal pain is better but still present. Tolerating clear liquids. He stated his pain started after eating broccoli, then he took a laxative when he thought it was constipation, then he started vomiting. We discussed advancing his diet as tolerated and continuing pain meds as needed. Likely DC tomorrow if keeps improving.      Objective     Physical Exam  Constitutional:       Appearance: He is obese.   HENT:      Head: Normocephalic and atraumatic.      Nose: Nose normal.      Mouth/Throat:      Mouth: Mucous membranes are moist.   Eyes:      Extraocular Movements: Extraocular movements intact.      Pupils: Pupils are equal, round, and reactive to light.   Cardiovascular:      Rate and Rhythm: Normal rate and regular rhythm.      Pulses: Normal pulses.      Heart sounds: Normal heart sounds.   Pulmonary:      Effort: Pulmonary effort is normal.      Breath sounds: Normal breath sounds.   Abdominal:      General: Bowel sounds are normal.      Palpations: Abdomen is soft.   Musculoskeletal:         General: Normal range of motion.      Cervical back: Normal range of motion.   Skin:     General: Skin is warm and dry.      Capillary Refill: Capillary refill takes less than 2 seconds.   Neurological:      Mental Status: He is alert. Mental status is at baseline.      Motor: Weakness present.   Psychiatric:         Mood and Affect: Mood normal.         Behavior: Behavior normal.     Last Recorded Vitals  Blood pressure 116/68, pulse 99, temperature 36.3 °C (97.3 °F), temperature source Temporal, resp. rate 18, height 1.778 m (5' 10\"), weight 129 kg (283 lb 11.7 oz), SpO2 92%.  Intake/Output last 3 Shifts:  I/O last 3 completed shifts:  In: 665 (5.2 mL/kg) [I.V.:605 (4.7 mL/kg); IV " Piggyback:60]  Out: - (0 mL/kg)   Weight: 128.7 kg     Scheduled medications  amitriptyline, 10 mg, oral, Nightly  aspirin, 81 mg, oral, BID  azelastine, 2 spray, Each Nostril, Daily  busPIRone, 15 mg, oral, TID  cholecalciferol, 2,000 Units, oral, Daily  empagliflozin, 25 mg, oral, Daily  enoxaparin, 40 mg, subcutaneous, q12h CHRISTOPHER  fenofibrate, 160 mg, oral, Daily  gabapentin, 900 mg, oral, TID  icosapent ethyL, 2 g, oral, BID  insulin glargine, 88 Units, subcutaneous, Nightly  insulin lispro, 0-20 Units, subcutaneous, TID  [Held by provider] metFORMIN, 1,000 mg, oral, q12h  pantoprazole, 40 mg, oral, Daily before breakfast   Or  pantoprazole, 40 mg, intravenous, Daily before breakfast  PARoxetine, 30 mg, oral, Daily  piperacillin-tazobactam, 3.375 g, intravenous, q6h  rosuvastatin, 40 mg, oral, Daily  sennosides-docusate sodium, 1 tablet, oral, BID    Continuous medications  sodium chloride 0.9%, 100 mL/hr, Last Rate: 100 mL/hr (08/30/24 0804)  sodium chloride 0.9%, 10 mL/hr    PRN medications  PRN medications: acetaminophen **OR** [DISCONTINUED] acetaminophen **OR** [DISCONTINUED] acetaminophen, calcium carbonate, dextrose, glucagon, HYDROmorphone, ketorolac, melatonin, ondansetron **OR** ondansetron, sodium chloride 0.9%    Relevant Results  ECG 12 lead  Result Date: 8/29/2024  Sinus tachycardia Right bundle branch block Abnormal ECG When compared with ECG of 27-AUG-2024 13:07, No significant change was found See ED provider note for full interpretation and clinical correlation Confirmed by Katerina Montague ([id number]) on 8/29/2024 7:40:08 PM    CT angio chest for pulmonary embolism  Result Date: 8/29/2024  1. No acute or chronic pulmonary emboli. 2. No thoracic aortic aneurysm or dissection. 3. Mild posterior bibasilar atelectasis. 4. Enlarged fatty liver. 5. Absent gallbladder. 6. Acute pancreatitis with extensive peripancreatic inflammatory change involving the head, body and tail of the pancreas.  Correlation with  amylase and lipase is suggested.  There is abnormal wall thickening of the second and third portions of the duodenum with induration of the mesenteric fat. There is mild distention of the transverse duodenum measuring up to 5.2 cm. 7. Diverticulosis coli without acute diverticulitis. The critical results were discussed with Dr. Leonel Chow at 3:58 PM on 08/29/2024. Signed by Chuck Lee MD    CT abdomen pelvis w IV contrast  Result Date: 8/29/2024  1. No acute or chronic pulmonary emboli. 2. No thoracic aortic aneurysm or dissection. 3. Mild posterior bibasilar atelectasis. 4. Enlarged fatty liver. 5. Absent gallbladder. 6. Acute pancreatitis with extensive peripancreatic inflammatory change involving the head, body and tail of the pancreas.  Correlation with amylase and lipase is suggested.  There is abnormal wall thickening of the second and third portions of the duodenum with induration of the mesenteric fat. There is mild distention of the transverse duodenum measuring up to 5.2 cm. 7. Diverticulosis coli without acute diverticulitis. The critical results were discussed with Dr. Leonel Chow at 3:58 PM on 08/29/2024. Signed by Chuck Lee MD    Transthoracic Echo (TTE) Complete  Result Date: 8/28/2024  CONCLUSIONS:  1. The left ventricular systolic function is normal, with a visually estimated ejection fraction of 65-70%.  2. There is normal right ventricular global systolic function.     Assessment/Plan   Assessment & Plan  Acute pancreatitis, unspecified complication status, unspecified pancreatitis type (HHS-HCC)    Allergic rhinitis    Depression    Hypercholesterolemia    Vitamin D deficiency    Anxiety disorder    Type 2 diabetes mellitus with obesity (Multi)    Diabetic autonomic neuropathy associated with type 2 diabetes mellitus (Multi)    Hyponatremia    Principal Problems  #Acute pancreatitis, unspecified complication status, unspecified pancreatitis type (HHS-HCC)  -WBC 9.1 > 7.3  -Lipase  1,569 > 538  -CT C/A/P: 1. No acute or chronic pulmonary emboli. 2. No thoracic aortic aneurysm or dissection. 3. Mild posterior bibasilar atelectasis. 4. Enlarged fatty liver. 5. Absent gallbladder. 6. Acute pancreatitis with extensive peripancreatic inflammatory change involving the head, body and tail of the pancreas.  Correlation with amylase and lipase is suggested.  There is abnormal wall thickening of the second and third portions of the duodenum with induration of the mesenteric fat. There is mild distention of the transverse duodenum measuring up to 5.2 cm. 7. Diverticulosis coli without acute diverticulitis.  -Dilaudid and Toradol prn  -Zosyn 3.375 g IV Q 6 hours (Day 2), de-escalated      Active Problems  #Type 2 diabetes mellitus with obesity (Multi)  #Diabetic autonomic neuropathy associated with type 2 diabetes mellitus (Multi)  -continue Jardiance 25 mg PO Daily   -continue Neurontin 900 mg PO TID  -Lantus 88 units subcutaneous HS  -Metformin 1,000 mg PO BID (Held due to IV contrast)  -SSI 0 - 20 units     #Hyponatremia  -Sodium 133 > 134  -NS @ 100 ml/hr, DC'd     #Allergic rhinitis  -continue Astelin 137 mcg 2 sprays daily      #Depression  #Anxiety disorder  -continue Elavil 10 mg PO HS  -continue Buspar 15 mg PO TID  -continue Paxil 30 mg PO Daily      #Hypercholesterolemia  -continue Fenofibrate 160 mg PO Daily   -continue Vascepa 2 g PO BID     #Vitamin D deficiency  -continue Vitamin D; 2,000 units PO Daily       DVT Prophylaxis: Lovenox   Fluids: NS @ 100  ml/hr, now as needed  Nutrition: Clear Liquid, ADAT   Code Status: Full Code   Pt requires inpatient stay at this time.    Madeline Bustillo, APRN-CNP

## 2024-08-30 NOTE — DISCHARGE INSTR - OTHER ORDERS
Thank you for choosing [facility] for your Health Care needs. As you transition from the hospital back to home, we hope we took your preferences into account on how you manage your health needs so you can manage your health at home.     You may receive a survey in the mail within the next couple weeks. Please take the time to complete it and return it. Your input is ALWAYS important to us. Thank you!  Your Care Transition Team - Trista [employer] - [phone]    For questions about your medications listed on your discharge instructions, please call the Nurses Station at [phone].

## 2024-08-30 NOTE — PROGRESS NOTES
08/30/24 1013   Discharge Planning   Living Arrangements Alone   Support Systems Family members   Assistance Needed Met with patient at bedside. AAOX3. Independent in ADL's and iADLs. DME: cane, grabber, grab bars in shower, CPap.He stated that he has difficulty preparing his meals. Information for Meals on Wheels and the  [employer] given to patient during last admission. Patient denies any further needs on discharge. PCP Trudy Gordon last seen 8/7/24.   Type of Residence Private residence  (apartment)   Number of Stairs to Enter Residence 4   Number of Stairs Within Residence 0   Do you have animals or pets at home? No   Who is requesting discharge planning? Provider   Home or Post Acute Services None   Expected Discharge Disposition Home   Does the patient need discharge transport arranged? No   Financial Resource Strain   How hard is it for you to pay for the very basics like food, housing, medical care, and heating? Not very   Housing Stability   In the last 12 months, was there a time when you were not able to pay the mortgage or rent on time? N   In the past 12 months, how many times have you moved where you were living? 0   At any time in the past 12 months, were you homeless or living in a shelter (including now)? N   Transportation Needs   In the past 12 months, has lack of transportation kept you from medical appointments or from getting medications? no   In the past 12 months, has lack of transportation kept you from meetings, work, or from getting things needed for daily living? No

## 2024-08-31 LAB
ANION GAP SERPL CALC-SCNC: 16 MMOL/L (ref 10–20)
BUN SERPL-MCNC: 17 MG/DL (ref 6–23)
CALCIUM SERPL-MCNC: 8 MG/DL (ref 8.6–10.3)
CHLORIDE SERPL-SCNC: 93 MMOL/L (ref 98–107)
CO2 SERPL-SCNC: 21 MMOL/L (ref 21–32)
CREAT SERPL-MCNC: 0.98 MG/DL (ref 0.5–1.3)
EGFRCR SERPLBLD CKD-EPI 2021: >90 ML/MIN/1.73M*2
ERYTHROCYTE [DISTWIDTH] IN BLOOD BY AUTOMATED COUNT: 14.6 % (ref 11.5–14.5)
GLUCOSE BLD MANUAL STRIP-MCNC: 119 MG/DL (ref 74–99)
GLUCOSE BLD MANUAL STRIP-MCNC: 152 MG/DL (ref 74–99)
GLUCOSE BLD MANUAL STRIP-MCNC: 175 MG/DL (ref 74–99)
GLUCOSE BLD MANUAL STRIP-MCNC: 224 MG/DL (ref 74–99)
GLUCOSE SERPL-MCNC: 251 MG/DL (ref 74–99)
HCT VFR BLD AUTO: 36.2 % (ref 41–52)
HGB BLD-MCNC: 12.2 G/DL (ref 13.5–17.5)
HOLD SPECIMEN: NORMAL
LIPASE SERPL-CCNC: 309 U/L (ref 9–82)
MCH RBC QN AUTO: 30.3 PG (ref 26–34)
MCHC RBC AUTO-ENTMCNC: 33.7 G/DL (ref 32–36)
MCV RBC AUTO: 90 FL (ref 80–100)
NRBC BLD-RTO: 0 /100 WBCS (ref 0–0)
PLATELET # BLD AUTO: 208 X10*3/UL (ref 150–450)
POTASSIUM SERPL-SCNC: 3.7 MMOL/L (ref 3.5–5.3)
RBC # BLD AUTO: 4.02 X10*6/UL (ref 4.5–5.9)
SODIUM SERPL-SCNC: 126 MMOL/L (ref 136–145)
WBC # BLD AUTO: 8.4 X10*3/UL (ref 4.4–11.3)

## 2024-08-31 PROCEDURE — 94760 N-INVAS EAR/PLS OXIMETRY 1: CPT | Mod: IPSPLIT

## 2024-08-31 PROCEDURE — 80048 BASIC METABOLIC PNL TOTAL CA: CPT | Mod: IPSPLIT | Performed by: NURSE PRACTITIONER

## 2024-08-31 PROCEDURE — 2500000004 HC RX 250 GENERAL PHARMACY W/ HCPCS (ALT 636 FOR OP/ED): Mod: IPSPLIT | Performed by: NURSE PRACTITIONER

## 2024-08-31 PROCEDURE — 2500000004 HC RX 250 GENERAL PHARMACY W/ HCPCS (ALT 636 FOR OP/ED): Mod: IPSPLIT

## 2024-08-31 PROCEDURE — 1200000002 HC GENERAL ROOM WITH TELEMETRY DAILY: Mod: IPSPLIT

## 2024-08-31 PROCEDURE — 82947 ASSAY GLUCOSE BLOOD QUANT: CPT | Mod: IPSPLIT

## 2024-08-31 PROCEDURE — 2500000001 HC RX 250 WO HCPCS SELF ADMINISTERED DRUGS (ALT 637 FOR MEDICARE OP): Mod: IPSPLIT | Performed by: NURSE PRACTITIONER

## 2024-08-31 PROCEDURE — 36415 COLL VENOUS BLD VENIPUNCTURE: CPT | Mod: IPSPLIT | Performed by: NURSE PRACTITIONER

## 2024-08-31 PROCEDURE — 2500000001 HC RX 250 WO HCPCS SELF ADMINISTERED DRUGS (ALT 637 FOR MEDICARE OP): Mod: IPSPLIT

## 2024-08-31 PROCEDURE — 2500000002 HC RX 250 W HCPCS SELF ADMINISTERED DRUGS (ALT 637 FOR MEDICARE OP, ALT 636 FOR OP/ED): Mod: IPSPLIT

## 2024-08-31 PROCEDURE — 85027 COMPLETE CBC AUTOMATED: CPT | Mod: IPSPLIT | Performed by: NURSE PRACTITIONER

## 2024-08-31 PROCEDURE — 83690 ASSAY OF LIPASE: CPT | Mod: IPSPLIT

## 2024-08-31 PROCEDURE — 94660 CPAP INITIATION&MGMT: CPT | Mod: IPSPLIT

## 2024-08-31 PROCEDURE — 99232 SBSQ HOSP IP/OBS MODERATE 35: CPT

## 2024-08-31 ASSESSMENT — PAIN SCALES - GENERAL
PAINLEVEL_OUTOF10: 3
PAINLEVEL_OUTOF10: 7
PAINLEVEL_OUTOF10: 1
PAINLEVEL_OUTOF10: 4
PAINLEVEL_OUTOF10: 1
PAINLEVEL_OUTOF10: 7
PAINLEVEL_OUTOF10: 4
PAINLEVEL_OUTOF10: 3
PAINLEVEL_OUTOF10: 2
PAINLEVEL_OUTOF10: 0 - NO PAIN
PAINLEVEL_OUTOF10: 7

## 2024-08-31 ASSESSMENT — PAIN DESCRIPTION - LOCATION
LOCATION: ABDOMEN
LOCATION: HEAD
LOCATION: ABDOMEN

## 2024-08-31 ASSESSMENT — PAIN - FUNCTIONAL ASSESSMENT
PAIN_FUNCTIONAL_ASSESSMENT: 0-10

## 2024-08-31 ASSESSMENT — PAIN DESCRIPTION - ORIENTATION
ORIENTATION: RIGHT

## 2024-08-31 NOTE — CARE PLAN
The patient's goals for the shift include rest    The clinical goals for the shift include patient will report a tolerable pain level by end of shift    The patient did not require any narcotics overnight was able to rest most of the night

## 2024-08-31 NOTE — NURSING NOTE
Assumed patient care. No immediate needs at this time. Patient resting in bed. Call light within reach.

## 2024-08-31 NOTE — CARE PLAN
The patient's goals for the shift include rest    The clinical goals for the shift include pain will be controled with pain medication, pt will report pain to be <3    Goal met Patient reported pain to be a 1/10. Vital signs stable. Patient sat in recliner most all of the day.

## 2024-08-31 NOTE — PROGRESS NOTES
Jono Quinn is a 54 y.o. male on day 2 of admission presenting with Acute pancreatitis, unspecified complication status, unspecified pancreatitis type ([facility]-HCC).      Subjective   Pt assessed at bedside. Ambulating from the restroom with a walker. Pt reports some right lower quadrant pain that has improved since admission. Pt has tolerated clear liquid diet, will adv to low fat today. Pt denies further complaints.        Objective     Last Recorded Vitals  /87 (BP Location: Left arm, Patient Position: Lying)   Pulse 88   Temp 36.5 °C (97.7 °F) (Temporal)   Resp 18   Wt 129 kg (283 lb 11.7 oz)   SpO2 94%   Intake/Output last 3 Shifts:    Intake/Output Summary (Last 24 hours) at 8/31/2024 0941  Last data filed at 8/30/2024 1800  Gross per 24 hour   Intake 1645 ml   Output 300 ml   Net 1345 ml       Admission Weight  Weight: 128 kg (282 lb 3 oz) (08/29/24 1315)    Daily Weight  08/29/24 : 129 kg (283 lb 11.7 oz)    Image Results  ECG 12 lead  Sinus tachycardia  Right bundle branch block  Abnormal ECG  When compared with ECG of 27-AUG-2024 13:07,  No significant change was found  See ED provider note for full interpretation and clinical correlation  Confirmed by Katerina Montague ([id number]) on 8/29/2024 7:40:08 PM  CT abdomen pelvis w IV contrast  Narrative: STUDY:  CT Angiogram of the Chest, CT Abdomen and Pelvis with IV Contrast;  8/29/2024 15:17  INDICATION:  Abdominal pain, low pulse oxygen.  COMPARISON:  8/24/2023 XR Chest.  ACCESSION NUMBER(S):  [id number], [id number]  ORDERING CLINICIAN:  JANE FLOREZ  TECHNIQUE:  CTA of the chest was performed following rapid injection  of intravenous contrast.  Images are reviewed and processed at a  workstation according to the CT angiogram protocol with 3-D and/or MIP  post processing imaging generated.  CT of the abdomen and pelvis was  performed with intravenous contrast.  Omnipaque 350 75 mL was  administered intravenously; positive oral contrast was  given.  Automated mA/kV exposure control was utilized and patient examination  was performed in strict accordance with principles of ALARA.  FINDINGS:    CTA CHEST:  Pulmonary arteries are adequately opacified without acute or chronic  filling defects.  The thoracic aorta is normal in course and caliber  without dissection or aneurysm.  The heart is normal in size without pericardial effusion.  Moderate  atherosclerotic disease of the coronary arteries.  There is an  aortopulmonary node measuring 1.5 x 1.4 cm.   There is no pleural effusion, pleural thickening, or pneumothorax.   The airways are patent.  Lungs are clear without consolidation, interstitial disease, or  suspicious nodules.  Mild posterior bibasilar atelectasis.      ABDOMEN:     LIVER:  Liver is enlarged (25.0 cm) and demonstrate diminished attenuation,  compatible with fatty liver metamorphosis.       BILE DUCTS:  No intrahepatic or extrahepatic biliary ductal dilatation.     GALLBLADDER:  The gallbladder is absent..     STOMACH:  Stomach is moderately distended with fluid.  No gastric mass, or  abnormal wall thickening.      PANCREAS:  No masses or ductal dilatation.  There is extensive peripancreatic  inflammatory change involving the head, body and tail of the pancreas  compatible with acute pancreatitis.  There is abnormal wall thickening  of the second and third portions of the duodenum with induration of  the mesenteric fat.  There is mild distention of the transverse  duodenum measuring up to 5.2 cm.     SPLEEN:  No splenomegaly or focal splenic lesion.     ADRENAL GLANDS:  No thickening or nodules.     KIDNEYS AND URETERS:  Kidneys are normal in size and location.  No renal or ureteral  calculi.     PELVIS:     BLADDER:  No abnormalities identified.     REPRODUCTIVE ORGANS:  No abnormalities identified.     BOWEL:  The appendix is normal.  Moderate stool visualized within the  ascending and transverse colon.  Scattered diverticula disease of  the  descending colon.  No acute diverticulitis.      VESSELS:  No abnormalities identified.  Abdominal aorta is normal in caliber.      PERITONEUM/RETROPERITONEUM/LYMPH NODES:  No free fluid.  No pneumoperitoneum.  No lymphadenopathy.     ABDOMINAL WALL:  No abnormalities identified.  SOFT TISSUES:   No abnormalities identified.     BONES:  No acute fracture or aggressive osseous lesion.  Impression: 1. No acute or chronic pulmonary emboli.  2. No thoracic aortic aneurysm or dissection.  3. Mild posterior bibasilar atelectasis.  4. Enlarged fatty liver.  5. Absent gallbladder.  6. Acute pancreatitis with extensive peripancreatic inflammatory  change involving the head, body and tail of the pancreas.  Correlation  with amylase and lipase is suggested.  There is abnormal wall  thickening of the second and third portions of the duodenum with  induration of the mesenteric fat. There is mild distention of the  transverse duodenum measuring up to 5.2 cm.  7. Diverticulosis coli without acute diverticulitis.  The critical results were discussed with Dr. Leonel Chow at 3:58 PM  on 08/29/2024.  Signed by Chuck Lee MD  CT angio chest for pulmonary embolism  Narrative: STUDY:  CT Angiogram of the Chest, CT Abdomen and Pelvis with IV Contrast;  8/29/2024 15:17  INDICATION:  Abdominal pain, low pulse oxygen.  COMPARISON:  8/24/2023 XR Chest.  ACCESSION NUMBER(S):  [id number], [id number]  ORDERING CLINICIAN:  LEONEL CHOW  TECHNIQUE:  CTA of the chest was performed following rapid injection  of intravenous contrast.  Images are reviewed and processed at a  workstation according to the CT angiogram protocol with 3-D and/or MIP  post processing imaging generated.  CT of the abdomen and pelvis was  performed with intravenous contrast.  Omnipaque 350 75 mL was  administered intravenously; positive oral contrast was given.  Automated mA/kV exposure control was utilized and patient examination  was performed in strict  accordance with principles of ALARA.  FINDINGS:    CTA CHEST:  Pulmonary arteries are adequately opacified without acute or chronic  filling defects.  The thoracic aorta is normal in course and caliber  without dissection or aneurysm.  The heart is normal in size without pericardial effusion.  Moderate  atherosclerotic disease of the coronary arteries.  There is an  aortopulmonary node measuring 1.5 x 1.4 cm.   There is no pleural effusion, pleural thickening, or pneumothorax.   The airways are patent.  Lungs are clear without consolidation, interstitial disease, or  suspicious nodules.  Mild posterior bibasilar atelectasis.      ABDOMEN:     LIVER:  Liver is enlarged (25.0 cm) and demonstrate diminished attenuation,  compatible with fatty liver metamorphosis.       BILE DUCTS:  No intrahepatic or extrahepatic biliary ductal dilatation.     GALLBLADDER:  The gallbladder is absent..     STOMACH:  Stomach is moderately distended with fluid.  No gastric mass, or  abnormal wall thickening.      PANCREAS:  No masses or ductal dilatation.  There is extensive peripancreatic  inflammatory change involving the head, body and tail of the pancreas  compatible with acute pancreatitis.  There is abnormal wall thickening  of the second and third portions of the duodenum with induration of  the mesenteric fat.  There is mild distention of the transverse  duodenum measuring up to 5.2 cm.     SPLEEN:  No splenomegaly or focal splenic lesion.     ADRENAL GLANDS:  No thickening or nodules.     KIDNEYS AND URETERS:  Kidneys are normal in size and location.  No renal or ureteral  calculi.     PELVIS:     BLADDER:  No abnormalities identified.     REPRODUCTIVE ORGANS:  No abnormalities identified.     BOWEL:  The appendix is normal.  Moderate stool visualized within the  ascending and transverse colon.  Scattered diverticula disease of the  descending colon.  No acute diverticulitis.      VESSELS:  No abnormalities identified.  Abdominal  aorta is normal in caliber.      PERITONEUM/RETROPERITONEUM/LYMPH NODES:  No free fluid.  No pneumoperitoneum.  No lymphadenopathy.     ABDOMINAL WALL:  No abnormalities identified.  SOFT TISSUES:   No abnormalities identified.     BONES:  No acute fracture or aggressive osseous lesion.  Impression: 1. No acute or chronic pulmonary emboli.  2. No thoracic aortic aneurysm or dissection.  3. Mild posterior bibasilar atelectasis.  4. Enlarged fatty liver.  5. Absent gallbladder.  6. Acute pancreatitis with extensive peripancreatic inflammatory  change involving the head, body and tail of the pancreas.  Correlation  with amylase and lipase is suggested.  There is abnormal wall  thickening of the second and third portions of the duodenum with  induration of the mesenteric fat. There is mild distention of the  transverse duodenum measuring up to 5.2 cm.  7. Diverticulosis coli without acute diverticulitis.  The critical results were discussed with Dr. Leonel Chow at 3:58 PM  on 08/29/2024.  Signed by Chuck Lee MD      Physical Exam  Constitutional:       Appearance: He is obese.   HENT:      Head: Normocephalic and atraumatic.      Right Ear: External ear normal.      Left Ear: External ear normal.      Nose: Nose normal.      Mouth/Throat:      Pharynx: Oropharynx is clear.   Eyes:      Conjunctiva/sclera: Conjunctivae normal.   Cardiovascular:      Rate and Rhythm: Normal rate and regular rhythm.      Pulses: Normal pulses.      Heart sounds: Normal heart sounds.   Pulmonary:      Effort: Pulmonary effort is normal.      Breath sounds: Normal breath sounds.   Abdominal:      General: Bowel sounds are normal.      Palpations: Abdomen is soft.      Tenderness: There is abdominal tenderness.   Musculoskeletal:         General: Normal range of motion.      Cervical back: Normal range of motion and neck supple.   Skin:     General: Skin is dry.   Neurological:      General: No focal deficit present.      Mental  Status: He is alert and oriented to person, place, and time.   Psychiatric:         Mood and Affect: Mood normal.         Behavior: Behavior normal.         Relevant Results  Scheduled medications  amitriptyline, 10 mg, oral, Nightly  aspirin, 81 mg, oral, BID  azelastine, 2 spray, Each Nostril, Daily  busPIRone, 15 mg, oral, TID  cholecalciferol, 2,000 Units, oral, Daily  empagliflozin, 25 mg, oral, Daily  enoxaparin, 40 mg, subcutaneous, q12h CHRISTOPHER  fenofibrate, 160 mg, oral, Daily  gabapentin, 900 mg, oral, TID  icosapent ethyL, 2 g, oral, BID  insulin glargine, 88 Units, subcutaneous, Nightly  insulin lispro, 0-20 Units, subcutaneous, TID  [Held by provider] metFORMIN, 1,000 mg, oral, q12h  pantoprazole, 40 mg, oral, Daily before breakfast  PARoxetine, 30 mg, oral, Daily  rosuvastatin, 40 mg, oral, Daily  sennosides-docusate sodium, 1 tablet, oral, BID      Continuous medications  sodium chloride 0.9%, 10 mL/hr      PRN medications  PRN medications: acetaminophen **OR** [DISCONTINUED] acetaminophen **OR** [DISCONTINUED] acetaminophen, calcium carbonate, dextrose, glucagon, HYDROmorphone, ketorolac, melatonin, ondansetron **OR** ondansetron, sodium chloride 0.9%    Results for orders placed or performed during the hospital encounter of 08/29/24 (from the past 24 hour(s))   POCT GLUCOSE   Result Value Ref Range    POCT Glucose 177 (H) 74 - 99 mg/dL   POCT GLUCOSE   Result Value Ref Range    POCT Glucose 232 (H) 74 - 99 mg/dL   POCT GLUCOSE   Result Value Ref Range    POCT Glucose 254 (H) 74 - 99 mg/dL   POCT GLUCOSE   Result Value Ref Range    POCT Glucose 119 (H) 74 - 99 mg/dL          Assessment/Plan        Assessment & Plan  Acute pancreatitis, unspecified complication status, unspecified pancreatitis type ([facility]HS-HCC)    Allergic rhinitis    Depression    Hypercholesterolemia    Vitamin D deficiency    Anxiety disorder    Type 2 diabetes mellitus with obesity (Multi)    Diabetic autonomic neuropathy associated with  type 2 diabetes mellitus (Multi)    Hyponatremia    #Acute pancreatitis  -CT abd/pelvis: 1. No acute or chronic pulmonary emboli. 2. No thoracic aortic aneurysm or dissection. 3. Mild posterior bibasilar atelectasis. 4. Enlarged fatty liver. 5. Absent gallbladder. 6. Acute pancreatitis with extensive peripancreatic inflammatory change involving the head, body and tail of the pancreas.  Correlation with amylase and lipase is suggested.  There is abnormal wall thickening of the second and third portions of the duodenum with induration of the mesenteric fat. There is mild distention of the transverse duodenum measuring up to 5.2 cm. 7. Diverticulosis coli without acute diverticulitis.   -WBC 9.1  -Lipase 1569 > 538  -Lipid panel: Veronica 200, HDL 17.2, Trig 1814  -IVF discontinued  -Clear liquid diet increased to low fat   -PRN analgesia   -DC zosyn     #DM Type II with neuropathy   -Continue gabapentin, lantus, empagliflozin   -Metformin on hold due to IV contrast   -SSI with hypoglycemia protocol  -Monitor BG     #HLD  -Lipid panel above  -Continue fenofibrate, rosuvastatin, icosapent ethyl    #Depression  #Anxiety  -Continue amitriptyline, buspirone, paroxetine     #Allergic rhinitis  -Continue azelastine spray    #Vit D Deficiency  -Continue cholecalciferol     DVT ppx  -lovenox    PUD ppx  -pantoprazole    F: PRN  E: Replete per protocol  N: Low fat  A: PIV    Disposition: Pt requires less than 2 inpatient days at this time   Code Status: Full Code          Nidhi Jose, BETH-CNP

## 2024-09-01 VITALS
WEIGHT: 283.73 LBS | HEIGHT: 70 IN | TEMPERATURE: 99.5 F | DIASTOLIC BLOOD PRESSURE: 67 MMHG | HEART RATE: 86 BPM | OXYGEN SATURATION: 95 % | SYSTOLIC BLOOD PRESSURE: 127 MMHG | RESPIRATION RATE: 18 BRPM | BODY MASS INDEX: 40.62 KG/M2

## 2024-09-01 LAB
ANION GAP SERPL CALC-SCNC: 19 MMOL/L (ref 10–20)
BUN SERPL-MCNC: 13 MG/DL (ref 6–23)
CALCIUM SERPL-MCNC: 9 MG/DL (ref 8.6–10.3)
CHLORIDE SERPL-SCNC: 93 MMOL/L (ref 98–107)
CO2 SERPL-SCNC: 22 MMOL/L (ref 21–32)
CREAT SERPL-MCNC: 0.81 MG/DL (ref 0.5–1.3)
EGFRCR SERPLBLD CKD-EPI 2021: >90 ML/MIN/1.73M*2
ERYTHROCYTE [DISTWIDTH] IN BLOOD BY AUTOMATED COUNT: 14.5 % (ref 11.5–14.5)
GLUCOSE BLD MANUAL STRIP-MCNC: 71 MG/DL (ref 74–99)
GLUCOSE SERPL-MCNC: 141 MG/DL (ref 74–99)
HCT VFR BLD AUTO: 35 % (ref 41–52)
HGB BLD-MCNC: 11.7 G/DL (ref 13.5–17.5)
HOLD SPECIMEN: NORMAL
MCH RBC QN AUTO: 30.1 PG (ref 26–34)
MCHC RBC AUTO-ENTMCNC: 33.4 G/DL (ref 32–36)
MCV RBC AUTO: 90 FL (ref 80–100)
NRBC BLD-RTO: 0 /100 WBCS (ref 0–0)
PLATELET # BLD AUTO: 242 X10*3/UL (ref 150–450)
POTASSIUM SERPL-SCNC: 4 MMOL/L (ref 3.5–5.3)
RBC # BLD AUTO: 3.89 X10*6/UL (ref 4.5–5.9)
SODIUM SERPL-SCNC: 130 MMOL/L (ref 136–145)
WBC # BLD AUTO: 8.6 X10*3/UL (ref 4.4–11.3)

## 2024-09-01 PROCEDURE — 2500000004 HC RX 250 GENERAL PHARMACY W/ HCPCS (ALT 636 FOR OP/ED): Mod: IPSPLIT | Performed by: NURSE PRACTITIONER

## 2024-09-01 PROCEDURE — 82947 ASSAY GLUCOSE BLOOD QUANT: CPT | Mod: IPSPLIT

## 2024-09-01 PROCEDURE — 80048 BASIC METABOLIC PNL TOTAL CA: CPT | Mod: IPSPLIT | Performed by: NURSE PRACTITIONER

## 2024-09-01 PROCEDURE — 2500000004 HC RX 250 GENERAL PHARMACY W/ HCPCS (ALT 636 FOR OP/ED): Mod: IPSPLIT

## 2024-09-01 PROCEDURE — 99239 HOSP IP/OBS DSCHRG MGMT >30: CPT

## 2024-09-01 PROCEDURE — 2500000001 HC RX 250 WO HCPCS SELF ADMINISTERED DRUGS (ALT 637 FOR MEDICARE OP): Mod: IPSPLIT

## 2024-09-01 PROCEDURE — 94660 CPAP INITIATION&MGMT: CPT | Mod: IPSPLIT

## 2024-09-01 PROCEDURE — 2500000001 HC RX 250 WO HCPCS SELF ADMINISTERED DRUGS (ALT 637 FOR MEDICARE OP): Mod: IPSPLIT | Performed by: NURSE PRACTITIONER

## 2024-09-01 PROCEDURE — 94760 N-INVAS EAR/PLS OXIMETRY 1: CPT | Mod: IPSPLIT

## 2024-09-01 PROCEDURE — 85027 COMPLETE CBC AUTOMATED: CPT | Mod: IPSPLIT | Performed by: NURSE PRACTITIONER

## 2024-09-01 PROCEDURE — 2500000002 HC RX 250 W HCPCS SELF ADMINISTERED DRUGS (ALT 637 FOR MEDICARE OP, ALT 636 FOR OP/ED): Mod: IPSPLIT

## 2024-09-01 PROCEDURE — 36415 COLL VENOUS BLD VENIPUNCTURE: CPT | Mod: IPSPLIT | Performed by: NURSE PRACTITIONER

## 2024-09-01 RX ORDER — KETOROLAC TROMETHAMINE 10 MG/1
10 TABLET, FILM COATED ORAL EVERY 6 HOURS PRN
Qty: 12 TABLET | Refills: 0 | Status: SHIPPED | OUTPATIENT
Start: 2024-09-01 | End: 2024-09-04

## 2024-09-01 ASSESSMENT — PAIN - FUNCTIONAL ASSESSMENT
PAIN_FUNCTIONAL_ASSESSMENT: 0-10

## 2024-09-01 ASSESSMENT — PAIN DESCRIPTION - ORIENTATION: ORIENTATION: RIGHT

## 2024-09-01 ASSESSMENT — PAIN DESCRIPTION - LOCATION: LOCATION: ABDOMEN

## 2024-09-01 ASSESSMENT — PAIN SCALES - GENERAL
PAINLEVEL_OUTOF10: 0 - NO PAIN
PAINLEVEL_OUTOF10: 7
PAINLEVEL_OUTOF10: 0 - NO PAIN

## 2024-09-01 NOTE — DISCHARGE INSTRUCTIONS
"Pancreatitis - Discharge instructions    The Basics  Written by the doctors and editors at [facility]  What are discharge instructions? -- Discharge instructions are information about how to take care of yourself after getting medical care for a health problem.  What is pancreatitis? -- Pancreatitis is a condition that can cause severe belly pain.  The pancreas is an organ that makes hormones and juices that help break down food (figure 1). Pancreatitis is the term for when this organ gets irritated or swollen.  Pancreatitis can be:  ? Acute - This is when there is a sudden episode of pain. Most of the time, acute pancreatitis is caused by gallstones or alcohol misuse.  ? Chronic - This is when the pancreas gets damaged by irritation over time. People with chronic pancreatitis have pain as well as trouble digesting food normally. It can also lead to other health problems, including diabetes (high blood sugar).  Chronic pancreatitis can be caused by alcohol, smoking, or high triglyceride levels. In some cases, it can be \"hereditary.\" This means that it is caused by a gene that runs in families. Or it can be \"autoimmune.\" This is when the body's infection-fighting system (immune system) attacks the pancreas.  Depending on the cause of your pancreatitis, you might need different treatments.  How do I care for myself at home? -- Ask the doctor or nurse what you should do when you go home. Make sure that you understand exactly what you need to do to care for yourself. Ask questions if there is anything you do not understand.  You should also:  ? Take all of your medicines as instructed. This might include medicines to help with pain.  If you had pancreatitis, you might need other medicines, too. These can include medicines to control your blood sugar and \"enzymes\" to help your body digest food. You will also need extra vitamins if your body cannot absorb these from food.  ? Avoid alcohol - This includes beer, wine, and " mixed drinks. Drinking alcohol can cause pancreatitis to come back.  ? Avoid smoking - If you smoke, your doctor or nurse can help you quit.  ? Eat small, low-fat meals - This might help you feel better.  ? Drink plenty of water to stay hydrated.  What follow-up care do I need? -- Your doctor or nurse will tell you if you need to make a follow-up appointment. If so, make sure that you know when and where to go.  When should I call the doctor? -- Call for advice if you have:  ? Signs of infection, such as a fever of 100.4°F (38°C) or higher, or chills  ? Very bad belly pain or back pain  ? Very bad nausea, vomiting, or diarrhea  ? Yellowing of the skin or whites of the eyes  ? Swelling of the stomach  ? High blood sugar that doesn't come down with treatment  ? Symptoms that are not getting better in 2 to 3 days or are getting worse  All topics are updated as new evidence becomes available and our peer review process is complete.  This topic retrieved from [employer] on: May 30, 2024.  Topic 781226 Version 2.0  Release: 32.5.3 - C32.150  © 2024 [employer] and/or its affiliates. All rights reserved.  figure 1: Pancreas

## 2024-09-01 NOTE — CARE PLAN
The patient's goals for the shift include rest    The clinical goals for the shift include patient will report decreased pain    Over the shift, the patient did make progress toward the following goals. Patient reported decreased pain after PRN medication given. Rested throughout the night.   Problem: Skin  Goal: Participates in plan/prevention/treatment measures  Outcome: Progressing     Problem: Skin  Goal: Prevent/manage excess moisture  Outcome: Progressing     Problem: Skin  Goal: Promote skin healing  Outcome: Progressing     Problem: Pain - Adult  Goal: Verbalizes/displays adequate comfort level or baseline comfort level  Outcome: Progressing     Problem: Safety - Adult  Goal: Free from fall injury  Outcome: Progressing

## 2024-09-01 NOTE — DISCHARGE SUMMARY
Discharge Diagnosis  Acute pancreatitis, unspecified complication status, unspecified pancreatitis type ([facility])    Issues Requiring Follow-Up  Follow up with PCP    Discharge Meds     Your medication list        START taking these medications        Instructions Last Dose Given Next Dose Due   ketorolac 10 mg tablet  Commonly known as: Toradol      Take 1 tablet (10 mg) by mouth every 6 hours if needed for moderate pain (4 - 6) for up to 3 days.              CONTINUE taking these medications        Instructions Last Dose Given Next Dose Due   amitriptyline 10 mg tablet  Commonly known as: Elavil      Take 1 tablet (10 mg) by mouth once daily at bedtime.       aspirin 81 mg EC tablet           azelastine 137 mcg (0.1 %) nasal spray  Commonly known as: Astelin           busPIRone 15 mg tablet  Commonly known as: Buspar           fenofibrate micronized 200 mg capsule  Commonly known as: Lofibra      TAKE 1 CAPSULE BY MOUTH EVERY DAY       Fiasp FlexTouch U-100 Insulin 100 unit/mL (3 mL) injection  Generic drug: insulin aspart (with niacinamide)      Inject 40-60 Units under the skin 3 times a day with meals. Take as directed per insulin instructions.       FreeStyle Delmi 3 Dixie misc  Generic drug: blood-glucose meter,continuous      Inject 1 Device under the skin once daily. Use as instructed       FreeStyle Delmi 3 Sensor device  Generic drug: blood-glucose sensor      1 kit every 14 (fourteen) days.       gabapentin 300 mg capsule  Commonly known as: Neurontin      Take 3 capsules (900 mg) by mouth 3 times a day.       ginseng 100 mg capsule           icosapent ethyL 1 gram capsule  Commonly known as: Vascepa      Take 2 capsules (2 g) by mouth 2 times daily (morning and late afternoon).       Jardiance 25 mg  Generic drug: empagliflozin      TAKE 1 TABLET BY MOUTH EVERY DAY       metFORMIN 1,000 mg tablet  Commonly known as: Glucophage      TAKE 1 TABLET BY MOUTH EVERY 12 HOURS       multivitamin capsule            PARoxetine 30 mg tablet  Commonly known as: Paxil      TAKE 1 TABLET EARLY IN THE MORNING (NEED TO SCHEDULE WITH PSYCH FOR REFILLS)       rosuvastatin 40 mg tablet  Commonly known as: Crestor      TAKE 1 TABLET BY MOUTH EVERY DAY       Toujeo Max U-300 SoloStar 300 unit/mL (3 mL) injection  Generic drug: insulin glargine      Inject 110 Units under the skin once daily at bedtime.       vitamin B complex tablet           Vitamin D3 50 mcg (2,000 unit) capsule  Generic drug: cholecalciferol                     Where to Get Your Medications        These medications were sent to [facility] - [place], [place] - [address]  [address] [facility] [address], [facility] [address]      Phone: [phone]   ketorolac 10 mg tablet  metFORMIN 1,000 mg tablet         Test Results Pending At Discharge  Pending Labs       No current pending labs.            Hospital Course   Jono Quinn is a 54 y.o. male presenting with generalized abdominal pain stating yesterday at 2300, then had 3 episodes of vomiting and around the same amount of diarrhea since last night. Has mild to moderate discomfort. Also believes he has a low-grade fever. He did hit his head a couple days ago was seen in the emergency department was medically cleared and he said that this was not related to go what is going on with him today. He does not have a headache no neurologic deficits no chest pain no shortness of breath. No neurologic deficits. Patient is not vomiting blood nor blood in his stool. Patient currently resting in bed, does have some tolerated RUQ pain at this time, denies N/V/D currently.  Discussed treatment plan, states understanding, and agrees.     Hospital Course:  #Acute pancreatitis  -CT abd/pelvis: 1. No acute or chronic pulmonary emboli. 2. No thoracic aortic aneurysm or dissection. 3. Mild posterior bibasilar atelectasis. 4. Enlarged fatty liver. 5. Absent gallbladder. 6. Acute pancreatitis with extensive peripancreatic inflammatory  change involving the head, body and tail of the pancreas.  Correlation with amylase and lipase is suggested.  There is abnormal wall thickening of the second and third portions of the duodenum with induration of the mesenteric fat. There is mild distention of the transverse duodenum measuring up to 5.2 cm. 7. Diverticulosis coli without acute diverticulitis.   -WBC 9.1  -Lipase 1569 > 538  -Lipid panel: Veronica 200, HDL 17.2, Trig 1814  -IVF discontinued  -Clear liquid diet increased to low fat   -PRN analgesia   -DC zosyn      #DM Type II with neuropathy   -Continue gabapentin, lantus, empagliflozin   -Metformin on hold due to IV contrast   -SSI with hypoglycemia protocol  -Monitor BG      #HLD  -Lipid panel above  -Continue fenofibrate, rosuvastatin, icosapent ethyl     #Depression  #Anxiety  -Continue amitriptyline, buspirone, paroxetine      #Allergic rhinitis  -Continue azelastine spray     #Vit D Deficiency  -Continue cholecalciferol      DVT ppx  -lovenox     PUD ppx  -pantoprazole     F: PRN  E: Replete per protocol  N: Low fat  A: PIV     Disposition: Pt stable for discharge, Will continue on low fat diet. Follow up with PCP    Code Status: Full Code    Total cumulative time spent in preparation of this discharge including documentation review, coordination of care with the medical team including PT/SW/care coordinators and treating consultants, discussion with patient and pertinent family members and finalization of prescriptions, followup appointments and this discharge summary was approximately  45 minutes. Over 50% of the time was spent face-to-face counseling the patient on diagnosis, prescriptions, and follow up appointments.     Pertinent Physical Exam At Time of Discharge    Physical Exam  Constitutional:       Appearance: He is obese.   HENT:      Head: Normocephalic and atraumatic.      Right Ear: External ear normal.      Left Ear: External ear normal.      Nose: Nose normal.      Mouth/Throat:       Pharynx: Oropharynx is clear.   Eyes:      Conjunctiva/sclera: Conjunctivae normal.   Cardiovascular:      Rate and Rhythm: Normal rate and regular rhythm.      Pulses: Normal pulses.      Heart sounds: Normal heart sounds.   Pulmonary:      Effort: Pulmonary effort is normal.      Breath sounds: Normal breath sounds.   Abdominal:      General: Bowel sounds are normal.      Palpations: Abdomen is soft.      Tenderness: There is abdominal tenderness.   Musculoskeletal:         General: Normal range of motion.      Cervical back: Normal range of motion and neck supple.   Skin:     General: Skin is dry.   Neurological:      General: No focal deficit present.      Mental Status: He is alert and oriented to person, place, and time.   Psychiatric:         Mood and Affect: Mood normal.         Behavior: Behavior normal.    Outpatient Follow-Up  Future Appointments   Date Time Provider Department [place]   11/6/2024  4:00 PM Vazquez Marquez MD [id number] [facility]   11/12/2024  3:30 PM Trudy Gordon MD [id number] [facility]         Nidhi Jose APRN-CNP

## 2024-09-01 NOTE — CARE PLAN
The patient's goals for the shift include rest    The clinical goals for the shift include report pain to Rt side of abdomen <3    Goal met. Vital signs stable. Patient discharging to home. Discharge instructions reviewed with patient. Patient is aware of new prescriptions that need picked up at pharmacy and follow up appointments.

## 2024-09-04 ENCOUNTER — TELEPHONE (OUTPATIENT)
Dept: PRIMARY CARE | Facility: CLINIC | Age: 54
End: 2024-09-04
Payer: COMMERCIAL

## 2024-09-04 NOTE — TELEPHONE ENCOUNTER
Transition of Care    Inpatient facility: [facility]  Discharge diagnosis: Pancreatitis  Discharged to: Home  Discharge date: 9/1/24  Initial Call date: 9/3/24  Spoke with patient/caregiver: Patient                                                                     Do you need assistance  visits prior to your PCP visit: No  Home health care ordered: No  Have you been contacted by home care and have a start of care date: No  Are you taking medications as prescribed at discharge: Yes    Referral to [facility] Pharmacist: No  Patient advised to bring all medications to PCP follow-up appointment.  Patient advised to follow discharge instructions until provider follow-up.  TCM visit date: 9/11/24  TCM provider visit with: ANDREW Gordon MD

## 2024-09-05 DIAGNOSIS — E11.69 TYPE 2 DIABETES MELLITUS WITH OBESITY (MULTI): Primary | ICD-10-CM

## 2024-09-05 DIAGNOSIS — E66.9 TYPE 2 DIABETES MELLITUS WITH OBESITY (MULTI): Primary | ICD-10-CM

## 2024-09-10 DIAGNOSIS — E11.69 TYPE 2 DIABETES MELLITUS WITH OBESITY (MULTI): Primary | ICD-10-CM

## 2024-09-10 DIAGNOSIS — E66.9 TYPE 2 DIABETES MELLITUS WITH OBESITY (MULTI): Primary | ICD-10-CM

## 2024-09-11 ENCOUNTER — APPOINTMENT (OUTPATIENT)
Dept: PRIMARY CARE | Facility: CLINIC | Age: 54
End: 2024-09-11
Payer: COMMERCIAL

## 2024-09-11 VITALS
OXYGEN SATURATION: 99 % | SYSTOLIC BLOOD PRESSURE: 116 MMHG | TEMPERATURE: 96.4 F | HEART RATE: 88 BPM | WEIGHT: 267.2 LBS | DIASTOLIC BLOOD PRESSURE: 64 MMHG | BODY MASS INDEX: 38.34 KG/M2

## 2024-09-11 DIAGNOSIS — E66.01 CLASS 2 SEVERE OBESITY DUE TO EXCESS CALORIES WITH SERIOUS COMORBIDITY AND BODY MASS INDEX (BMI) OF 38.0 TO 38.9 IN ADULT (MULTI): ICD-10-CM

## 2024-09-11 DIAGNOSIS — Z79.4 TYPE 2 DIABETES MELLITUS WITH OTHER SPECIFIED COMPLICATION, WITH LONG-TERM CURRENT USE OF INSULIN (MULTI): ICD-10-CM

## 2024-09-11 DIAGNOSIS — I25.10 CORONARY ARTERY DISEASE INVOLVING NATIVE CORONARY ARTERY OF NATIVE HEART WITHOUT ANGINA PECTORIS: ICD-10-CM

## 2024-09-11 DIAGNOSIS — E11.69 TYPE 2 DIABETES MELLITUS WITH OTHER SPECIFIED COMPLICATION, WITH LONG-TERM CURRENT USE OF INSULIN (MULTI): ICD-10-CM

## 2024-09-11 DIAGNOSIS — K85.80 OTHER ACUTE PANCREATITIS WITHOUT INFECTION OR NECROSIS (HHS-HCC): Primary | ICD-10-CM

## 2024-09-11 PROCEDURE — 3074F SYST BP LT 130 MM HG: CPT | Performed by: INTERNAL MEDICINE

## 2024-09-11 PROCEDURE — 3052F HG A1C>EQUAL 8.0%<EQUAL 9.0%: CPT | Performed by: INTERNAL MEDICINE

## 2024-09-11 PROCEDURE — 99495 TRANSJ CARE MGMT MOD F2F 14D: CPT | Performed by: INTERNAL MEDICINE

## 2024-09-11 PROCEDURE — 1036F TOBACCO NON-USER: CPT | Performed by: INTERNAL MEDICINE

## 2024-09-11 PROCEDURE — 3078F DIAST BP <80 MM HG: CPT | Performed by: INTERNAL MEDICINE

## 2024-09-11 ASSESSMENT — ENCOUNTER SYMPTOMS
FATIGUE: 1
DIZZINESS: 1

## 2024-09-11 NOTE — PROGRESS NOTES
Subjective   Patient ID: Jono Quinn is a 54 y.o. male who presents for Hospital Follow-up (TCM - acute pancreatitis //Patient feels slower and cognitive loss //Patient feels like he should have more energy and he feels dizzy and he uses a cane and walker //Gets heart monitor tomorrow ), Fatigue, and Dizziness.  Fatigue  Associated symptoms include fatigue.   Dizziness  Associated symptoms include fatigue.       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  HBA1C 8  8-24  Endo following     GERD stable on diet     Depression  better on therapy no side effects  Psych following     Vitamin D deficiency on suplementation     Diet and exercise reviewed  Stop eating pizza and junk foods     Check labs before appt     Colonoscopy on follow up    Review of Systems   Constitutional:  Positive for fatigue.   Neurological:  Positive for dizziness.   All other systems reviewed and are negative.      Objective   /64   Pulse 88   Temp 35.8 °C (96.4 °F)   Wt 121 kg (267 lb 3.2 oz)   SpO2 99%   BMI 38.34 kg/m²   Lab Results   Component Value Date    WBC 8.6 09/01/2024    HGB 11.7 (L) 09/01/2024    HCT 35.0 (L) 09/01/2024     09/01/2024    CHOL 200 (H) 08/27/2024    TRIG 1,814 (H) 08/27/2024    HDL 17.2 08/27/2024    LDLDIRECT 32 03/05/2022    ALT 19 08/29/2024    AST 33 08/29/2024     (L) 09/01/2024    K 4.0 09/01/2024    CL 93 (L) 09/01/2024    CREATININE 0.81 09/01/2024    BUN 13 09/01/2024    CO2 22 09/01/2024    TSH 1.21 07/22/2023    INR 0.9 08/27/2024     HGBA1C 8.0 (H) 08/27/2024           Physical Exam  Vitals reviewed.   Constitutional:       Appearance: Normal appearance. He is obese.   HENT:      Head: Normocephalic and atraumatic.      Mouth/Throat:      Pharynx: No posterior oropharyngeal erythema.   Eyes:      General: No scleral icterus.     Conjunctiva/sclera: Conjunctivae normal.      Pupils: Pupils are equal, round, and reactive to light.   Cardiovascular:      Rate and Rhythm: Normal rate and regular rhythm.      Heart sounds: Normal heart sounds.   Pulmonary:      Effort: No respiratory distress.      Breath sounds: No wheezing.   Abdominal:      General: Abdomen is flat. Bowel sounds are normal. There is no distension.      Palpations: Abdomen is soft. There is no mass.      Tenderness: There is no abdominal tenderness. There is no rebound.   Musculoskeletal:         General: Normal range of motion.      Cervical back: Normal range of motion and neck supple.      Comments: cane   Skin:     General: Skin is warm and dry.   Neurological:      General: No focal deficit present.      Mental Status: He is alert and oriented to person, place, and time. Mental status is at baseline.   Psychiatric:         Mood and Affect: Mood normal.         Behavior: Behavior normal.         Thought Content: Thought content normal.         Judgment: Judgment normal.         Problem List Items Addressed This Visit             ICD-10-CM    Acute pancreatitis ([facility]) - Primary K85.90    Coronary artery disease involving native coronary artery of native heart without angina pectoris I25.10     Other Visit Diagnoses         Codes    Type 2 diabetes mellitus with other specified complication, with long-term current use of insulin (Multi)     E11.69, Z79.4    Class 2 severe obesity due to excess calories with serious comorbidity and body mass index (BMI) of 38.0 to 38.9 in adult (Multi)     E66.01, Z68.38          Assessment/Plan     CM    Sister / Marielle present H&P    Dx acute  pancreatitis 3rd bout     Residual epigastric pain     No diarrhea or fever     Eating better, bowels normalizing    Rec's rev'd     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  HBA1C 8  8-24  Endo following     GERD stable on diet     Depression  better on therapy no side effects  Psych following     Vitamin D deficiency on suplementation     Diet and exercise reviewed  Stop eating pizza and junk foods     Check labs before appt     Colonoscopy on follow up    Prostate 8-22  Colonoscopy none  CT chest lung cancer screening n/a  immunizations rev'd flu  BMI 38    Follow up as scheduled

## 2024-09-12 DIAGNOSIS — R53.81 PHYSICAL DECONDITIONING: ICD-10-CM

## 2024-09-12 DIAGNOSIS — R53.1 WEAKNESS: ICD-10-CM

## 2024-09-16 DIAGNOSIS — E11.311 DIABETIC RETINOPATHY OF BOTH EYES WITH MACULAR EDEMA ASSOCIATED WITH TYPE 2 DIABETES MELLITUS, UNSPECIFIED RETINOPATHY SEVERITY: ICD-10-CM

## 2024-09-20 ENCOUNTER — TELEMEDICINE (OUTPATIENT)
Dept: PHARMACY | Facility: HOSPITAL | Age: 54
End: 2024-09-20
Payer: COMMERCIAL

## 2024-09-20 DIAGNOSIS — E66.9 TYPE 2 DIABETES MELLITUS WITH OBESITY (MULTI): ICD-10-CM

## 2024-09-20 DIAGNOSIS — E11.69 TYPE 2 DIABETES MELLITUS WITH OBESITY (MULTI): ICD-10-CM

## 2024-09-20 NOTE — PROGRESS NOTES
Pharmacy Post-Discharge Visit  Jono Quinn is a 54 y.o. male who was referred to the Clinical Pharmacy Team to complete a post-discharge medication optimization and monitoring visit.  The patient was referred for their Diabetes.    Recent Hospitalization  Admission Date: 8/29/24  Discharge Date: 9/1/24  Discharge Diagnosis: Acute pancreatitis     Admission Date: 8/27/24  Discharge Date: 9/1/24  Discharge Diagnosis: Syncope and collapse    Referring Provider: Dr. Trudy Gordno    Preferred Pharmacy  [facility] - [address]  [address] [facility] [address]  Phone: [phone] Fax: [phone]    [facility] - [place], [place] - [address]  [address]  [facility] [address]  Phone: [phone] Fax: [phone]    Allergies   Allergen Reactions    Fluticasone Propionate Swelling    Liraglutide Other     Episode of acute panc while on this, though other possible causes at this time as well    Lisinopril Unknown    Other Hives     Flowers and perfumes    Pollen Extracts Itching       Notable Medication changes following discharge:  Start:   Ketorolac 10 mg PRN (completed)  Icosapent Ethyl 1 g, 2 caps BID  Change:   N/A  Hold:  N/A  Stop:   Albuterol HFA  Insulin aspart (?)    Lost 30 lbs in the past month, possibly more    DIABETES ASSESSMENT Patient currently following with Dr. Marquez for diabetes management  Patient has been diagnosed with diabetes for 15-20 years, pre-diabetic ~25 years    Current Diabetes Medication Regimen    Jardiance 25 mg daily  Fiasp 40 units + 4 units for every 50 >150, max 60 units/administration  Metformin 1000 mg Q12  Toujeo U-100, 110 units QPM    Secondary Prevention  Statin? Yes, describe: rosuvastatin 40 mg daily  ACE-I/ARB? No  Aspirin? Yes    Pertinent PMH Review:  PMH of Pancreatitis: Yes, describe: 8/29/24  PMH of Retinopathy: Yes, describe: yes- confirms diabetic retinopathy  PMH/FH of Medullary  "Thyroid Carcinoma or Multiple Endocrine Neoplasia Type 2: N/A  PMH of Urinary Tract Infections: No    Health Maintenance:   Foot Exam: Once every visit with Dr. Marquez  Eye Exam: Once a year  Lipid Panel: LDL unable to calculate, triglycerides 1814 mg/dL as of 8/27/24  Urine Albumin/Creatinine Ratio: Currently ordered, however no results    DIET: Patient states \"before the hospital, bad,\" \"after the hospital, much better.\"  EXERCISE: Sedentary most day behind computer, may be pursuing physical therapy in the future    Current monitoring regimen:   Patient is using: continuous glucose monitor    Patient-Reported Blood Glucose:  In general, patient states 150s mg/dL    Historical Pharmacotherapy  Glipizide- ineffective      Laboratory Results  Lab Results   Component Value Date    BILITOT 0.7 08/29/2024    CALCIUM 9.0 09/01/2024    CO2 22 09/01/2024    CL 93 (L) 09/01/2024    CREATININE 0.81 09/01/2024    GLUCOSE 141 (H) 09/01/2024    ALKPHOS 71 08/29/2024    K 4.0 09/01/2024    PROT 7.3 08/29/2024     (L) 09/01/2024    AST 33 08/29/2024    ALT 19 08/29/2024    BUN 13 09/01/2024    ANIONGAP 19 09/01/2024    MG 2.35 08/30/2024    ALBUMIN 4.1 08/29/2024    AMYLASE 101 04/30/2021    LIPASE 309 (H) 08/31/2024    GFRMALE >90 08/09/2023    EGFR >90 09/01/2024     Lab Results   Component Value Date    TRIG 1,814 (H) 08/27/2024    CHOL 200 (H) 08/27/2024    HDL 17.2 08/27/2024     Lab Results   Component Value Date    HGBA1C 8.0 (H) 08/27/2024         Assessment/Plan   Problem List Items Addressed This Visit       Type 2 diabetes mellitus with obesity (Multi)     General Patient Discussion  DM  Managed by Dr. Marquez- discussed with patient, voices understanding that Clinical Pharmacy is unable to make any medication dose adjustments  Per patient, is really struggling with the 'diet' portion of managing diabetes.   Patient is looking for a 'hands-on' diabetes cooking class to tangibly inform on ways to prepare " diabetes-friendly meals. Will consult endocrinology pharmacist for suggestions; int the meantime, recommended online cooking classes with the [employer] (see patient message 9/20/24).    Plan/Changes   Continue all meds under the continuation of care with the referring provider and clinical pharmacy team  Specialists: Patient currently sees outpatient endocrinology.    Next PCP Follow-Up  11/12/24    Next Clinical Pharmacy Follow-Up  TBD, based on information request from endocrinology pharmacy      Tiffanie Haque, Kwame     Verbal consent to manage patient's drug therapy was obtained from the patient. They were informed they may decline to participate or withdraw from participation in pharmacy services at any time.

## 2024-09-23 ENCOUNTER — EVALUATION (OUTPATIENT)
Dept: PHYSICAL THERAPY | Facility: HOSPITAL | Age: 54
End: 2024-09-23
Payer: COMMERCIAL

## 2024-09-23 DIAGNOSIS — R53.1 WEAKNESS: ICD-10-CM

## 2024-09-23 DIAGNOSIS — R53.81 PHYSICAL DECONDITIONING: Primary | ICD-10-CM

## 2024-09-23 PROCEDURE — 97110 THERAPEUTIC EXERCISES: CPT | Mod: GP | Performed by: PHYSICAL THERAPIST

## 2024-09-23 PROCEDURE — 97161 PT EVAL LOW COMPLEX 20 MIN: CPT | Mod: GP | Performed by: PHYSICAL THERAPIST

## 2024-09-23 ASSESSMENT — ENCOUNTER SYMPTOMS
OCCASIONAL FEELINGS OF UNSTEADINESS: 1
DEPRESSION: 1
LOSS OF SENSATION IN FEET: 1

## 2024-09-23 ASSESSMENT — PAIN SCALES - GENERAL: PAINLEVEL_OUTOF10: 4

## 2024-09-23 ASSESSMENT — PAIN - FUNCTIONAL ASSESSMENT: PAIN_FUNCTIONAL_ASSESSMENT: 0-10

## 2024-09-23 NOTE — PROGRESS NOTES
Physical Therapy  Physical Therapy Orthopedic Evaluation and Treatment    Patient Name: Jono Quinn  MRN: [id number]  Today's Date: 9/23/2024  Time Calculation  Start Time: 1348  Stop Time: 1427  Time Calculation (min): 39 min    PT Evaluation Time Entry  PT Evaluation (Low) Time Entry: 30  PT Therapeutic Procedures Time Entry  Therapeutic Exercise Time Entry: 9        Insurance:  Visit number: 1 of 8  Authorization info: no auth required  Payor: AETNA / Plan: AET[employer]  HEALTHCARE / Product Type: *No Product type* /     Current Problem  Problem List Items Addressed This Visit             ICD-10-CM    Physical deconditioning - Primary R53.81    Relevant Orders    Follow Up In Physical Therapy    Weakness R53.1    Relevant Orders    Follow Up In Physical Therapy     1. Physical deconditioning  Referral to Physical Therapy    Follow Up In Physical Therapy      2. Weakness  Referral to Physical Therapy    Follow Up In Physical Therapy          General:  General  Reason for Referral: physical deconditioning  Referred By: Dr. KRISTIAN Gordon      Precautions:   Precautions  STEADI Fall Risk Score (The score of 4 or more indicates an increased risk of falling): 12  Medical Precautions: Fall precautions    Medical History Form: Reviewed (scanned into chart)    Subjective:   Subjective   Chief Complaint: Patient is a 54 year old male who presents to clinic with generalized weakness. Patient has a prior medical history including: PAD, PAD, DM with neuropathy, obesity, A[place], depression, anxiety, Bell's Palsy.  Onset Date: 9/12/2024  BHAVANA: Chronic    Current Condition:   Same    Pain:  Pain Assessment: 0-10  0-10 (Numeric) Pain Score: 4  Pain Location: Back  Pain Orientation: Mid  Highest: 6/10 pain  Lowest: 2/10 pain  Aggravating Factors:  anything but lying down  Relieving Factors: lying down    Relevant Information (PMH & Previous Tests/Imaging): none  Previous Interventions/Treatments: None    Prior Level of Function  (PLOF)  Patient previously independent with all ADLs  Exercise/Physical Activity: none  Work/School: full time  Hobbies: not stated    Patients Living Environment: Reviewed and no concern    Primary Language: English    Patient's Goal(s) for Therapy: To improve his lower extremity strength and balance.    Red Flags: Do you have any of the following? No  Fever/chills, unexplained weight changes, dizziness/fainting, unexplained change in bowel or bladder functions, unexplained malaise or muscle weakness, night pain/sweats, numbness or tingling    Objective:  Objective       Hip PROM  R hip flexion: (125°): min restriction  L hip flexion: (125°): min restriction  R hip abduction: (45°): WFL  L hip abduction: (45°): WFL  R hip ER: (45°): WFL  L hip ER: (45°): WFL  R hip IR: (45°): WFL  L hip IR: (45°): WFL  Specific Lower Extremity MMT  R Iliopsoas: (5/5): 4+/5  L Iliopsoas: (5/5): 5/5  R Gluteals (sidelying): (5/5): 4/5  L Gluteals (sidelying): (5/5): 4/5    Flexibility  R hamstrings: min restriction  L hamstrings: min restriction    Knee AROM  Knee AROM WFL: yes    Knee MMT  R knee flexion: (5/5): 5/5  L knee flexion: (5/5): 5/5  R knee extension: (5/5): 5/5  L knee extension: (5/5): 5/5    Ankle MMT  R ankle dorsiflexion: (5/5): 5/5  L ankle dorsiflexion: (5/5): 5/5        Functional Screening    Lower Extremity Functional Movements  Transfers: Modified Independent  Gait: wide-based  Assistive Device: cane    Balance  Feet Together: 30 seconds  Tandem: 10 seconds bilateral lower extremities       Special Tests    Response to repeated L/S movements for directional preference: -  Leg Length Discrepancy: -  LELO Test: -    Treatment Performed:  Therapeutic Exercise  Therapeutic Exercise Performed: Yes  Therapeutic Exercise Activity 1: bridge x5  Therapeutic Exercise Activity 2: s/l hip abd x5  Therapeutic Exercise Activity 3: LTR x5  Therapeutic Exercise Activity 4: h/l hip add iso x5    Outcome Measures:  Other  Measures  5x Sit to Stand: 26.29 seconds  Other Outcome Measures: AM-PAC short form=37     EDUCATION:   Individual(s) Educated: patient   Education Provided: Home exercise program, plan of care, activity modifications, pain management, and injury pathology  Handout(s) Provided: Scanned into chart  Home Program: See below treatment  Risk and Benefits Discussed with Patient/Caregiver/Other: Yes   Patient/Caregiver Demonstrated Understanding: Yes   Plan of Care Discussed and Agreed Upon: Yes   Patient Response to Education: Patient/Caregiver verbalized understanding of information and Patient/Caregiver performed return demonstration of exercises/activities    Assessment: Patient presents with generalized weakness and deconditioning resulting in limited participation in difficulty performing their prior level of function. Patient demonstrated need for single point cane for all mobility. Patient demonstrated impaired balance, strength, and range of motion. Skilled PT warranted to address the above stated impairments, so the patient can perform FA's without increased pain or difficulty.    PT Assessment Results: Decreased strength, Decreased range of motion, Impaired balance, Decreased mobility, Obesity, Pain  Rehab Prognosis: Good  Evaluation/Treatment Tolerance: Patient tolerated treatment well    Complexity: low    Plan:  Treatment/Interventions: Education/ Instruction, Gait training, Manual therapy, Neuromuscular re-education, Therapeutic activities, Therapeutic exercises  PT Plan: Skilled PT  PT Frequency: 2 times per week  Duration: 4 weeks  Onset Date: 09/12/24  Certification Period Start Date: 09/24/24  Certification Period End Date: 10/22/24  Number of Treatments Authorized: 1 of 8  Rehab Potential: Good  Plan of Care Agreement: Patient    Goals: Set and discussed today  Active       PT Problem       Patient will demonstrate improved 5 Times Sit to Stand to <15 seconds.       Start:  09/23/24    Expected End:   10/21/24            Patient will maintain single leg stand on each leg for at least 10 sec with no upper extremity support.       Start:  09/23/24    Expected End:  10/21/24            Patient will ambulate with normal gait pattern with no device community distances.       Start:  09/23/24    Expected End:  10/21/24            Patient will ascend and descend 4-6 steps with rail modified independent and reciprocal pattern       Start:  09/23/24    Expected End:  10/21/24            Patient will achieve bilateral hip flexion strength of at least 4+/5       Start:  09/23/24    Expected End:  10/21/24            Patient will achieve bilateral hip abduction strength of at least 4+/5       Start:  09/23/24    Expected End:  10/21/24            Patient will demonstrate independence in home program for support of progression       Start:  09/23/24    Expected End:  10/21/24            Patient will show a significant change in AM-PAC (37 to 47) patient reported outcome tool to demonstrate subjective imporovement       Start:  09/23/24    Expected End:  10/21/24                Plan of care was developed with input and agreement by the patient    Ambulatory Screenings Summary       Screening  Frequency  Date Last Completed   Spiritual and Cultural Beliefs   Screening each visit or episode of care    Falls Risk Screening every ambulatory visit 9/24/2024    Pain Screening annually at primary care visit  10/4/2022   Domestic Violence screening annually at primary care visit    Depression Screening annually in the primary care setting 8/29/2024   Suicide Risk Screening annually in the primary care setting 8/29/2024   Nutrition and Food Insecurity   Screening at least annually at primary care visit     Key Learner annually in the primary care setting    Drug Screen  9/11/2024  1:42 PM   Alcohol Screen  9/11/2024  1:42 PM   Advance Directive         Favian Ash, PT

## 2024-09-30 ENCOUNTER — TREATMENT (OUTPATIENT)
Dept: PHYSICAL THERAPY | Facility: HOSPITAL | Age: 54
End: 2024-09-30
Payer: COMMERCIAL

## 2024-09-30 DIAGNOSIS — R53.81 PHYSICAL DECONDITIONING: Primary | ICD-10-CM

## 2024-09-30 DIAGNOSIS — E11.9 TYPE 2 DIABETES MELLITUS WITHOUT COMPLICATION, WITH LONG-TERM CURRENT USE OF INSULIN (MULTI): ICD-10-CM

## 2024-09-30 DIAGNOSIS — F41.9 ANXIETY DISORDER, UNSPECIFIED TYPE: ICD-10-CM

## 2024-09-30 DIAGNOSIS — E78.1 HYPERTRIGLYCERIDEMIA: ICD-10-CM

## 2024-09-30 DIAGNOSIS — I73.9 PAD (PERIPHERAL ARTERY DISEASE) (CMS-HCC): ICD-10-CM

## 2024-09-30 DIAGNOSIS — E11.9 TYPE 2 DIABETES MELLITUS WITHOUT COMPLICATION, WITH LONG-TERM CURRENT USE OF INSULIN (MULTI): Primary | ICD-10-CM

## 2024-09-30 DIAGNOSIS — Z79.4 TYPE 2 DIABETES MELLITUS WITHOUT COMPLICATION, WITH LONG-TERM CURRENT USE OF INSULIN (MULTI): ICD-10-CM

## 2024-09-30 DIAGNOSIS — Z79.4 TYPE 2 DIABETES MELLITUS WITHOUT COMPLICATION, WITH LONG-TERM CURRENT USE OF INSULIN (MULTI): Primary | ICD-10-CM

## 2024-09-30 DIAGNOSIS — E11.311 DIABETIC RETINOPATHY OF BOTH EYES WITH MACULAR EDEMA ASSOCIATED WITH TYPE 2 DIABETES MELLITUS, UNSPECIFIED RETINOPATHY SEVERITY: ICD-10-CM

## 2024-09-30 DIAGNOSIS — E78.00 HYPERCHOLESTEREMIA: ICD-10-CM

## 2024-09-30 DIAGNOSIS — R53.1 WEAKNESS: ICD-10-CM

## 2024-09-30 DIAGNOSIS — F32.A DEPRESSION, UNSPECIFIED DEPRESSION TYPE: ICD-10-CM

## 2024-09-30 DIAGNOSIS — I25.10 CORONARY ARTERY DISEASE INVOLVING NATIVE CORONARY ARTERY OF NATIVE HEART WITHOUT ANGINA PECTORIS: ICD-10-CM

## 2024-09-30 PROCEDURE — 97110 THERAPEUTIC EXERCISES: CPT | Mod: GP,CQ

## 2024-09-30 RX ORDER — GABAPENTIN 300 MG/1
900 CAPSULE ORAL 3 TIMES DAILY
Qty: 270 CAPSULE | Refills: 2 | Status: SHIPPED | OUTPATIENT
Start: 2024-09-30

## 2024-09-30 RX ORDER — INSULIN ASPART INJECTION 100 [IU]/ML
40-60 INJECTION, SOLUTION SUBCUTANEOUS
Qty: 150 ML | Refills: 3 | Status: SHIPPED | OUTPATIENT
Start: 2024-09-30 | End: 2025-09-30

## 2024-09-30 RX ORDER — PEN NEEDLE, DIABETIC 31 GX5/16"
NEEDLE, DISPOSABLE MISCELLANEOUS
Qty: 400 EACH | Refills: 3 | Status: SHIPPED | OUTPATIENT
Start: 2024-09-30

## 2024-09-30 RX ORDER — ICOSAPENT ETHYL 1 G/1
2 CAPSULE ORAL
Qty: 360 CAPSULE | Refills: 0 | Status: SHIPPED | OUTPATIENT
Start: 2024-09-30 | End: 2024-11-29

## 2024-09-30 RX ORDER — AMITRIPTYLINE HYDROCHLORIDE 10 MG/1
10 TABLET, FILM COATED ORAL NIGHTLY
Qty: 90 TABLET | Refills: 0 | Status: SHIPPED | OUTPATIENT
Start: 2024-09-30 | End: 2024-12-29

## 2024-09-30 RX ORDER — FENOFIBRATE 200 MG/1
200 CAPSULE ORAL DAILY
Qty: 90 CAPSULE | Refills: 0 | Status: SHIPPED | OUTPATIENT
Start: 2024-09-30

## 2024-09-30 RX ORDER — INSULIN GLARGINE 300 U/ML
110 INJECTION, SOLUTION SUBCUTANEOUS NIGHTLY
Qty: 36 ML | Refills: 3 | Status: SHIPPED | OUTPATIENT
Start: 2024-09-30

## 2024-09-30 RX ORDER — ROSUVASTATIN CALCIUM 40 MG/1
40 TABLET, COATED ORAL DAILY
Qty: 90 TABLET | Refills: 0 | Status: SHIPPED | OUTPATIENT
Start: 2024-09-30

## 2024-09-30 ASSESSMENT — PAIN - FUNCTIONAL ASSESSMENT: PAIN_FUNCTIONAL_ASSESSMENT: 0-10

## 2024-09-30 ASSESSMENT — PAIN SCALES - GENERAL: PAINLEVEL_OUTOF10: 3

## 2024-09-30 NOTE — PROGRESS NOTES
Physical Therapy Treatment    Patient Name: Jono Quinn  MRN: [id number]  Today's Date: 9/30/2024  Time Calculation  Start Time: 1739  Stop Time: 1820  Time Calculation (min): 41 min        PT Therapeutic Procedures Time Entry  Therapeutic Exercise Time Entry: 41                Current Problem  1. Physical deconditioning        2. Weakness            General  Reason for Referral: physical deconditioning  Referred By: Dr. KRISTIAN Gordon    Subjective   Current Condition:   Same  Patient reports some soreness after last visit, and compliance with HEP, but soreness has subsided.    Performing HEP?: Yes    Precautions  Precautions  STEADI Fall Risk Score (The score of 4 or more indicates an increased risk of falling): 12  Medical Precautions: Fall precautions    Pain  Pain Assessment: 0-10  0-10 (Numeric) Pain Score: 3  Pain Location: Knee  Pain Orientation: Left, Outer    Objective   Pt. Required rest breaks due to deconditioning, and presented with tight trunk and LE muscles , but was able to tolerate some stretching.    Treatments:    Therapeutic Exercise  Therapeutic Exercise Performed: Yes  Therapeutic Exercise Activity 1: bridge x5  Therapeutic Exercise Activity 2: standing hip abd with 2# x 10 ea.  Therapeutic Exercise Activity 3: LTR x10  Therapeutic Exercise Activity 4: h/l hip add iso x5  Therapeutic Exercise Activity 5: standing heel raises x 15  Therapeutic Exercise Activity 6: gastroc stretch on wedge 20 sec. x 2  Therapeutic Exercise Activity 7: seated stepper lv. 4 x 6 min.  Therapeutic Exercise Activity 8: seated LAQ x 10 ea. with 2#  Therapeutic Exercise Activity 9: seated HS curls x 10 ea. red.  Therapeutic Exercise Activity 10: STS x 5    EDUCATION:   Outpatient Education  Individual(s) Educated: Patient  Education Provided: Home Exercise Program  Patient/Caregiver Demonstrated Understanding: yes    Assessment: Pt. Able to tolerate light reps with rest breaks when fatigued, but was able to complete an  increased  number of strengthening exercises today. Pt did report soreness after last visit, but was feeling better today.  PT Assessment  PT Assessment Results: Decreased strength, Decreased range of motion, Impaired balance, Decreased mobility, Obesity, Pain  Rehab Prognosis: Good  Evaluation/Treatment Tolerance: Patient tolerated treatment well, Patient limited by fatigue    Plan:will continue PT POC with focus on strengthening of weak Les and core, stretching of tight muscles, and balance activities as tolerated to improve overall conditioning and decrease fall risk.  OP PT Plan  Treatment/Interventions: Education/ Instruction  PT Plan: Skilled PT  PT Frequency: 2 times per week  Duration: 4 weeks  Onset Date: 09/12/24  Certification Period Start Date: 09/24/24  Certification Period End Date: 10/22/24  Number of Treatments Authorized: 2 of 8  Rehab Potential: Good  Plan of Care Agreement: Patient    Goals:  Active       PT Problem       Patient will demonstrate improved 5 Times Sit to Stand to <15 seconds.       Start:  09/23/24    Expected End:  10/21/24            Patient will maintain single leg stand on each leg for at least 10 sec with no upper extremity support.       Start:  09/23/24    Expected End:  10/21/24            Patient will ambulate with normal gait pattern with no device community distances.       Start:  09/23/24    Expected End:  10/21/24            Patient will ascend and descend 4-6 steps with rail modified independent and reciprocal pattern       Start:  09/23/24    Expected End:  10/21/24            Patient will achieve bilateral hip flexion strength of at least 4+/5       Start:  09/23/24    Expected End:  10/21/24            Patient will achieve bilateral hip abduction strength of at least 4+/5       Start:  09/23/24    Expected End:  10/21/24            Patient will demonstrate independence in home program for support of progression       Start:  09/23/24    Expected End:  10/21/24             Patient will show a significant change in AM-PAC (37 to 47) patient reported outcome tool to demonstrate subjective imporovement       Start:  09/23/24    Expected End:  10/21/24                 Flor Wellington PTA

## 2024-10-02 ENCOUNTER — TREATMENT (OUTPATIENT)
Dept: PHYSICAL THERAPY | Facility: HOSPITAL | Age: 54
End: 2024-10-02
Payer: COMMERCIAL

## 2024-10-02 DIAGNOSIS — R53.1 WEAKNESS: ICD-10-CM

## 2024-10-02 DIAGNOSIS — E11.311 DIABETIC RETINOPATHY OF BOTH EYES WITH MACULAR EDEMA ASSOCIATED WITH TYPE 2 DIABETES MELLITUS, UNSPECIFIED RETINOPATHY SEVERITY: ICD-10-CM

## 2024-10-02 DIAGNOSIS — R53.81 PHYSICAL DECONDITIONING: Primary | ICD-10-CM

## 2024-10-02 PROCEDURE — 97110 THERAPEUTIC EXERCISES: CPT | Mod: GP,CQ

## 2024-10-02 ASSESSMENT — PAIN SCALES - GENERAL: PAINLEVEL_OUTOF10: 0 - NO PAIN

## 2024-10-02 ASSESSMENT — PAIN - FUNCTIONAL ASSESSMENT: PAIN_FUNCTIONAL_ASSESSMENT: 0-10

## 2024-10-02 NOTE — PROGRESS NOTES
"  Physical Therapy Treatment    Patient Name: Jono Quinn  MRN: [id number]  Today's Date: 10/2/2024  Time Calculation  Start Time: 1646  Stop Time: 1726  Time Calculation (min): 40 min        PT Therapeutic Procedures Time Entry  Neuromuscular Re-Education Time Entry: 4  Therapeutic Exercise Time Entry: 36                Current Problem  1. Physical deconditioning        2. Weakness            General  Reason for Referral: physical deconditioning  Referred By: Dr. KRISTIAN Gordon    Subjective   Current Condition:   Same  Patient reports     Performing HEP?: Yes    Precautions  Precautions  Medical Precautions: Fall precautions  Pain  Pain Assessment: 0-10  0-10 (Numeric) Pain Score: 0 - No pain    Objective         Treatments:    Therapeutic Exercise  Therapeutic Exercise Performed: Yes  Therapeutic Exercise Activity 1: bridge with ADD  x10  Therapeutic Exercise Activity 2: standing hip abd with 2# x 10 ea.  Therapeutic Exercise Activity 4: H/L Hip ABD Iso Black x10 R/L  Therapeutic Exercise Activity 5: standing heel raises x 15  Therapeutic Exercise Activity 6: gastroc stretch on wedge 60\"  Therapeutic Exercise Activity 7: seated stepper lv. 4 x 5 min.  Therapeutic Exercise Activity 8: seated LAQ x 10 ea. with 2#  Therapeutic Exercise Activity 9: seated HS curls x 10 ea. Green  Therapeutic Exercise Activity 10: STS x 5  Therapeutic Exercise Activity 11: Standing Hip Extension x10 R/L    Balance/Neuromuscular Re-Education  Balance/Neuromuscular Re-Education Activity Performed: Yes  Balance/Neuromuscular Re-Education Activity 1: 3/4 tandem 30\" R/L  Balance/Neuromuscular Re-Education Activity 2: SLS with opposite toe down 30\" R/L                        EDUCATION:   Individual(s) Educated: Patient  Education Provided:   Risk and Benefits Discussed with Patient/Caregiver/Other: Yes   Patient/Caregiver Demonstrated Understanding: Yes   Patient Response to Education: Patient/Caregiver verbalized understanding of " information    Assessment: Pt was able to complete session without c/o pain. Pt demonstrated min/mod ankle strategy with balance activities, however, able to complete without LOB.   PT Assessment  PT Assessment Results: Decreased strength, Decreased range of motion, Impaired balance, Decreased mobility, Obesity, Pain  Rehab Prognosis: Good    Plan: Continue to progress current POC as tolerated to facilitate ability to perform functional activities.   OP PT Plan  Treatment/Interventions: Education/ Instruction  PT Plan: Skilled PT  PT Frequency: 2 times per week  Duration: 4 weeks  Onset Date: 09/12/24  Certification Period Start Date: 09/24/24  Certification Period End Date: 10/22/24  Number of Treatments Authorized: 3 of 8  Rehab Potential: Good  Plan of Care Agreement: Patient    Goals:  Active       PT Problem       Patient will demonstrate improved 5 Times Sit to Stand to <15 seconds.       Start:  09/23/24    Expected End:  10/21/24            Patient will maintain single leg stand on each leg for at least 10 sec with no upper extremity support.       Start:  09/23/24    Expected End:  10/21/24            Patient will ambulate with normal gait pattern with no device community distances.       Start:  09/23/24    Expected End:  10/21/24            Patient will ascend and descend 4-6 steps with rail modified independent and reciprocal pattern       Start:  09/23/24    Expected End:  10/21/24            Patient will achieve bilateral hip flexion strength of at least 4+/5       Start:  09/23/24    Expected End:  10/21/24            Patient will achieve bilateral hip abduction strength of at least 4+/5       Start:  09/23/24    Expected End:  10/21/24            Patient will demonstrate independence in home program for support of progression       Start:  09/23/24    Expected End:  10/21/24            Patient will show a significant change in AM-PAC (37 to 47) patient reported outcome tool to demonstrate subjective  imporovement       Start:  09/23/24    Expected End:  10/21/24                 Alexys Yanes, PTA

## 2024-10-03 RX ORDER — METFORMIN HYDROCHLORIDE 1000 MG/1
TABLET ORAL
Qty: 60 TABLET | Refills: 1 | Status: SHIPPED | OUTPATIENT
Start: 2024-10-03

## 2024-10-07 ENCOUNTER — APPOINTMENT (OUTPATIENT)
Dept: PHYSICAL THERAPY | Facility: HOSPITAL | Age: 54
End: 2024-10-07
Payer: COMMERCIAL

## 2024-10-09 ENCOUNTER — TREATMENT (OUTPATIENT)
Dept: PHYSICAL THERAPY | Facility: HOSPITAL | Age: 54
End: 2024-10-09
Payer: COMMERCIAL

## 2024-10-09 DIAGNOSIS — R53.1 WEAKNESS: ICD-10-CM

## 2024-10-09 DIAGNOSIS — R53.81 PHYSICAL DECONDITIONING: Primary | ICD-10-CM

## 2024-10-09 PROCEDURE — 97112 NEUROMUSCULAR REEDUCATION: CPT | Mod: GP,CQ

## 2024-10-09 PROCEDURE — 97110 THERAPEUTIC EXERCISES: CPT | Mod: GP,CQ

## 2024-10-09 ASSESSMENT — PAIN - FUNCTIONAL ASSESSMENT: PAIN_FUNCTIONAL_ASSESSMENT: 0-10

## 2024-10-09 ASSESSMENT — PAIN SCALES - GENERAL: PAINLEVEL_OUTOF10: 0 - NO PAIN

## 2024-10-09 NOTE — PROGRESS NOTES
Nursing Transfer Note      11/1/2018     Transfer To: [facility], [id number]    Transfer via bed    Transfer with cardiac monitoring    Transported by RN x 2    Medicines sent: N/A    Chart send with patient: Yes    Notified: spouse    Patient reassessed at: 1750, 11/1/18     Upon arrival to floor: cardiac monitor applied, patient oriented to room, call bell in reach and bed in lowest position   "  Physical Therapy Treatment    Patient Name: Jono Quinn  MRN: [id number]  Today's Date: 10/9/2024  Time Calculation  Start Time: 1648  Stop Time: 1728  Time Calculation (min): 40 min        PT Therapeutic Procedures Time Entry  Neuromuscular Re-Education Time Entry: 8  Therapeutic Exercise Time Entry: 32                Current Problem  1. Physical deconditioning        2. Weakness            General  Reason for Referral: physical deconditioning  Referred By: Dr. KRISTIAN Gordon    Subjective   Current Condition:   Better  Patient reports he has difficulty going up and down stairs multiple times to do laundry.     Performing HEP?: Yes    Precautions  Precautions  Medical Precautions: Fall precautions  Pain  Pain Assessment: 0-10  0-10 (Numeric) Pain Score: 0 - No pain    Objective         Treatments:    Therapeutic Exercise  Therapeutic Exercise Performed: Yes  Therapeutic Exercise Activity 1: bridge with ADD  x10  Therapeutic Exercise Activity 2: standing hip abd with 2# x 10 ea.  Therapeutic Exercise Activity 3: Step ups 4\" x10 R/L  Therapeutic Exercise Activity 4: H/L Hip ABD Iso Black x10 R/L  Therapeutic Exercise Activity 5: standing heel raises x 15  Therapeutic Exercise Activity 6: gastroc stretch on wedge 60\"  Therapeutic Exercise Activity 7: Nu Step level 4 x 5 min.  Therapeutic Exercise Activity 8: seated LAQ x 10 ea. with 3#  Therapeutic Exercise Activity 9: HS curls 45#x 20 ea.  Therapeutic Exercise Activity 10: STS 23\" x 10  Therapeutic Exercise Activity 11: Standing Hip Extension 2# x10 R/L    Balance/Neuromuscular Re-Education  Balance/Neuromuscular Re-Education Activity Performed: Yes  Balance/Neuromuscular Re-Education Activity 1: Airex 3/4 tandem 30\" R/L `  Balance/Neuromuscular Re-Education Activity 2: Airex look over shoulder 30\"  Balance/Neuromuscular Re-Education Activity 3: Airex chops 4# MB x10 R/L                        EDUCATION:   Individual(s) Educated: Patient  Education Provided:   Risk and " Benefits Discussed with Patient/Caregiver/Other: Yes   Patient/Caregiver Demonstrated Understanding: Yes   Patient Response to Education: Patient/Caregiver verbalized understanding of information    Assessment: Pt reported feeling apprehensive with looking over the shoulder, however, was able to perform without LOB. Pt was able to tolerate progression of PRE's without c/o increased pain or fatigue.   PT Assessment  PT Assessment Results: Decreased strength, Decreased range of motion, Impaired balance, Decreased mobility, Obesity, Pain  Rehab Prognosis: Good    Plan:Continue to progress current POC as tolerated to facilitate ability to perform functional activities.   OP PT Plan  Treatment/Interventions: Education/ Instruction  PT Plan: Skilled PT  PT Frequency: 2 times per week  Duration: 4 weeks  Onset Date: 09/12/24  Certification Period Start Date: 09/24/24  Certification Period End Date: 10/22/24  Number of Treatments Authorized: 3 of 8  Rehab Potential: Good  Plan of Care Agreement: Patient    Goals:  Active       PT Problem       Patient will demonstrate improved 5 Times Sit to Stand to <15 seconds.       Start:  09/23/24    Expected End:  10/21/24            Patient will maintain single leg stand on each leg for at least 10 sec with no upper extremity support.       Start:  09/23/24    Expected End:  10/21/24            Patient will ambulate with normal gait pattern with no device community distances.       Start:  09/23/24    Expected End:  10/21/24            Patient will ascend and descend 4-6 steps with rail modified independent and reciprocal pattern       Start:  09/23/24    Expected End:  10/21/24            Patient will achieve bilateral hip flexion strength of at least 4+/5       Start:  09/23/24    Expected End:  10/21/24            Patient will achieve bilateral hip abduction strength of at least 4+/5       Start:  09/23/24    Expected End:  10/21/24            Patient will demonstrate independence  in home program for support of progression       Start:  09/23/24    Expected End:  10/21/24            Patient will show a significant change in AM-PAC (37 to 47) patient reported outcome tool to demonstrate subjective imporovement       Start:  09/23/24    Expected End:  10/21/24                 Alexys Yanes, PTA

## 2024-10-14 ENCOUNTER — TREATMENT (OUTPATIENT)
Dept: PHYSICAL THERAPY | Facility: HOSPITAL | Age: 54
End: 2024-10-14
Payer: COMMERCIAL

## 2024-10-14 DIAGNOSIS — R53.81 PHYSICAL DECONDITIONING: Primary | ICD-10-CM

## 2024-10-14 DIAGNOSIS — R53.1 WEAKNESS: ICD-10-CM

## 2024-10-14 PROCEDURE — 97112 NEUROMUSCULAR REEDUCATION: CPT | Mod: GP,CQ

## 2024-10-14 PROCEDURE — 97110 THERAPEUTIC EXERCISES: CPT | Mod: GP,CQ

## 2024-10-14 ASSESSMENT — PAIN SCALES - GENERAL: PAINLEVEL_OUTOF10: 0 - NO PAIN

## 2024-10-14 ASSESSMENT — PAIN - FUNCTIONAL ASSESSMENT: PAIN_FUNCTIONAL_ASSESSMENT: 0-10

## 2024-10-14 NOTE — PROGRESS NOTES
"  Physical Therapy Treatment    Patient Name: Jono Quinn  MRN: [id number]  Today's Date: 10/14/2024  Time Calculation  Start Time: 1733  Stop Time: 1820  Time Calculation (min): 47 min        PT Therapeutic Procedures Time Entry  Neuromuscular Re-Education Time Entry: 10  Therapeutic Exercise Time Entry: 37                Current Problem  1. Physical deconditioning        2. Weakness            General  Reason for Referral: physical deconditioning  Referred By: Dr. KRISTIAN Gordon    Subjective   Current Condition:   Better    Performing HEP?: Yes    Precautions  Precautions  STEADI Fall Risk Score (The score of 4 or more indicates an increased risk of falling): 12  Medical Precautions: Fall precautions    Pain  Pain Assessment: 0-10  0-10 (Numeric) Pain Score: 0 - No pain    Objective   Pt able to balance tandem 3/4, and dynamically without LOB  Treatments:    Therapeutic Exercise  Therapeutic Exercise Performed: Yes  Therapeutic Exercise Activity 2: standing hip abd with 2# x 10 ea.  Therapeutic Exercise Activity 3: Step ups 4\" x10 R/L  Therapeutic Exercise Activity 5: standing heel raises x 15  Therapeutic Exercise Activity 7: Nu Step level 4 x 5 min.  Therapeutic Exercise Activity 8: seated LAQ x 10 ea. with 3#  Therapeutic Exercise Activity 9: HS curls 45#x 20 ea.  Therapeutic Exercise Activity 10: STS 23\" x 10  Therapeutic Exercise Activity 11: Standing Hip Extension 2# x10 R/L    Balance/Neuromuscular Re-Education  Balance/Neuromuscular Re-Education Activity Performed: Yes  Balance/Neuromuscular Re-Education Activity 1: Airex 3/4 tandem 30\" R/L `  Balance/Neuromuscular Re-Education Activity 2: Airex look over shoulder 30\"  Balance/Neuromuscular Re-Education Activity 3: Airex chops 4# MB x10 R/L    Assessment: Pt. Was able to tolerate all exercises and balance activities with no LOB and fewer rest breaks. Pt. Is compliant with HEP daily  PT Assessment  PT Assessment Results: Decreased strength, Decreased range of " motion, Impaired balance, Decreased mobility, Obesity, Pain  Rehab Prognosis: Good  Evaluation/Treatment Tolerance: Patient tolerated treatment well    Plan:continue with current PT POC with focus on strengthening of core and Les and balance activities for decreased fall risk  OP PT Plan  PT Plan: Skilled PT  PT Frequency: 2 times per week  Duration: 4 weeks  Onset Date: 09/12/24  Certification Period Start Date: 09/24/24  Certification Period End Date: 10/22/24  Number of Treatments Authorized: 4 of 8  Rehab Potential: Good  Plan of Care Agreement: Patient    Goals:  Active       PT Problem       Patient will demonstrate improved 5 Times Sit to Stand to <15 seconds.       Start:  09/23/24    Expected End:  10/21/24            Patient will maintain single leg stand on each leg for at least 10 sec with no upper extremity support.       Start:  09/23/24    Expected End:  10/21/24            Patient will ambulate with normal gait pattern with no device community distances.       Start:  09/23/24    Expected End:  10/21/24            Patient will ascend and descend 4-6 steps with rail modified independent and reciprocal pattern       Start:  09/23/24    Expected End:  10/21/24            Patient will achieve bilateral hip flexion strength of at least 4+/5       Start:  09/23/24    Expected End:  10/21/24            Patient will achieve bilateral hip abduction strength of at least 4+/5       Start:  09/23/24    Expected End:  10/21/24            Patient will demonstrate independence in home program for support of progression       Start:  09/23/24    Expected End:  10/21/24            Patient will show a significant change in AM-PAC (37 to 47) patient reported outcome tool to demonstrate subjective imporovement       Start:  09/23/24    Expected End:  10/21/24                 Flor Wellington PTA  Physical Therapy Treatment    Patient Name: Jono Quinn  MRN: [id number]  Today's Date: 10/14/2024  Time Calculation  Start  "Time: 1733  Stop Time: 1820  Time Calculation (min): 47 min        PT Therapeutic Procedures Time Entry  Neuromuscular Re-Education Time Entry: 10  Therapeutic Exercise Time Entry: 37                Current Problem  1. Physical deconditioning        2. Weakness            General  Reason for Referral: physical deconditioning  Referred By: Dr. KRISTIAN Gordon    Subjective   Current Condition:   Better  Patient reports he is feeling much better. He is already noticing improvement with his strength and he states he is sleeping better also.    Performing HEP?: Yes    Precautions  Precautions  STEADI Fall Risk Score (The score of 4 or more indicates an increased risk of falling): 12  Medical Precautions: Fall precautions    Pain  Pain Assessment: 0-10  0-10 (Numeric) Pain Score: 0 - No pain    Objective   Treatments:    Therapeutic Exercise  Therapeutic Exercise Performed: Yes  Therapeutic Exercise Activity 2: standing hip abd with 2# x 10 ea.  Therapeutic Exercise Activity 3: Step ups 4\" x10 R/L  Therapeutic Exercise Activity 5: standing heel raises x 15  Therapeutic Exercise Activity 7: Nu Step level 4 x 5 min.  Therapeutic Exercise Activity 8: seated LAQ x 10 ea. with 3#  Therapeutic Exercise Activity 9: HS curls 45#x 20 ea.  Therapeutic Exercise Activity 10: STS 23\" x 10  Therapeutic Exercise Activity 11: Standing Hip Extension 2# x10 R/L    Balance/Neuromuscular Re-Education  Balance/Neuromuscular Re-Education Activity Performed: Yes  Balance/Neuromuscular Re-Education Activity 1: Airex 3/4 tandem 30\" R/L `  Balance/Neuromuscular Re-Education Activity 2: Airex look over shoulder 30\"  Balance/Neuromuscular Re-Education Activity 3: Airex chops 4# MB x10 R/L                        EDUCATION:        Assessment:  PT Assessment  PT Assessment Results: Decreased strength, Decreased range of motion, Impaired balance, Decreased mobility, Obesity, Pain  Rehab Prognosis: Good  Evaluation/Treatment Tolerance: Patient tolerated " treatment well    Plan:  OP PT Plan  PT Plan: Skilled PT  PT Frequency: 2 times per week  Duration: 4 weeks  Onset Date: 09/12/24  Certification Period Start Date: 09/24/24  Certification Period End Date: 10/22/24  Number of Treatments Authorized: 4 of 8  Rehab Potential: Good  Plan of Care Agreement: Patient    Goals:  Active       PT Problem       Patient will demonstrate improved 5 Times Sit to Stand to <15 seconds.       Start:  09/23/24    Expected End:  10/21/24            Patient will maintain single leg stand on each leg for at least 10 sec with no upper extremity support.       Start:  09/23/24    Expected End:  10/21/24            Patient will ambulate with normal gait pattern with no device community distances.       Start:  09/23/24    Expected End:  10/21/24            Patient will ascend and descend 4-6 steps with rail modified independent and reciprocal pattern       Start:  09/23/24    Expected End:  10/21/24            Patient will achieve bilateral hip flexion strength of at least 4+/5       Start:  09/23/24    Expected End:  10/21/24            Patient will achieve bilateral hip abduction strength of at least 4+/5       Start:  09/23/24    Expected End:  10/21/24            Patient will demonstrate independence in home program for support of progression       Start:  09/23/24    Expected End:  10/21/24            Patient will show a significant change in AM-PAC (37 to 47) patient reported outcome tool to demonstrate subjective imporovement       Start:  09/23/24    Expected End:  10/21/24                 Flor Wellington, PTA

## 2024-10-16 ENCOUNTER — TREATMENT (OUTPATIENT)
Dept: PHYSICAL THERAPY | Facility: HOSPITAL | Age: 54
End: 2024-10-16
Payer: COMMERCIAL

## 2024-10-16 DIAGNOSIS — R53.81 PHYSICAL DECONDITIONING: Primary | ICD-10-CM

## 2024-10-16 DIAGNOSIS — R53.1 WEAKNESS: ICD-10-CM

## 2024-10-16 PROCEDURE — 97110 THERAPEUTIC EXERCISES: CPT | Mod: GP | Performed by: PHYSICAL THERAPIST

## 2024-10-16 ASSESSMENT — PAIN - FUNCTIONAL ASSESSMENT: PAIN_FUNCTIONAL_ASSESSMENT: 0-10

## 2024-10-16 ASSESSMENT — PAIN SCALES - GENERAL: PAINLEVEL_OUTOF10: 0 - NO PAIN

## 2024-10-16 NOTE — PROGRESS NOTES
"  Physical Therapy Treatment    Patient Name: Jono Quinn  MRN: [id number]  Today's Date: 10/16/2024  Time Calculation  Start Time: 1641  Stop Time: 1723  Time Calculation (min): 42 min        PT Therapeutic Procedures Time Entry  Neuromuscular Re-Education Time Entry: 5  Therapeutic Exercise Time Entry: 37                Current Problem  1. Physical deconditioning  Follow Up In Physical Therapy      2. Weakness  Follow Up In Physical Therapy        Problem List Items Addressed This Visit             ICD-10-CM    Physical deconditioning - Primary R53.81    Weakness R53.1       General  Reason for Referral: physical deconditioning  Referred By: Dr. KRISTIAN Gordon    Subjective   Current Condition:   Better  Patient reports feeling stronger and that he is using his cane less.    Performing HEP?: Yes    Precautions  Precautions  Medical Precautions: Fall precautions  Pain  Pain Assessment: 0-10  0-10 (Numeric) Pain Score: 0 - No pain    Objective     Treatments:  Therapeutic Exercise  Therapeutic Exercise Performed: Yes  Therapeutic Exercise Activity 3: Step ups 6\" x20 R/L  Therapeutic Exercise Activity 5: standing heel raises x20  Therapeutic Exercise Activity 6: gastroc stretch on wedge 60\"  Therapeutic Exercise Activity 7: Nu Step lvl5 x 5 min. seat #9  Therapeutic Exercise Activity 8: LAQ 2x10 ea. with 15# single leg  Therapeutic Exercise Activity 9: HS curls 45# 2x10 single leg  Therapeutic Exercise Activity 10: STS 21\" x10  Therapeutic Exercise Activity 11: Standing Hip Extension 2# 2x10 R/L  Therapeutic Exercise Activity 12: Pallof Press anti rotation dbl green x10  Therapeutic Exercise Activity 13: sidestepping yellow 18'x1    Balance/Neuromuscular Re-Education  Balance/Neuromuscular Re-Education Activity 3: Airex chops 4# MB x10 R/L    EDUCATION:   Individual(s) Educated: Patient  Education Provided: yes  Handout(s) Provided: Scanned into chart  Home Program: reviewed home exercise program   Risk and Benefits " Discussed with Patient/Caregiver/Other: Yes   Patient/Caregiver Demonstrated Understanding: Yes   Patient Response to Education: Patient/Caregiver verbalized understanding of information, Patient/Caregiver performed return demonstration of exercises/activities, and Patient/Caregiver asked appropriate questions    Assessment: Patient demonstrated good tolerance to PROGRESSIVE RESISTED EXERCISE. Patient demonstrated good ability to walk without his cane throughout the session without loss of balance.  PT Assessment  PT Assessment Results: Decreased strength, Decreased range of motion, Impaired balance, Decreased mobility, Obesity, Pain  Rehab Prognosis: Good  Evaluation/Treatment Tolerance: Patient tolerated treatment well    Plan: Continue with POC. Progress exercises as tolerated.  OP PT Plan  Treatment/Interventions: Education/ Instruction  PT Plan: Skilled PT  PT Frequency: 2 times per week  Duration: 4 weeks  Onset Date: 09/12/24  Certification Period Start Date: 09/24/24  Certification Period End Date: 10/22/24  Number of Treatments Authorized: 5 of 8  Rehab Potential: Good  Plan of Care Agreement: Patient    Goals:  Active       PT Problem       Patient will demonstrate improved 5 Times Sit to Stand to <15 seconds.       Start:  09/23/24    Expected End:  10/21/24            Patient will maintain single leg stand on each leg for at least 10 sec with no upper extremity support.       Start:  09/23/24    Expected End:  10/21/24            Patient will ambulate with normal gait pattern with no device community distances.       Start:  09/23/24    Expected End:  10/21/24            Patient will ascend and descend 4-6 steps with rail modified independent and reciprocal pattern       Start:  09/23/24    Expected End:  10/21/24            Patient will achieve bilateral hip flexion strength of at least 4+/5       Start:  09/23/24    Expected End:  10/21/24            Patient will achieve bilateral hip abduction  strength of at least 4+/5       Start:  09/23/24    Expected End:  10/21/24            Patient will demonstrate independence in home program for support of progression       Start:  09/23/24    Expected End:  10/21/24            Patient will show a significant change in AM-PAC (37 to 47) patient reported outcome tool to demonstrate subjective imporovement       Start:  09/23/24    Expected End:  10/21/24                 Favian Ash, PT

## 2024-10-23 ENCOUNTER — TREATMENT (OUTPATIENT)
Dept: PHYSICAL THERAPY | Facility: HOSPITAL | Age: 54
End: 2024-10-23
Payer: COMMERCIAL

## 2024-10-23 DIAGNOSIS — R53.81 PHYSICAL DECONDITIONING: Primary | ICD-10-CM

## 2024-10-23 DIAGNOSIS — R53.1 WEAKNESS: ICD-10-CM

## 2024-10-23 PROCEDURE — 97110 THERAPEUTIC EXERCISES: CPT | Mod: GP | Performed by: PHYSICAL THERAPIST

## 2024-10-23 ASSESSMENT — PAIN SCALES - GENERAL: PAINLEVEL_OUTOF10: 0 - NO PAIN

## 2024-10-23 ASSESSMENT — PAIN - FUNCTIONAL ASSESSMENT: PAIN_FUNCTIONAL_ASSESSMENT: 0-10

## 2024-10-23 NOTE — PROGRESS NOTES
"  Physical Therapy Treatment and Discharge     Patient Name: Jono Quinn  MRN: [id number]  Today's Date: 10/23/2024  Time Calculation  Start Time: 1648  Stop Time: 1727  Time Calculation (min): 39 min        PT Therapeutic Procedures Time Entry  Therapeutic Exercise Time Entry: 39                Current Problem  1. Physical deconditioning  Follow Up In Physical Therapy      2. Weakness  Follow Up In Physical Therapy        Problem List Items Addressed This Visit             ICD-10-CM    Physical deconditioning - Primary R53.81    Weakness R53.1       General  Reason for Referral: physical deconditioning  Referred By: Dr. KRISTIAN Gordon    Subjective   Current Condition:   Better  Patient reports walking without his cane more frequently.    Performing HEP?: Yes    Precautions  Precautions  Medical Precautions: Fall precautions  Pain  Pain Assessment: 0-10  0-10 (Numeric) Pain Score: 0 - No pain    Objective     Specific Lower Extremity MMT  R Iliopsoas: (5/5): 5/5  L Iliopsoas: (5/5): 5/5  R Gluteals (sidelying): (5/5): 5/5  L Gluteals (sidelying): (5/5): 5/5    Knee AROM  Knee AROM WFL: yes    Knee MMT  Knee MMT WFL: yes    Outcome Measures:  Other Measures  5x Sit to Stand: 15.22 seconds  Other Outcome Measures: AM-PAC short form=46    Treatments:  Therapeutic Exercise  Therapeutic Exercise Performed: Yes  Therapeutic Exercise Activity 3: Step ups 6\" x20 R/L  Therapeutic Exercise Activity 6: gastroc stretch on wedge 60\"  Therapeutic Exercise Activity 7: Nu Step lvl5 x 5 min. seat #9         EDUCATION:   Individual(s) Educated: Patient  Education Provided: yes  Handout(s) Provided: Scanned into chart  Home Program: Access Code: [email or web address]  Risk and Benefits Discussed with Patient/Caregiver/Other: Yes   Patient/Caregiver Demonstrated Understanding: Yes   Patient Response to Education: Patient/Caregiver verbalized understanding of information, Patient/Caregiver performed return demonstration of exercises/activities, and " Patient/Caregiver asked appropriate questions    Assessment: Patient has met most of his physical therapy goals and is appropriate to discharge from physical therapy at this time. Patient demonstrated good ability to perform exercises without pain or need for verbal cues for proper technique.  PT Assessment  PT Assessment Results: Decreased strength, Decreased range of motion, Impaired balance, Decreased mobility, Obesity, Pain  Rehab Prognosis: Good  Evaluation/Treatment Tolerance: Patient tolerated treatment well    Plan: Continue with POC. Discharge physical therapy   OP PT Plan  Treatment/Interventions: Education/ Instruction  PT Plan: No Additional PT interventions required at this time  Onset Date: 09/12/24  Certification Period Start Date: 09/24/24  Certification Period End Date: 10/22/24  Number of Treatments Authorized: 6 of 8  Rehab Potential: Good  Plan of Care Agreement: Patient    Goals:  Active       PT Problem       Patient will demonstrate improved 5 Times Sit to Stand to <15 seconds. (Progressing)       Start:  09/23/24    Expected End:  10/21/24            Patient will maintain single leg stand on each leg for at least 10 sec with no upper extremity support. (Met)       Start:  09/23/24    Expected End:  10/21/24    Resolved:  10/23/24         Patient will ambulate with normal gait pattern with no device community distances. (Met)       Start:  09/23/24    Expected End:  10/21/24    Resolved:  10/23/24         Patient will ascend and descend 4-6 steps with rail modified independent and reciprocal pattern (Met)       Start:  09/23/24    Expected End:  10/21/24    Resolved:  10/23/24         Patient will achieve bilateral hip flexion strength of at least 4+/5 (Met)       Start:  09/23/24    Expected End:  10/21/24    Resolved:  10/23/24         Patient will achieve bilateral hip abduction strength of at least 4+/5 (Met)       Start:  09/23/24    Expected End:  10/21/24    Resolved:  10/23/24          Patient will demonstrate independence in home program for support of progression (Met)       Start:  09/23/24    Expected End:  10/21/24    Resolved:  10/23/24         Patient will show a significant change in AM-PAC (37 to 47) patient reported outcome tool to demonstrate subjective imporovement (Progressing)       Start:  09/23/24    Expected End:  10/21/24                 Favian Ash, PT

## 2024-10-26 ENCOUNTER — LAB (OUTPATIENT)
Dept: LAB | Facility: LAB | Age: 54
End: 2024-10-26
Payer: COMMERCIAL

## 2024-10-26 DIAGNOSIS — Z12.5 ENCOUNTER FOR PROSTATE CANCER SCREENING: ICD-10-CM

## 2024-10-26 DIAGNOSIS — E11.311 DIABETIC RETINOPATHY OF BOTH EYES WITH MACULAR EDEMA ASSOCIATED WITH TYPE 2 DIABETES MELLITUS, UNSPECIFIED RETINOPATHY SEVERITY: ICD-10-CM

## 2024-10-26 DIAGNOSIS — Z00.00 ANNUAL PHYSICAL EXAM: ICD-10-CM

## 2024-10-26 DIAGNOSIS — E55.9 VITAMIN D DEFICIENCY: ICD-10-CM

## 2024-10-26 LAB
25(OH)D3 SERPL-MCNC: 34 NG/ML (ref 30–100)
ALBUMIN SERPL BCP-MCNC: 4.5 G/DL (ref 3.4–5)
ALP SERPL-CCNC: 92 U/L (ref 33–120)
ALT SERPL W P-5'-P-CCNC: 22 U/L (ref 10–52)
ANION GAP SERPL CALC-SCNC: 17 MMOL/L (ref 10–20)
AST SERPL W P-5'-P-CCNC: 21 U/L (ref 9–39)
BASOPHILS # BLD AUTO: 0.04 X10*3/UL (ref 0–0.1)
BASOPHILS NFR BLD AUTO: 0.7 %
BILIRUB SERPL-MCNC: 0.5 MG/DL (ref 0–1.2)
BUN SERPL-MCNC: 17 MG/DL (ref 6–23)
CALCIUM SERPL-MCNC: 9.5 MG/DL (ref 8.6–10.3)
CHLORIDE SERPL-SCNC: 101 MMOL/L (ref 98–107)
CHOLEST SERPL-MCNC: 108 MG/DL (ref 0–199)
CHOLESTEROL/HDL RATIO: 3.9
CO2 SERPL-SCNC: 27 MMOL/L (ref 21–32)
CREAT SERPL-MCNC: 0.89 MG/DL (ref 0.5–1.3)
CREAT UR-MCNC: 40.9 MG/DL (ref 20–370)
EGFRCR SERPLBLD CKD-EPI 2021: >90 ML/MIN/1.73M*2
EOSINOPHIL # BLD AUTO: 0.11 X10*3/UL (ref 0–0.7)
EOSINOPHIL NFR BLD AUTO: 1.9 %
ERYTHROCYTE [DISTWIDTH] IN BLOOD BY AUTOMATED COUNT: 14.6 % (ref 11.5–14.5)
GLUCOSE SERPL-MCNC: 170 MG/DL (ref 74–99)
HCT VFR BLD AUTO: 42 % (ref 41–52)
HDLC SERPL-MCNC: 27.6 MG/DL
HGB BLD-MCNC: 13.9 G/DL (ref 13.5–17.5)
IMM GRANULOCYTES # BLD AUTO: 0.01 X10*3/UL (ref 0–0.7)
IMM GRANULOCYTES NFR BLD AUTO: 0.2 % (ref 0–0.9)
LDLC SERPL CALC-MCNC: ABNORMAL MG/DL
LYMPHOCYTES # BLD AUTO: 2.18 X10*3/UL (ref 1.2–4.8)
LYMPHOCYTES NFR BLD AUTO: 37.3 %
MCH RBC QN AUTO: 30.1 PG (ref 26–34)
MCHC RBC AUTO-ENTMCNC: 33.1 G/DL (ref 32–36)
MCV RBC AUTO: 91 FL (ref 80–100)
MICROALBUMIN UR-MCNC: <7 MG/L
MICROALBUMIN/CREAT UR: NORMAL MG/G{CREAT}
MONOCYTES # BLD AUTO: 0.48 X10*3/UL (ref 0.1–1)
MONOCYTES NFR BLD AUTO: 8.2 %
NEUTROPHILS # BLD AUTO: 3.02 X10*3/UL (ref 1.2–7.7)
NEUTROPHILS NFR BLD AUTO: 51.7 %
NON HDL CHOLESTEROL: 80 MG/DL (ref 0–149)
NRBC BLD-RTO: 0 /100 WBCS (ref 0–0)
PLATELET # BLD AUTO: 193 X10*3/UL (ref 150–450)
POTASSIUM SERPL-SCNC: 4.8 MMOL/L (ref 3.5–5.3)
PROT SERPL-MCNC: 6.7 G/DL (ref 6.4–8.2)
PSA SERPL-MCNC: 1.07 NG/ML
RBC # BLD AUTO: 4.62 X10*6/UL (ref 4.5–5.9)
SODIUM SERPL-SCNC: 140 MMOL/L (ref 136–145)
TRIGL SERPL-MCNC: 472 MG/DL (ref 0–149)
TSH SERPL-ACNC: 1.93 MIU/L (ref 0.44–3.98)
VLDL: ABNORMAL
WBC # BLD AUTO: 5.8 X10*3/UL (ref 4.4–11.3)

## 2024-10-26 PROCEDURE — 84153 ASSAY OF PSA TOTAL: CPT

## 2024-10-26 PROCEDURE — 84443 ASSAY THYROID STIM HORMONE: CPT

## 2024-10-26 PROCEDURE — 85025 COMPLETE CBC W/AUTO DIFF WBC: CPT

## 2024-10-26 PROCEDURE — 82306 VITAMIN D 25 HYDROXY: CPT

## 2024-10-26 PROCEDURE — 82570 ASSAY OF URINE CREATININE: CPT

## 2024-10-26 PROCEDURE — 82043 UR ALBUMIN QUANTITATIVE: CPT

## 2024-10-26 PROCEDURE — 36415 COLL VENOUS BLD VENIPUNCTURE: CPT

## 2024-10-26 PROCEDURE — 80053 COMPREHEN METABOLIC PANEL: CPT

## 2024-10-26 PROCEDURE — 80061 LIPID PANEL: CPT

## 2024-10-28 RX ORDER — GLIPIZIDE 10 MG/1
10 TABLET ORAL
Qty: 180 TABLET | Refills: 0 | OUTPATIENT
Start: 2024-10-28

## 2024-11-06 ENCOUNTER — APPOINTMENT (OUTPATIENT)
Dept: ENDOCRINOLOGY | Facility: CLINIC | Age: 54
End: 2024-11-06
Payer: COMMERCIAL

## 2024-11-06 DIAGNOSIS — E11.311 DIABETIC RETINOPATHY OF BOTH EYES WITH MACULAR EDEMA ASSOCIATED WITH TYPE 2 DIABETES MELLITUS, UNSPECIFIED RETINOPATHY SEVERITY: ICD-10-CM

## 2024-11-06 RX ORDER — METFORMIN HYDROCHLORIDE 1000 MG/1
1000 TABLET ORAL
Qty: 180 TABLET | Refills: 0 | Status: SHIPPED | OUTPATIENT
Start: 2024-11-06

## 2024-11-12 ENCOUNTER — APPOINTMENT (OUTPATIENT)
Dept: PRIMARY CARE | Facility: CLINIC | Age: 54
End: 2024-11-12
Payer: COMMERCIAL

## 2024-11-18 ENCOUNTER — TELEPHONE (OUTPATIENT)
Dept: PRIMARY CARE | Facility: CLINIC | Age: 54
End: 2024-11-18
Payer: COMMERCIAL

## 2024-11-18 DIAGNOSIS — G47.33 OSA (OBSTRUCTIVE SLEEP APNEA): ICD-10-CM

## 2024-11-18 NOTE — TELEPHONE ENCOUNTER
Patient LMOM, use to see Yady Worthington before she left.  Patient is in need of supplies for his CPAP machine and would like wither a referral to a new pulmonologist or for Dr. ANDREW Gordon to fill this order for him     Please advise

## 2024-12-02 ENCOUNTER — APPOINTMENT (OUTPATIENT)
Dept: PRIMARY CARE | Facility: CLINIC | Age: 54
End: 2024-12-02
Payer: COMMERCIAL

## 2024-12-05 ENCOUNTER — APPOINTMENT (OUTPATIENT)
Dept: PRIMARY CARE | Facility: CLINIC | Age: 54
End: 2024-12-05
Payer: COMMERCIAL

## 2024-12-09 ENCOUNTER — APPOINTMENT (OUTPATIENT)
Dept: PRIMARY CARE | Facility: CLINIC | Age: 54
End: 2024-12-09
Payer: COMMERCIAL

## 2025-01-06 DIAGNOSIS — I25.10 CORONARY ARTERY DISEASE INVOLVING NATIVE CORONARY ARTERY OF NATIVE HEART WITHOUT ANGINA PECTORIS: ICD-10-CM

## 2025-01-06 DIAGNOSIS — E11.311 DIABETIC RETINOPATHY OF BOTH EYES WITH MACULAR EDEMA ASSOCIATED WITH TYPE 2 DIABETES MELLITUS, UNSPECIFIED RETINOPATHY SEVERITY: ICD-10-CM

## 2025-01-06 RX ORDER — ROSUVASTATIN CALCIUM 40 MG/1
40 TABLET, COATED ORAL DAILY
Qty: 30 TABLET | Refills: 1 | Status: SHIPPED | OUTPATIENT
Start: 2025-01-06

## 2025-01-24 ENCOUNTER — APPOINTMENT (OUTPATIENT)
Dept: SLEEP MEDICINE | Facility: CLINIC | Age: 55
End: 2025-01-24
Payer: COMMERCIAL

## 2025-01-24 VITALS
SYSTOLIC BLOOD PRESSURE: 130 MMHG | DIASTOLIC BLOOD PRESSURE: 83 MMHG | OXYGEN SATURATION: 98 % | BODY MASS INDEX: 39.29 KG/M2 | WEIGHT: 273.8 LBS | HEART RATE: 87 BPM

## 2025-01-24 DIAGNOSIS — G47.33 OSA (OBSTRUCTIVE SLEEP APNEA): ICD-10-CM

## 2025-01-24 PROCEDURE — 3075F SYST BP GE 130 - 139MM HG: CPT | Performed by: PSYCHIATRY & NEUROLOGY

## 2025-01-24 PROCEDURE — 1036F TOBACCO NON-USER: CPT | Performed by: PSYCHIATRY & NEUROLOGY

## 2025-01-24 PROCEDURE — 3079F DIAST BP 80-89 MM HG: CPT | Performed by: PSYCHIATRY & NEUROLOGY

## 2025-01-24 PROCEDURE — 99203 OFFICE O/P NEW LOW 30 MIN: CPT | Performed by: PSYCHIATRY & NEUROLOGY

## 2025-01-24 ASSESSMENT — PATIENT HEALTH QUESTIONNAIRE - PHQ9
2. FEELING DOWN, DEPRESSED OR HOPELESS: SEVERAL DAYS
SUM OF ALL RESPONSES TO PHQ9 QUESTIONS 1 AND 2: 1
10. IF YOU CHECKED OFF ANY PROBLEMS, HOW DIFFICULT HAVE THESE PROBLEMS MADE IT FOR YOU TO DO YOUR WORK, TAKE CARE OF THINGS AT HOME, OR GET ALONG WITH OTHER PEOPLE: NOT DIFFICULT AT ALL
1. LITTLE INTEREST OR PLEASURE IN DOING THINGS: NOT AT ALL

## 2025-01-24 NOTE — PROGRESS NOTES
Patient: Jono Quinn    [id number]  : 1970 -- AGE 54 y.o.    Provider: Jon Gonzalez MD     [facility]   Service Date: 2025              [facility]  Follow-up Note      HPI: Jono Quinn is a 54 y.o. male with LITZY on CPAP since the early , s/p tonsillectomy and adenoidectomy (in , after LITZY diagnosis), which resolved symptoms without CPAP until  and he resumed using CPAP. He is here today for a follow up visit. He is new to me, but was last seen on 22 by my colleague, BENNETT Grant, at which time she prescribed him a new CPAP set at 16 cm H2O because his machine's motor life had exceeded.    He is using CPAP nightly. Not bothered by the pressure sensation.    Issues with CPAP:  -Needs a new tube - his is partially flattened in some places because he stepped on it. -Mask fits well, but needs to be replaced.   -Waking up with dry mouth, dry sinuses, and dry eyes because his machine's humidifier chamber runs out of water.   -tube is not heating up the air properly anymore  -A couple times he put purified water in the humidifier chamber instead of using distilled water. The minerals from the purified water are staining his humidifier chamber    Was using Mucinex because of suspected rebound secretions due to the CPAP air drying out his sinuses. No longer using Mucinex.    Sometimes has trouble getting to sleep, which he relates to spending time in the evening on his computer watching training videos for work.    No RLS, but has diabetic neuropathy in his feet    PAP DEVICE   DME: [facility]  Setup date: 22  Type: CPAP    Finding benefit: yes - cannot sleep without it. Much improved sleep quality with CPAP  MASK  Type: Siddiqi Fx large nasal pillows   Fit: good    Patient is keeping the equipment clean and supplies are being renewed at appropriate intervals.     Prior Sleep studies:   -PSG 1999: pt complained of snoring, EDS,  insomnia, excessive movement during sleep, and sinus and nasal problems. BMI 36, ESS . 22 hypopneas with AHI of 3.2, supine AHI 3.1, REM AHI 8.9. Snoring. No significant desaturations. PLM index 11, PLM arousal index 1.6. Occasional unifocal PVCs.  -Split night PSG 2012: weight 127.8 kg, BMI 42.2. Pre-CPAP AHI 20.3, supine AHI 24.4, no REM sleep, mean SpO2 94%, dick 86%, max EtCO2 50 mm Hg, 0.5% of sleep time >45 and 0.1% above 50 mm Hg. CPAP titrated from 5-9 cm H2O. Snoring eliminated at 8 cm H2O. AHI <4 at all settings. At 9 cm H2O there were 53 min of sleep, including 38 min of REM and 56% of time was spent supine, AHI at this setting was 1.1, REM AHI 1.6, arousal index 4.5, mean SpO2 95%, and dick 91%. PLM index 11/h with PLM arousal index 2.6.  -CPAP titration : BMI 40.6. titrated from 9-16 cm H2O. AHI controlled at most settings, but flow loops best at 16 cm H2O, at which AHI was 0. PLM index 58 with essentially no arousals.        REVIEW OF MACHINE DOWNLOAD:   Date Range: 24-25  % Days used: 100%  % Days with Usage >= 4 hrs: 97%  Average usage days used: 9 h 58 min   Settin cmH2O  95th percentile leak: 34 LPM, median leak 1.4 LPM  Residual AHI: 0.5      Patient Active Problem List   Diagnosis    Abnormal screening cardiac CT    Allergic rhinitis    Candidal intertrigo    Carotid atherosclerosis    Depression    Diabetes mellitus with neuropathy    Disturbance of salivary secretion    Fall    GERD (gastroesophageal reflux disease)    Hoarse voice quality    Hypercholesterolemia    Hypogonadism, male    Nasal congestion    LITZY (obstructive sleep apnea)    PAD (peripheral artery disease) (CMS-[facility])    Postnasal drip    Ayers disease    Stasis, venous    Vitamin D deficiency    Acute calculous cholecystitis    Bell's palsy    Diabetic autonomic neuropathy associated with type 2 diabetes mellitus (Multi)    Diabetic polyneuropathy associated with type 2 diabetes mellitus (Multi)     Hypertriglyceridemia    Hypertension associated with diabetes (Multi)    MDD (major depressive disorder), recurrent severe, without psychosis (Multi)    DHILLON (nonalcoholic steatohepatitis)    Type 2 diabetes mellitus with severe nonproliferative retinopathy of both eyes and macular edema    Anxiety disorder    Chest pain    Short of breath on exertion    SOB (shortness of breath)    Benign essential hypertension    Diabetic retinopathy (Multi)    Diplopia    Type 2 diabetes mellitus with obesity (Multi)    Acute pancreatitis    Coronary artery disease involving native coronary artery of native heart without angina pectoris    Disorder of soft tissue    Fatigue    Headache    Insomnia    Obesity due to energy imbalance    Severe obesity (Multi)    Obesity with body mass index 30 or greater    Severe acute respiratory syndrome coronavirus 2 (SARS-CoV-2) infection ruled out    Thyroid nodule    Abnormal computed tomography scan    Acute cholecystitis due to biliary calculus    Intertriginous candidiasis    Abdominal pain    Syncope and collapse    Left facial numbness    Acute pancreatitis, unspecified complication status, unspecified pancreatitis type ([facility])    Hyponatremia    Physical deconditioning    Weakness     Past Medical History:   Diagnosis Date    COVID-19 11/23/2020    Pneumonia due to COVID-19 virus    Depression, unspecified 10/26/2022    Depression    Diabetes mellitus (Multi)     Obstructive sleep apnea (adult) (pediatric) 07/11/2022    LITZY on CPAP    Other conditions influencing health status 05/16/2022    Diabetes type 2, uncontrolled    Personal history of other diseases of the digestive system 11/19/2021    H/O acute pancreatitis    Personal history of other diseases of the nervous system and sense organs     History of sleep apnea    Pure hypercholesterolemia, unspecified 10/26/2022    Hypercholesterolemia    Testicular hypofunction 07/01/2021    Hypogonadism male    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     Past Surgical History:   Procedure Laterality Date    CARDIAC CATHETERIZATION N/A 12/6/2023    Procedure: Left Heart Cath;  Surgeon: Lucas Pino MD;  Location: [facility];  Service: Cardiovascular;  Laterality: N/A;  12/6/ am for [facility] [id number] for CAD with angina I25.119 and abnormal cardiac CTA R93.1  Auth # for [employer]:  [id number] 11/10/23-05/08/24    CT ANGIO CORONARY ART WITH HEARTFLOW IF SCORE >30%  10/24/2023    CT ANGIO CORONARY ART WITH HEARTFLOW IF SCORE >30% 10/24/2023 [facility] CT    OTHER SURGICAL HISTORY  07/13/2020    Cholecystectomy    OTHER SURGICAL HISTORY  07/13/2020    Cranioplasty    OTHER SURGICAL HISTORY  07/13/2020    Tonsillectomy with adenoidectomy    OTHER SURGICAL HISTORY  07/13/2020    Complete colonoscopy    OTHER SURGICAL HISTORY  03/01/2022    Nasal septoplasty    OTHER SURGICAL HISTORY  03/01/2022    Submucous resection of nasal turbinate     Current Outpatient Medications on File Prior to Visit   Medication Sig Dispense Refill    aspirin 81 mg EC tablet Take 1 tablet (81 mg) by mouth 2 times a day.      azelastine (Astelin) 137 mcg (0.1 %) nasal spray Administer 2 sprays into affected nostril(s) once daily.      BD Ultra-Fine Mini Pen Needle 31 gauge x 3/16\" needle Four times daily 400 each 3    busPIRone (Buspar) 15 mg tablet Take 1 tablet (15 mg) by mouth 3 times a day.      cholecalciferol (Vitamin D3) 50 mcg (2,000 unit) capsule Take by mouth once daily.      empagliflozin (Jardiance) 25 mg Take 1 tablet (25 mg) by mouth once daily. 90 tablet 3    fenofibrate micronized (Lofibra) 200 mg capsule Take 1 capsule (200 mg) by mouth once daily. 90 capsule 0    Fiasp FlexTouch U-100 Insulin 100 unit/mL (3 mL) injection Inject 40-60 Units under the skin 3 times a day before meals. Take as directed per insulin instructions. 150 mL 3    FreeStyle Demli 3 [place] misc Inject 1 Device under the skin once daily. Use as instructed 1 each 0 "    gabapentin (Neurontin) 300 mg capsule Take 3 capsules (900 mg) by mouth 3 times a day. 270 capsule 2    ginseng 100 mg capsule Take 200 mg by mouth once daily.      metFORMIN (Glucophage) 1,000 mg tablet Take 1 tablet (1,000 mg) by mouth 2 times daily (morning and late afternoon). 180 tablet 0    multivitamin capsule Take 1 capsule by mouth once daily.      PARoxetine (Paxil) 30 mg tablet TAKE 1 TABLET EARLY IN THE MORNING (NEED TO SCHEDULE WITH PSYCH FOR REFILLS) 90 tablet 1    rosuvastatin (Crestor) 40 mg tablet TAKE 1 TABLET BY MOUTH EVERY DAY 30 tablet 1    Toujeo Max U-300 SoloStar 300 unit/mL (3 mL) injection Inject 110 Units under the skin once daily at bedtime. 36 mL 3    vitamin B complex tablet Take 1 tablet by mouth once daily.      amitriptyline (Elavil) 10 mg tablet Take 1 tablet (10 mg) by mouth once daily at bedtime. 90 tablet 0    FreeStyle Delmi 3 Sensor device 1 kit every 14 (fourteen) days. 6 each 0    icosapent ethyL (Vascepa) 1 gram capsule Take 2 capsules (2 g) by mouth 2 times daily (morning and late afternoon). 360 capsule 0     No current facility-administered medications on file prior to visit.       PHYSICAL EXAMINATION:   Vitals:    01/24/25 1511   BP: 130/83   BP Location: Left arm   Patient Position: Sitting   BP Cuff Size: Adult   Pulse: 87   SpO2: 98%   Weight: 124 kg (273 lb 12.8 oz)     Body mass index is 39.29 kg/m².  General: Awake. Alert. Comfortable. No apparent distress.   Speech: Normal.  Comprehension: Normal.  Mood: Stable.  Affect: Appropriate.  Pul:         Normal respiratory effort.   Abd:         Obese  Neuro: Alert, well-oriented. Cranial nerves II-XII grossly normal and symmetric.  Moves all limbs symmetrically with no evidence of significant focal weakness. No abnormal movements noted. Normal gait      ASSESSMENT AND PLAN: Mr. Jono Quinn is a 54 y.o. male with moderate LITZY on CPAP in need of new supplies. Running out of water after 5 hours og sleep causing very  dry sinuses.        #LITZY - Patient's sleep apnea is under very good control with CPAP, he is deriving significant subjective benefit from treatment, his compliance is excellent, and mask leak is well controlled overall.  -remotely I lowered pressure range from 16 to 14-16 cm H2O to allow for slightly water usage to let it last longer  -Rx to DME for new supplies  -I showed pt how to self adjust humidity setting on CPAP for his comfort  -pt to consider buying a humidifier for his bedroom to help moisturize air better.      All of the above was discussed with the patient in detail. He voiced an understanding of the above and was agreeable to proceed further as advised.     42 minutes were spent with the patient plus time spent reviewing the chart, updating the chart as needed, and documenting.     FOLLOW UP: 3 months

## 2025-01-25 DIAGNOSIS — I25.10 CORONARY ARTERY DISEASE INVOLVING NATIVE CORONARY ARTERY OF NATIVE HEART WITHOUT ANGINA PECTORIS: ICD-10-CM

## 2025-01-25 DIAGNOSIS — E11.311 DIABETIC RETINOPATHY OF BOTH EYES WITH MACULAR EDEMA ASSOCIATED WITH TYPE 2 DIABETES MELLITUS, UNSPECIFIED RETINOPATHY SEVERITY: ICD-10-CM

## 2025-01-25 DIAGNOSIS — E78.00 HYPERCHOLESTEREMIA: ICD-10-CM

## 2025-01-27 RX ORDER — FENOFIBRATE 200 MG/1
200 CAPSULE ORAL DAILY
Qty: 30 CAPSULE | Refills: 0 | Status: SHIPPED | OUTPATIENT
Start: 2025-01-27

## 2025-01-27 RX ORDER — ROSUVASTATIN CALCIUM 40 MG/1
40 TABLET, COATED ORAL DAILY
Qty: 30 TABLET | Refills: 0 | Status: SHIPPED | OUTPATIENT
Start: 2025-01-27

## 2025-01-30 DIAGNOSIS — F32.A DEPRESSION, UNSPECIFIED DEPRESSION TYPE: ICD-10-CM

## 2025-01-30 RX ORDER — PAROXETINE 30 MG/1
30 TABLET, FILM COATED ORAL NIGHTLY
Qty: 30 TABLET | Refills: 0 | Status: SHIPPED | OUTPATIENT
Start: 2025-01-30

## 2025-02-02 DIAGNOSIS — E11.311 DIABETIC RETINOPATHY OF BOTH EYES WITH MACULAR EDEMA ASSOCIATED WITH TYPE 2 DIABETES MELLITUS, UNSPECIFIED RETINOPATHY SEVERITY: ICD-10-CM

## 2025-02-03 RX ORDER — METFORMIN HYDROCHLORIDE 1000 MG/1
1000 TABLET ORAL
Qty: 60 TABLET | Refills: 0 | Status: SHIPPED | OUTPATIENT
Start: 2025-02-03

## 2025-02-09 DIAGNOSIS — E11.311 DIABETIC RETINOPATHY OF BOTH EYES WITH MACULAR EDEMA ASSOCIATED WITH TYPE 2 DIABETES MELLITUS, UNSPECIFIED RETINOPATHY SEVERITY: ICD-10-CM

## 2025-02-10 RX ORDER — METFORMIN HYDROCHLORIDE 1000 MG/1
1000 TABLET ORAL
Qty: 60 TABLET | Refills: 0 | Status: SHIPPED | OUTPATIENT
Start: 2025-02-10

## 2025-02-12 ENCOUNTER — APPOINTMENT (OUTPATIENT)
Dept: PRIMARY CARE | Facility: CLINIC | Age: 55
End: 2025-02-12
Payer: COMMERCIAL

## 2025-02-18 ENCOUNTER — APPOINTMENT (OUTPATIENT)
Dept: PRIMARY CARE | Facility: CLINIC | Age: 55
End: 2025-02-18
Payer: COMMERCIAL

## 2025-02-18 VITALS
OXYGEN SATURATION: 97 % | BODY MASS INDEX: 40.06 KG/M2 | TEMPERATURE: 97.3 F | WEIGHT: 279.2 LBS | SYSTOLIC BLOOD PRESSURE: 120 MMHG | HEART RATE: 94 BPM | DIASTOLIC BLOOD PRESSURE: 66 MMHG

## 2025-02-18 DIAGNOSIS — E66.01 CLASS 3 SEVERE OBESITY DUE TO EXCESS CALORIES WITH SERIOUS COMORBIDITY AND BODY MASS INDEX (BMI) OF 40.0 TO 44.9 IN ADULT: ICD-10-CM

## 2025-02-18 DIAGNOSIS — L30.9 DERMATITIS: ICD-10-CM

## 2025-02-18 DIAGNOSIS — Z79.4 TYPE 2 DIABETES MELLITUS WITH OTHER SPECIFIED COMPLICATION, WITH LONG-TERM CURRENT USE OF INSULIN: ICD-10-CM

## 2025-02-18 DIAGNOSIS — E78.00 HYPERCHOLESTEREMIA: ICD-10-CM

## 2025-02-18 DIAGNOSIS — H35.00 RETINOPATHY: ICD-10-CM

## 2025-02-18 DIAGNOSIS — H60.393 OTHER INFECTIVE ACUTE OTITIS EXTERNA OF BOTH EARS: ICD-10-CM

## 2025-02-18 DIAGNOSIS — F33.2 MDD (MAJOR DEPRESSIVE DISORDER), RECURRENT SEVERE, WITHOUT PSYCHOSIS (MULTI): ICD-10-CM

## 2025-02-18 DIAGNOSIS — E11.69 TYPE 2 DIABETES MELLITUS WITH OTHER SPECIFIED COMPLICATION, WITH LONG-TERM CURRENT USE OF INSULIN: ICD-10-CM

## 2025-02-18 DIAGNOSIS — I25.10 CORONARY ARTERY DISEASE INVOLVING NATIVE CORONARY ARTERY OF NATIVE HEART WITHOUT ANGINA PECTORIS: ICD-10-CM

## 2025-02-18 DIAGNOSIS — Z71.2 ENCOUNTER TO DISCUSS TEST RESULTS: Primary | ICD-10-CM

## 2025-02-18 DIAGNOSIS — E66.813 CLASS 3 SEVERE OBESITY DUE TO EXCESS CALORIES WITH SERIOUS COMORBIDITY AND BODY MASS INDEX (BMI) OF 40.0 TO 44.9 IN ADULT: ICD-10-CM

## 2025-02-18 DIAGNOSIS — I73.9 PAD (PERIPHERAL ARTERY DISEASE) (CMS-HCC): ICD-10-CM

## 2025-02-18 PROCEDURE — 3078F DIAST BP <80 MM HG: CPT | Performed by: INTERNAL MEDICINE

## 2025-02-18 PROCEDURE — 1036F TOBACCO NON-USER: CPT | Performed by: INTERNAL MEDICINE

## 2025-02-18 PROCEDURE — 3074F SYST BP LT 130 MM HG: CPT | Performed by: INTERNAL MEDICINE

## 2025-02-18 PROCEDURE — 99214 OFFICE O/P EST MOD 30 MIN: CPT | Performed by: INTERNAL MEDICINE

## 2025-02-18 RX ORDER — GABAPENTIN 300 MG/1
900 CAPSULE ORAL 3 TIMES DAILY
Qty: 270 CAPSULE | Refills: 2 | Status: SHIPPED | OUTPATIENT
Start: 2025-02-18

## 2025-02-18 RX ORDER — NEOMYCIN SULFATE, POLYMYXIN B SULFATE, HYDROCORTISONE 3.5; 10000; 1 MG/ML; [USP'U]/ML; MG/ML
2 SOLUTION/ DROPS AURICULAR (OTIC) 4 TIMES DAILY
Qty: 2.8 ML | Refills: 0 | Status: SHIPPED | OUTPATIENT
Start: 2025-02-18 | End: 2025-02-25

## 2025-02-18 NOTE — PROGRESS NOTES
Subjective   Patient ID: Jono Quinn is a 54 y.o. male who presents for Follow-up (3 month ), Results, Rash (Both ankles //Started on right ankle ), Eye Problem (Happens when first wakes up or bright light //Swiggles is what patient sees out of right eye ), Labs Only (Would like labs to see what supplements to take ), and Earache (Right ear ).  Rash    Eye Problem     Earache   Associated symptoms include a rash.     Follow up    Labs rev'd    Rash on ankles  Derm consult    retinopathy with macular edema  Retinal specialist following  [facility]   Getting eye shots  Sees flashes of light x 5 weeks  Counseled  Appt 2-21-25    Ear pain bilateral  Corticosporin otic gtts as directed    acute pancreatitis 3rd bout     Residual epigastric pain     No diarrhea or fever     Eating better, bowels normalizing     H/o [place] palsy    CAD / hypercholesterolemia on rx   Cardio following  Cath 12-6 -23      thyroid nodule   Thyroid ultrasound neg 10-23     DM type II -insulin-dependent  's   Continue meds  Follow blood sugars closely.  HBA1C 8  8-24  Endo following appt tomorrow     GERD stable on diet     Depression  better on therapy no side effects  Psych following     Vitamin D deficiency on suplementation     Diet and exercise reviewed  Stop eating pizza and junk foods     Colonoscopy on follow up    Review of Systems   HENT:  Positive for ear pain.    Skin:  Positive for rash.   All other systems reviewed and are negative.      Objective   /66   Pulse 94   Temp 36.3 °C (97.3 °F)   Wt 127 kg (279 lb 3.2 oz)   SpO2 97%   BMI 40.06 kg/m²   Lab Results   Component Value Date    WBC 5.8 10/26/2024    HGB 13.9 10/26/2024    HCT 42.0 10/26/2024     10/26/2024    CHOL 108 10/26/2024    TRIG 472 (H) 10/26/2024    HDL 27.6 10/26/2024    LDLDIRECT 32 03/05/2022    ALT 22 10/26/2024    AST 21 10/26/2024     10/26/2024    K 4.8 10/26/2024     10/26/2024    CREATININE 0.89 10/26/2024    BUN 17 10/26/2024     CO2 27 10/26/2024    TSH 1.93 10/26/2024    INR 0.9 08/27/2024    HGBA1C 8.0 (H) 08/27/2024           Physical Exam  Vitals reviewed.   Constitutional:       Appearance: Normal appearance. He is obese.   HENT:      Head: Normocephalic and atraumatic.      Comments: B/l ear canals red     Mouth/Throat:      Pharynx: No posterior oropharyngeal erythema.   Eyes:      General: No scleral icterus.     Conjunctiva/sclera: Conjunctivae normal.      Pupils: Pupils are equal, round, and reactive to light.   Cardiovascular:      Rate and Rhythm: Normal rate and regular rhythm.      Heart sounds: Normal heart sounds.   Pulmonary:      Effort: No respiratory distress.      Breath sounds: No wheezing.   Abdominal:      General: Abdomen is flat. Bowel sounds are normal. There is no distension.      Palpations: Abdomen is soft. There is no mass.      Tenderness: There is no abdominal tenderness. There is no rebound.   Musculoskeletal:         General: Normal range of motion.      Cervical back: Normal range of motion and neck supple.   Skin:     General: Skin is warm and dry.      Findings: Rash present.   Neurological:      General: No focal deficit present.      Mental Status: He is alert and oriented to person, place, and time. Mental status is at baseline.   Psychiatric:         Mood and Affect: Mood normal.         Behavior: Behavior normal.         Thought Content: Thought content normal.         Judgment: Judgment normal.         Problem List Items Addressed This Visit             ICD-10-CM    PAD (peripheral artery disease) (CMS-[facility]) I73.9    Relevant Medications    gabapentin (Neurontin) 300 mg capsule    MDD (major depressive disorder), recurrent severe, without psychosis (Multi) F33.2    Coronary artery disease involving native coronary artery of native heart without angina pectoris I25.10     Other Visit Diagnoses         Codes    Encounter to discuss test results    -  Primary Z71.2    Type 2 diabetes mellitus with  other specified complication, with long-term current use of insulin     E11.69, Z79.4    Relevant Orders    Referral to Podiatry    Retinopathy     H35.00    Dermatitis     L30.9    Other infective acute otitis externa of both ears     H60.393    Relevant Medications    neomycin-polymyxin-HC (Cortisporin) otic solution    Hypercholesteremia     E78.00    Class 3 severe obesity due to excess calories with serious comorbidity and body mass index (BMI) of 40.0 to 44.9 in adult     E66.813, E66.01, Z68.41          Assessment/Plan     Labs rev'd    Rash on ankles  Derm consult    retinopathy with macular edema  Retinal specialist following  [facility]   Getting eye shots  Sees flashes of light x 5 weeks  Counseled  Appt 2-21-25    Ear pain bilateral  Corticosporin otic gtts as directed    acute pancreatitis 3rd bout     Residual epigastric pain     No diarrhea or fever     Eating better, bowels normalizing     H/o [place] palsy    CAD / hypercholesterolemia on rx   Cardio following  Cath 12-6 -23      thyroid nodule   Thyroid ultrasound neg 10-23     DM type II -insulin-dependent  's   Continue meds  Follow blood sugars closely.  HBA1C 8  8-24  Endo following appt tomorrow     GERD stable on diet     Depression  better on therapy no side effects  Psych following     Vitamin D deficiency on suplementation     Diet and exercise reviewed  Stop eating pizza and junk foods     Colonoscopy on follow up    Prostate 10-24  Colonoscopy none  CT chest lung cancer screening n/a  immunizations rev'd flu  BMI 40    Follow up 3 months

## 2025-02-19 ENCOUNTER — APPOINTMENT (OUTPATIENT)
Dept: ENDOCRINOLOGY | Facility: CLINIC | Age: 55
End: 2025-02-19
Payer: COMMERCIAL

## 2025-02-19 VITALS
DIASTOLIC BLOOD PRESSURE: 70 MMHG | HEIGHT: 70 IN | HEART RATE: 85 BPM | BODY MASS INDEX: 39.8 KG/M2 | SYSTOLIC BLOOD PRESSURE: 107 MMHG | WEIGHT: 278 LBS

## 2025-02-19 DIAGNOSIS — Z79.4 TYPE 2 DIABETES MELLITUS WITHOUT COMPLICATION, WITH LONG-TERM CURRENT USE OF INSULIN (MULTI): ICD-10-CM

## 2025-02-19 DIAGNOSIS — E11.9 TYPE 2 DIABETES MELLITUS WITHOUT COMPLICATION, WITH LONG-TERM CURRENT USE OF INSULIN (MULTI): ICD-10-CM

## 2025-02-19 LAB — POC HEMOGLOBIN A1C: 7.2 % (ref 4.2–6.5)

## 2025-02-19 PROCEDURE — 99214 OFFICE O/P EST MOD 30 MIN: CPT | Performed by: INTERNAL MEDICINE

## 2025-02-19 PROCEDURE — 3078F DIAST BP <80 MM HG: CPT | Performed by: INTERNAL MEDICINE

## 2025-02-19 PROCEDURE — 3074F SYST BP LT 130 MM HG: CPT | Performed by: INTERNAL MEDICINE

## 2025-02-19 PROCEDURE — 3008F BODY MASS INDEX DOCD: CPT | Performed by: INTERNAL MEDICINE

## 2025-02-19 PROCEDURE — 95251 CONT GLUC MNTR ANALYSIS I&R: CPT | Performed by: INTERNAL MEDICINE

## 2025-02-19 PROCEDURE — 83036 HEMOGLOBIN GLYCOSYLATED A1C: CPT | Performed by: INTERNAL MEDICINE

## 2025-02-19 PROCEDURE — 1036F TOBACCO NON-USER: CPT | Performed by: INTERNAL MEDICINE

## 2025-02-19 RX ORDER — BLOOD-GLUCOSE SENSOR
EACH MISCELLANEOUS
COMMUNITY
End: 2025-02-19 | Stop reason: SDUPTHER

## 2025-02-19 RX ORDER — BLOOD-GLUCOSE,RECEIVER,CONT
1 EACH MISCELLANEOUS AS NEEDED
COMMUNITY

## 2025-02-19 RX ORDER — BLOOD-GLUCOSE SENSOR
EACH MISCELLANEOUS
Qty: 9 EACH | Refills: 3 | Status: SHIPPED | OUTPATIENT
Start: 2025-02-19

## 2025-02-19 ASSESSMENT — ENCOUNTER SYMPTOMS
MYALGIAS: 1
POLYDIPSIA: 0
DIARRHEA: 0
WEAKNESS: 1
COUGH: 0
VOMITING: 0
APNEA: 1
NUMBNESS: 1
HEADACHES: 0
FATIGUE: 1
ABDOMINAL PAIN: 0
NAUSEA: 0
EYE PAIN: 1
SORE THROAT: 0
CONSTIPATION: 0
ARTHRALGIAS: 0
DYSPHORIC MOOD: 1
NERVOUS/ANXIOUS: 1
FREQUENCY: 0
UNEXPECTED WEIGHT CHANGE: 1
FEVER: 0
CONFUSION: 1
SHORTNESS OF BREATH: 0
APPETITE CHANGE: 0

## 2025-02-19 NOTE — PROGRESS NOTES
History Of Present Illness  Jono Quinn is a 54 y.o. male     Duration of type 2 diabetes mellitus:  15-20 years  Complications:  Retinopathy  Neuropathy  Cardiovascular disease  Dysautonomia     Interval history of pancreatitis 4 months ago, admitted to [facility]  He was drinking [employer], now stopped.   Hypertriglyceridemia  Prior pancreatitis in college and 5 years ago.    Weight loss, now regaining  Dietary indiscretion, more fast food    Toujeo 110 units at bedtime  FiAsp 40 units QAC add 4:50 over glucose 150 mg/dl  Metformin 1000 mg BID  Jardiance 25 mg/day  Glipizide 10 mg BID    DexCom G7  Patient is testing glucose 1440 times daily  Records reviewed, on file     Last eye exam:  February 2025  History of laser surgery right eye     Cardiology:  Dr. KATIE Pino  Hyperlipidemia  Hypertriglyceridemia  Hereditary per patient  Rosuvastatin 40 mg/day  Fenofibrate 200 mg/day  Vascepa     History of alcoholism, stopped drinking in 2003  Started drinking some beer          Past Medical History  He has a past medical history of COVID-19 (11/23/2020), Depression, unspecified (10/26/2022), Diabetes mellitus (Multi), Obstructive sleep apnea (adult) (pediatric) (07/11/2022), Other conditions influencing health status (05/16/2022), Personal history of other diseases of the digestive system (11/19/2021), Personal history of other diseases of the nervous system and sense organs, Pure hypercholesterolemia, unspecified (10/26/2022), Testicular hypofunction (07/01/2021), and 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    Surgical History  He has a past surgical history that includes Other surgical history (07/13/2020); Other surgical history (07/13/2020); Other surgical history (07/13/2020); Other surgical history (07/13/2020); Other surgical history (03/01/2022); Other surgical history (03/01/2022); CT angio coronary art with heartflow if score >30% (10/24/2023); and Cardiac catheterization (N/A, 12/6/2023).    "  Social History  He reports that he has never smoked. He has never been exposed to tobacco smoke. He has never used smokeless tobacco. He reports that he does not drink alcohol and does not use drugs.    Family History  Family History   Problem Relation Name Age of Onset    Prostate cancer Father         Medications  Current Outpatient Medications   Medication Instructions    amitriptyline (ELAVIL) 10 mg, oral, Nightly    aspirin 81 mg EC tablet 1 tablet, 2 times daily    azelastine (Astelin) 137 mcg (0.1 %) nasal spray 2 sprays, Daily    BD Ultra-Fine Mini Pen Needle 31 gauge x 3/16\" needle Four times daily    busPIRone (BUSPAR) 15 mg, 3 times daily    cholecalciferol (Vitamin D3) 50 mcg (2,000 unit) capsule Daily    Dexcom G7  misc 1 Device, As needed    Dexcom G7 Sensor device No dose, route, or frequency recorded.    empagliflozin (JARDIANCE) 25 mg, oral, Daily    fenofibrate micronized (LOFIBRA) 200 mg, oral, Daily    Fiasp FlexTouch U-100 Insulin 100 unit/mL (3 mL) injection 40-60 Units, subcutaneous, 3 times daily before meals, Take as directed per insulin instructions.    gabapentin (NEURONTIN) 900 mg, oral, 3 times daily    ginseng 200 mg, Daily RT    icosapent ethyL (VASCEPA) 2 g, oral, 2 times daily (morning and late afternoon)    metFORMIN (GLUCOPHAGE) 1,000 mg, oral, 2 times daily (morning and late afternoon)    multivitamin capsule 1 tablet, Daily    neomycin-polymyxin-HC (Cortisporin) otic solution 2 drops, Each Ear, 4 times daily    PARoxetine (PAXIL) 30 mg, oral, Nightly    rosuvastatin (CRESTOR) 40 mg, oral, Daily    Toujeo Max U-300 SoloStar 110 Units, subcutaneous, Nightly    vitamin B complex tablet 1 tablet, Daily       Allergies  Perfume, Fluticasone propionate, Liraglutide, Lisinopril, Other, and Pollen extracts    Review of Systems   Constitutional:  Positive for fatigue and unexpected weight change. Negative for appetite change and fever.   HENT:  Positive for hearing loss. " "Negative for sore throat.         Denies dry mouth   Eyes:  Positive for pain and visual disturbance.   Respiratory:  Positive for apnea (sleep). Negative for cough and shortness of breath.    Cardiovascular:  Negative for chest pain.   Gastrointestinal:  Negative for abdominal pain, constipation, diarrhea, nausea and vomiting.   Endocrine: Negative for polydipsia and polyuria.   Genitourinary:  Negative for frequency.   Musculoskeletal:  Positive for myalgias. Negative for arthralgias.   Skin:  Positive for rash.   Neurological:  Positive for weakness and numbness. Negative for headaches.   Psychiatric/Behavioral:  Positive for confusion and dysphoric mood. The patient is nervous/anxious.          Last Recorded Vitals  Blood pressure 107/70, pulse 85, height 1.778 m (5' 10\"), weight 126 kg (278 lb).    Physical Exam  Constitutional:       General: He is not in acute distress.     Appearance: He is obese.   HENT:      Head: Normocephalic.      Mouth/Throat:      Mouth: Mucous membranes are moist.   Eyes:      Extraocular Movements: Extraocular movements intact.   Neck:      Thyroid: No thyroid mass or thyromegaly.   Cardiovascular:      Pulses:           Radial pulses are 2+ on the right side and 2+ on the left side.   Musculoskeletal:      Right lower leg: No edema.      Left lower leg: No edema.   Feet:      Comments: Flat feet  Lymphadenopathy:      Cervical: No cervical adenopathy.   Skin:     Comments: Spotty erythema, tinea at feet   Neurological:      Mental Status: He is alert.      Motor: No tremor.   Psychiatric:         Mood and Affect: Affect normal.          Relevant Results  Glucose (mg/dL)   Date Value   10/26/2024 170 (H)   09/01/2024 141 (H)   08/31/2024 251 (H)     POC HEMOGLOBIN A1c (%)   Date Value   02/19/2025 7.2 (A)   05/03/2024 7.2 (A)     Hemoglobin A1C (%)   Date Value   08/27/2024 8.0 (H)   11/18/2023 5.9 (H)   08/25/2023 6.8 (H)   08/24/2023 6.9 (H)   07/22/2023 6.7 (A)     Bicarbonate " (mmol/L)   Date Value   10/26/2024 27   09/01/2024 22   08/31/2024 21     Urea Nitrogen (mg/dL)   Date Value   10/26/2024 17   09/01/2024 13   08/31/2024 17     Creatinine (mg/dL)   Date Value   10/26/2024 0.89   09/01/2024 0.81   08/31/2024 0.98     Lab Results   Component Value Date    CHOL 108 10/26/2024    CHOL 200 (H) 08/27/2024    CHOL 126 11/18/2023     Lab Results   Component Value Date    HDL 27.6 10/26/2024    HDL 17.2 08/27/2024    HDL 24.0 11/18/2023     Lab Results   Component Value Date    LDLCALC  10/26/2024      Comment:      The calculation of LDL and VLDL are inaccurate when the Triglycerides are greater than 400 mg/dL or when the patient is non-fasting. If LDL measurement is necessary contact the testing laboratory for an alternative LDL assay.                                  Near   Borderline      AGE      Desirable  Optimal    High     High     Very High     0-19 Y     0 - 109     ---    110-129   >/= 130     ----    20-24 Y     0 - 119     ---    120-159   >/= 160     ----      >24 Y     0 -  99   100-129  130-159   160-189     >/=190      LDLCALC  08/27/2024      Comment:      The calculation of LDL and VLDL are inaccurate when the Triglycerides are greater than 400 mg/dL or when the patient is non-fasting. If LDL measurement is necessary contact the testing laboratory for an alternative LDL assay.        LDLCALC  11/18/2023      Comment:      The calculation of LDL and VLDL are inaccurate when the Triglycerides are greater than 400 mg/dL or when the patient is non-fasting. If LDL measurement is necessary contact the testing laboratory for an alternative LDL assay.                                  Near   Borderline      AGE      Desirable  Optimal    High     High     Very High     0-19 Y     0 - 109     ---    110-129   >/= 130     ----    20-24 Y     0 - 119     ---    120-159   >/= 160     ----      >24 Y     0 -  99   100-129  130-159   160-189     >/=190       Lab Results   Component  Value Date    TRIG 472 (H) 10/26/2024    TRIG 1,814 (H) 08/27/2024    TRIG 1,102 (H) 11/18/2023     Lab Results   Component Value Date    TSH 1.93 10/26/2024       CGM INTERPRETATION:  Patient is adhering to and benefitting from continuous glucose monitoring.   Model: DexCom G7  Period reviewed:  14 days  Testing glucose 1440 times daily    Average blood sugar 239 mg/dL, glucose management indicator 9%    Glucose less than 70 mg/dL equals 0% of time worn  Glucose ranging between 70 to 180 mg/dL represented by 26% of time worn  Glucose ranging greater than 180 mg/dL represented by 74% of time worn    >72 hours of data reviewed in order to inform diabetes treatment plan decision making, patient currently at risk for recurrent hypoglycemia safety concerns      IMPRESSION  TYPE 2 DIABETES MELLITUS  LONG TERM CURRENT INSULIN USE   Rapid A1c 7.2%  A1c indicates good control, but CGM indicates glucose rising over the past 2-4 weeks.   Dietary indiscretion    Presumed tinea at feet  He may use OTC remedy pending upcoming dermatology appointment      RECOMMENDATIONS  FiAsp 44 units before meals, add 4 for every 50 over glucose 101 mg/dl    Follow up 3-4 months  Labs as ordered by Dr. Gordon.    Decrease fast food  Recommend you drinking alcohol altogether.

## 2025-02-19 NOTE — PATIENT INSTRUCTIONS
A1c 7.2%    RECOMMENDATIONS  FiAsp 44 units before meals, add 4 for every 50 over glucose 101 mg/dl    Follow up 3-4 months  Labs as ordered by Dr. Gordon.    Decrease fast food  Recommend you drinking alcohol altogether.

## 2025-02-19 NOTE — LETTER
February 19, 2025     Trudy Gordon MD  [address]  [place] [address]    Patient: Jono Quinn   YOB: 1970   Date of Visit: 2/19/2025       Dear Dr. Trudy Gordon MD:    Thank you for referring Jono Quinn to me for evaluation. Below are my notes for this consultation.  If you have questions, please do not hesitate to call me. I look forward to following your patient along with you.       Sincerely,     Vazquez Marquez MD      CC: No Recipients  ______________________________________________________________________________________    History Of Present Illness  Jono Quinn is a 54 y.o. male     Duration of type 2 diabetes mellitus:  15-20 years  Complications:  Retinopathy  Neuropathy  Cardiovascular disease  Dysautonomia     Interval history of pancreatitis 4 months ago, admitted to [facility]  He was drinking [employer], now stopped.   Hypertriglyceridemia  Prior pancreatitis in college and 5 years ago.    Weight loss, now regaining  Dietary indiscretion, more fast food    Toujeo 110 units at bedtime  FiAsp 40 units QAC add 4:50 over glucose 150 mg/dl  Metformin 1000 mg BID  Jardiance 25 mg/day  Glipizide 10 mg BID    DexCom G7  Patient is testing glucose 1440 times daily  Records reviewed, on file     Last eye exam:  February 2025  History of laser surgery right eye     Cardiology:  Dr. KATIE Pino  Hyperlipidemia  Hypertriglyceridemia  Hereditary per patient  Rosuvastatin 40 mg/day  Fenofibrate 200 mg/day  Vascepa     History of alcoholism, stopped drinking in 2003  Started drinking some beer          Past Medical History  He has a past medical history of COVID-19 (11/23/2020), Depression, unspecified (10/26/2022), Diabetes mellitus (Multi), Obstructive sleep apnea (adult) (pediatric) (07/11/2022), Other conditions influencing health status (05/16/2022), Personal history of other diseases of the digestive system (11/19/2021), Personal history of other  "diseases of the nervous system and sense organs, Pure hypercholesterolemia, unspecified (10/26/2022), Testicular hypofunction (07/01/2021), and 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    Surgical History  He has a past surgical history that includes Other surgical history (07/13/2020); Other surgical history (07/13/2020); Other surgical history (07/13/2020); Other surgical history (07/13/2020); Other surgical history (03/01/2022); Other surgical history (03/01/2022); CT angio coronary art with heartflow if score >30% (10/24/2023); and Cardiac catheterization (N/A, 12/6/2023).     Social History  He reports that he has never smoked. He has never been exposed to tobacco smoke. He has never used smokeless tobacco. He reports that he does not drink alcohol and does not use drugs.    Family History  Family History   Problem Relation Name Age of Onset   • Prostate cancer Father         Medications  Current Outpatient Medications   Medication Instructions   • amitriptyline (ELAVIL) 10 mg, oral, Nightly   • aspirin 81 mg EC tablet 1 tablet, 2 times daily   • azelastine (Astelin) 137 mcg (0.1 %) nasal spray 2 sprays, Daily   • BD Ultra-Fine Mini Pen Needle 31 gauge x 3/16\" needle Four times daily   • busPIRone (BUSPAR) 15 mg, 3 times daily   • cholecalciferol (Vitamin D3) 50 mcg (2,000 unit) capsule Daily   • Dexcom G7  misc 1 Device, As needed   • Dexcom G7 Sensor device No dose, route, or frequency recorded.   • empagliflozin (JARDIANCE) 25 mg, oral, Daily   • fenofibrate micronized (LOFIBRA) 200 mg, oral, Daily   • Fiasp FlexTouch U-100 Insulin 100 unit/mL (3 mL) injection 40-60 Units, subcutaneous, 3 times daily before meals, Take as directed per insulin instructions.   • gabapentin (NEURONTIN) 900 mg, oral, 3 times daily   • ginseng 200 mg, Daily RT   • icosapent ethyL (VASCEPA) 2 g, oral, 2 times daily (morning and late afternoon)   • metFORMIN (GLUCOPHAGE) 1,000 mg, oral, 2 times daily (morning and late afternoon) " "  • multivitamin capsule 1 tablet, Daily   • neomycin-polymyxin-HC (Cortisporin) otic solution 2 drops, Each Ear, 4 times daily   • PARoxetine (PAXIL) 30 mg, oral, Nightly   • rosuvastatin (CRESTOR) 40 mg, oral, Daily   • Toujeo Max U-300 SoloStar 110 Units, subcutaneous, Nightly   • vitamin B complex tablet 1 tablet, Daily       Allergies  Perfume, Fluticasone propionate, Liraglutide, Lisinopril, Other, and Pollen extracts    Review of Systems   Constitutional:  Positive for fatigue and unexpected weight change. Negative for appetite change and fever.   HENT:  Positive for hearing loss. Negative for sore throat.         Denies dry mouth   Eyes:  Positive for pain and visual disturbance.   Respiratory:  Positive for apnea (sleep). Negative for cough and shortness of breath.    Cardiovascular:  Negative for chest pain.   Gastrointestinal:  Negative for abdominal pain, constipation, diarrhea, nausea and vomiting.   Endocrine: Negative for polydipsia and polyuria.   Genitourinary:  Negative for frequency.   Musculoskeletal:  Positive for myalgias. Negative for arthralgias.   Skin:  Positive for rash.   Neurological:  Positive for weakness and numbness. Negative for headaches.   Psychiatric/Behavioral:  Positive for confusion and dysphoric mood. The patient is nervous/anxious.          Last Recorded Vitals  Blood pressure 107/70, pulse 85, height 1.778 m (5' 10\"), weight 126 kg (278 lb).    Physical Exam  Constitutional:       General: He is not in acute distress.     Appearance: He is obese.   HENT:      Head: Normocephalic.      Mouth/Throat:      Mouth: Mucous membranes are moist.   Eyes:      Extraocular Movements: Extraocular movements intact.   Neck:      Thyroid: No thyroid mass or thyromegaly.   Cardiovascular:      Pulses:           Radial pulses are 2+ on the right side and 2+ on the left side.   Musculoskeletal:      Right lower leg: No edema.      Left lower leg: No edema.   Feet:      Comments: Flat " feet  Lymphadenopathy:      Cervical: No cervical adenopathy.   Skin:     Comments: Spotty erythema, tinea at feet   Neurological:      Mental Status: He is alert.      Motor: No tremor.   Psychiatric:         Mood and Affect: Affect normal.          Relevant Results  Glucose (mg/dL)   Date Value   10/26/2024 170 (H)   09/01/2024 141 (H)   08/31/2024 251 (H)     POC HEMOGLOBIN A1c (%)   Date Value   02/19/2025 7.2 (A)   05/03/2024 7.2 (A)     Hemoglobin A1C (%)   Date Value   08/27/2024 8.0 (H)   11/18/2023 5.9 (H)   08/25/2023 6.8 (H)   08/24/2023 6.9 (H)   07/22/2023 6.7 (A)     Bicarbonate (mmol/L)   Date Value   10/26/2024 27   09/01/2024 22   08/31/2024 21     Urea Nitrogen (mg/dL)   Date Value   10/26/2024 17   09/01/2024 13   08/31/2024 17     Creatinine (mg/dL)   Date Value   10/26/2024 0.89   09/01/2024 0.81   08/31/2024 0.98     Lab Results   Component Value Date    CHOL 108 10/26/2024    CHOL 200 (H) 08/27/2024    CHOL 126 11/18/2023     Lab Results   Component Value Date    HDL 27.6 10/26/2024    HDL 17.2 08/27/2024    HDL 24.0 11/18/2023     Lab Results   Component Value Date    LDLCALC  10/26/2024      Comment:      The calculation of LDL and VLDL are inaccurate when the Triglycerides are greater than 400 mg/dL or when the patient is non-fasting. If LDL measurement is necessary contact the testing laboratory for an alternative LDL assay.                                  Near   Borderline      AGE      Desirable  Optimal    High     High     Very High     0-19 Y     0 - 109     ---    110-129   >/= 130     ----    20-24 Y     0 - 119     ---    120-159   >/= 160     ----      >24 Y     0 -  99   100-129  130-159   160-189     >/=190      LDLCALC  08/27/2024      Comment:      The calculation of LDL and VLDL are inaccurate when the Triglycerides are greater than 400 mg/dL or when the patient is non-fasting. If LDL measurement is necessary contact the testing laboratory for an alternative LDL assay.         LDLCALC  11/18/2023      Comment:      The calculation of LDL and VLDL are inaccurate when the Triglycerides are greater than 400 mg/dL or when the patient is non-fasting. If LDL measurement is necessary contact the testing laboratory for an alternative LDL assay.                                  Near   Borderline      AGE      Desirable  Optimal    High     High     Very High     0-19 Y     0 - 109     ---    110-129   >/= 130     ----    20-24 Y     0 - 119     ---    120-159   >/= 160     ----      >24 Y     0 -  99   100-129  130-159   160-189     >/=190       Lab Results   Component Value Date    TRIG 472 (H) 10/26/2024    TRIG 1,814 (H) 08/27/2024    TRIG 1,102 (H) 11/18/2023     Lab Results   Component Value Date    TSH 1.93 10/26/2024       CGM INTERPRETATION:  Patient is adhering to and benefitting from continuous glucose monitoring.   Model: DexCom G7  Period reviewed:  14 days  Testing glucose 1440 times daily    Average blood sugar 239 mg/dL, glucose management indicator 9%    Glucose less than 70 mg/dL equals 0% of time worn  Glucose ranging between 70 to 180 mg/dL represented by 26% of time worn  Glucose ranging greater than 180 mg/dL represented by 74% of time worn    >72 hours of data reviewed in order to inform diabetes treatment plan decision making, patient currently at risk for recurrent hypoglycemia safety concerns      IMPRESSION  TYPE 2 DIABETES MELLITUS  LONG TERM CURRENT INSULIN USE   Rapid A1c 7.2%  A1c indicates good control, but CGM indicates glucose rising over the past 2-4 weeks.   Dietary indiscretion    Presumed tinea at feet  He may use OTC remedy pending upcoming dermatology appointment      RECOMMENDATIONS  FiAsp 44 units before meals, add 4 for every 50 over glucose 101 mg/dl    Follow up 3-4 months  Labs as ordered by Dr. Gordon.    Decrease fast food  Recommend you drinking alcohol altogether.

## 2025-02-20 DIAGNOSIS — E78.00 HYPERCHOLESTEREMIA: ICD-10-CM

## 2025-02-20 RX ORDER — FENOFIBRATE 200 MG/1
200 CAPSULE ORAL DAILY
Qty: 90 CAPSULE | Refills: 0 | Status: SHIPPED | OUTPATIENT
Start: 2025-02-20

## 2025-02-21 ENCOUNTER — APPOINTMENT (OUTPATIENT)
Dept: RADIOLOGY | Facility: HOSPITAL | Age: 55
End: 2025-02-21
Payer: COMMERCIAL

## 2025-02-21 ENCOUNTER — HOSPITAL ENCOUNTER (EMERGENCY)
Facility: HOSPITAL | Age: 55
Discharge: HOME | End: 2025-02-21
Attending: STUDENT IN AN ORGANIZED HEALTH CARE EDUCATION/TRAINING PROGRAM
Payer: COMMERCIAL

## 2025-02-21 VITALS
TEMPERATURE: 97.3 F | DIASTOLIC BLOOD PRESSURE: 84 MMHG | RESPIRATION RATE: 18 BRPM | SYSTOLIC BLOOD PRESSURE: 123 MMHG | HEART RATE: 75 BPM | WEIGHT: 278.22 LBS | BODY MASS INDEX: 39.83 KG/M2 | OXYGEN SATURATION: 95 % | HEIGHT: 70 IN

## 2025-02-21 DIAGNOSIS — H53.9 TRANSIENT VISION DISTURBANCE: Primary | ICD-10-CM

## 2025-02-21 LAB
ALBUMIN SERPL BCP-MCNC: 4.7 G/DL (ref 3.4–5)
ALP SERPL-CCNC: 106 U/L (ref 33–120)
ALT SERPL W P-5'-P-CCNC: 25 U/L (ref 10–52)
ANION GAP SERPL CALCULATED.3IONS-SCNC: 12 MMOL/L (ref 10–20)
AST SERPL W P-5'-P-CCNC: 25 U/L (ref 9–39)
BASOPHILS # BLD AUTO: 0.06 X10*3/UL (ref 0–0.1)
BASOPHILS NFR BLD AUTO: 0.9 %
BILIRUB SERPL-MCNC: 0.6 MG/DL (ref 0–1.2)
BUN SERPL-MCNC: 12 MG/DL (ref 6–23)
CALCIUM SERPL-MCNC: 9.7 MG/DL (ref 8.6–10.3)
CHLORIDE SERPL-SCNC: 100 MMOL/L (ref 98–107)
CO2 SERPL-SCNC: 27 MMOL/L (ref 21–32)
CREAT SERPL-MCNC: 1.07 MG/DL (ref 0.5–1.3)
CRP SERPL-MCNC: 0.19 MG/DL
EGFRCR SERPLBLD CKD-EPI 2021: 81 ML/MIN/1.73M*2
EOSINOPHIL # BLD AUTO: 0.12 X10*3/UL (ref 0–0.7)
EOSINOPHIL NFR BLD AUTO: 1.7 %
ERYTHROCYTE [DISTWIDTH] IN BLOOD BY AUTOMATED COUNT: 13.3 % (ref 11.5–14.5)
ERYTHROCYTE [SEDIMENTATION RATE] IN BLOOD BY WESTERGREN METHOD: 3 MM/H (ref 0–20)
GLUCOSE SERPL-MCNC: 196 MG/DL (ref 74–99)
HCT VFR BLD AUTO: 43.2 % (ref 41–52)
HGB BLD-MCNC: 14.8 G/DL (ref 13.5–17.5)
IMM GRANULOCYTES # BLD AUTO: 0.05 X10*3/UL (ref 0–0.7)
IMM GRANULOCYTES NFR BLD AUTO: 0.7 % (ref 0–0.9)
LYMPHOCYTES # BLD AUTO: 2.91 X10*3/UL (ref 1.2–4.8)
LYMPHOCYTES NFR BLD AUTO: 41.3 %
MCH RBC QN AUTO: 30.7 PG (ref 26–34)
MCHC RBC AUTO-ENTMCNC: 34.3 G/DL (ref 32–36)
MCV RBC AUTO: 90 FL (ref 80–100)
MONOCYTES # BLD AUTO: 0.6 X10*3/UL (ref 0.1–1)
MONOCYTES NFR BLD AUTO: 8.5 %
NEUTROPHILS # BLD AUTO: 3.3 X10*3/UL (ref 1.2–7.7)
NEUTROPHILS NFR BLD AUTO: 46.9 %
NRBC BLD-RTO: 0 /100 WBCS (ref 0–0)
PLATELET # BLD AUTO: 178 X10*3/UL (ref 150–450)
POTASSIUM SERPL-SCNC: 4.3 MMOL/L (ref 3.5–5.3)
PROT SERPL-MCNC: 7.4 G/DL (ref 6.4–8.2)
RBC # BLD AUTO: 4.82 X10*6/UL (ref 4.5–5.9)
SODIUM SERPL-SCNC: 135 MMOL/L (ref 136–145)
WBC # BLD AUTO: 7 X10*3/UL (ref 4.4–11.3)

## 2025-02-21 PROCEDURE — 80053 COMPREHEN METABOLIC PANEL: CPT

## 2025-02-21 PROCEDURE — 70496 CT ANGIOGRAPHY HEAD: CPT | Performed by: STUDENT IN AN ORGANIZED HEALTH CARE EDUCATION/TRAINING PROGRAM

## 2025-02-21 PROCEDURE — 86140 C-REACTIVE PROTEIN: CPT

## 2025-02-21 PROCEDURE — 85652 RBC SED RATE AUTOMATED: CPT

## 2025-02-21 PROCEDURE — 36415 COLL VENOUS BLD VENIPUNCTURE: CPT

## 2025-02-21 PROCEDURE — 70498 CT ANGIOGRAPHY NECK: CPT | Performed by: STUDENT IN AN ORGANIZED HEALTH CARE EDUCATION/TRAINING PROGRAM

## 2025-02-21 PROCEDURE — 2550000001 HC RX 255 CONTRASTS: Performed by: STUDENT IN AN ORGANIZED HEALTH CARE EDUCATION/TRAINING PROGRAM

## 2025-02-21 PROCEDURE — 70498 CT ANGIOGRAPHY NECK: CPT

## 2025-02-21 PROCEDURE — 85025 COMPLETE CBC W/AUTO DIFF WBC: CPT

## 2025-02-21 PROCEDURE — 99285 EMERGENCY DEPT VISIT HI MDM: CPT | Performed by: STUDENT IN AN ORGANIZED HEALTH CARE EDUCATION/TRAINING PROGRAM

## 2025-02-21 RX ADMIN — IOHEXOL 75 ML: 350 INJECTION, SOLUTION INTRAVENOUS at 19:36

## 2025-02-21 ASSESSMENT — PAIN SCALES - GENERAL
PAINLEVEL_OUTOF10: 0 - NO PAIN
PAINLEVEL_OUTOF10: 3

## 2025-02-21 ASSESSMENT — VISUAL ACUITY
OD: 20/40
OS: 20/30

## 2025-02-21 ASSESSMENT — PAIN - FUNCTIONAL ASSESSMENT: PAIN_FUNCTIONAL_ASSESSMENT: 0-10

## 2025-02-21 NOTE — ED PROVIDER NOTES
HPI   Chief Complaint   Patient presents with    Eye Problem     Pt states he has been having visual disturbances in his right eye for multiple weeks.  States it happens first thing when he wakes up in the morning and lasts for 20 minutes.  Also has pressure in the orbital region. Was seen by his ophthalmologist and was told to come to the ED to get checked for a stroke.        Patient is a 54-year-old male presenting to the emergency department from ophthalmology office for evaluation of changes in vision.  Patient states for the past few weeks every morning when he wakes up he gets a few lines throughout his vision.  He states that it lasts for about 20 minutes and then goes away.  He denies any significant pain in the eye.  He states he does have some pain in the right temple when it occurs as well.  He also states that he has an occasional dull ache in the temple.  Ophthalmology was concerned for possible CVA/TIA versus temporal arteritis.  They sent the patient here for further management.  Patient states that nothing was wrong with his eye.  He denies any significant vision changes at this time.  He denies any chest pain, shortness of breath, fevers, chills, nausea, vomiting abdominal pain.  He denies any one-sided weakness.              Patient History   Past Medical History:   Diagnosis Date    COVID-19 11/23/2020    Pneumonia due to COVID-19 virus    Depression, unspecified 10/26/2022    Depression    Diabetes mellitus (Multi)     Obstructive sleep apnea (adult) (pediatric) 07/11/2022    LITZY on CPAP    Other conditions influencing health status 05/16/2022    Diabetes type 2, uncontrolled    Personal history of other diseases of the digestive system 11/19/2021    H/O acute pancreatitis    Personal history of other diseases of the nervous system and sense organs     History of sleep apnea    Pure hypercholesterolemia, unspecified 10/26/2022    Hypercholesterolemia    Testicular hypofunction 07/01/2021     Hypogonadism male    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     Past Surgical History:   Procedure Laterality Date    CARDIAC CATHETERIZATION N/A 12/6/2023    Procedure: Left Heart Cath;  Surgeon: Lucas Pino MD;  Location: [facility];  Service: Cardiovascular;  Laterality: N/A;  12/6/ am for C 73974 for CAD with angina I25.119 and abnormal cardiac CTA R93.1  Auth # for [employer]:  [id number] 11/10/23-05/08/24    CT ANGIO CORONARY ART WITH HEARTFLOW IF SCORE >30%  10/24/2023    CT ANGIO CORONARY ART WITH HEARTFLOW IF SCORE >30% 10/24/2023 [facility]    OTHER SURGICAL HISTORY  07/13/2020    Cholecystectomy    OTHER SURGICAL HISTORY  07/13/2020    Cranioplasty    OTHER SURGICAL HISTORY  07/13/2020    Tonsillectomy with adenoidectomy    OTHER SURGICAL HISTORY  07/13/2020    Complete colonoscopy    OTHER SURGICAL HISTORY  03/01/2022    Nasal septoplasty    OTHER SURGICAL HISTORY  03/01/2022    Submucous resection of nasal turbinate     Family History   Problem Relation Name Age of Onset    Prostate cancer Father       Social History     Tobacco Use    Smoking status: Never     Passive exposure: Never    Smokeless tobacco: Never   Vaping Use    Vaping status: Never Used   Substance Use Topics    Alcohol use: Never    Drug use: Never       Physical Exam   ED Triage Vitals [02/21/25 1805]   Temperature Heart Rate Respirations BP   37.3 °C (99.2 °F) 85 16 129/77      Pulse Ox Temp src Heart Rate Source Patient Position   100 % -- -- --      BP Location FiO2 (%)     -- --       Physical Exam  Vitals and nursing note reviewed.   Constitutional:       General: He is not in acute distress.     Appearance: Normal appearance. He is not ill-appearing or toxic-appearing.   HENT:      Head: Normocephalic and atraumatic.      Comments: Mild tenderness noted to the right temporal region.  Strong temporal pulse bilaterally.     Nose: Nose normal.      Mouth/Throat:      Mouth: Mucous membranes are  moist.   Eyes:      Extraocular Movements: Extraocular movements intact.      Pupils: Pupils are equal, round, and reactive to light.      Comments: Pupils dilated however patient states that he had his pupils dilated at the ophthalmology office today   Cardiovascular:      Rate and Rhythm: Normal rate and regular rhythm.      Pulses: Normal pulses.   Pulmonary:      Effort: Pulmonary effort is normal.      Breath sounds: Normal breath sounds.   Abdominal:      Palpations: Abdomen is soft.      Tenderness: There is no abdominal tenderness.   Musculoskeletal:         General: Normal range of motion.      Cervical back: Normal range of motion.   Skin:     General: Skin is warm and dry.   Neurological:      General: No focal deficit present.      Mental Status: He is alert and oriented to person, place, and time.   Psychiatric:         Mood and Affect: Mood normal.         Behavior: Behavior normal.           ED Course & MDM   ED Course as of 02/21/25 2117 Fri Feb 21, 2025 1837 EKG Time:1836  EKG Interpretation time:1837  EKG Interpretation: EKG shows normal sinus rhythm with a rate of 78 bpm, left axis deviation, right bundle branch block, QTc 485, no evidence of STEMI.    EKG was interpreted by myself independently [JL]      ED Course User Index  [JL] Troy Ware,          Diagnoses as of 02/21/25 2117   Transient vision disturbance                 No data recorded     [place] Coma Scale Score: 15 (02/21/25 1832 : Nicki Vee RN)       NIH Stroke Scale: 0 (02/21/25 1855 : Francine Best PA-C)                   Medical Decision Making  **Disclaimer parts of this chart have been completed using voice recognition software. Please excuse any errors of transcription.     Patient seen in conjunction with attending physician Dr. Ware.     HPI: Detailed above.    Exam: A medically appropriate exam performed, outlined above, given the known history and presentation.    History obtained from:  Patient    Labs/Diagnostics:  Labs Reviewed   COMPREHENSIVE METABOLIC PANEL - Abnormal       Result Value    Glucose 196 (*)     Sodium 135 (*)     Potassium 4.3      Chloride 100      Bicarbonate 27      Anion Gap 12      Urea Nitrogen 12      Creatinine 1.07      eGFR 81      Calcium 9.7      Albumin 4.7      Alkaline Phosphatase 106      Total Protein 7.4      AST 25      Bilirubin, Total 0.6      ALT 25     SEDIMENTATION RATE, AUTOMATED - Normal    Sedimentation Rate 3     C-REACTIVE PROTEIN - Normal    C-Reactive Protein 0.19     CBC WITH AUTO DIFFERENTIAL    WBC 7.0      nRBC 0.0      RBC 4.82      Hemoglobin 14.8      Hematocrit 43.2      MCV 90      MCH 30.7      MCHC 34.3      RDW 13.3      Platelets 178      Neutrophils % 46.9      Immature Granulocytes %, Automated 0.7      Lymphocytes % 41.3      Monocytes % 8.5      Eosinophils % 1.7      Basophils % 0.9      Neutrophils Absolute 3.30      Immature Granulocytes Absolute, Automated 0.05      Lymphocytes Absolute 2.91      Monocytes Absolute 0.60      Eosinophils Absolute 0.12      Basophils Absolute 0.06       CT angio head and neck w and wo IV contrast   Final Result   No evidence of temporal arteritis including occlusion or inflammatory   wall thickening.        No acute intracranial abnormality.        No hemodynamically significant intracranial or extracranial stenosis,   occlusion, or aneurysm.                       MACRO:   None.        Signed by: Favian Bar 2/21/2025 9:01 PM   Dictation workstation:   [id number]        EMERGENCY DEPARTMENT COURSE and DIFFERENTIAL DIAGNOSIS/MDM:  Patient is a 54-year-old male presenting to the emergency department for evaluation of abnormal vision and pain to the temporal region.  On physical exam vital signs stable and patient is in no acute distress.  Patient has strong temporal pulses bilaterally.  He has some mild tenderness to palpation in the right temporal region.  Patient seen by ophthalmology due  "to concern for possible temporal arteritis or CVA.  CT angio of the head ordered as well as inflammatory markers, CBC, and CMP.  Patient not in any significant pain at this time therefore does not want any pain medications.  No one-sided weakness.  NIH 0.  Sed rate normal CRP normal therefore low suspicion for temporal arteritis.  CMP showed a sodium of 135 but otherwise no further electrolyte normalities.  CBC showed no leukocytosis or anemia.  CT angio of the head and neck showed no evidence of temporal arteritis, no acute intracranial abnormality, and no significant intracranial or extracranial stenosis, occlusion, or aneurysm.  Neurology was consulted Dr. Crisostomo who recommended outpatient follow-up.  She states she does not feel that patient needs to stay in the hospital.  Patient therefore discharged in stable condition.  He will follow-up with neurology outpatient and return to the emergency department with any new or worsening symptoms.    Vitals:    Vitals:    02/21/25 1805 02/21/25 1845 02/21/25 1900 02/21/25 1901   BP: 129/77   127/78   Pulse: 85 88 85 87   Resp: 16   15   Temp: 37.3 °C (99.2 °F)      SpO2: 100% 96% (!) 93% 97%   Weight: 126 kg (278 lb 3.5 oz)      Height: 1.778 m (5' 10\")        History Limited by:    None    Independent history obtained from:    None    External records reviewed:    Outpatient Note ophthalmology note that patient brought    Diagnostics interpreted by me:    CT Scan(s) see MDM    Discussions with other clinicians:    Neurology Dr. Crisostomo    Chronic conditions impacting care:    None    Social determinants of health affecting care:    None    Diagnostic tests considered but not performed: None    ED Medications managed:    Medications   iohexol (OMNIPaque) 350 mg iodine/mL solution 75 mL (75 mL intravenous Given 2/21/25 1936)       Prescription drugs considered:    None    Screenings:  NIH Stroke Scale: 0             Procedure  Procedures     Francine Best PA-C  02/21/25 " 0008

## 2025-02-22 DIAGNOSIS — F32.A DEPRESSION, UNSPECIFIED DEPRESSION TYPE: ICD-10-CM

## 2025-02-22 NOTE — ED PROVIDER NOTES
Patient was seen by both myself and advanced practitioner.  I personally saw the patient and made/approved the management plan and take responsibility for the patient management     Patient is a 54-year-old male that presents emergency department for evaluation of vision changes as well as right-sided headache and temporal pain.  Patient states for the last several weeks he is waking up in the morning with central vision changes along with wavy vision lines.  Last for roughly 20 minutes and then will go away.  He denies any eye pain however has been having some pain in the right temporal region which is slightly tender to palpation.  He also occasionally will have a headache, across the head however this is resolved with Tylenol and an ice pack.  He went to see his ophthalmologist today who states that the vision was normal and the eye itself looked okay however given the patient's temporal pain and intermittent vision changes was concern for possible transient arteritis versus possible amaurosis fugax and sent the patient in for possible stroke workup versus further evaluation for giant cell arteritis.  Patient denies any unilateral weakness, facial droop, slurred speech and states he has no vision changes at this time.    On exam patient uncomfortable appearing but in no obvious distress.  He is awake, alert and oriented.  Pupils are dilated however reactive bilaterally.  Extraocular eye movements are intact.  He is moving all extremities equal with no obvious focal deficit.  Cranials 2 through 12 are intact and he has no vision changes at this time.  Given the concern for possible giant cell arteritis blood work ordered including CBC, CMP, sed rate, CRP along with CT angio of the head and neck with and without contrast.  Patient has an NIH stroke score of 0 at this time and a very low suspicion for stroke given he has no visual changes and no motor focal deficits with normal gait on ambulation.  Blood work was  unremarkable with a normal sed rate of 3, normal CRP of 0.19 and have very low suspicion for giant cell arteritis at this time given patient's normal blood work.  CT angio of the head and neck shows no evidence of temporal arteritis including no evidence of inflammation or occlusion.  I discussed case with neurologist Dr. Crisostomo who agrees that patient can be followed up with as an outpatient for further evaluation as he has no focal neurologic deficit at this time and there is very low suspicion for giant cell arteritis given patient's reassuring workup.  Patient resting comfortably and asymptomatic on reevaluation.  He was given referral for neurology for outpatient and I did discuss the importance of following up for further evaluation.  Return precautions were also discussed with patient.    I independently interpreted the following study(s) EKG shows normal sinus rhythm with a right bundle branch block but no evidence of STEMI.  CT angio of the head and neck with and without contrast which show no evidence of occlusion, no evidence of inflammatory changes with no evidence of temporal arteritis at this time as well as no evidence of aneurysm or stenosis noted.    I personally discussed the patient's management with neurologist Dr. Aleta Crisostomo, who stated she agreed that patient does not need inpatient treatment at this time given reassuring neurologic evaluation and negative workup at this time.  Patient will follow-up closely with neurology as an outpatient.       Troy Ware, DO  02/21/25 [id number]

## 2025-02-22 NOTE — DISCHARGE INSTRUCTIONS
Follow-up with neurology outpatient Dr. Aleta Crisostomo regarding your vision changes.  Return to the emergency department at anytime with any new or worsening symptoms.

## 2025-02-24 RX ORDER — PAROXETINE 30 MG/1
30 TABLET, FILM COATED ORAL NIGHTLY
Qty: 30 TABLET | Refills: 0 | Status: SHIPPED | OUTPATIENT
Start: 2025-02-24

## 2025-02-27 DIAGNOSIS — E11.3413 TYPE 2 DIABETES MELLITUS WITH BOTH EYES AFFECTED BY SEVERE NONPROLIFERATIVE RETINOPATHY AND MACULAR EDEMA, WITH LONG-TERM CURRENT USE OF INSULIN: ICD-10-CM

## 2025-02-27 DIAGNOSIS — E11.42 DIABETIC POLYNEUROPATHY ASSOCIATED WITH TYPE 2 DIABETES MELLITUS (MULTI): ICD-10-CM

## 2025-02-27 DIAGNOSIS — Z79.4 TYPE 2 DIABETES MELLITUS WITH BOTH EYES AFFECTED BY SEVERE NONPROLIFERATIVE RETINOPATHY AND MACULAR EDEMA, WITH LONG-TERM CURRENT USE OF INSULIN: ICD-10-CM

## 2025-02-27 DIAGNOSIS — K75.81 NASH (NONALCOHOLIC STEATOHEPATITIS): ICD-10-CM

## 2025-03-15 DIAGNOSIS — E11.311 DIABETIC RETINOPATHY OF BOTH EYES WITH MACULAR EDEMA ASSOCIATED WITH TYPE 2 DIABETES MELLITUS, UNSPECIFIED RETINOPATHY SEVERITY: ICD-10-CM

## 2025-03-17 RX ORDER — METFORMIN HYDROCHLORIDE 1000 MG/1
TABLET ORAL
Qty: 180 TABLET | Refills: 0 | Status: SHIPPED | OUTPATIENT
Start: 2025-03-17

## 2025-03-22 DIAGNOSIS — E11.9 TYPE 2 DIABETES MELLITUS WITHOUT COMPLICATION, WITH LONG-TERM CURRENT USE OF INSULIN (MULTI): Primary | ICD-10-CM

## 2025-03-22 DIAGNOSIS — Z79.4 TYPE 2 DIABETES MELLITUS WITHOUT COMPLICATION, WITH LONG-TERM CURRENT USE OF INSULIN (MULTI): Primary | ICD-10-CM

## 2025-03-22 RX ORDER — INSULIN LISPRO 100 [IU]/ML
44-64 INJECTION, SOLUTION INTRAVENOUS; SUBCUTANEOUS
Qty: 165 ML | Refills: 3 | Status: SHIPPED | OUTPATIENT
Start: 2025-03-22 | End: 2026-03-22

## 2025-03-23 LAB
CARDIOLIPIN IGA SER IA-ACNC: NORMAL
CARDIOLIPIN IGG SER IA-ACNC: NORMAL
CARDIOLIPIN IGM SER IA-ACNC: NORMAL
EST. AVERAGE GLUCOSE BLD GHB EST-MCNC: 169 MG/DL
EST. AVERAGE GLUCOSE BLD GHB EST-SCNC: 9.3 MMOL/L
HBA1C MFR BLD: 7.5 % OF TOTAL HGB
RPR SER QL: NORMAL
VIT B12 SERPL-MCNC: 648 PG/ML (ref 200–1100)

## 2025-03-24 LAB
EST. AVERAGE GLUCOSE BLD GHB EST-MCNC: 169 MG/DL
EST. AVERAGE GLUCOSE BLD GHB EST-SCNC: 9.3 MMOL/L
HBA1C MFR BLD: 7.5 % OF TOTAL HGB
RPR SER QL: NORMAL
VIT B12 SERPL-MCNC: 648 PG/ML (ref 200–1100)

## 2025-03-25 LAB
CARDIOLIPIN IGA SER IA-ACNC: 4.1 APL-U/ML
CARDIOLIPIN IGG SER IA-ACNC: <2 GPL-U/ML
CARDIOLIPIN IGM SER IA-ACNC: 7.2 MPL-U/ML

## 2025-04-16 ENCOUNTER — APPOINTMENT (OUTPATIENT)
Dept: SLEEP MEDICINE | Facility: CLINIC | Age: 55
End: 2025-04-16
Payer: COMMERCIAL

## 2025-04-16 VITALS
DIASTOLIC BLOOD PRESSURE: 78 MMHG | HEART RATE: 64 BPM | OXYGEN SATURATION: 97 % | SYSTOLIC BLOOD PRESSURE: 126 MMHG | WEIGHT: 280 LBS | BODY MASS INDEX: 40.18 KG/M2

## 2025-04-16 DIAGNOSIS — G47.9 SLEEP DISTURBANCE: ICD-10-CM

## 2025-04-16 DIAGNOSIS — G47.33 OSA (OBSTRUCTIVE SLEEP APNEA): Primary | ICD-10-CM

## 2025-04-16 DIAGNOSIS — R53.82 CHRONIC FATIGUE: ICD-10-CM

## 2025-04-16 DIAGNOSIS — E66.812 CLASS 2 OBESITY DUE TO EXCESS CALORIES WITH BODY MASS INDEX (BMI) OF 39.0 TO 39.9 IN ADULT, UNSPECIFIED WHETHER SERIOUS COMORBIDITY PRESENT: ICD-10-CM

## 2025-04-16 DIAGNOSIS — E66.09 CLASS 2 OBESITY DUE TO EXCESS CALORIES WITH BODY MASS INDEX (BMI) OF 39.0 TO 39.9 IN ADULT, UNSPECIFIED WHETHER SERIOUS COMORBIDITY PRESENT: ICD-10-CM

## 2025-04-16 PROCEDURE — 3078F DIAST BP <80 MM HG: CPT | Performed by: PSYCHIATRY & NEUROLOGY

## 2025-04-16 PROCEDURE — 3051F HG A1C>EQUAL 7.0%<8.0%: CPT | Performed by: PSYCHIATRY & NEUROLOGY

## 2025-04-16 PROCEDURE — 3074F SYST BP LT 130 MM HG: CPT | Performed by: PSYCHIATRY & NEUROLOGY

## 2025-04-16 PROCEDURE — 99215 OFFICE O/P EST HI 40 MIN: CPT | Performed by: PSYCHIATRY & NEUROLOGY

## 2025-04-16 PROCEDURE — 1036F TOBACCO NON-USER: CPT | Performed by: PSYCHIATRY & NEUROLOGY

## 2025-04-16 RX ORDER — NORTRIPTYLINE HYDROCHLORIDE 25 MG/1
25 CAPSULE ORAL NIGHTLY
COMMUNITY
Start: 2025-03-21

## 2025-04-16 NOTE — PROGRESS NOTES
Patient: Jono Quinn    [id number]  : 1970 -- AGE 54 y.o.    Provider: Jon Gonzalez MD     [facility]   Service Date: 2025              [facility]  Follow-up Note        HPI: Jono Quinn is a 54 y.o. male with LITZY on CPAP since the early , s/p tonsillectomy and adenoidectomy (in , after LITZY diagnosis), which resolved symptoms without CPAP until  and he then resumed using CPAP. He is here today for a follow up visit. Last seen by me on 25, at which time he needed a new tube and new mask, was waking up with dry mouth/sinuses/eyes, and occasional trouble  falling asleep due to spending time in the evening on his computer watching training videos for work. I lowered pressure range from 16 to 14-16 cm H2O to allow for slightly water usage to let it last longer, sent in a prescription to DME for new supplies, I showed pt how to self adjust humidity setting on CPAP for his comfort, and advised him to consider buying a humidifier for his bedroom to help moisturize air better.    He did not end up getting new supplies from the DME due to his insurance having changed. He provided them with his info and they have yet to send him new supplies. He will call them about this. Got an external humidifier, which helped significantly to resolve his dry mouth.     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    Lethargy/sleepiness started in teen years and correlated with depression and boredom.    He has dysautonomia and when warm he gets more sleepy.     Brother has narcolepsy with cataplexy. Pt himself has trouble staying asleep, can wake up and play on his phone for an hour. Wakes up in the morning and hits snooze several times. He is sleepy in the daytime,  but wonders if it is due to boredom/depression. Naps at lunchtime and sometimes before dinner, and sometimes cannot sleep at night until after midnight.    Started drinking energy drinks, but he states that this caused him to have pancreatitis, so he is back to napping frequently. Drinks a lot of coffee, instead - a whole pot of coffee before lunch (12 cup equivalent), sometimes has coffee after lunch. Tries to switch to Diet Pepsi in the afternoon.    Denies symptoms of cataplexy.     Has been told he might have hypovolemia. Sodium is slightly low at last check a month ago. He adds salt to his food.    BP is soft at home with systolic 110 and diastolic 60 mm Hg if he has not eaten.    Has diabetic neuropathy with frequent paresthesias in his feet and below his knees his legs can get cold, but does not have clear RLS.    Has a light machine at home.     Gets occasional migraines with aura. Taking vit D supplement. Mood is good, controlled with paroxetine.    Goal is to golf frequently in the summertime.      PAP DEVICE   DME: [facility]  Setup date: 6/2/22  Type: CPAP  Finding benefit: yes  MASK  Type:  Siddiqi Fx large nasal pillow   Fit: good    Patient is keeping the equipment clean and supplies are being renewed at appropriate intervals.     Prior Sleep studies:   -PSG 8/13/1999: pt complained of snoring, EDS, insomnia, excessive movement during sleep, and sinus and nasal problems. BMI 36, ESS 5/24. 22 hypopneas with AHI of 3.2, supine AHI 3.1, REM AHI 8.9. Snoring. No significant desaturations. PLM index 11, PLM arousal index 1.6. Occasional unifocal PVCs.  -Split night PSG 9/24/2012: weight 127.8 kg, BMI 42.2. Pre-CPAP AHI 20.3, supine AHI 24.4, no REM sleep, mean SpO2 94%, dick 86%, max EtCO2 50 mm Hg, 0.5% of sleep time >45 and 0.1% above 50 mm Hg. CPAP titrated from 5-9 cm H2O. Snoring eliminated at 8 cm H2O. AHI <4 at all settings. At 9 cm H2O there were 53 min of sleep, including 38 min of REM and 56%  of time was spent supine, AHI at this setting was 1.1, REM AHI 1.6, arousal index 4.5, mean SpO2 95%, and dick 91%. PLM index 11/h with PLM arousal index 2.6.  -CPAP titration : BMI 40.6. titrated from 9-16 cm H2O. AHI controlled at most settings, but flow loops best at 16 cm H2O, at which AHI was 0. PLM index 58 with essentially no arousals.              REVIEW OF MACHINE DOWNLOAD:   Date Range: 3/16/25-25  % Days used: 100%  % Days with Usage >= 4 hrs: 100%  Average usage days used: 8 h 59 min   Settin-20 cmH2O  95th percentile pressure: 14.9 cmH2O, median pressure 14.1 cmH2O, max pressure 15.9 cmH2O  95th percentile leak: 25 LPM, median leak 0 LPM  Residual AHI: 0.5    Problem List[1]  Medical History[2]  Surgical History[3]  Medications Ordered Prior to Encounter[4]    PHYSICAL EXAMINATION:   Vitals:    25 1153   BP: 126/78   BP Location: Left arm   BP Cuff Size: Adult long   Pulse: 64   SpO2: 97%   Weight: 127 kg (280 lb)     Body mass index is 40.18 kg/m².  General: Awake. Alert. Comfortable. No apparent distress.   Speech: Normal.  Comprehension: Normal.  Mood: Stable.  Affect: Appropriate.  Pul:         Normal respiratory effort.   Abd:         Obese  Neuro: Alert, well-oriented. Cranial nerves II-XII grossly normal and symmetric.  Moves all limbs symmetrically with no evidence of significant focal weakness. No abnormal movements noted. Normal gait    ASSESSMENT AND PLAN: Mr. Jono Quinn is a 54 y.o. male with LITZY on CPAP with some days of fatigue/lethargy.  Differential includes depression, vit D insufficiency (level was just above normal at 34 five months ago), history of anemia (resolved on last CBC), PLMS, dysautonomia, inadequate physical activity, sleep disruption to excessive daytime caffeine use, residual sleepiness from LITZY (though it is now controlled, but was untreated for many years), and cannot rule out narcolepsy (brother has narcolepsy) or idiopathic hypersomnia.    #LITZY -  Patient's sleep apnea is under very good control with CPAP, he is deriving significant subjective benefit from treatment, his compliance is excellent, and mask leak is well controlled overall.  -continue nightly CPAP at current settings    #fatigue, chronic unspecified  #sleep disturbance   -advised pt to work on cutting back caffeine and increase daytime physical activity so as to hopefully get better, less disrupted sleep  -consider having pt increase nighttime gabapentin if PLMS are the culprit  -other providers to continue managing his mood and metabolic issues  -consider PSG/MSLT, though narcolepsy is low on my differential    #obesity  -Increased physical activity was encouraged    All of the above was discussed with the patient in detail. He voiced an understanding of the above and was agreeable to proceed further as advised.     51 minutes were spent with the patient plus time spent reviewing the chart, updating the chart as needed, and documenting.     FOLLOW UP: 3 months         [1]   Patient Active Problem List  Diagnosis    Abnormal screening cardiac CT    Allergic rhinitis    Candidal intertrigo    Carotid atherosclerosis    Depression    Diabetes mellitus with neuropathy    Disturbance of salivary secretion    Fall    GERD (gastroesophageal reflux disease)    Hoarse voice quality    Hypercholesterolemia    Hypogonadism, male    Nasal congestion    LITZY (obstructive sleep apnea)    PAD (peripheral artery disease) ([facility])    Postnasal drip    Ayers disease    Stasis, venous    Vitamin D deficiency    Acute calculous cholecystitis    Bell's palsy    Diabetic autonomic neuropathy associated with type 2 diabetes mellitus    Diabetic polyneuropathy associated with type 2 diabetes mellitus    Hypertriglyceridemia    Hypertension associated with diabetes    MDD (major depressive disorder), recurrent severe, without psychosis (Multi)    DHILLON (nonalcoholic steatohepatitis)    Type 2 diabetes mellitus with  severe nonproliferative retinopathy of both eyes and macular edema    Anxiety disorder    Chest pain    Short of breath on exertion    SOB (shortness of breath)    Benign essential hypertension    Diabetic retinopathy (Multi)    Diplopia    Type 2 diabetes mellitus with obesity (Multi)    Acute pancreatitis    Coronary artery disease involving native coronary artery of native heart without angina pectoris    Disorder of soft tissue    Fatigue    Headache    Insomnia    Obesity due to energy imbalance    Severe obesity (Multi)    Obesity with body mass index 30 or greater    Severe acute respiratory syndrome coronavirus 2 (SARS-CoV-2) infection ruled out    Thyroid nodule    Abnormal computed tomography scan    Acute cholecystitis due to biliary calculus    Intertriginous candidiasis    Abdominal pain    Syncope and collapse    Left facial numbness    Acute pancreatitis, unspecified complication status, unspecified pancreatitis type ([facility])    Hyponatremia    Physical deconditioning    Weakness   [2]   Past Medical History:  Diagnosis Date    COVID-19 11/23/2020    Pneumonia due to COVID-19 virus    Depression, unspecified 10/26/2022    Depression    Diabetes mellitus (Multi)     Obstructive sleep apnea (adult) (pediatric) 07/11/2022    LITZY on CPAP    Other conditions influencing health status 05/16/2022    Diabetes type 2, uncontrolled    Personal history of other diseases of the digestive system 11/19/2021    H/O acute pancreatitis    Personal history of other diseases of the nervous system and sense organs     History of sleep apnea    Pure hypercholesterolemia, unspecified 10/26/2022    Hypercholesterolemia    Testicular hypofunction 07/01/2021    Hypogonadism male    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   [3]   Past Surgical History:  Procedure Laterality Date    CARDIAC CATHETERIZATION N/A 12/6/2023    Procedure: Left Heart Cath;  Surgeon: Lucas Pino MD;  Location: [facility] Cardiac Cath  "Lab;  Service: Cardiovascular;  Laterality: N/A;  12/6/ am for [facility] [id number] for CAD with angina I25.119 and abnormal cardiac CTA R93.1  Auth # for [employer]:  [id number] 11/10/23-05/08/24    CT ANGIO CORONARY ART WITH HEARTFLOW IF SCORE >30%  10/24/2023    CT ANGIO CORONARY ART WITH HEARTFLOW IF SCORE >30% 10/24/2023 [facility] CT    OTHER SURGICAL HISTORY  07/13/2020    Cholecystectomy    OTHER SURGICAL HISTORY  07/13/2020    Cranioplasty    OTHER SURGICAL HISTORY  07/13/2020    Tonsillectomy with adenoidectomy    OTHER SURGICAL HISTORY  07/13/2020    Complete colonoscopy    OTHER SURGICAL HISTORY  03/01/2022    Nasal septoplasty    OTHER SURGICAL HISTORY  03/01/2022    Submucous resection of nasal turbinate   [4]   Current Outpatient Medications on File Prior to Visit   Medication Sig Dispense Refill    amitriptyline (Elavil) 10 mg tablet Take 1 tablet (10 mg) by mouth once daily at bedtime. 90 tablet 0    aspirin 81 mg EC tablet Take 1 tablet (81 mg) by mouth 2 times a day.      azelastine (Astelin) 137 mcg (0.1 %) nasal spray Administer 2 sprays into affected nostril(s) once daily.      BD Ultra-Fine Mini Pen Needle 31 gauge x 3/16\" needle Four times daily 400 each 3    busPIRone (Buspar) 15 mg tablet Take 1 tablet (15 mg) by mouth 3 times a day.      cholecalciferol (Vitamin D3) 50 mcg (2,000 unit) capsule Take by mouth once daily.      Dexcom G7  misc Inject 1 Device under the skin if needed. Use as instructed      Dexcom G7 Sensor device For continuous glucose monitoring.  Change every 10 days. 9 each 3    empagliflozin (Jardiance) 25 mg Take 1 tablet (25 mg) by mouth once daily. 90 tablet 3    fenofibrate micronized (Lofibra) 200 mg capsule TAKE 1 CAPSULE BY MOUTH EVERY DAY 90 capsule 0    Fiasp FlexTouch U-100 Insulin 100 unit/mL (3 mL) injection Inject 40-60 Units under the skin 3 times a day before meals. Take as directed per insulin instructions. 150 mL 3    gabapentin (Neurontin) 300 mg capsule " Take 3 capsules (900 mg) by mouth 3 times a day. 270 capsule 2    ginseng 100 mg capsule Take 200 mg by mouth once daily.      icosapent ethyL (Vascepa) 1 gram capsule Take 2 capsules (2 g) by mouth 2 times daily (morning and late afternoon). 360 capsule 0    insulin lispro (HumaLOG KwikPen Insulin) 100 unit/mL pen Inject 44-64 Units under the skin 3 times a day before meals. Take as directed per insulin instructions. 165 mL 3    metFORMIN (Glucophage) 1,000 mg tablet TAKE 1 TABLET BY MOUTH TWICE DAILY (MORNING  AND  LATE  AFTERNOON) 180 tablet 0    multivitamin capsule Take 1 capsule by mouth once daily.      PARoxetine (Paxil) 30 mg tablet TAKE 1 TABLET (30 MG) BY MOUTH ONCE DAILY AT BEDTIME. 30 tablet 0    rosuvastatin (Crestor) 40 mg tablet Take 1 tablet (40 mg) by mouth once daily. 30 tablet 0    Toujeo Max U-300 SoloStar 300 unit/mL (3 mL) injection Inject 110 Units under the skin once daily at bedtime. 36 mL 3    vitamin B complex tablet Take 1 tablet by mouth once daily.       No current facility-administered medications on file prior to visit.

## 2025-05-20 ENCOUNTER — APPOINTMENT (OUTPATIENT)
Dept: PRIMARY CARE | Facility: CLINIC | Age: 55
End: 2025-05-20
Payer: COMMERCIAL

## 2025-05-20 VITALS
TEMPERATURE: 96.7 F | BODY MASS INDEX: 39.77 KG/M2 | WEIGHT: 277.2 LBS | SYSTOLIC BLOOD PRESSURE: 110 MMHG | DIASTOLIC BLOOD PRESSURE: 72 MMHG | OXYGEN SATURATION: 97 % | HEART RATE: 88 BPM

## 2025-05-20 DIAGNOSIS — F32.A DEPRESSION, UNSPECIFIED DEPRESSION TYPE: ICD-10-CM

## 2025-05-20 DIAGNOSIS — K21.9 GASTROESOPHAGEAL REFLUX DISEASE WITHOUT ESOPHAGITIS: ICD-10-CM

## 2025-05-20 DIAGNOSIS — Z12.11 ENCOUNTER FOR SCREENING FOR MALIGNANT NEOPLASM OF COLON: ICD-10-CM

## 2025-05-20 DIAGNOSIS — E66.01 CLASS 2 SEVERE OBESITY DUE TO EXCESS CALORIES WITH SERIOUS COMORBIDITY AND BODY MASS INDEX (BMI) OF 39.0 TO 39.9 IN ADULT: ICD-10-CM

## 2025-05-20 DIAGNOSIS — E55.9 VITAMIN D DEFICIENCY: ICD-10-CM

## 2025-05-20 DIAGNOSIS — I25.10 CORONARY ARTERY DISEASE INVOLVING NATIVE CORONARY ARTERY OF NATIVE HEART WITHOUT ANGINA PECTORIS: ICD-10-CM

## 2025-05-20 DIAGNOSIS — E66.812 CLASS 2 SEVERE OBESITY DUE TO EXCESS CALORIES WITH SERIOUS COMORBIDITY AND BODY MASS INDEX (BMI) OF 39.0 TO 39.9 IN ADULT: ICD-10-CM

## 2025-05-20 DIAGNOSIS — E78.00 HYPERCHOLESTEREMIA: ICD-10-CM

## 2025-05-20 DIAGNOSIS — I73.9 PAD (PERIPHERAL ARTERY DISEASE): ICD-10-CM

## 2025-05-20 DIAGNOSIS — E08.311: Primary | ICD-10-CM

## 2025-05-20 PROBLEM — E11.42 DIABETIC POLYNEUROPATHY ASSOCIATED WITH TYPE 2 DIABETES MELLITUS: Status: RESOLVED | Noted: 2018-01-08 | Resolved: 2025-05-20

## 2025-05-20 PROBLEM — E11.40 DIABETES MELLITUS WITH NEUROPATHY: Status: RESOLVED | Noted: 2023-04-26 | Resolved: 2025-05-20

## 2025-05-20 PROBLEM — E11.8 TYPE 2 DIABETES MELLITUS WITH UNSPECIFIED COMPLICATIONS: Status: RESOLVED | Noted: 2023-10-06 | Resolved: 2025-05-20

## 2025-05-20 PROBLEM — E11.59 HYPERTENSION ASSOCIATED WITH DIABETES: Status: RESOLVED | Noted: 2023-05-03 | Resolved: 2025-05-20

## 2025-05-20 PROBLEM — E11.3413: Status: RESOLVED | Noted: 2023-06-28 | Resolved: 2025-05-20

## 2025-05-20 PROBLEM — E11.319 DIABETIC RETINOPATHY (MULTI): Status: RESOLVED | Noted: 2023-10-06 | Resolved: 2025-05-20

## 2025-05-20 PROBLEM — I15.2 HYPERTENSION ASSOCIATED WITH DIABETES: Status: RESOLVED | Noted: 2023-05-03 | Resolved: 2025-05-20

## 2025-05-20 PROBLEM — E11.8 TYPE 2 DIABETES MELLITUS WITH UNSPECIFIED COMPLICATIONS: Status: ACTIVE | Noted: 2023-10-06

## 2025-05-20 PROCEDURE — 1036F TOBACCO NON-USER: CPT | Performed by: INTERNAL MEDICINE

## 2025-05-20 PROCEDURE — 3074F SYST BP LT 130 MM HG: CPT | Performed by: INTERNAL MEDICINE

## 2025-05-20 PROCEDURE — 99214 OFFICE O/P EST MOD 30 MIN: CPT | Performed by: INTERNAL MEDICINE

## 2025-05-20 PROCEDURE — 3051F HG A1C>EQUAL 7.0%<8.0%: CPT | Performed by: INTERNAL MEDICINE

## 2025-05-20 PROCEDURE — 3078F DIAST BP <80 MM HG: CPT | Performed by: INTERNAL MEDICINE

## 2025-05-20 RX ORDER — ACETAMINOPHEN 500 MG
100 TABLET ORAL DAILY
COMMUNITY
Start: 2025-05-20

## 2025-05-20 RX ORDER — ROSUVASTATIN CALCIUM 40 MG/1
40 TABLET, COATED ORAL DAILY
Qty: 90 TABLET | Refills: 0 | Status: SHIPPED | OUTPATIENT
Start: 2025-05-20 | End: 2025-08-18

## 2025-05-20 RX ORDER — FENOFIBRATE 200 MG/1
200 CAPSULE ORAL DAILY
Qty: 90 CAPSULE | Refills: 0 | Status: SHIPPED | OUTPATIENT
Start: 2025-05-20

## 2025-05-20 RX ORDER — GABAPENTIN 300 MG/1
900 CAPSULE ORAL 3 TIMES DAILY
COMMUNITY
Start: 2025-05-20

## 2025-05-20 NOTE — PROGRESS NOTES
Subjective   Patient ID: Jono Quinn is a 54 y.o. male who presents for Follow-up (3 month) and Medication Reaction (Feels drowsy //States to an interaction with medications  ).  HPI    Routine follow up    Rash on ankles  Derm consult not done     retinopathy with macular edema  Retinal specialist following  Counseled     acute pancreatitis 3rd bout     Residual epigastric pain     No diarrhea or fever     Eating better, bowels normalizing     H/o [place] palsy    CAD / hypercholesterolemia on rx   Cardio following  Cath 12-6 -23      thyroid nodule   Thyroid ultrasound neg 10-23     DM type II -insulin-dependent  FBS high  Continue meds  Follow blood sugars closely.  HBA1C 8  8-24  Endo following      GERD stable on diet     Depression  better on therapy no side effects  Psych following     Vitamin D deficiency on suplementation     Diet and exercise reviewed  Stop eating pizza and junk foods     Colonoscopy ordered    Review of Systems   All other systems reviewed and are negative.      Objective   /72   Pulse 88   Temp 35.9 °C (96.7 °F)   Wt 126 kg (277 lb 3.2 oz)   SpO2 97%   BMI 39.77 kg/m²   Lab Results   Component Value Date    WBC 7.0 02/21/2025    HGB 14.8 02/21/2025    HCT 43.2 02/21/2025     02/21/2025    CHOL 108 10/26/2024    TRIG 472 (H) 10/26/2024    HDL 27.6 10/26/2024    LDLDIRECT 32 03/05/2022    ALT 25 02/21/2025    AST 25 02/21/2025     (L) 02/21/2025    K 4.3 02/21/2025     02/21/2025    CREATININE 1.07 02/21/2025    BUN 12 02/21/2025    CO2 27 02/21/2025    TSH 1.93 10/26/2024    INR 0.9 08/27/2024    HGBA1C 7.5 (H) 03/22/2025           Physical Exam  Vitals reviewed.   Constitutional:       Appearance: Normal appearance. He is obese.   HENT:      Head: Normocephalic and atraumatic.      Mouth/Throat:      Pharynx: No posterior oropharyngeal erythema.   Eyes:      General: No scleral icterus.     Conjunctiva/sclera: Conjunctivae normal.      Pupils: Pupils are  equal, round, and reactive to light.   Cardiovascular:      Rate and Rhythm: Normal rate and regular rhythm.      Heart sounds: Normal heart sounds.   Pulmonary:      Effort: No respiratory distress.      Breath sounds: No wheezing.   Abdominal:      General: Abdomen is flat. Bowel sounds are normal. There is no distension.      Palpations: Abdomen is soft. There is no mass.      Tenderness: There is no abdominal tenderness. There is no rebound.   Musculoskeletal:         General: Normal range of motion.      Cervical back: Normal range of motion and neck supple.   Skin:     General: Skin is warm and dry.   Neurological:      General: No focal deficit present.      Mental Status: He is alert and oriented to person, place, and time. Mental status is at baseline.   Psychiatric:         Mood and Affect: Mood normal.         Behavior: Behavior normal.         Thought Content: Thought content normal.         Judgment: Judgment normal.         Problem List Items Addressed This Visit           ICD-10-CM    Depression F32.A    GERD (gastroesophageal reflux disease) K21.9    PAD (peripheral artery disease) I73.9    Relevant Medications    gabapentin (Neurontin) 300 mg capsule    Vitamin D deficiency E55.9    Relevant Medications    cholecalciferol (Vitamin D3) 50 mcg (2,000 units) capsule    Coronary artery disease involving native coronary artery of native heart without angina pectoris I25.10    Relevant Medications    rosuvastatin (Crestor) 40 mg tablet     Other Visit Diagnoses         Codes      Diabetes mellitus due to underlying condition with both eyes affected by retinopathy and macular edema, unspecified retinopathy severity, unspecified whether long term insulin use    -  Primary E08.311      Hypercholesteremia     E78.00    Relevant Medications    fenofibrate micronized (Lofibra) 200 mg capsule    rosuvastatin (Crestor) 40 mg tablet      Encounter for screening for malignant neoplasm of colon     Z12.11     Relevant Orders    Colonoscopy Screening; Average Risk Patient      Class 2 severe obesity due to excess calories with serious comorbidity and body mass index (BMI) of 39.0 to 39.9 in adult     E66.812, E66.01, Z68.39          Assessment/Plan     Rash on ankles  Derm consult not done     retinopathy with macular edema  Retinal specialist following / missed last appt  Counseled     H/o pancreatitis 3rd bout     H/o [place] palsy    CAD / hypercholesterolemia on rx   Cardio following  Cath 12-6 -23      thyroid nodule   Thyroid ultrasound neg 10-23     DM type II -insulin-dependent  FBS high  Continue meds  Follow blood sugars closely.  HBA1C 8  8-24  Endo following   Poor dietary habits discussed     GERD stable on diet     Depression  better on therapy possible side effects  Psych following     Vitamin D deficiency on suplementation     Diet and exercise reviewed  Stop eating pizza and junk foods since has been eating out more     Colonoscopy ordered    Prostate 10-24  Colonoscopy none  CT chest lung cancer screening n/a  immunizations rev'd flu  BMI 39.7     Follow up 3 months / yearly physical

## 2025-05-30 DIAGNOSIS — Z12.11 COLON CANCER SCREENING: ICD-10-CM

## 2025-06-02 RX ORDER — SODIUM, POTASSIUM,MAG SULFATES 17.5-3.13G
SOLUTION, RECONSTITUTED, ORAL ORAL
Qty: 354 ML | Refills: 0 | Status: SHIPPED | OUTPATIENT
Start: 2025-06-02

## 2025-06-09 ENCOUNTER — APPOINTMENT (OUTPATIENT)
Dept: ENDOCRINOLOGY | Facility: CLINIC | Age: 55
End: 2025-06-09
Payer: COMMERCIAL

## 2025-06-11 DIAGNOSIS — Z79.4 TYPE 2 DIABETES MELLITUS WITHOUT COMPLICATION, WITH LONG-TERM CURRENT USE OF INSULIN: ICD-10-CM

## 2025-06-11 DIAGNOSIS — E11.9 TYPE 2 DIABETES MELLITUS WITHOUT COMPLICATION, WITH LONG-TERM CURRENT USE OF INSULIN: ICD-10-CM

## 2025-06-12 ENCOUNTER — OFFICE VISIT (OUTPATIENT)
Dept: PRIMARY CARE | Facility: CLINIC | Age: 55
End: 2025-06-12
Payer: COMMERCIAL

## 2025-06-12 ENCOUNTER — APPOINTMENT (OUTPATIENT)
Dept: RADIOLOGY | Facility: HOSPITAL | Age: 55
End: 2025-06-12
Payer: COMMERCIAL

## 2025-06-12 ENCOUNTER — HOSPITAL ENCOUNTER (OUTPATIENT)
Facility: HOSPITAL | Age: 55
Setting detail: OBSERVATION
Discharge: HOME | End: 2025-06-14
Attending: INTERNAL MEDICINE | Admitting: INTERNAL MEDICINE
Payer: COMMERCIAL

## 2025-06-12 ENCOUNTER — APPOINTMENT (OUTPATIENT)
Dept: CARDIOLOGY | Facility: HOSPITAL | Age: 55
End: 2025-06-12
Payer: COMMERCIAL

## 2025-06-12 VITALS
WEIGHT: 296 LBS | HEART RATE: 96 BPM | DIASTOLIC BLOOD PRESSURE: 58 MMHG | BODY MASS INDEX: 42.47 KG/M2 | SYSTOLIC BLOOD PRESSURE: 122 MMHG | OXYGEN SATURATION: 99 % | TEMPERATURE: 97 F

## 2025-06-12 DIAGNOSIS — E66.813 CLASS 3 SEVERE OBESITY DUE TO EXCESS CALORIES WITH SERIOUS COMORBIDITY AND BODY MASS INDEX (BMI) OF 40.0 TO 44.9 IN ADULT: ICD-10-CM

## 2025-06-12 DIAGNOSIS — E78.00 HYPERCHOLESTEREMIA: ICD-10-CM

## 2025-06-12 DIAGNOSIS — E08.311: ICD-10-CM

## 2025-06-12 DIAGNOSIS — I50.9 ACUTE HEART FAILURE, UNSPECIFIED HEART FAILURE TYPE: Primary | ICD-10-CM

## 2025-06-12 DIAGNOSIS — F32.A DEPRESSION, UNSPECIFIED DEPRESSION TYPE: ICD-10-CM

## 2025-06-12 DIAGNOSIS — R06.02 SHORTNESS OF BREATH: Primary | ICD-10-CM

## 2025-06-12 DIAGNOSIS — E55.9 VITAMIN D DEFICIENCY: ICD-10-CM

## 2025-06-12 DIAGNOSIS — I50.9 CONGESTIVE HEART FAILURE, UNSPECIFIED HF CHRONICITY, UNSPECIFIED HEART FAILURE TYPE: ICD-10-CM

## 2025-06-12 DIAGNOSIS — I50.9 HEART FAILURE, UNSPECIFIED HF CHRONICITY, UNSPECIFIED HEART FAILURE TYPE: ICD-10-CM

## 2025-06-12 DIAGNOSIS — K21.9 GASTROESOPHAGEAL REFLUX DISEASE WITHOUT ESOPHAGITIS: ICD-10-CM

## 2025-06-12 DIAGNOSIS — I25.10 CORONARY ARTERY DISEASE INVOLVING NATIVE CORONARY ARTERY OF NATIVE HEART WITHOUT ANGINA PECTORIS: ICD-10-CM

## 2025-06-12 DIAGNOSIS — R60.9 EDEMA, UNSPECIFIED TYPE: ICD-10-CM

## 2025-06-12 LAB
ALBUMIN SERPL BCP-MCNC: 4.3 G/DL (ref 3.4–5)
ALP SERPL-CCNC: 93 U/L (ref 33–120)
ALT SERPL W P-5'-P-CCNC: 54 U/L (ref 10–52)
ANION GAP SERPL CALC-SCNC: 14 MMOL/L (ref 10–20)
AST SERPL W P-5'-P-CCNC: 37 U/L (ref 9–39)
BASOPHILS # BLD AUTO: 0.03 X10*3/UL (ref 0–0.1)
BASOPHILS NFR BLD AUTO: 0.6 %
BILIRUB SERPL-MCNC: 0.4 MG/DL (ref 0–1.2)
BNP SERPL-MCNC: 20 PG/ML (ref 0–99)
BUN SERPL-MCNC: 13 MG/DL (ref 6–23)
CALCIUM SERPL-MCNC: 9.6 MG/DL (ref 8.6–10.3)
CARDIAC TROPONIN I PNL SERPL HS: <3 NG/L (ref 0–20)
CARDIAC TROPONIN I PNL SERPL HS: <3 NG/L (ref 0–20)
CHLORIDE SERPL-SCNC: 99 MMOL/L (ref 98–107)
CO2 SERPL-SCNC: 28 MMOL/L (ref 21–32)
CREAT SERPL-MCNC: 0.79 MG/DL (ref 0.5–1.3)
EGFRCR SERPLBLD CKD-EPI 2021: >90 ML/MIN/1.73M*2
EOSINOPHIL # BLD AUTO: 0.12 X10*3/UL (ref 0–0.7)
EOSINOPHIL NFR BLD AUTO: 2.4 %
ERYTHROCYTE [DISTWIDTH] IN BLOOD BY AUTOMATED COUNT: 13.5 % (ref 11.5–14.5)
GLUCOSE BLD MANUAL STRIP-MCNC: 242 MG/DL (ref 74–99)
GLUCOSE SERPL-MCNC: 252 MG/DL (ref 74–99)
HCT VFR BLD AUTO: 37.5 % (ref 41–52)
HGB BLD-MCNC: 12.9 G/DL (ref 13.5–17.5)
IMM GRANULOCYTES # BLD AUTO: 0.06 X10*3/UL (ref 0–0.7)
IMM GRANULOCYTES NFR BLD AUTO: 1.2 % (ref 0–0.9)
LYMPHOCYTES # BLD AUTO: 1.65 X10*3/UL (ref 1.2–4.8)
LYMPHOCYTES NFR BLD AUTO: 32.7 %
MAGNESIUM SERPL-MCNC: 1.98 MG/DL (ref 1.6–2.4)
MCH RBC QN AUTO: 32.1 PG (ref 26–34)
MCHC RBC AUTO-ENTMCNC: 34.4 G/DL (ref 32–36)
MCV RBC AUTO: 93 FL (ref 80–100)
MONOCYTES # BLD AUTO: 0.49 X10*3/UL (ref 0.1–1)
MONOCYTES NFR BLD AUTO: 9.7 %
NEUTROPHILS # BLD AUTO: 2.69 X10*3/UL (ref 1.2–7.7)
NEUTROPHILS NFR BLD AUTO: 53.4 %
NRBC BLD-RTO: 0 /100 WBCS (ref 0–0)
PLATELET # BLD AUTO: 161 X10*3/UL (ref 150–450)
POTASSIUM SERPL-SCNC: 3.9 MMOL/L (ref 3.5–5.3)
PROT SERPL-MCNC: 6.8 G/DL (ref 6.4–8.2)
RBC # BLD AUTO: 4.02 X10*6/UL (ref 4.5–5.9)
SODIUM SERPL-SCNC: 137 MMOL/L (ref 136–145)
WBC # BLD AUTO: 5 X10*3/UL (ref 4.4–11.3)

## 2025-06-12 PROCEDURE — 2500000002 HC RX 250 W HCPCS SELF ADMINISTERED DRUGS (ALT 637 FOR MEDICARE OP, ALT 636 FOR OP/ED): Performed by: INTERNAL MEDICINE

## 2025-06-12 PROCEDURE — 36415 COLL VENOUS BLD VENIPUNCTURE: CPT | Performed by: EMERGENCY MEDICINE

## 2025-06-12 PROCEDURE — 2500000002 HC RX 250 W HCPCS SELF ADMINISTERED DRUGS (ALT 637 FOR MEDICARE OP, ALT 636 FOR OP/ED): Performed by: NURSE PRACTITIONER

## 2025-06-12 PROCEDURE — 96372 THER/PROPH/DIAG INJ SC/IM: CPT | Performed by: NURSE PRACTITIONER

## 2025-06-12 PROCEDURE — 83880 ASSAY OF NATRIURETIC PEPTIDE: CPT | Performed by: EMERGENCY MEDICINE

## 2025-06-12 PROCEDURE — 71046 X-RAY EXAM CHEST 2 VIEWS: CPT

## 2025-06-12 PROCEDURE — 85025 COMPLETE CBC W/AUTO DIFF WBC: CPT | Performed by: EMERGENCY MEDICINE

## 2025-06-12 PROCEDURE — 71275 CT ANGIOGRAPHY CHEST: CPT

## 2025-06-12 PROCEDURE — 84484 ASSAY OF TROPONIN QUANT: CPT | Performed by: EMERGENCY MEDICINE

## 2025-06-12 PROCEDURE — 3078F DIAST BP <80 MM HG: CPT | Performed by: INTERNAL MEDICINE

## 2025-06-12 PROCEDURE — 82947 ASSAY GLUCOSE BLOOD QUANT: CPT

## 2025-06-12 PROCEDURE — 93005 ELECTROCARDIOGRAM TRACING: CPT

## 2025-06-12 PROCEDURE — 83735 ASSAY OF MAGNESIUM: CPT

## 2025-06-12 PROCEDURE — G0378 HOSPITAL OBSERVATION PER HR: HCPCS

## 2025-06-12 PROCEDURE — 93000 ELECTROCARDIOGRAM COMPLETE: CPT | Performed by: INTERNAL MEDICINE

## 2025-06-12 PROCEDURE — 2500000001 HC RX 250 WO HCPCS SELF ADMINISTERED DRUGS (ALT 637 FOR MEDICARE OP): Performed by: NURSE PRACTITIONER

## 2025-06-12 PROCEDURE — 2550000001 HC RX 255 CONTRASTS

## 2025-06-12 PROCEDURE — 94760 N-INVAS EAR/PLS OXIMETRY 1: CPT

## 2025-06-12 PROCEDURE — 1036F TOBACCO NON-USER: CPT | Performed by: INTERNAL MEDICINE

## 2025-06-12 PROCEDURE — 71275 CT ANGIOGRAPHY CHEST: CPT | Mod: FOREIGN READ | Performed by: RADIOLOGY

## 2025-06-12 PROCEDURE — 2500000004 HC RX 250 GENERAL PHARMACY W/ HCPCS (ALT 636 FOR OP/ED): Performed by: NURSE PRACTITIONER

## 2025-06-12 PROCEDURE — 99285 EMERGENCY DEPT VISIT HI MDM: CPT | Mod: 25

## 2025-06-12 PROCEDURE — 71046 X-RAY EXAM CHEST 2 VIEWS: CPT | Mod: FOREIGN READ | Performed by: RADIOLOGY

## 2025-06-12 PROCEDURE — 3051F HG A1C>EQUAL 7.0%<8.0%: CPT | Performed by: INTERNAL MEDICINE

## 2025-06-12 PROCEDURE — 3074F SYST BP LT 130 MM HG: CPT | Performed by: INTERNAL MEDICINE

## 2025-06-12 PROCEDURE — 80053 COMPREHEN METABOLIC PANEL: CPT | Performed by: EMERGENCY MEDICINE

## 2025-06-12 PROCEDURE — 99214 OFFICE O/P EST MOD 30 MIN: CPT | Performed by: INTERNAL MEDICINE

## 2025-06-12 RX ORDER — DEXTROSE 50 % IN WATER (D50W) INTRAVENOUS SYRINGE
25
Status: DISCONTINUED | OUTPATIENT
Start: 2025-06-12 | End: 2025-06-14 | Stop reason: HOSPADM

## 2025-06-12 RX ORDER — PAROXETINE 20 MG/1
30 TABLET, FILM COATED ORAL NIGHTLY
Status: DISCONTINUED | OUTPATIENT
Start: 2025-06-12 | End: 2025-06-14 | Stop reason: HOSPADM

## 2025-06-12 RX ORDER — TALC
6 POWDER (GRAM) TOPICAL NIGHTLY PRN
Status: DISCONTINUED | OUTPATIENT
Start: 2025-06-12 | End: 2025-06-14 | Stop reason: HOSPADM

## 2025-06-12 RX ORDER — NORTRIPTYLINE HYDROCHLORIDE 25 MG/1
25 CAPSULE ORAL NIGHTLY
Status: DISCONTINUED | OUTPATIENT
Start: 2025-06-12 | End: 2025-06-14 | Stop reason: HOSPADM

## 2025-06-12 RX ORDER — CHOLECALCIFEROL (VITAMIN D3) 25 MCG
100 TABLET ORAL DAILY
Status: DISCONTINUED | OUTPATIENT
Start: 2025-06-12 | End: 2025-06-14 | Stop reason: HOSPADM

## 2025-06-12 RX ORDER — INSULIN GLARGINE 100 [IU]/ML
25 INJECTION, SOLUTION SUBCUTANEOUS NIGHTLY
Status: DISCONTINUED | OUTPATIENT
Start: 2025-06-12 | End: 2025-06-14 | Stop reason: HOSPADM

## 2025-06-12 RX ORDER — ACETAMINOPHEN 325 MG/1
650 TABLET ORAL EVERY 4 HOURS PRN
Status: DISCONTINUED | OUTPATIENT
Start: 2025-06-12 | End: 2025-06-14 | Stop reason: HOSPADM

## 2025-06-12 RX ORDER — GABAPENTIN 300 MG/1
900 CAPSULE ORAL 3 TIMES DAILY
Status: DISCONTINUED | OUTPATIENT
Start: 2025-06-12 | End: 2025-06-14 | Stop reason: HOSPADM

## 2025-06-12 RX ORDER — ENOXAPARIN SODIUM 100 MG/ML
40 INJECTION SUBCUTANEOUS EVERY 12 HOURS SCHEDULED
Status: DISCONTINUED | OUTPATIENT
Start: 2025-06-12 | End: 2025-06-14 | Stop reason: HOSPADM

## 2025-06-12 RX ORDER — DEXTROSE 50 % IN WATER (D50W) INTRAVENOUS SYRINGE
12.5
Status: DISCONTINUED | OUTPATIENT
Start: 2025-06-12 | End: 2025-06-14 | Stop reason: HOSPADM

## 2025-06-12 RX ORDER — POLYETHYLENE GLYCOL 3350 17 G/17G
17 POWDER, FOR SOLUTION ORAL DAILY
Status: DISCONTINUED | OUTPATIENT
Start: 2025-06-12 | End: 2025-06-14 | Stop reason: HOSPADM

## 2025-06-12 RX ORDER — ROSUVASTATIN CALCIUM 10 MG/1
40 TABLET, COATED ORAL NIGHTLY
Status: DISCONTINUED | OUTPATIENT
Start: 2025-06-12 | End: 2025-06-14 | Stop reason: HOSPADM

## 2025-06-12 RX ORDER — FENOFIBRATE 160 MG/1
160 TABLET ORAL DAILY
Status: DISCONTINUED | OUTPATIENT
Start: 2025-06-12 | End: 2025-06-14 | Stop reason: HOSPADM

## 2025-06-12 RX ORDER — INSULIN LISPRO 100 [IU]/ML
0-10 INJECTION, SOLUTION INTRAVENOUS; SUBCUTANEOUS
Status: DISCONTINUED | OUTPATIENT
Start: 2025-06-13 | End: 2025-06-14 | Stop reason: HOSPADM

## 2025-06-12 RX ADMIN — INSULIN GLARGINE 25 UNITS: 100 INJECTION, SOLUTION SUBCUTANEOUS at 22:51

## 2025-06-12 RX ADMIN — GABAPENTIN 900 MG: 300 CAPSULE ORAL at 20:32

## 2025-06-12 RX ADMIN — ROSUVASTATIN CALCIUM 40 MG: 10 TABLET, FILM COATED ORAL at 20:32

## 2025-06-12 RX ADMIN — ENOXAPARIN SODIUM 40 MG: 40 INJECTION SUBCUTANEOUS at 20:32

## 2025-06-12 RX ADMIN — BUSPIRONE HYDROCHLORIDE 15 MG: 10 TABLET ORAL at 20:32

## 2025-06-12 RX ADMIN — EMPAGLIFLOZIN 25 MG: 10 TABLET, FILM COATED ORAL at 19:47

## 2025-06-12 RX ADMIN — IOHEXOL 75 ML: 350 INJECTION, SOLUTION INTRAVENOUS at 17:23

## 2025-06-12 RX ADMIN — NORTRIPTYLINE HYDROCHLORIDE 25 MG: 25 CAPSULE ORAL at 20:32

## 2025-06-12 SDOH — SOCIAL STABILITY: SOCIAL INSECURITY
WITHIN THE LAST YEAR, HAVE YOU BEEN RAPED OR FORCED TO HAVE ANY KIND OF SEXUAL ACTIVITY BY YOUR PARTNER OR EX-PARTNER?: NO

## 2025-06-12 SDOH — SOCIAL STABILITY: SOCIAL INSECURITY: WITHIN THE LAST YEAR, HAVE YOU BEEN HUMILIATED OR EMOTIONALLY ABUSED IN OTHER WAYS BY YOUR PARTNER OR EX-PARTNER?: NO

## 2025-06-12 SDOH — SOCIAL STABILITY: SOCIAL INSECURITY: ARE YOU OR HAVE YOU BEEN THREATENED OR ABUSED PHYSICALLY, EMOTIONALLY, OR SEXUALLY BY ANYONE?: NO

## 2025-06-12 SDOH — SOCIAL STABILITY: SOCIAL INSECURITY: HAS ANYONE EVER THREATENED TO HURT YOUR FAMILY OR YOUR PETS?: NO

## 2025-06-12 SDOH — ECONOMIC STABILITY: INCOME INSECURITY: IN THE PAST 12 MONTHS HAS THE ELECTRIC, GAS, OIL, OR WATER COMPANY THREATENED TO SHUT OFF SERVICES IN YOUR HOME?: NO

## 2025-06-12 SDOH — ECONOMIC STABILITY: FOOD INSECURITY
WITHIN THE PAST 12 MONTHS, YOU WORRIED THAT YOUR FOOD WOULD RUN OUT BEFORE YOU GOT THE MONEY TO BUY MORE.: SOMETIMES TRUE

## 2025-06-12 SDOH — SOCIAL STABILITY: SOCIAL INSECURITY: WITHIN THE LAST YEAR, HAVE YOU BEEN AFRAID OF YOUR PARTNER OR EX-PARTNER?: NO

## 2025-06-12 SDOH — SOCIAL STABILITY: SOCIAL INSECURITY
WITHIN THE LAST YEAR, HAVE YOU BEEN KICKED, HIT, SLAPPED, OR OTHERWISE PHYSICALLY HURT BY YOUR PARTNER OR EX-PARTNER?: NO

## 2025-06-12 SDOH — ECONOMIC STABILITY: FOOD INSECURITY: WITHIN THE PAST 12 MONTHS, THE FOOD YOU BOUGHT JUST DIDN'T LAST AND YOU DIDN'T HAVE MONEY TO GET MORE.: SOMETIMES TRUE

## 2025-06-12 SDOH — SOCIAL STABILITY: SOCIAL INSECURITY: DO YOU FEEL UNSAFE GOING BACK TO THE PLACE WHERE YOU ARE LIVING?: NO

## 2025-06-12 SDOH — SOCIAL STABILITY: SOCIAL INSECURITY: WERE YOU ABLE TO COMPLETE ALL THE BEHAVIORAL HEALTH SCREENINGS?: YES

## 2025-06-12 SDOH — SOCIAL STABILITY: SOCIAL INSECURITY: HAVE YOU HAD ANY THOUGHTS OF HARMING ANYONE ELSE?: NO

## 2025-06-12 SDOH — SOCIAL STABILITY: SOCIAL INSECURITY: HAVE YOU HAD THOUGHTS OF HARMING ANYONE ELSE?: NO

## 2025-06-12 SDOH — SOCIAL STABILITY: SOCIAL INSECURITY: ABUSE: ADULT

## 2025-06-12 SDOH — SOCIAL STABILITY: SOCIAL INSECURITY: ARE THERE ANY APPARENT SIGNS OF INJURIES/BEHAVIORS THAT COULD BE RELATED TO ABUSE/NEGLECT?: NO

## 2025-06-12 SDOH — SOCIAL STABILITY: SOCIAL INSECURITY: DO YOU FEEL ANYONE HAS EXPLOITED OR TAKEN ADVANTAGE OF YOU FINANCIALLY OR OF YOUR PERSONAL PROPERTY?: NO

## 2025-06-12 SDOH — SOCIAL STABILITY: SOCIAL INSECURITY: DOES ANYONE TRY TO KEEP YOU FROM HAVING/CONTACTING OTHER FRIENDS OR DOING THINGS OUTSIDE YOUR HOME?: NO

## 2025-06-12 ASSESSMENT — LIFESTYLE VARIABLES
SKIP TO QUESTIONS 9-10: 1
HOW MANY STANDARD DRINKS CONTAINING ALCOHOL DO YOU HAVE ON A TYPICAL DAY: 1 OR 2
HOW OFTEN DO YOU HAVE A DRINK CONTAINING ALCOHOL: 2-4 TIMES A MONTH
HOW OFTEN DO YOU HAVE 6 OR MORE DRINKS ON ONE OCCASION: NEVER
AUDIT-C TOTAL SCORE: 2
AUDIT-C TOTAL SCORE: 2

## 2025-06-12 ASSESSMENT — PAIN - FUNCTIONAL ASSESSMENT
PAIN_FUNCTIONAL_ASSESSMENT: 0-10
PAIN_FUNCTIONAL_ASSESSMENT: 0-10

## 2025-06-12 ASSESSMENT — COGNITIVE AND FUNCTIONAL STATUS - GENERAL
DAILY ACTIVITIY SCORE: 24
CLIMB 3 TO 5 STEPS WITH RAILING: A LITTLE
WALKING IN HOSPITAL ROOM: A LITTLE
MOBILITY SCORE: 22
PATIENT BASELINE BEDBOUND: NO

## 2025-06-12 ASSESSMENT — ACTIVITIES OF DAILY LIVING (ADL)
WALKS IN HOME: INDEPENDENT
JUDGMENT_ADEQUATE_SAFELY_COMPLETE_DAILY_ACTIVITIES: YES
HEARING - LEFT EAR: FUNCTIONAL
BATHING: INDEPENDENT
TOILETING: INDEPENDENT
HEARING - RIGHT EAR: FUNCTIONAL
PATIENT'S MEMORY ADEQUATE TO SAFELY COMPLETE DAILY ACTIVITIES?: YES
FEEDING YOURSELF: INDEPENDENT
ASSISTIVE_DEVICE: EYEGLASSES;OTHER (COMMENT)
DRESSING YOURSELF: INDEPENDENT
GROOMING: INDEPENDENT
ADEQUATE_TO_COMPLETE_ADL: YES
LACK_OF_TRANSPORTATION: NO

## 2025-06-12 ASSESSMENT — PAIN DESCRIPTION - DESCRIPTORS: DESCRIPTORS: ACHING

## 2025-06-12 ASSESSMENT — PAIN SCALES - GENERAL
PAINLEVEL_OUTOF10: 0 - NO PAIN
PAINLEVEL_OUTOF10: 0 - NO PAIN
PAINLEVEL_OUTOF10: 2
PAINLEVEL_OUTOF10: 0 - NO PAIN

## 2025-06-12 ASSESSMENT — ENCOUNTER SYMPTOMS: BLURRED VISION: 1

## 2025-06-12 ASSESSMENT — PAIN DESCRIPTION - ORIENTATION: ORIENTATION: LEFT;RIGHT

## 2025-06-12 ASSESSMENT — PATIENT HEALTH QUESTIONNAIRE - PHQ9
SUM OF ALL RESPONSES TO PHQ9 QUESTIONS 1 & 2: 5
1. LITTLE INTEREST OR PLEASURE IN DOING THINGS: MORE THAN HALF THE DAYS
2. FEELING DOWN, DEPRESSED OR HOPELESS: NEARLY EVERY DAY

## 2025-06-12 ASSESSMENT — PAIN DESCRIPTION - LOCATION: LOCATION: LEG

## 2025-06-12 NOTE — ED TRIAGE NOTES
Pt states he has had bilateral leg swelling, abdominal bloating and SOB with exertion that started on Tuesday. Pt was sent by PCP for further evaluation. Pt is not currently on diuretics.

## 2025-06-12 NOTE — ED PROVIDER NOTES
Limitations to history: None  Independent Historians: Family  External Records Reviewed: HIE, OARRS, outpatient notes, inpatient notes, paper charts if needed    History of Present Illness:  Patient is a 55-year-old male with a past medical history positive for GERD, LITZY, PAD, chest pain, shortness of breath, hypertension, obesity presents to ED chief complaint of bilateral leg swelling, shortness of breath with exertion that started on Tuesday.  Patient reports he was sent here from his primary care provider's office, Dr. Singh for further evaluation and admission.  Patient reports that he does not take a diuretic.  Patient denies any other complaints of chest pain, fevers, chills, nausea, vomiting, diarrhea.  Patient is alert and oriented x 3 upon examination, ambulatory into ED, in no acute distress, nontoxic-appearing.      Denies HA, C/P, ABD pain, Nausea, Vomiting, Diarrhea, Weakness, Dizziness, Fever, Chills.    PMFSH:   As per HPI, otherwise nurses notes reviewed in EMR    Physical Exam:  Appearance: Alert, oriented x3, supine on exam table with head elevated, cooperative, in no acute distress. Well nourished & well hydrated.      Skin: Intact, dry skin, no lesions, rash, petechiae or purpura.     Eyes: PERRLA, EOMs intact, Conjunctiva pink with no redness or exudates. No scleral icterus.     Ears: Hearing grossly intact.      Nose: Nares patent, no epistaxis.     Mouth: Dentition without concerning abnormalities. no obstruction of posterior pharynx.     Neck: Supple, without meningismus. Trachea at midline.     Pulmonary: Clear bilaterally with good chest wall excursion. No rales, rhonchi or wheezing. No accessory muscle use or stridor. Talking in full sentences.     Cardiac: Normal S1, S2 without murmur, rub, gallop or extrasystole.     Abdomen: Soft, nontender to light and deep palpation to all quadrants, normoactive bowel sounds.  No palpable organomegaly.  No rebound or guarding.     Genitourinary:  Physical exam deferred.     Musculoskeletal: +1 pitting edema noted bilaterally on lower extremities.  Normal gait. Full range of motion to all extremities. Rest of the exam reveals no pain on palpation, instability, or deformity. Pulses full and equal. No cyanosis or clubbing. capillary refill <2 seconds to all examined digits.     Neurological:  Cranial nerves II through XII are grossly intact, normal sensation, no weakness, no focal findings identified.      Psychiatric: Appropriate mood and affect.    EKG interpreted by me shows normal sinus rhythm  Ventricular rate of 85  bpm  FL interval  134              ms  QTc    400/476                        ms  No T wave elevation or depression    Labs Reviewed   CBC WITH AUTO DIFFERENTIAL - Abnormal       Result Value    WBC 5.0      nRBC 0.0      RBC 4.02 (*)     Hemoglobin 12.9 (*)     Hematocrit 37.5 (*)     MCV 93      MCH 32.1      MCHC 34.4      RDW 13.5      Platelets 161      Neutrophils % 53.4      Immature Granulocytes %, Automated 1.2 (*)     Lymphocytes % 32.7      Monocytes % 9.7      Eosinophils % 2.4      Basophils % 0.6      Neutrophils Absolute 2.69      Immature Granulocytes Absolute, Automated 0.06      Lymphocytes Absolute 1.65      Monocytes Absolute 0.49      Eosinophils Absolute 0.12      Basophils Absolute 0.03     COMPREHENSIVE METABOLIC PANEL - Abnormal    Glucose 252 (*)     Sodium 137      Potassium 3.9      Chloride 99      Bicarbonate 28      Anion Gap 14      Urea Nitrogen 13      Creatinine 0.79      eGFR >90      Calcium 9.6      Albumin 4.3      Alkaline Phosphatase 93      Total Protein 6.8      AST 37      Bilirubin, Total 0.4      ALT 54 (*)    B-TYPE NATRIURETIC PEPTIDE - Normal    BNP 20      Narrative:        <100 pg/mL - Heart failure unlikely  100-299 pg/mL - Intermediate probability of acute heart                  failure exacerbation. Correlate with clinical                  context and patient history.    >=300 pg/mL - Heart  Failure likely. Correlate with clinical                  context and patient history.    BNP testing is performed using different testing methodology at [facility] than at other [facility]. Direct result comparisons should only be made within the same method.      SERIAL TROPONIN-INITIAL - Normal    Troponin I, High Sensitivity <3      Narrative:     Less than 99th percentile of normal range cutoff-  Female and children under 18 years old <14 ng/L; Male <21 ng/L: Negative  Repeat testing should be performed if clinically indicated.     Female and children under 18 years old 14-50 ng/L; Male 21-50 ng/L:  Consistent with possible cardiac damage and possible increased clinical   risk. Serial measurements may help to assess extent of myocardial damage.     >50 ng/L: Consistent with cardiac damage, increased clinical risk and  myocardial infarction. Serial measurements may help assess extent of   myocardial damage.      NOTE: Children less than 1 year old may have higher baseline troponin   levels and results should be interpreted in conjunction with the overall   clinical context.     NOTE: Troponin I testing is performed using a different   testing methodology at [facility] than at other   [facility]. Direct result comparisons should only   be made within the same method.   SERIAL TROPONIN, 1 HOUR - Normal    Troponin I, High Sensitivity <3      Narrative:     Less than 99th percentile of normal range cutoff-  Female and children under 18 years old <14 ng/L; Male <21 ng/L: Negative  Repeat testing should be performed if clinically indicated.     Female and children under 18 years old 14-50 ng/L; Male 21-50 ng/L:  Consistent with possible cardiac damage and possible increased clinical   risk. Serial measurements may help to assess extent of myocardial damage.     >50 ng/L: Consistent with cardiac damage, increased clinical risk and  myocardial infarction. Serial measurements may  help assess extent of   myocardial damage.      NOTE: Children less than 1 year old may have higher baseline troponin   levels and results should be interpreted in conjunction with the overall   clinical context.     NOTE: Troponin I testing is performed using a different   testing methodology at [facility] than at other   [facility]. Direct result comparisons should only   be made within the same method.   MAGNESIUM - Normal    Magnesium 1.98     TROPONIN SERIES- (INITIAL, 1 HR)    Narrative:     The following orders were created for panel order Troponin I Series, High Sensitivity (0, 1 HR).  Procedure                               Abnormality         Status                     ---------                               -----------         ------                     Troponin I, High Sensiti...[[id number]]  Normal              Final result               Troponin, High Sensitivi...[[id number]]  Normal              Final result                 Please view results for these tests on the individual orders.   GRAY TOP      XR chest 2 views   Final Result   No acute process.   Signed by Adam Carey MD      CT angio chest for pulmonary embolism    (Results Pending)                Repeat Evaluation below    Summary:  Medical Decision Making:   Patient presented as described in HPI. Patient case including ROS, PE, and treatment and plan discussed with ED attending if attached as cosigner. Due to patients presentation orders completed include as documented.  Patient evaluated for complaints of exertional shortness of breath, lower extremity swelling.  Patient sent in to ED from primary care provider office.  Patient denied any complaints of chest pain, reports he does not take blood thinners.  Patient denied any systemic symptoms of fevers, chills, nausea, vomiting, diarrhea.  Lab work in ED revealed a negative delta troponin, BNP 20, magnesium 1.98, serum CMP within normal limits, serum CBC revealed no  evidence of leukocytosis present.  Chest x-ray revealed no acute process.  Spoke with admitting team who agrees to keep patient for further evaluation treatment of exertional shortness of breath, CHF.  Admitting team agrees to hold off on diuresis at this time, PE study added.  Case findings also discussed with ED attending Dr. Aguirre.             Tests/Medications/Escalations of Care considered but not given:    Patient care discussed with: N/A  Social Determinants affecting care: N/A    Final diagnosis and disposition as documented in impression         Disposition:  admit     Hospitalize to _ floor under  _ per their orders. Discussed findings and treatment done here in ED with admitting physician. It would be a risk to discharge the patient in their condition due to possibility of deterioration in their condition and the need for urgent interventions.    This note has been transcribed using voice recognition and may contain grammatical errors, misplaced words, incorrect words, incorrect phrases or other errors.     Lynette Tinoco, APRN-CNP  06/12/25 0975

## 2025-06-12 NOTE — PROGRESS NOTES
Subjective   Patient ID: Jono Quinn is a 55 y.o. male who presents for Foot Swelling (Noticed Tuesday /Has been off of jardiance for awhile //Has happened before ), Joint Swelling (Ankles ), and Blurred Vision (Vision changes - noticed today ).  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  HBA1C 8  8-24  Endo following   Poor dietary habits discussed     GERD stable on diet     Depression on therapy possible side effects  Psych following     Vitamin D deficiency on suplementation     Diet and exercise reviewed  Stop eating pizza and junk foods since has been eating out more     Colonoscopy ordered    Review of Systems   Eyes:  Positive for blurred vision.   All other systems reviewed and are negative.      Objective   /58   Pulse 96   Temp 36.1 °C (97 °F)   Wt 134 kg (296 lb)   SpO2 99%   BMI 42.47 kg/m²   Lab Results   Component Value Date    WBC 5.0 06/12/2025    HGB 12.9 (L) 06/12/2025    HCT 37.5 (L) 06/12/2025     06/12/2025    CHOL 108 10/26/2024    TRIG 472 (H) 10/26/2024    HDL 27.6 10/26/2024    LDLDIRECT 32 03/05/2022    ALT 54 (H) 06/12/2025    AST 37 06/12/2025     06/12/2025    K 3.9 06/12/2025    CL 99 06/12/2025    CREATININE 0.79 06/12/2025    BUN 13 06/12/2025    CO2 28 06/12/2025    TSH 1.93 10/26/2024    INR 0.9 08/27/2024    HGBA1C 7.5 (H) 03/22/2025           Physical Exam  Vitals reviewed.   Constitutional:        Appearance: Normal appearance. He is obese.   HENT:      Head: Normocephalic and atraumatic.      Mouth/Throat:      Pharynx: No posterior oropharyngeal erythema.   Eyes:      General: No scleral icterus.     Conjunctiva/sclera: Conjunctivae normal.      Pupils: Pupils are equal, round, and reactive to light.   Cardiovascular:      Rate and Rhythm: Normal rate and regular rhythm.      Heart sounds: Normal heart sounds.      Comments: B/l edema upto knees  Pulmonary:      Effort: No respiratory distress.      Breath sounds: No wheezing.   Abdominal:      General: Abdomen is flat. Bowel sounds are normal. There is no distension.      Palpations: Abdomen is soft. There is no mass.      Tenderness: There is no abdominal tenderness. There is no rebound.   Musculoskeletal:         General: Normal range of motion.      Cervical back: Normal range of motion and neck supple.   Skin:     General: Skin is warm and dry.   Neurological:      General: No focal deficit present.      Mental Status: He is alert and oriented to person, place, and time. Mental status is at baseline.   Psychiatric:         Mood and Affect: Mood normal.         Behavior: Behavior normal.         Thought Content: Thought content normal.         Judgment: Judgment normal.         Problem List Items Addressed This Visit           ICD-10-CM    Depression F32.A    GERD (gastroesophageal reflux disease) K21.9    Vitamin D deficiency E55.9    Coronary artery disease involving native coronary artery of native heart without angina pectoris I25.10    Heart failure - Primary I50.9     Other Visit Diagnoses         Codes      Edema, unspecified type     R60.9    Relevant Orders    ECG 12 lead (Clinic Performed) (Completed)      Diabetes mellitus due to underlying condition with both eyes affected by retinopathy and macular edema, unspecified retinopathy severity, unspecified whether long term insulin use     E08.311      Hypercholesteremia     E78.00      Class 3  severe obesity due to excess calories with serious comorbidity and body mass index (BMI) of 40.0 to 44.9 in adult     E66.813, Z68.41          Assessment/Plan   Same day sick    Bilateral swelling legs x 3 days  Eating more salt and fast food  Dyspnea, weight gain  No CP  Probable CHF  Check EKG  SR 91 RBBB old  To ED for probable admit  D/W ER staff    Rash on ankles  Derm consult not done     retinopathy with macular edema  Retinal specialist following / missed last appt  Counseled     H/o pancreatitis 3rd bout     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  HBA1C 8  8-24  Endo following   Poor dietary habits discussed     GERD stable on diet     Depression  on therapy possible side effects  Psych following     Vitamin D deficiency on suplementation     Diet and exercise reviewed  Stop eating pizza and junk foods since has been eating out more     Colonoscopy ordered    Prostate 10-24  Colonoscopy none  CT chest lung cancer screening n/a  immunizations rev'd flu  BMI 42.3     Follow up pending

## 2025-06-13 ENCOUNTER — APPOINTMENT (OUTPATIENT)
Dept: CARDIOLOGY | Facility: HOSPITAL | Age: 55
End: 2025-06-13
Payer: COMMERCIAL

## 2025-06-13 LAB
AORTIC VALVE PEAK VELOCITY: 1.31 M/S
AV PEAK GRADIENT: 7 MMHG
AVA (PEAK VEL): 2.61 CM2
EJECTION FRACTION APICAL 4 CHAMBER: 60.4
EJECTION FRACTION: 68 %
GLUCOSE BLD MANUAL STRIP-MCNC: 150 MG/DL (ref 74–99)
GLUCOSE BLD MANUAL STRIP-MCNC: 170 MG/DL (ref 74–99)
GLUCOSE BLD MANUAL STRIP-MCNC: 181 MG/DL (ref 74–99)
GLUCOSE BLD MANUAL STRIP-MCNC: 222 MG/DL (ref 74–99)
LEFT VENTRICLE INTERNAL DIMENSION DIASTOLE: 4.4 CM (ref 3.5–6)
LEFT VENTRICULAR OUTFLOW TRACT DIAMETER: 2 CM
MITRAL VALVE E/A RATIO: 1.64
RIGHT VENTRICLE FREE WALL PEAK S': 13.2 CM/S
TRICUSPID ANNULAR PLANE SYSTOLIC EXCURSION: 2.3 CM

## 2025-06-13 PROCEDURE — 2500000002 HC RX 250 W HCPCS SELF ADMINISTERED DRUGS (ALT 637 FOR MEDICARE OP, ALT 636 FOR OP/ED): Performed by: INTERNAL MEDICINE

## 2025-06-13 PROCEDURE — 94760 N-INVAS EAR/PLS OXIMETRY 1: CPT

## 2025-06-13 PROCEDURE — 2500000001 HC RX 250 WO HCPCS SELF ADMINISTERED DRUGS (ALT 637 FOR MEDICARE OP): Performed by: NURSE PRACTITIONER

## 2025-06-13 PROCEDURE — 2500000002 HC RX 250 W HCPCS SELF ADMINISTERED DRUGS (ALT 637 FOR MEDICARE OP, ALT 636 FOR OP/ED): Performed by: NURSE PRACTITIONER

## 2025-06-13 PROCEDURE — 2500000004 HC RX 250 GENERAL PHARMACY W/ HCPCS (ALT 636 FOR OP/ED): Performed by: NURSE PRACTITIONER

## 2025-06-13 PROCEDURE — C8929 TTE W OR WO FOL WCON,DOPPLER: HCPCS

## 2025-06-13 PROCEDURE — 97165 OT EVAL LOW COMPLEX 30 MIN: CPT | Mod: GO

## 2025-06-13 PROCEDURE — 96372 THER/PROPH/DIAG INJ SC/IM: CPT | Performed by: NURSE PRACTITIONER

## 2025-06-13 PROCEDURE — G0378 HOSPITAL OBSERVATION PER HR: HCPCS

## 2025-06-13 PROCEDURE — 82947 ASSAY GLUCOSE BLOOD QUANT: CPT

## 2025-06-13 PROCEDURE — 99223 1ST HOSP IP/OBS HIGH 75: CPT | Performed by: NURSE PRACTITIONER

## 2025-06-13 PROCEDURE — 2500000004 HC RX 250 GENERAL PHARMACY W/ HCPCS (ALT 636 FOR OP/ED): Mod: JW | Performed by: NURSE PRACTITIONER

## 2025-06-13 RX ORDER — ASPIRIN 81 MG/1
81 TABLET ORAL ONCE
Status: COMPLETED | OUTPATIENT
Start: 2025-06-13 | End: 2025-06-13

## 2025-06-13 RX ORDER — SODIUM CHLORIDE 9 MG/ML
10 INJECTION, SOLUTION INTRAVENOUS CONTINUOUS PRN
Status: DISCONTINUED | OUTPATIENT
Start: 2025-06-13 | End: 2025-06-14 | Stop reason: HOSPADM

## 2025-06-13 RX ADMIN — PAROXETINE HYDROCHLORIDE 30 MG: 20 TABLET, FILM COATED ORAL at 09:03

## 2025-06-13 RX ADMIN — EMPAGLIFLOZIN 25 MG: 10 TABLET, FILM COATED ORAL at 09:04

## 2025-06-13 RX ADMIN — ENOXAPARIN SODIUM 40 MG: 40 INJECTION SUBCUTANEOUS at 21:37

## 2025-06-13 RX ADMIN — Medication 100 MCG: at 09:04

## 2025-06-13 RX ADMIN — PERFLUTREN 3.5 ML OF DILUTION: 6.52 INJECTION, SUSPENSION INTRAVENOUS at 10:09

## 2025-06-13 RX ADMIN — ASPIRIN 81 MG: 81 TABLET, DELAYED RELEASE ORAL at 09:04

## 2025-06-13 RX ADMIN — INSULIN GLARGINE 25 UNITS: 100 INJECTION, SOLUTION SUBCUTANEOUS at 21:37

## 2025-06-13 RX ADMIN — GABAPENTIN 900 MG: 300 CAPSULE ORAL at 15:58

## 2025-06-13 RX ADMIN — FENOFIBRATE 160 MG: 160 TABLET ORAL at 09:04

## 2025-06-13 RX ADMIN — ROSUVASTATIN CALCIUM 40 MG: 10 TABLET, FILM COATED ORAL at 21:37

## 2025-06-13 RX ADMIN — BUSPIRONE HYDROCHLORIDE 15 MG: 10 TABLET ORAL at 09:03

## 2025-06-13 RX ADMIN — BUSPIRONE HYDROCHLORIDE 15 MG: 10 TABLET ORAL at 21:37

## 2025-06-13 RX ADMIN — GABAPENTIN 900 MG: 300 CAPSULE ORAL at 09:04

## 2025-06-13 RX ADMIN — INSULIN LISPRO 2 UNITS: 100 INJECTION, SOLUTION INTRAVENOUS; SUBCUTANEOUS at 12:32

## 2025-06-13 RX ADMIN — INSULIN LISPRO 2 UNITS: 100 INJECTION, SOLUTION INTRAVENOUS; SUBCUTANEOUS at 09:02

## 2025-06-13 RX ADMIN — BUSPIRONE HYDROCHLORIDE 15 MG: 10 TABLET ORAL at 15:58

## 2025-06-13 RX ADMIN — NORTRIPTYLINE HYDROCHLORIDE 25 MG: 25 CAPSULE ORAL at 21:37

## 2025-06-13 RX ADMIN — GABAPENTIN 900 MG: 300 CAPSULE ORAL at 21:37

## 2025-06-13 RX ADMIN — ENOXAPARIN SODIUM 40 MG: 40 INJECTION SUBCUTANEOUS at 09:04

## 2025-06-13 ASSESSMENT — ENCOUNTER SYMPTOMS
STRIDOR: 0
COLOR CHANGE: 0
CHILLS: 0
NAUSEA: 0
CONSTITUTIONAL NEGATIVE: 1
JOINT SWELLING: 0
ABDOMINAL DISTENTION: 0
FATIGUE: 0
ENDOCRINE NEGATIVE: 1
ACTIVITY CHANGE: 0
BACK PAIN: 0
FREQUENCY: 0
VOMITING: 0
DIARRHEA: 0
ALLERGIC/IMMUNOLOGIC NEGATIVE: 1
MYALGIAS: 0
NECK STIFFNESS: 0
GASTROINTESTINAL NEGATIVE: 1
LIGHT-HEADEDNESS: 0
RESPIRATORY NEGATIVE: 1
POLYDIPSIA: 0
ABDOMINAL PAIN: 0
ARTHRALGIAS: 0
PSYCHIATRIC NEGATIVE: 1
CHEST TIGHTNESS: 0
PALPITATIONS: 0
APPETITE CHANGE: 0
APNEA: 0
WHEEZING: 0
MUSCULOSKELETAL NEGATIVE: 1
HEMATOLOGIC/LYMPHATIC NEGATIVE: 1
NECK PAIN: 0
DIZZINESS: 0
POLYPHAGIA: 0
COUGH: 0
FEVER: 0
EYES NEGATIVE: 1
BLOOD IN STOOL: 0
NEUROLOGICAL NEGATIVE: 1
SHORTNESS OF BREATH: 0

## 2025-06-13 ASSESSMENT — COGNITIVE AND FUNCTIONAL STATUS - GENERAL
WALKING IN HOSPITAL ROOM: A LITTLE
MOBILITY SCORE: 22
DAILY ACTIVITIY SCORE: 24
DAILY ACTIVITIY SCORE: 24
CLIMB 3 TO 5 STEPS WITH RAILING: A LITTLE

## 2025-06-13 ASSESSMENT — PAIN - FUNCTIONAL ASSESSMENT: PAIN_FUNCTIONAL_ASSESSMENT: 0-10

## 2025-06-13 ASSESSMENT — ACTIVITIES OF DAILY LIVING (ADL)
BATHING_ASSISTANCE: INDEPENDENT
ADL_ASSISTANCE: INDEPENDENT
LACK_OF_TRANSPORTATION: NO

## 2025-06-13 ASSESSMENT — PAIN SCALES - GENERAL
PAINLEVEL_OUTOF10: 0 - NO PAIN
PAINLEVEL_OUTOF10: 0 - NO PAIN

## 2025-06-13 NOTE — PROGRESS NOTES
06/13/25 0854   Discharge Planning   Living Arrangements Alone   Support Systems Friends/neighbors;Family members   Assistance Needed Patient is A&OX4, independent in ADLs, ambulates without assistive devices but will occassionaly use a cane. Patient is able to drive and plans to transport himself home at discharge. No O2, CPAP or Bipap at baseline. No active HHC agency. PCP is Trudy Gordon and patient preferances [facility] in [place].   Type of Residence Private residence   Number of Stairs to Enter Residence 2   Number of Stairs Within Residence 0   Do you have animals or pets at home? No   Who is requesting discharge planning? Provider   Home or Post Acute Services Other (Comment)  (Needs outpatient therapy script)   Expected Discharge Disposition Home  (Home, patient denies any need for HHC. Patient is requesting a script for outpatient PT/OT(MD dayo).)   Does the patient need discharge transport arranged? No   Financial Resource Strain   How hard is it for you to pay for the very basics like food, housing, medical care, and heating? Somewhat   Housing Stability   In the last 12 months, was there a time when you were not able to pay the mortgage or rent on time? N   In the past 12 months, how many times have you moved where you were living? 0   At any time in the past 12 months, were you homeless or living in a shelter (including now)? N   Transportation Needs   In the past 12 months, has lack of transportation kept you from medical appointments or from getting medications? no   In the past 12 months, has lack of transportation kept you from meetings, work, or from getting things needed for daily living? No   Stroke Family Assessment   Stroke Family Assessment Needed No   Intensity of Service   Intensity of Service 0-30 min     6/13/25 at 0929: [facility] working remote. Patient also reports he has grab bars by shower and toilet.

## 2025-06-13 NOTE — CARE PLAN
The patient's goals for the shift include      The clinical goals for the shift include Patient to be free from injury throughout this shift      Problem: Diabetes  Goal: Achieve decreasing blood glucose levels by end of shift  Outcome: Progressing  Goal: Increase stability of blood glucose readings by end of shift  Outcome: Progressing  Goal: Maintain electrolyte levels within acceptable range throughout shift  Outcome: Progressing  Goal: Maintain glucose levels >70mg/dl to <250mg/dl throughout shift  Outcome: Progressing  Goal: No changes in neurological exam by end of shift  Outcome: Progressing  Goal: Vital signs within normal range for age by end of shift  Outcome: Progressing     Problem: Pain - Adult  Goal: Verbalizes/displays adequate comfort level or baseline comfort level  Outcome: Progressing     Problem: Safety - Adult  Goal: Free from fall injury  Outcome: Progressing     Problem: Discharge Planning  Goal: Discharge to home or other facility with appropriate resources  Outcome: Progressing     Problem: Chronic Conditions and Co-morbidities  Goal: Patient's chronic conditions and co-morbidity symptoms are monitored and maintained or improved  Outcome: Progressing     Problem: Nutrition  Goal: Nutrient intake appropriate for maintaining nutritional needs  Outcome: Progressing     Problem: Heart Failure  Goal: Improved gas exchange this shift  Outcome: Progressing  Goal: Improved urinary output this shift  Outcome: Progressing  Goal: Reduction in peripheral edema within 24 hours  Outcome: Progressing  Goal: Report improvement of dyspnea/breathlessness this shift  Outcome: Progressing  Goal: Weight from fluid excess reduced over 2-3 days, then stabilize  Outcome: Progressing  Goal: Increase self care and/or family involvement in 24 hours  Outcome: Progressing     Problem: Skin  Goal: Participates in plan/prevention/treatment measures  Outcome: Progressing  Goal: Prevent/minimize sheer/friction  injuries  Outcome: Progressing

## 2025-06-13 NOTE — CARE PLAN
The patient's goals for the shift include  resting    The clinical goals for the shift include Patient's SPO2 will remain >90% this shift    Assumed care of patient at 1930. Patient denies pain this shift. Patient's BLE has +2 edema. Input 480 this shift and output 2300mLs. BG at . Patient normally takes U-300 of long acting, MD notified and 25 units of lantus administered. Patient resting in bed with call light within reach.

## 2025-06-13 NOTE — SIGNIFICANT EVENT
"Patient takes Toujeo 110 units at home.  He is getting Jardiance and lispro here.  25 of Lantus will be given tonight.  Will monitor his blood sugar.  Insulin dosage will need to be adjusted. /76 (BP Location: Left arm, Patient Position: Lying)   Pulse 85   Temp 36.2 °C (97.2 °F) (Temporal)   Resp 18   Ht 1.778 m (5' 10\")   Wt 134 kg (294 lb 15.6 oz)   SpO2 98%   BMI 42.32 kg/m²     "

## 2025-06-13 NOTE — H&P
"History Of Present Illness  Jono Quinn is a 55 y.o. male with a past medical hx of depression, DM Type II, GERD, HLD, HTN, LITZY presenting with bilateral lower leg swelling and sob on exertion that started Tuesday (3 days ago). He went to see his PCP for this complaint and he states he was sent here from the office.   Patient denies fever, chills or chest pain He denies change in bowel or bladder habits - pt states he has had pancreatitis multiple times and that he has dysautonomia. He reveals that his activity tolerance has been declining in recent months and that he cannot tolerate climbing a flight of stairs and states this is the first time he recalls this kind of \"fluid accumulation\". Pt admits to consumption a many high salt foods.      CXR: NAD  CTA: Neg for PE    ED testing: Chem reveals: Na 137/ K 3.9 / BUN 13 Cr 0.79   GFR >90 ALT 54 AST 37 Mg 1.98 BNP 20 trop <3<3  CBC H&H 37.5/12.9 plt 161  ED treatment: no diuretics    Past Medical History  He has a past medical history of COVID-19 (11/23/2020), Depression, unspecified (10/26/2022), Diabetes mellitus (Multi), Obstructive sleep apnea (adult) (pediatric) (07/11/2022), Other conditions influencing health status (05/16/2022), Personal history of other diseases of the digestive system (11/19/2021), Personal history of other diseases of the nervous system and sense organs, Pure hypercholesterolemia, unspecified (10/26/2022), Testicular hypofunction (07/01/2021), and 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    Surgical History  He has a past surgical history that includes Other surgical history (07/13/2020); Other surgical history (07/13/2020); Other surgical history (07/13/2020); Other surgical history (07/13/2020); Other surgical history (03/01/2022); Other surgical history (03/01/2022); CT angio coronary art with heartflow if score >30% (10/24/2023); and Cardiac catheterization (N/A, 12/6/2023).     Social History  He reports that he has never smoked. He has " never been exposed to tobacco smoke. He has never used smokeless tobacco. He reports that he does not drink alcohol and does not use drugs.    Family History  Family History[1]     Allergies  Perfume, Fluticasone propionate, Liraglutide, Lisinopril, Other, and Pollen extracts    Review of Systems   Constitutional: Negative.    HENT: Negative.     Eyes: Negative.    Respiratory: Negative.     Cardiovascular:  Positive for leg swelling.   Gastrointestinal: Negative.    Endocrine: Negative.    Genitourinary: Negative.    Musculoskeletal: Negative.    Allergic/Immunologic: Negative.    Neurological: Negative.    Hematological: Negative.    Psychiatric/Behavioral: Negative.          Physical Exam     Last Recorded Vitals  /73   Pulse 92   Temp 36.3 °C (97.3 °F) (Temporal)   Resp 16   Wt 134 kg (294 lb 15.6 oz)   SpO2 98%     Relevant Results        Scheduled medications  Scheduled Medications[2]  Continuous medications  Continuous Medications[3]  PRN medications  PRN Medications[4]  Results for orders placed or performed during the hospital encounter of 06/12/25 (from the past 24 hours)   CBC with Differential   Result Value Ref Range    WBC 5.0 4.4 - 11.3 x10*3/uL    nRBC 0.0 0.0 - 0.0 /100 WBCs    RBC 4.02 (L) 4.50 - 5.90 x10*6/uL    Hemoglobin 12.9 (L) 13.5 - 17.5 g/dL    Hematocrit 37.5 (L) 41.0 - 52.0 %    MCV 93 80 - 100 fL    MCH 32.1 26.0 - 34.0 pg    MCHC 34.4 32.0 - 36.0 g/dL    RDW 13.5 11.5 - 14.5 %    Platelets 161 150 - 450 x10*3/uL    Neutrophils % 53.4 40.0 - 80.0 %    Immature Granulocytes %, Automated 1.2 (H) 0.0 - 0.9 %    Lymphocytes % 32.7 13.0 - 44.0 %    Monocytes % 9.7 2.0 - 10.0 %    Eosinophils % 2.4 0.0 - 6.0 %    Basophils % 0.6 0.0 - 2.0 %    Neutrophils Absolute 2.69 1.20 - 7.70 x10*3/uL    Immature Granulocytes Absolute, Automated 0.06 0.00 - 0.70 x10*3/uL    Lymphocytes Absolute 1.65 1.20 - 4.80 x10*3/uL    Monocytes Absolute 0.49 0.10 - 1.00 x10*3/uL    Eosinophils Absolute  0.12 0.00 - 0.70 x10*3/uL    Basophils Absolute 0.03 0.00 - 0.10 x10*3/uL   Comprehensive Metabolic Panel   Result Value Ref Range    Glucose 252 (H) 74 - 99 mg/dL    Sodium 137 136 - 145 mmol/L    Potassium 3.9 3.5 - 5.3 mmol/L    Chloride 99 98 - 107 mmol/L    Bicarbonate 28 21 - 32 mmol/L    Anion Gap 14 10 - 20 mmol/L    Urea Nitrogen 13 6 - 23 mg/dL    Creatinine 0.79 0.50 - 1.30 mg/dL    eGFR >90 >60 mL/min/1.73m*2    Calcium 9.6 8.6 - 10.3 mg/dL    Albumin 4.3 3.4 - 5.0 g/dL    Alkaline Phosphatase 93 33 - 120 U/L    Total Protein 6.8 6.4 - 8.2 g/dL    AST 37 9 - 39 U/L    Bilirubin, Total 0.4 0.0 - 1.2 mg/dL    ALT 54 (H) 10 - 52 U/L   Brain Natriuretic Peptide   Result Value Ref Range    BNP 20 0 - 99 pg/mL   Troponin I, High Sensitivity, Initial   Result Value Ref Range    Troponin I, High Sensitivity <3 0 - 20 ng/L   Magnesium   Result Value Ref Range    Magnesium 1.98 1.60 - 2.40 mg/dL   Troponin, High Sensitivity, 1 Hour   Result Value Ref Range    Troponin I, High Sensitivity <3 0 - 20 ng/L   POCT GLUCOSE   Result Value Ref Range    POCT Glucose 242 (H) 74 - 99 mg/dL   POCT GLUCOSE   Result Value Ref Range    POCT Glucose 170 (H) 74 - 99 mg/dL   Transthoracic Echo Complete   Result Value Ref Range    BSA 2.57 m2   POCT GLUCOSE   Result Value Ref Range    POCT Glucose 181 (H) 74 - 99 mg/dL     CT angio chest for pulmonary embolism  Result Date: 6/12/2025  STUDY: CT Angiogram of the Chest; 06/12/2025 at 5:22 PM INDICATION: Shortness of breath. COMPARISON: XR chest of same date, 06/12/25. CTA chest, CT abdomen and pelvis 08/29/24. ACCESSION NUMBER(S): [id number] ORDERING CLINICIAN: VIOLET SIMS TECHNIQUE:  CTA of the chest was performed with intravenous contrast. Images are reviewed and processed at a workstation according to the CT angiogram protocol with 3-D and/or MIP post processing imaging generated.  Omnipaque-350 75 mL was administered intravenously.  Automated mA/kV exposure control was  utilized and patient examination was performed in strict accordance with principles of ALARA. FINDINGS: Pulmonary arteries are adequately opacified without acute or chronic filling defects.  The thoracic aorta is normal in course and caliber without dissection or aneurysm. The heart is normal in size without pericardial effusion. Aortopulmonary lymph node measuring up to 2.5 cm.. There is no pleural effusion, pleural thickening, or pneumothorax. The airways are patent. Lungs are clear without consolidation, interstitial disease, or suspicious nodules. Upper abdomen demonstrates no acute pathology. Fatty infiltration of the liver. There are no acute fractures.  No suspicious bony lesions.    No evidence of pulmonary embolism or acute thoracic aortic pathology. No pulmonary consolidation. Aortopulmonary lymph node measuring up to 2.5 cm. Fatty infiltration of the liver.. Signed by Troy Vuong MD    XR chest 2 views  Result Date: 6/12/2025  STUDY: Chest Radiographs;  6/12/2025 3:52 PM INDICATION: Chest pain. COMPARISON: 8/24/2023 XR Chest. ACCESSION NUMBER(S): [id number] ORDERING CLINICIAN: TANNER MEJIA TECHNIQUE:  Frontal and lateral chest. FINDINGS: CARDIOMEDIASTINAL SILHOUETTE: Cardiomediastinal silhouette is normal in size and configuration.  LUNGS: Lungs are clear.  ABDOMEN: No remarkable upper abdominal findings.  BONES: No acute osseous changes.    No acute process. Signed by Adam Carey MD    ECG 12 lead (Clinic Performed)  Result Date: 6/12/2025  SR 91 RBBB old         Assessment/Plan   Assessment & Plan  Heart failure    SOB (shortness of breath)    Severe obesity (Multi)    Physical deconditioning    DHILLON (nonalcoholic steatohepatitis)    MDD (major depressive disorder), recurrent severe, without psychosis (Multi)    GERD (gastroesophageal reflux disease)    Benign essential hypertension    Heart Failure (Suspect)  SOB  - continue ASA  - LHC 12/23 with moderate multiple vessel stenosis - no  procedural interventions  - trops <3<3   - EKG SR with BBB   - BNP 20 (false low with BMI 42)  - address lifestyle and risk factor modification     Severe obesity  Physical Deconditioning   - recommend low Na diet, low fat diet and weight loss  - follow up with PCP - also likely needs aggressive BP management   - echo pending:   - Na restriction and fluid restriction - pt seems to be auto-diuresing - education on risk factor modification     DM Type II  HLD  - HgbA1C 7.5 - (03/22/25) - follow with PCP  - continue jardiance,  gabapentin, (oupt) lispro sliding scale, toujeo, metformin   Continue fenofibrate, vascepa, rosuvastatin     DHILLON  - hx multiple bouts pancreatitis   - 8/24 CT abd reveals fatty liver metamorphosis   - mild ALT elevation 54 AST wnl - no complaints pain     MDD  - continue buspar, paxil, pamelor - pt endorses a hx of panic attacks     DVT: lovenox  Code Status: FUll  Dispo: Pt requires an overnight stay     BETH Faye-CNP         [1]   Family History  Problem Relation Name Age of Onset    Prostate cancer Father      Narcolepsy Brother          with cataplexy   [2] busPIRone, 15 mg, oral, TID  cholecalciferol, 100 mcg, oral, Daily  empagliflozin, 25 mg, oral, Daily  enoxaparin, 40 mg, subcutaneous, q12h CHRISTOPHER  fenofibrate, 160 mg, oral, Daily  gabapentin, 900 mg, oral, TID  insulin glargine, 25 Units, subcutaneous, Nightly  insulin lispro, 0-10 Units, subcutaneous, TID  nortriptyline, 25 mg, oral, Nightly  PARoxetine, 30 mg, oral, Nightly  perflutren protein A microsphere, 0.5 mL, intravenous, Once in imaging  polyethylene glycol, 17 g, oral, Daily  rosuvastatin, 40 mg, oral, Nightly  sulfur hexafluoride microsphr, 2 mL, intravenous, Once in imaging  [3] sodium chloride 0.9%, 10 mL/hr  [4] PRN medications: acetaminophen, dextrose, dextrose, glucagon, glucagon, melatonin, sodium chloride 0.9%

## 2025-06-13 NOTE — CONSULTS
Inpatient consult to Cardiology  Consult performed by: BENNETT Kilgore  Consult ordered by: BENNETT Faye  Reason for consult: acute onset CHF          Reason For Consult  Acute onset CHF    History Of Present Illness  Jono Quinn is a 55 y.o. male with a past medical history of hypertension, diastolic dysfunction, obesity, diabetes mellitus type 2, hyperlipidemia, hypogonadism, chronic RBBB, GERD, LITZY adherent to CPAP, diabetic retinopathy, anxiety, dysautonomia, CAD per cardiac score presenting with acute onset CHF.  Jono reports that he had been consuming a much higher intake of sodium over the past month. Frequently going to [employer], fast food [employer] and consuming canned soup. This weekend he had an even high intake of sodium by also consuming olives, potato chips, and pizza. He noticed worsening swelling starting on Tuesday, worse on Wednesday and Thursday was very swollen.     He was seen by his PCP for complaints of shortness of breath and lower extremity swelling. He was sent to the ER for evaluation. He was not given any diuretics  Upon arrival to the ER his BNP was 20 (can be falsely low in obese patients), troponin negative x 2, normal CMP, normal CBC, CT chest was negative for PE, no pulmonary consolidation. He was admitted for acute onset of CHF.     His swelling has already significantly improved with no diuretics, just less sodium intake.      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    Surgical History  He has a past surgical history that includes Other surgical history (07/13/2020); Other surgical history (07/13/2020); Other surgical history (07/13/2020); Other surgical history (07/13/2020); Other surgical history (03/01/2022); Other surgical history (03/01/2022); CT angio coronary art with heartflow if score >30% (10/24/2023); and Cardiac catheterization (N/A, 12/6/2023).     Social History  He reports that he has never smoked. He has never been exposed to tobacco smoke. He has never used smokeless tobacco. He reports that he does not drink alcohol and does not use drugs.    Family History  Family History[1]     Allergies  Perfume, Fluticasone propionate, Liraglutide, Lisinopril, Other, and Pollen extracts    Review of Systems  Review of Systems   Constitutional:  Negative for activity change, appetite change, chills, fatigue and fever.   HENT:  Negative for congestion.    Respiratory:  Negative for apnea, cough, chest tightness, shortness of breath, wheezing and stridor.    Cardiovascular:  Negative for chest pain, palpitations and leg swelling.   Gastrointestinal:  Negative for abdominal distention, abdominal pain, blood in stool, diarrhea, nausea and vomiting.   Endocrine: Negative for cold intolerance, heat intolerance, polydipsia, polyphagia and polyuria.   Genitourinary:  Negative for frequency and urgency.   Musculoskeletal:  Negative for arthralgias, back pain, gait problem, joint swelling, myalgias, neck pain and neck stiffness.   Skin:  Negative for color change and pallor.   Neurological:  Negative for dizziness and light-headedness.   Psychiatric/Behavioral:  Negative for behavioral problems.          Physical Exam  Physical Exam  Constitutional:       Appearance: Normal appearance.   HENT:      Head: Normocephalic and atraumatic.      Nose: Nose normal. No congestion.   Eyes:      Extraocular Movements: Extraocular movements intact.      Pupils: Pupils are equal,  round, and reactive to light.   Cardiovascular:      Rate and Rhythm: Normal rate and regular rhythm.      Pulses: Normal pulses.      Heart sounds: Normal heart sounds. No murmur heard.     No friction rub. No gallop.   Pulmonary:      Effort: Pulmonary effort is normal. No tachypnea, bradypnea, accessory muscle usage or respiratory distress.      Breath sounds: Normal breath sounds. No stridor. No wheezing, rhonchi or rales.   Chest:      Chest wall: No tenderness.   Abdominal:      General: Bowel sounds are normal. There is no distension.      Palpations: Abdomen is soft.      Tenderness: There is no abdominal tenderness.   Musculoskeletal:         General: Normal range of motion.      Cervical back: Normal range of motion and neck supple.   Skin:     General: Skin is warm and dry.      Capillary Refill: Capillary refill takes less than 2 seconds.   Neurological:      General: No focal deficit present.      Mental Status: He is alert and oriented to person, place, and time. Mental status is at baseline.   Psychiatric:         Mood and Affect: Mood normal.         Behavior: Behavior normal.             Last Recorded Vitals  /73   Pulse 92   Temp 36.3 °C (97.3 °F) (Temporal)   Resp 16   Wt 134 kg (294 lb 15.6 oz)   SpO2 98%     Relevant Results  Results for orders placed or performed during the hospital encounter of 06/12/25 (from the past 24 hours)   CBC with Differential   Result Value Ref Range    WBC 5.0 4.4 - 11.3 x10*3/uL    nRBC 0.0 0.0 - 0.0 /100 WBCs    RBC 4.02 (L) 4.50 - 5.90 x10*6/uL    Hemoglobin 12.9 (L) 13.5 - 17.5 g/dL    Hematocrit 37.5 (L) 41.0 - 52.0 %    MCV 93 80 - 100 fL    MCH 32.1 26.0 - 34.0 pg    MCHC 34.4 32.0 - 36.0 g/dL    RDW 13.5 11.5 - 14.5 %    Platelets 161 150 - 450 x10*3/uL    Neutrophils % 53.4 40.0 - 80.0 %    Immature Granulocytes %, Automated 1.2 (H) 0.0 - 0.9 %    Lymphocytes % 32.7 13.0 - 44.0 %    Monocytes % 9.7 2.0 - 10.0 %    Eosinophils % 2.4 0.0 - 6.0 %     Basophils % 0.6 0.0 - 2.0 %    Neutrophils Absolute 2.69 1.20 - 7.70 x10*3/uL    Immature Granulocytes Absolute, Automated 0.06 0.00 - 0.70 x10*3/uL    Lymphocytes Absolute 1.65 1.20 - 4.80 x10*3/uL    Monocytes Absolute 0.49 0.10 - 1.00 x10*3/uL    Eosinophils Absolute 0.12 0.00 - 0.70 x10*3/uL    Basophils Absolute 0.03 0.00 - 0.10 x10*3/uL   Comprehensive Metabolic Panel   Result Value Ref Range    Glucose 252 (H) 74 - 99 mg/dL    Sodium 137 136 - 145 mmol/L    Potassium 3.9 3.5 - 5.3 mmol/L    Chloride 99 98 - 107 mmol/L    Bicarbonate 28 21 - 32 mmol/L    Anion Gap 14 10 - 20 mmol/L    Urea Nitrogen 13 6 - 23 mg/dL    Creatinine 0.79 0.50 - 1.30 mg/dL    eGFR >90 >60 mL/min/1.73m*2    Calcium 9.6 8.6 - 10.3 mg/dL    Albumin 4.3 3.4 - 5.0 g/dL    Alkaline Phosphatase 93 33 - 120 U/L    Total Protein 6.8 6.4 - 8.2 g/dL    AST 37 9 - 39 U/L    Bilirubin, Total 0.4 0.0 - 1.2 mg/dL    ALT 54 (H) 10 - 52 U/L   Brain Natriuretic Peptide   Result Value Ref Range    BNP 20 0 - 99 pg/mL   Troponin I, High Sensitivity, Initial   Result Value Ref Range    Troponin I, High Sensitivity <3 0 - 20 ng/L   Magnesium   Result Value Ref Range    Magnesium 1.98 1.60 - 2.40 mg/dL   Troponin, High Sensitivity, 1 Hour   Result Value Ref Range    Troponin I, High Sensitivity <3 0 - 20 ng/L   POCT GLUCOSE   Result Value Ref Range    POCT Glucose 242 (H) 74 - 99 mg/dL   POCT GLUCOSE   Result Value Ref Range    POCT Glucose 170 (H) 74 - 99 mg/dL        === 06/12/25 ===    XR CHEST 2 VIEWS    - Impression -  No acute process.  Signed by Adam Carey MD     Transthoracic Echo (TTE) Complete  Result Date: 8/28/2024   [facility], [address] [address]              Tel [phone] and Fax [phone] TRANSTHORACIC ECHOCARDIOGRAM REPORT  Patient Name:      JULIO Hardy Physician:    [id number] Lucas Pino MD Study Date:         8/28/2024            Ordering Provider:    [id number] OLIVIA BUTLER MRN/PID:           [id number]             Fellow: Accession#:        [id number]         Nurse: Date of Birth/Age: 1970 / 54 years Sonographer:          Jessica Castillo Gender:            M                    Additional Staff: Height:            177.80 cm            Admit Date: Weight:            129.28 kg            Admission Status:     Inpatient -                                                               Routine BSA / BMI:         2.43 m2 / 40.89      Encounter#:           [id number]                    kg/m2 Blood Pressure:    139/81 mmHg          Department Location:  [facility] Study Type:    TRANSTHORACIC ECHO (TTE) COMPLETE Diagnosis/ICD: Syncope and collapse-R55 Indication:    syncope CPT Code:      Echo Complete w Full Doppler-[id number] Patient History: Diabetes:          Yes Pertinent History: Hyperlipidemia, HTN, Syncope, Chest Pain, CAD and Dyspnea.                    LITZY, GERD. Study Detail: The following Echo studies were performed: 2D, M-Mode, Doppler and               color flow. Technically challenging study due to body habitus and               poor acoustic windows. Definity used as a contrast agent for               endocardial border definition. Total contrast used for this               procedure was 2 mL via IV push.  PHYSICIAN INTERPRETATION: Left Ventricle: The left ventricular systolic function is normal, with a visually estimated ejection fraction of 65-70%. There are no regional left ventricular wall motion abnormalities. The left ventricular cavity size is normal. There is mild concentric left ventricular hypertrophy involving the septal wall. Spectral Doppler shows a normal pattern of left ventricular diastolic filling. Left Atrium: The left atrium is normal in size. Right Ventricle:  The right ventricle is normal in size. There is normal right ventricular global systolic function. Right Atrium: The right atrium is normal in size. Aortic Valve: The aortic valve appears structurally normal. There is no evidence of aortic valve regurgitation. The peak instantaneous gradient of the aortic valve is 6.6 mmHg. Mitral Valve: The mitral valve is normal in structure. There is no evidence of mitral valve regurgitation. Tricuspid Valve: The tricuspid valve is structurally normal. No evidence of tricuspid regurgitation. Pulmonic Valve: The pulmonic valve is structurally normal. There is physiologic pulmonic valve regurgitation. Pericardium: There is no pericardial effusion noted. Aorta: The aortic root is abnormal. There is mild dilatation of the ascending aorta. There is mild dilatation of the aortic root. Pulmonary Veins: The pulmonary veins appear normal and return normally to the left atrium. Systemic Veins: The inferior vena cava appears mildly dilated. There is less than 50% IVC collapse with inspiration.  CONCLUSIONS:  1. The left ventricular systolic function is normal, with a visually estimated ejection fraction of 65-70%.  2. There is normal right ventricular global systolic function. QUANTITATIVE DATA SUMMARY: 2D MEASUREMENTS:                         Normal Ranges: Ao Root d:     4.00 cm  (2.0-3.7cm) LAs:           2.70 cm  (2.7-4.0cm) IVSd:          1.17 cm  (0.6-1.1cm) LVPWd:         1.06 cm  (0.6-1.1cm) LVIDd:         5.02 cm  (3.9-5.9cm) LVIDs:         3.35 cm LV Mass Index: 88 g/m2 LVEDV Index:   64 ml/m2 LV % FS        33.3 % LA VOLUME:                               Normal Ranges: LA Vol A4C:        50.1 ml    (22+/-6mL/m2) LA Vol A2C:        50.1 ml LA Vol BP:         50.1 ml LA Vol Index A4C:  20.6ml/m2 LA Vol Index A2C:  20.6 ml/m2 LA Vol Index BP:   20.6 ml/m2 LA Area A4C:       18.0 cm2 LA Area A2C:       18.0 cm2 LA Major Axis A4C: 5.5 cm LA Major Axis A2C: 5.5 cm LA Volume Index:    19.0 ml/m2 RA VOLUME BY A/L METHOD:                               Normal Ranges: RA Vol A4C:        39.0 ml    (8.3-19.5ml) RA Vol Index A4C:  16.1 ml/m2 RA Area A4C:       15.0 cm2 RA Major Axis A4C: 4.9 cm M-MODE MEASUREMENTS:                  Normal Ranges: Ao Root: 3.90 cm (2.0-3.7cm) LAs:     3.40 cm (2.7-4.0cm) AORTA MEASUREMENTS:                    Normal Ranges: Asc Ao, d: 3.60 cm (2.1-3.4cm) LV SYSTOLIC FUNCTION BY 2D PLANIMETRY (MOD):                      Normal Ranges: EF-A4C View:    63 % (>=55%) EF-A2C View:    63 % EF-Biplane:     64 % EF-Visual:      68 % LV EF Reported: 68 % LV DIASTOLIC FUNCTION:                               Normal Ranges: MV Peak E:        1.14 m/s    (0.7-1.2 m/s) MV Peak A:        0.82 m/s    (0.42-0.7 m/s) E/A Ratio:        1.39        (1.0-2.2) MV e'             0.110 m/s   (>8.0) MV lateral e'     0.12 m/s MV medial e'      0.10 m/s MV A Dur:         174.00 msec E/e' Ratio:       10.36       (<8.0) PulmV Sys Otis:    52.90 cm/s PulmV [place] Otis:   37.10 cm/s PulmV S/D Otis:    1.40 PulmV A Revs Otis: 32.30 cm/s PulmV A Revs Dur: 148.00 msec MITRAL VALVE:                 Normal Ranges: MV DT: 209 msec (150-240msec) AORTIC VALVE:                         Normal Ranges: AoV Vmax:      1.28 m/s (<=1.7m/s) AoV Peak P.6 mmHg (<20mmHg) LVOT Max Otis:  1.20 m/s (<=1.1m/s) LVOT VTI:      21.70 cm LVOT Diameter: 2.30 cm  (1.8-2.4cm) AoV Area,Vmax: 3.90 cm2 (2.5-4.5cm2)  RIGHT VENTRICLE: RV Basal 3.20 cm RV Mid   2.50 cm RV Major 8.1 cm TAPSE:   15.8 mm RV s'    0.14 m/s TRICUSPID VALVE/RVSP:                   Normal Ranges: IVC Diam: 2.20 cm PULMONIC VALVE:                      Normal Ranges: PV Max Otis: 1.0 m/s  (0.6-0.9m/s) PV Max PG:  3.7 mmHg Pulmonary Veins: PulmV A Revs Dur: 148.00 msec PulmV A Revs Otis: 32.30 cm/s PulmV Gu[place] Otis:   37.10 cm/s PulmV S/D Otis:    1.40 PulmV Sys Otis:    52.90 cm/s  [id number] Lucas Pino MD Electronically signed on 2024 at 4:36:00 PM  **  Final **        Problem List[2]       Assessment/Plan     12/6/2023 [facility]:   Coronary Lesion Summary:  Vessel   Stenosis                         Vessel Segment  LAD    50% stenosis distal third of the proximal and proximal third of the mid  OM 1   50% stenosis                distal third of the proximal  RCA    40% stenosis  distal third of the proximal and middle third of the mid    8/28/2024 Echocardiogram  CONCLUSIONS:   1. The left ventricular systolic function is normal, with a visually estimated ejection fraction of 65-70%.   2. There is normal right ventricular global systolic function.    # Possible onset of CHF  -BNP 20 (falsely low in obese patients)  -CT chest: negative PE, no pulmonary consolidations  -I reviewed the Echocardiogram from 2024 as above, new echocardiogram pending  -Strict I & Os  -Daily weights  -2gm na diet  -1500mL fluid restriction   - already improving with restricted sodium, no diuretic    # CAD  - [facility] reviewed as above  - troponins negative  - continue aspirin and rosuvastatin  - 12 lead EKG reviewed, RBBB, unable to view ischemic changes    # Hypertension  # Hyperlipidemia  - BP stable  - continue statin    # Diabetes Mellitus  - ISS  - Accu check  - check hemoglobin A1C     # Obstructive sleep apnea  - continue CPAP          Christa Love, APRN-CNP              [1]   Family History  Problem Relation Name Age of Onset    Prostate cancer Father      Narcolepsy Brother          with cataplexy   [2]   Patient Active Problem List  Diagnosis    Abnormal screening cardiac CT    Allergic rhinitis    Candidal intertrigo    Carotid atherosclerosis    Depression    Disturbance of salivary secretion    Fall    GERD (gastroesophageal reflux disease)    Hoarse voice quality    Hypercholesterolemia    Hypogonadism, male    Nasal congestion    LITZY (obstructive sleep apnea)    PAD (peripheral artery disease)    Postnasal drip    Ayers disease    Stasis, venous    Vitamin D deficiency    Acute  calculous cholecystitis    Bell's palsy    Hypertriglyceridemia    MDD (major depressive disorder), recurrent severe, without psychosis (Multi)    DHILLON (nonalcoholic steatohepatitis)    Anxiety disorder    Chest pain    Short of breath on exertion    SOB (shortness of breath)    Benign essential hypertension    Diplopia    Acute pancreatitis    Coronary artery disease involving native coronary artery of native heart without angina pectoris    Disorder of soft tissue    Fatigue    Headache    Insomnia    Obesity due to energy imbalance    Severe obesity (Multi)    Obesity with body mass index 30 or greater    Severe acute respiratory syndrome coronavirus 2 (SARS-CoV-2) infection ruled out    Thyroid nodule    Abnormal computed tomography scan    Acute cholecystitis due to biliary calculus    Intertriginous candidiasis    Abdominal pain    Syncope and collapse    Left facial numbness    Acute pancreatitis, unspecified complication status, unspecified pancreatitis type (HHS-HCC)    Hyponatremia    Physical deconditioning    Weakness    Heart failure

## 2025-06-13 NOTE — PROGRESS NOTES
Occupational Therapy    Evaluation    Patient Name: Jono Quinn  MRN: [id number]  Department: [facility] NONV1  Room: [address] [place] [address]  Today's Date: 6/13/2025  Time Calculation  Start Time: 1350  Stop Time: 1410  Time Calculation (min): 20 min    Assessment  IP OT Assessment  OT Assessment: RN cleared pt. OT orders received and occupational profile established via interview method, data gathering, and review of medical records to determine no further skilled OT needs at this time- pt observed to be at baseline with no further concerns (believes he will benefit from PT, OT not needed).  At this time, pt will be d/c from OT caseload, remain under care of RN staff. At end, pt positioned into recliner per request with alarm on. Needs met, call light in reach. RN notified.  Barriers to Discharge Home: No anticipated barriers  Evaluation/Treatment Tolerance: Patient tolerated treatment well  Medical Staff Made Aware: Yes  End of Session Communication: Bedside nurse  End of Session Patient Position: Up in chair, Alarm on    Plan:  No Skilled OT: At baseline function  OT Frequency: OT eval only  OT Discharge Recommendations: No further acute OT  OT - OK to Discharge: Yes    Subjective   Current Problem:  1. Shortness of breath        2. Congestive heart failure, unspecified HF chronicity, unspecified heart failure type        3. Heart failure, unspecified HF chronicity, unspecified heart failure type  Transthoracic Echo Complete    Transthoracic Echo Complete        OT Visit Info:  OT Received On: 06/13/25  General Visit Info:  General  Reason for Referral: Impaired ADLs/safety  Referred By: BETH Faye-CNP  Past Medical History Relevant to Rehab: PMH includes- hypertension, diastolic dysfunction, obesity, diabetes mellitus type 2, hyperlipidemia, hypogonadism, chronic RBBB, GERD, LITZY adherent to CPAP, diabetic retinopathy, anxiety, dysautonomia, CAD  Family/Caregiver Present: No  Prior to Session Communication: Bedside nurse,  PCT/NA/CTA  Patient Position Received: Bed, 2 rail up, Alarm on  Preferred Learning Style: auditory, verbal  General Comment: Pt supine, awake on breathing treatment. Agreeable to work with therapy- cooperative throughout.    Precautions:  Medical Precautions: Fall precautions    Pain:  Pain Assessment  Pain Assessment: 0-10  0-10 (Numeric) Pain Score: 0 - No pain    Objective   Cognition:  Overall Cognitive Status: Within Functional Limits  Orientation Level: Oriented X4    Home Living:  Type of Home: Apartment  Lives With: Alone  Home Adaptive Equipment: Cane, Walker rolling or standard, Reacher  Home Layout: One level, Laundry main level  Home Access: Stairs to enter without rails  Entrance Stairs-Rails: None  Entrance Stairs-Number of Steps: 2  Bathroom Shower/Tub: Tub/shower unit  Bathroom Toilet: Standard  Bathroom Equipment: Grab bars in shower, Hand-held shower hose, Grab bars around toilet  Bathroom Accessibility: No concern  Home Living Comments: Sleeps in reg bed.     Prior Function:  Level of [place]: Independent with ADLs and functional transfers, Independent with homemaking with ambulation  ADL Assistance: Independent  Homemaking Assistance: Independent  Ambulatory Assistance: Independent  Vocational: Works at home  Hand Dominance: Right  Prior Function Comments: Prior to recent hospitalization, pt reports independence with ADLs/IADLs and ambulation with intermittent use of AD d/t dysautonomia s/s (chronic). 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 No recent falls to report.    IADL History:  Homemaking Responsibilities: Yes  Meal Prep Responsibility: Primary  Laundry Responsibility: Primary  Cleaning Responsibility: Primary  Bill Paying/Finance Responsibility: Primary  Shopping Responsibility: Primary  Current License: Yes  Mode of Transportation: Car    ADL:  Eating Assistance: Independent  Grooming Assistance: Independent  Bathing Assistance: Independent (anticipated)  UE Dressing  Assistance: Independent (anticipated)  LE Dressing Assistance: Independent  Toileting Assistance with Device: Independent  Functional Assistance: Modified independent    Activity Tolerance:  Endurance: Endurance does not limit participation in activity    Bed Mobility/Transfers:   Bed Mobility  Bed Mobility: Yes  Bed Mobility 1  Bed Mobility 1: Supine to sitting  Level of Assistance 1: Independent  Bed Mobility 2  Bed Mobility  2: Scooting  Level of Assistance 2: Independent    Transfers  Transfer: Yes  Transfer 1  Transfer From 1: Sit to  Transfer to 1: Stand  Technique 1: Sit to stand, Stand to sit  Transfer Device 1: Walker  Transfer Level of Assistance 1: Independent  Trials/Comments 1: Mult trials from varied surfaces (chair, bed, toilet) without AD required- consistent ADRIANO.    Functional Mobility:  Functional Mobility  Functional Mobility Performed: Yes  Functional Mobility 1  Surface 1: Level tile  Device 1: Rolling walker  Assistance 1: Distant supervision  Comments 1: AD use appearing awkward/increased time, otherwise no concerns with safety.    Sitting Balance:  Static Sitting Balance  Static Sitting-Balance Support: Feet supported  Static Sitting-Level of Assistance: Independent  Dynamic Sitting Balance  Dynamic Sitting-Balance Support: Feet supported  Dynamic Sitting-Level of Assistance: Independent    Standing Balance:  Static Standing Balance  Static Standing-Balance Support: Bilateral upper extremity supported  Static Standing-Level of Assistance: Modified Independent  Dynamic Standing Balance  Dynamic Standing-Balance Support: Bilateral upper extremity supported  Dynamic Standing-Level of Assistance: Modified independent    IADL's:   Homemaking Responsibilities: Yes  Meal Prep Responsibility: Primary  Laundry Responsibility: Primary  Cleaning Responsibility: Primary  Bill Paying/Finance Responsibility: Primary  Shopping Responsibility: Primary  Current License: Yes  Mode of Transportation:  Car    Vision:   Vision - Basic Assessment  Current Vision: No visual deficits    Sensation:  Light Touch: No apparent deficits (BUE/hands)    Strength:  Strength Comments: BUE WFL- grossly 4/5 throughout. B - good    Coordination:  Movements are Fluid and Coordinated: Yes     Hand Function:  Hand Function  Gross Grasp: Functional  Coordination: Functional    Extremities:   RUE   RUE : Within Functional Limits and LUE   LUE: Within Functional Limits    Outcome Measures:   [facility] Daily Activity  Putting on and taking off regular lower body clothing: None  Bathing (including washing, rinsing, drying): None  Putting on and taking off regular upper body clothing: None  Toileting, which includes using toilet, bedpan or urinal: None  Taking care of personal grooming such as brushing teeth: None  Eating Meals: None  Daily Activity - Total Score: 24    Education Documentation  No documentation found.  Education Comments  No comments found.

## 2025-06-14 VITALS
HEIGHT: 70 IN | RESPIRATION RATE: 18 BRPM | HEART RATE: 65 BPM | BODY MASS INDEX: 39.88 KG/M2 | SYSTOLIC BLOOD PRESSURE: 119 MMHG | TEMPERATURE: 97.3 F | OXYGEN SATURATION: 98 % | DIASTOLIC BLOOD PRESSURE: 72 MMHG | WEIGHT: 278.6 LBS

## 2025-06-14 LAB
ANION GAP SERPL CALC-SCNC: 14 MMOL/L (ref 10–20)
BASOPHILS # BLD AUTO: 0.05 X10*3/UL (ref 0–0.1)
BASOPHILS NFR BLD AUTO: 1 %
BUN SERPL-MCNC: 17 MG/DL (ref 6–23)
CALCIUM SERPL-MCNC: 9.4 MG/DL (ref 8.6–10.3)
CHLORIDE SERPL-SCNC: 97 MMOL/L (ref 98–107)
CO2 SERPL-SCNC: 27 MMOL/L (ref 21–32)
CREAT SERPL-MCNC: 0.99 MG/DL (ref 0.5–1.3)
EGFRCR SERPLBLD CKD-EPI 2021: 89 ML/MIN/1.73M*2
EOSINOPHIL # BLD AUTO: 0.13 X10*3/UL (ref 0–0.7)
EOSINOPHIL NFR BLD AUTO: 2.6 %
ERYTHROCYTE [DISTWIDTH] IN BLOOD BY AUTOMATED COUNT: 13.8 % (ref 11.5–14.5)
GLUCOSE BLD MANUAL STRIP-MCNC: 171 MG/DL (ref 74–99)
GLUCOSE SERPL-MCNC: 157 MG/DL (ref 74–99)
HCT VFR BLD AUTO: 38.2 % (ref 41–52)
HGB BLD-MCNC: 13 G/DL (ref 13.5–17.5)
IMM GRANULOCYTES # BLD AUTO: 0.04 X10*3/UL (ref 0–0.7)
IMM GRANULOCYTES NFR BLD AUTO: 0.8 % (ref 0–0.9)
LYMPHOCYTES # BLD AUTO: 2.25 X10*3/UL (ref 1.2–4.8)
LYMPHOCYTES NFR BLD AUTO: 45.8 %
MCH RBC QN AUTO: 31.9 PG (ref 26–34)
MCHC RBC AUTO-ENTMCNC: 34 G/DL (ref 32–36)
MCV RBC AUTO: 94 FL (ref 80–100)
MONOCYTES # BLD AUTO: 0.41 X10*3/UL (ref 0.1–1)
MONOCYTES NFR BLD AUTO: 8.4 %
NEUTROPHILS # BLD AUTO: 2.03 X10*3/UL (ref 1.2–7.7)
NEUTROPHILS NFR BLD AUTO: 41.4 %
NRBC BLD-RTO: 0 /100 WBCS (ref 0–0)
PLATELET # BLD AUTO: 170 X10*3/UL (ref 150–450)
POTASSIUM SERPL-SCNC: 4.1 MMOL/L (ref 3.5–5.3)
RBC # BLD AUTO: 4.08 X10*6/UL (ref 4.5–5.9)
SODIUM SERPL-SCNC: 134 MMOL/L (ref 136–145)
WBC # BLD AUTO: 4.9 X10*3/UL (ref 4.4–11.3)

## 2025-06-14 PROCEDURE — 99239 HOSP IP/OBS DSCHRG MGMT >30: CPT

## 2025-06-14 PROCEDURE — 94660 CPAP INITIATION&MGMT: CPT

## 2025-06-14 PROCEDURE — 82374 ASSAY BLOOD CARBON DIOXIDE: CPT | Performed by: NURSE PRACTITIONER

## 2025-06-14 PROCEDURE — 96372 THER/PROPH/DIAG INJ SC/IM: CPT | Performed by: NURSE PRACTITIONER

## 2025-06-14 PROCEDURE — 2500000002 HC RX 250 W HCPCS SELF ADMINISTERED DRUGS (ALT 637 FOR MEDICARE OP, ALT 636 FOR OP/ED): Performed by: NURSE PRACTITIONER

## 2025-06-14 PROCEDURE — 85025 COMPLETE CBC W/AUTO DIFF WBC: CPT | Performed by: NURSE PRACTITIONER

## 2025-06-14 PROCEDURE — 2500000004 HC RX 250 GENERAL PHARMACY W/ HCPCS (ALT 636 FOR OP/ED): Performed by: NURSE PRACTITIONER

## 2025-06-14 PROCEDURE — G0378 HOSPITAL OBSERVATION PER HR: HCPCS

## 2025-06-14 PROCEDURE — 82947 ASSAY GLUCOSE BLOOD QUANT: CPT

## 2025-06-14 PROCEDURE — 36415 COLL VENOUS BLD VENIPUNCTURE: CPT | Performed by: NURSE PRACTITIONER

## 2025-06-14 PROCEDURE — 2500000001 HC RX 250 WO HCPCS SELF ADMINISTERED DRUGS (ALT 637 FOR MEDICARE OP): Performed by: NURSE PRACTITIONER

## 2025-06-14 RX ADMIN — POLYETHYLENE GLYCOL 3350 17 G: 17 POWDER, FOR SOLUTION ORAL at 08:05

## 2025-06-14 RX ADMIN — INSULIN LISPRO 2 UNITS: 100 INJECTION, SOLUTION INTRAVENOUS; SUBCUTANEOUS at 08:06

## 2025-06-14 RX ADMIN — Medication 100 MCG: at 08:06

## 2025-06-14 RX ADMIN — ENOXAPARIN SODIUM 40 MG: 40 INJECTION SUBCUTANEOUS at 08:06

## 2025-06-14 RX ADMIN — EMPAGLIFLOZIN 25 MG: 10 TABLET, FILM COATED ORAL at 08:05

## 2025-06-14 RX ADMIN — GABAPENTIN 900 MG: 300 CAPSULE ORAL at 08:06

## 2025-06-14 RX ADMIN — FENOFIBRATE 160 MG: 160 TABLET ORAL at 08:06

## 2025-06-14 RX ADMIN — BUSPIRONE HYDROCHLORIDE 15 MG: 10 TABLET ORAL at 08:06

## 2025-06-14 RX ADMIN — PAROXETINE HYDROCHLORIDE 30 MG: 20 TABLET, FILM COATED ORAL at 08:06

## 2025-06-14 ASSESSMENT — COGNITIVE AND FUNCTIONAL STATUS - GENERAL
DAILY ACTIVITIY SCORE: 24
MOBILITY SCORE: 24

## 2025-06-14 ASSESSMENT — PAIN SCALES - GENERAL: PAINLEVEL_OUTOF10: 0 - NO PAIN

## 2025-06-14 NOTE — PROGRESS NOTES
Subjective Data:  No chest pain or dyspnea, edema much better  NSR  Comprehensive 10-point review of systems negative otherwise as noted above in HPI    Overnight Events:    None     Objective Data:  Last Recorded Vitals:  Vitals:    06/14/25 0330 06/14/25 0421 06/14/25 0431 06/14/25 0711   BP: 123/75   119/72   BP Location: Left arm   Left arm   Patient Position: Lying   Lying   Pulse: 63   65   Resp: 18  18 18   Temp: 36.2 °C (97.2 °F)   36.3 °C (97.3 °F)   TempSrc: Temporal   Temporal   SpO2: 95%   98%   Weight:  126 kg (278 lb 9.6 oz)     Height:           Last Labs:  CBC - 6/14/2025:  7:21 AM  4.9 13.0 170    38.2      CMP - 6/14/2025:  7:21 AM  9.4 6.8 37 --- 0.4   _ 4.3 54 93      PTT - 8/27/2024:  1:35 PM  0.9   10.4 32     TROPHS   Date/Time Value Ref Range Status   06/12/2025 04:44 PM <3 0 - 20 ng/L Final   06/12/2025 03:41 PM <3 0 - 20 ng/L Final   08/29/2024 01:43 PM 5 0 - 20 ng/L Final     BNP   Date/Time Value Ref Range Status   06/12/2025 03:41 PM 20 0 - 99 pg/mL Final   08/27/2024 01:35 PM 19 0 - 99 pg/mL Final     HGBA1C   Date/Time Value Ref Range Status   03/22/2025 08:39 AM 7.5 <5.7 % of total Hgb Final     Comment:     For someone without known diabetes, a hemoglobin A1c  value of 6.5% or greater indicates that they may have   diabetes and this should be confirmed with a follow-up   test.     For someone with known diabetes, a value <7% indicates   that their diabetes is well controlled and a value   greater than or equal to 7% indicates suboptimal   control. A1c targets should be individualized based on   duration of diabetes, age, comorbid conditions, and   other considerations.     Currently, no consensus exists regarding use of  hemoglobin A1c for diagnosis of diabetes for children.         02/19/2025 01:52 PM 7.2 4.2 - 6.5 % Final   08/27/2024 01:35 PM 8.0 see below % Final   05/03/2024 12:01 PM 7.2 4.2 - 6.5 % Final     LDLCALC   Date/Time Value Ref Range Status   10/26/2024 09:32 AM    Final     Comment:     The calculation of LDL and VLDL are inaccurate when the Triglycerides are greater than 400 mg/dL or when the patient is non-fasting. If LDL measurement is necessary contact the testing laboratory for an alternative LDL assay.                                  Near   Borderline      AGE      Desirable  Optimal    High     High     Very High     0-19 Y     0 - 109     ---    110-129   >/= 130     ----    20-24 Y     0 - 119     ---    120-159   >/= 160     ----      >24 Y     0 -  99   100-129  130-159   160-189     >/=190     08/27/2024 01:35 PM   Final     Comment:     The calculation of LDL and VLDL are inaccurate when the Triglycerides are greater than 400 mg/dL or when the patient is non-fasting. If LDL measurement is necessary contact the testing laboratory for an alternative LDL assay.       11/18/2023 08:46 AM   Final     Comment:     The calculation of LDL and VLDL are inaccurate when the Triglycerides are greater than 400 mg/dL or when the patient is non-fasting. If LDL measurement is necessary contact the testing laboratory for an alternative LDL assay.                                  Near   Borderline      AGE      Desirable  Optimal    High     High     Very High     0-19 Y     0 - 109     ---    110-129   >/= 130     ----    20-24 Y     0 - 119     ---    120-159   >/= 160     ----      >24 Y     0 -  99   100-129  130-159   160-189     >/=190       VLDL   Date/Time Value Ref Range Status   10/26/2024 09:32 AM   Final     Comment:     Unable to calculate VLDL.   08/27/2024 01:35 PM   Final     Comment:     Unable to calculate VLDL.   11/18/2023 08:46 AM   Final     Comment:     Unable to calculate VLDL.      Last I/O:  I/O last 3 completed shifts:  In: 900 (7.1 mL/kg) [P.O.:900]  Out: 6500 (51.4 mL/kg) [Urine:6500 (1.4 mL/kg/hr)]  Weight: 126.4 kg     Ejection Fractions:  EF   Date/Time Value Ref Range Status   06/13/2025 10:00 AM 68 %    08/28/2024 10:00 AM 68 %    Inpatient  Medications:  Scheduled Medications[1]  PRN Medications[2]  Continuous Medications[3]    Physical Exam:  Constitutional: Well developed, awake/alert/oriented x3, no distress, alert and cooperative  Eyes: PERRL, EOMI, clear sclera  ENMT: mucous membranes moist, no apparent injury, no lesions seen  Head/Neck: Neck supple, no apparent injury, thyroid without mass or tenderness, No JVD, trachea midline, no bruits  Respiratory/Thorax: Patent airways, CTAB, normal breath sounds with good chest expansion, thorax symmetric  Cardiovascular: Regular, rate and rhythm, no murmurs, 2+ equal pulses of the extremities, normal S 1and S 2  Gastrointestinal: Nondistended, soft, non-tender, no rebound tenderness or guarding, no masses palpable, no organomegaly, +BS, no bruits  Musculoskeletal: ROM intact, no joint swelling, normal strength  Extremities: normal extremities, no cyanosis edema, contusions or wounds, no clubbing  Neurological: alert and oriented x3, intact senses, motor, response and reflexes, normal strength  Lymphatic: No significant lymphadenopathy  Psychological: Appropriate mood and behavior  Skin: Warm and dry, no lesions, no rashes       Assessment/Plan   12/6/2023 [facility]:   Coronary Lesion Summary:  Vessel   Stenosis                         Vessel Segment  LAD    50% stenosis distal third of the proximal and proximal third of the mid  OM 1   50% stenosis                distal third of the proximal  RCA    40% stenosis  distal third of the proximal and middle third of the mid     8/28/2024 Echocardiogram  CONCLUSIONS:   1. The left ventricular systolic function is normal, with a visually estimated ejection fraction of 65-70%.   2. There is normal right ventricular global systolic function.     # Possible onset of CHF  -BNP 20 (falsely low in obese patients)  -CT chest: negative PE, no pulmonary consolidations  -I reviewed the Echocardiogram from 2024 as above, new echocardiogram pending  -Strict I & Os  -Daily  weights  -2gm na diet  -1500mL fluid restriction   - already improving with restricted sodium, no diuretic     # CAD  - LHC reviewed as above  - troponins negative  - continue aspirin and rosuvastatin  - 12 lead EKG reviewed, RBBB, no ischemia     # Hypertension  # Hyperlipidemia  - BP stable  - continue statin     # Diabetes Mellitus  - ISS  - Accu check     # Obstructive sleep apnea  - continue CPAP     Stable for discharge   OP PCP and Cardiology follow up  He will benefit from strict attention to diet, exercise, sodium and fluid restriction  Consider starting GLP agonists    Peripheral IV 06/12/25 20 G Right Antecubital (Active)   Site Assessment Clean;Dry;Intact 06/14/25 0721   Dressing Status Dry;Clean 06/14/25 0721   Number of days: 2       Code Status:  Full Code    I spent 15 minutes in the professional and overall care of this patient.        Lucas Pino MD       [1] [2] [3]

## 2025-06-14 NOTE — PROGRESS NOTES
Physical Therapy                 Therapy Communication Note    Patient Name: Jono Quinn  MRN: [id number]  Department:   Room: [address] [place] [address]  Today's Date: 6/14/2025     Discipline: Physical Therapy    Missed Visit:       Missed Visit Reason:      Missed Time: Attempt 12:25    Comment:  Pt discharged prior to PT getting there

## 2025-06-14 NOTE — DISCHARGE SUMMARY
"Discharge Diagnosis  Heart failure     Issues Requiring Follow-Up  Patient to follow up with PCP and Cardiology.      Discharge Meds     Medication List      CONTINUE taking these medications     BD Ultra-Fine Mini Pen Needle 31 gauge x 3/16\" needle; Generic drug: pen   needle, diabetic; Four times daily   Dexcom G7  misc; Generic drug: blood-glucose,,cont   Dexcom G7 Sensor device; Generic drug: blood-glucose sensor; For   continuous glucose monitoring.  Change every 10 days.     ASK your doctor about these medications     aspirin 81 mg EC tablet   azelastine 137 mcg (0.1 %) nasal spray; Commonly known as: Astelin   busPIRone 15 mg tablet; Commonly known as: Buspar   empagliflozin 25 mg tablet; Commonly known as: Jardiance; Take 1 tablet   (25 mg) by mouth once daily.   fenofibrate micronized 200 mg capsule; Commonly known as: Lofibra; Take   1 capsule (200 mg) by mouth once daily.   Fiasp FlexTouch U-100 Insulin 100 unit/mL (3 mL) pen; Generic drug:   insulin aspart (with niacinamide); Inject 40-60 Units under the skin 3   times a day before meals. Take as directed per insulin instructions.   gabapentin 300 mg capsule; Commonly known as: Neurontin   ginseng 100 mg capsule   icosapent ethyL 1 gram capsule; Commonly known as: Vascepa; Take 2   capsules (2 g) by mouth 2 times daily (morning and late afternoon).   insulin lispro 100 unit/mL pen; Commonly known as: HumaLOG KwikPen   Insulin; Inject 44-64 Units under the skin 3 times a day before meals.   Take as directed per insulin instructions.   metFORMIN 1,000 mg tablet; Commonly known as: Glucophage; TAKE 1 TABLET   BY MOUTH TWICE DAILY (MORNING  AND  LATE  AFTERNOON)   multivitamin capsule   nortriptyline 25 mg capsule; Commonly known as: Pamelor   PARoxetine 30 mg tablet; Commonly known as: Paxil; TAKE 1 TABLET (30 MG)   BY MOUTH ONCE DAILY AT BEDTIME.   rosuvastatin 40 mg tablet; Commonly known as: Crestor; Take 1 tablet (40   mg) by mouth once " daily.   sodium,potassium,mag sulfates 17.5-3.13-1.6 gram solution; Commonly   known as: Suprep; Take one bottle twice as directed by the prep   instructions   Toujeo Max U-300 SoloStar 300 unit/mL (3 mL) pen; Generic drug: insulin   glargine; Inject 110 Units under the skin once daily at bedtime.   vitamin B complex tablet   Vitamin D3 50 mcg (2,000 units) capsule; Generic drug: cholecalciferol       Test Results Pending At Discharge  Pending Labs       Order Current Status    Basic metabolic panel In process    CBC and Auto Differential In process    Extra Tubes In process    Graf Top In process    Light Blue Top In process          Patient doing well today, states he is ready to go home.  Patient educated extensively on low salt intake, states understanding.  Patient to discuss potential weight loss medications with PCP.  All questions answered.        Hospital Course   Heart Failure (Suspect)  SOB  - continue ASA  - C 12/23 with moderate multiple vessel stenosis - no procedural interventions  - trops <3<3   - EKG SR with BBB   - BNP 20 (false low with BMI 42)  - address lifestyle and risk factor modification   -education on low salt intake     Severe obesity  Physical Deconditioning   - recommend low Na diet, low fat diet and weight loss  - follow up with PCP - also likely needs aggressive BP management   - echo pending:   - Na restriction and fluid restriction - pt auto-diuresing - education on risk factor modification   -will discuss weight loss with PCP     DM Type II  HLD  - HgbA1C 7.5 - (03/22/25) - follow with PCP  - continue jardiance,  gabapentin, (oupt) lispro sliding scale, toujeo, metformin   Continue fenofibrate, vascepa, rosuvastatin      DHILLON  - hx multiple bouts pancreatitis   - 8/24 CT abd reveals fatty liver metamorphosis   - mild ALT elevation 54 AST wnl - no complaints pain      MDD  - continue buspar, paxil, pamelor - pt endorses a hx of panic attacks      DVT: lovenox  Code Status:  Full    Disposition: Patient was stable to be discharged to home. He will follow up with his PCP and Cardiologist.       Total cumulative time spent in preparation of this discharge including documentation review, coordination of care with the medical team including PT/SW/care coordinators and treating consultants, discussion with patient and pertinent family members and finalization of prescriptions, follow-up appointments, and this discharge summary was approximately 45 minutes.     Pertinent Physical Exam At Time of Discharge  Physical Exam  Constitutional:       Appearance: He is obese.   HENT:      Head: Normocephalic and atraumatic.      Nose: Nose normal.      Mouth/Throat:      Mouth: Mucous membranes are dry.   Eyes:      Extraocular Movements: Extraocular movements intact.      Pupils: Pupils are equal, round, and reactive to light.   Cardiovascular:      Rate and Rhythm: Normal rate and regular rhythm.      Pulses: Normal pulses.      Heart sounds: Normal heart sounds.   Pulmonary:      Effort: Pulmonary effort is normal.      Breath sounds: Normal breath sounds.   Abdominal:      General: Bowel sounds are normal.      Palpations: Abdomen is soft.   Musculoskeletal:         General: Normal range of motion.      Cervical back: Normal range of motion.      Right lower leg: Edema present.      Left lower leg: Edema present.      Comments: Non pitting   Skin:     General: Skin is warm and dry.      Capillary Refill: Capillary refill takes less than 2 seconds.   Neurological:      Mental Status: He is alert and oriented to person, place, and time. Mental status is at baseline.   Psychiatric:         Mood and Affect: Mood normal.         Behavior: Behavior normal.         Outpatient Follow-Up  Future Appointments   Date Time Provider Department Center   6/23/2025  1:30 PM GEN SUSAN GENPAAL [facility]   7/7/2025  9:00 AM Ruben Worthy MD [id number] [facility]   7/8/2025  3:00 PM Vazquez Marquez MD [id number] [facility]   7/16/2025   4:00 PM Jon Gonzalez MD [id number] [facility]   8/12/2025  3:30 PM Trudy Gordon MD [id number] [facility]   9/22/2025  3:40 PM Vazquez Marquez MD [id number] [facility]     Patient seen and evaluated with attending Dr. Hart.    Isabelle Pollard, APRN-CNP

## 2025-06-16 ENCOUNTER — OFFICE VISIT (OUTPATIENT)
Dept: PRIMARY CARE | Facility: CLINIC | Age: 55
End: 2025-06-16
Payer: COMMERCIAL

## 2025-06-16 ENCOUNTER — TELEPHONE (OUTPATIENT)
Dept: PRIMARY CARE | Facility: CLINIC | Age: 55
End: 2025-06-16

## 2025-06-16 VITALS
WEIGHT: 272.6 LBS | OXYGEN SATURATION: 98 % | HEART RATE: 96 BPM | BODY MASS INDEX: 39.11 KG/M2 | DIASTOLIC BLOOD PRESSURE: 78 MMHG | SYSTOLIC BLOOD PRESSURE: 134 MMHG | TEMPERATURE: 97.3 F

## 2025-06-16 DIAGNOSIS — I50.9 CONGESTIVE HEART FAILURE, UNSPECIFIED HF CHRONICITY, UNSPECIFIED HEART FAILURE TYPE: Primary | ICD-10-CM

## 2025-06-16 DIAGNOSIS — K21.9 GASTROESOPHAGEAL REFLUX DISEASE WITHOUT ESOPHAGITIS: ICD-10-CM

## 2025-06-16 DIAGNOSIS — E11.311 DIABETIC RETINOPATHY OF BOTH EYES WITH MACULAR EDEMA ASSOCIATED WITH TYPE 2 DIABETES MELLITUS, UNSPECIFIED RETINOPATHY SEVERITY: ICD-10-CM

## 2025-06-16 DIAGNOSIS — E66.812 CLASS 2 SEVERE OBESITY DUE TO EXCESS CALORIES WITH SERIOUS COMORBIDITY AND BODY MASS INDEX (BMI) OF 39.0 TO 39.9 IN ADULT: ICD-10-CM

## 2025-06-16 DIAGNOSIS — F32.A DEPRESSION, UNSPECIFIED DEPRESSION TYPE: ICD-10-CM

## 2025-06-16 DIAGNOSIS — E78.00 HYPERCHOLESTEREMIA: ICD-10-CM

## 2025-06-16 DIAGNOSIS — I25.10 CORONARY ARTERY DISEASE INVOLVING NATIVE CORONARY ARTERY OF NATIVE HEART WITHOUT ANGINA PECTORIS: ICD-10-CM

## 2025-06-16 DIAGNOSIS — E55.9 VITAMIN D DEFICIENCY: ICD-10-CM

## 2025-06-16 DIAGNOSIS — E66.01 CLASS 2 SEVERE OBESITY DUE TO EXCESS CALORIES WITH SERIOUS COMORBIDITY AND BODY MASS INDEX (BMI) OF 39.0 TO 39.9 IN ADULT: ICD-10-CM

## 2025-06-16 LAB
ATRIAL RATE: 85 BPM
ATRIAL RATE: 89 BPM
HOLD SPECIMEN: NORMAL
HOLD SPECIMEN: NORMAL
P AXIS: 41 DEGREES
P AXIS: 50 DEGREES
P OFFSET: 208 MS
P OFFSET: 210 MS
P ONSET: 157 MS
P ONSET: 158 MS
PR INTERVAL: 132 MS
PR INTERVAL: 134 MS
Q ONSET: 224 MS
Q ONSET: 224 MS
QRS COUNT: 14 BEATS
QRS COUNT: 15 BEATS
QRS DURATION: 132 MS
QRS DURATION: 138 MS
QT INTERVAL: 400 MS
QT INTERVAL: 406 MS
QTC CALCULATION(BAZETT): 476 MS
QTC CALCULATION(BAZETT): 493 MS
QTC FREDERICIA: 449 MS
QTC FREDERICIA: 462 MS
R AXIS: 11 DEGREES
R AXIS: 31 DEGREES
T AXIS: 20 DEGREES
T AXIS: 27 DEGREES
T OFFSET: 424 MS
T OFFSET: 427 MS
VENTRICULAR RATE: 85 BPM
VENTRICULAR RATE: 89 BPM

## 2025-06-16 PROCEDURE — 3051F HG A1C>EQUAL 7.0%<8.0%: CPT | Performed by: INTERNAL MEDICINE

## 2025-06-16 PROCEDURE — 3075F SYST BP GE 130 - 139MM HG: CPT | Performed by: INTERNAL MEDICINE

## 2025-06-16 PROCEDURE — 1036F TOBACCO NON-USER: CPT | Performed by: INTERNAL MEDICINE

## 2025-06-16 PROCEDURE — 99496 TRANSJ CARE MGMT HIGH F2F 7D: CPT | Performed by: INTERNAL MEDICINE

## 2025-06-16 PROCEDURE — 3078F DIAST BP <80 MM HG: CPT | Performed by: INTERNAL MEDICINE

## 2025-06-16 ASSESSMENT — ENCOUNTER SYMPTOMS: ABDOMINAL PAIN: 1

## 2025-06-16 NOTE — TELEPHONE ENCOUNTER
Transition of Care    Inpatient facility: [facility]  Discharge diagnosis: Heart Failure  Discharged to: Home  Discharge date: 6/14/25  Initial Call date: 6/16/25  Spoke with patient/caregiver: Patient                                                                     Do you need assistance  visits prior to your PCP visit: No  Home health care ordered: No  Have you been contacted by home care and have a start of care date: No  Are you taking medications as prescribed at discharge: Yes    Referral to [facility] Pharmacist: No  Patient advised to bring all medications to PCP follow-up appointment.  Patient advised to follow discharge instructions until provider follow-up.  TCM visit date: 6/16/25  TCM provider visit with: ANDREW Gordon MD

## 2025-06-16 NOTE — PROGRESS NOTES
Subjective   Patient ID: Jono Quinn is a 55 y.o. male who presents for Hospital Follow-up (TCM - heart failure//Feeling better ), Abdominal Pain (When eats something acidic ), and Nausea.  Abdominal Pain      TCM    Dx CHF greatly improved    Rec's rev'd    Rash on ankles  Derm consult not done     retinopathy with macular edema  Retinal specialist following / missed last appt  Counseled     H/o pancreatitis 3rd bout     H/o Wagner palsy    CAD / hypercholesterolemia on rx   Cardio following  Cath 12-6 -23      thyroid nodule   Thyroid ultrasound neg 10-23     DM type II -insulin-dependent  FBS high  Continue meds  Follow blood sugars closely.  HBA1C 8  8-24  Endo following   Poor dietary habits discussed     GERD  on diet  Pepcid daily prn     Depression  better on therapy possible side effects  Psych following     Vitamin D deficiency on suplementation     Diet and exercise reviewed  Stop eating pizza and junk foods since has been eating out more     Colonoscopy ordered    Review of Systems   Gastrointestinal:  Positive for abdominal pain.   All other systems reviewed and are negative.      Objective   /78   Pulse 96   Temp 36.3 °C (97.3 °F)   Wt 124 kg (272 lb 9.6 oz)   SpO2 98%   BMI 39.11 kg/m²   Lab Results   Component Value Date    WBC 4.9 06/14/2025    HGB 13.0 (L) 06/14/2025    HCT 38.2 (L) 06/14/2025     06/14/2025    CHOL 108 10/26/2024    TRIG 472 (H) 10/26/2024    HDL 27.6 10/26/2024    LDLDIRECT 32 03/05/2022    ALT 54 (H) 06/12/2025    AST 37 06/12/2025     (L) 06/14/2025    K 4.1 06/14/2025    CL 97 (L) 06/14/2025    CREATININE 0.99 06/14/2025    BUN 17 06/14/2025    CO2 27 06/14/2025    TSH 1.93 10/26/2024    INR 0.9 08/27/2024    HGBA1C 7.5 (H) 03/22/2025           Physical Exam  Vitals reviewed.   Constitutional:       Appearance: Normal appearance. He is obese.   HENT:      Head: Normocephalic and atraumatic.      Mouth/Throat:      Pharynx: No posterior oropharyngeal  erythema.   Eyes:      General: No scleral icterus.     Conjunctiva/sclera: Conjunctivae normal.      Pupils: Pupils are equal, round, and reactive to light.   Cardiovascular:      Rate and Rhythm: Normal rate and regular rhythm.      Heart sounds: Normal heart sounds.   Pulmonary:      Effort: No respiratory distress.      Breath sounds: No wheezing.   Abdominal:      General: Abdomen is flat. Bowel sounds are normal. There is no distension.      Palpations: Abdomen is soft. There is no mass.      Tenderness: There is no abdominal tenderness. There is no rebound.   Musculoskeletal:         General: Normal range of motion.      Cervical back: Normal range of motion and neck supple.   Skin:     General: Skin is warm and dry.   Neurological:      General: No focal deficit present.      Mental Status: He is alert and oriented to person, place, and time. Mental status is at baseline.   Psychiatric:         Mood and Affect: Mood normal.         Behavior: Behavior normal.         Thought Content: Thought content normal.         Judgment: Judgment normal.         Problem List Items Addressed This Visit           ICD-10-CM    Depression F32.A    GERD (gastroesophageal reflux disease) K21.9    Vitamin D deficiency E55.9    Coronary artery disease involving native coronary artery of native heart without angina pectoris I25.10     Other Visit Diagnoses         Codes      Congestive heart failure, unspecified HF chronicity, unspecified heart failure type    -  Primary I50.9      Diabetic retinopathy of both eyes with macular edema associated with type 2 diabetes mellitus, unspecified retinopathy severity     E11.311      Hypercholesteremia     E78.00      Class 2 severe obesity due to excess calories with serious comorbidity and body mass index (BMI) of 39.0 to 39.9 in adult     E66.812, E66.01, Z68.39          Assessment/Plan      TCM    Dx CHF greatly improved    Rec's rev'd    Rash on ankles  Derm consult not done      retinopathy with macular edema  Retinal specialist following / missed last appt  Counseled     H/o pancreatitis 3rd bout     H/o Erin palsy    CAD / hypercholesterolemia on rx   Cardio following  Cath 12-6 -23      thyroid nodule   Thyroid ultrasound neg 10-23     DM type II -insulin-dependent  FBS high  Continue meds  Follow blood sugars closely.  HBA1C 8  8-24  Endo following   Poor dietary habits discussed / changed     GERD  on diet  Pepcid daily prn     Depression  better on therapy possible side effects  Psych following     Vitamin D deficiency on suplementation     Diet and exercise reviewed  Stop eating pizza and junk foods since has been eating out more     Colonoscopy ordered    Prostate 10-24  Colonoscopy none  CT chest lung cancer screening n/a  immunizations rev'd flu  BMI 39.1    Follow up as scheduled  / yearly physical   Detail Level: Zone

## 2025-06-16 NOTE — TELEPHONE ENCOUNTER
Transition of Care    Inpatient facility: [facility]   Discharge diagnosis: Heart Failure   Discharged to: Home   Discharge date: 6/14/25  Initial Call date: 6/16/25  Spoke with patient/caregiver: Patient                                                                      Do you need assistance  visits prior to your PCP visit: No  Home health care ordered: No  Have you been contacted by home care and have a start of care date: No  Are you taking medications as prescribed at discharge: Yes    Referral to [facility] Pharmacist: No  Patient advised to bring all medications to PCP follow-up appointment.  Patient advised to follow discharge instructions until provider follow-up.  TCM visit date: 6/16/25  TCM provider visit with: KRISTIAN Gordon MD

## 2025-06-23 ENCOUNTER — PRE-ADMISSION TESTING (OUTPATIENT)
Dept: PREADMISSION TESTING | Facility: HOSPITAL | Age: 55
End: 2025-06-23
Payer: COMMERCIAL

## 2025-06-23 ENCOUNTER — ANESTHESIA EVENT (OUTPATIENT)
Dept: GASTROENTEROLOGY | Facility: HOSPITAL | Age: 55
End: 2025-06-23
Payer: COMMERCIAL

## 2025-06-23 DIAGNOSIS — E78.00 HYPERCHOLESTEREMIA: ICD-10-CM

## 2025-06-23 RX ORDER — FENOFIBRATE 200 MG/1
200 CAPSULE ORAL DAILY
Qty: 90 CAPSULE | Refills: 0 | Status: SHIPPED | OUTPATIENT
Start: 2025-06-23 | End: 2025-06-25 | Stop reason: SDUPTHER

## 2025-06-23 SDOH — HEALTH STABILITY: MENTAL HEALTH: CURRENT SMOKER: 0

## 2025-06-25 DIAGNOSIS — E78.00 HYPERCHOLESTEREMIA: ICD-10-CM

## 2025-06-25 RX ORDER — FENOFIBRATE 200 MG/1
200 CAPSULE ORAL DAILY
Qty: 90 CAPSULE | Refills: 0 | Status: SHIPPED | OUTPATIENT
Start: 2025-06-25

## 2025-06-26 DIAGNOSIS — I73.9 PAD (PERIPHERAL ARTERY DISEASE): ICD-10-CM

## 2025-06-26 RX ORDER — GABAPENTIN 300 MG/1
900 CAPSULE ORAL 3 TIMES DAILY
Qty: 270 CAPSULE | Refills: 2 | Status: SHIPPED | OUTPATIENT
Start: 2025-06-26

## 2025-07-03 ENCOUNTER — APPOINTMENT (OUTPATIENT)
Dept: RADIOLOGY | Facility: HOSPITAL | Age: 55
End: 2025-07-03
Payer: COMMERCIAL

## 2025-07-03 ENCOUNTER — HOSPITAL ENCOUNTER (INPATIENT)
Facility: HOSPITAL | Age: 55
End: 2025-07-03
Attending: EMERGENCY MEDICINE | Admitting: INTERNAL MEDICINE
Payer: COMMERCIAL

## 2025-07-03 ENCOUNTER — APPOINTMENT (OUTPATIENT)
Dept: CARDIOLOGY | Facility: HOSPITAL | Age: 55
End: 2025-07-03
Payer: COMMERCIAL

## 2025-07-03 DIAGNOSIS — R10.9 ABDOMINAL PAIN, UNSPECIFIED ABDOMINAL LOCATION: Primary | ICD-10-CM

## 2025-07-03 DIAGNOSIS — R93.5 ABNORMAL ABDOMINAL CT SCAN: ICD-10-CM

## 2025-07-03 DIAGNOSIS — R06.02 SHORTNESS OF BREATH: ICD-10-CM

## 2025-07-03 LAB
ALBUMIN SERPL BCP-MCNC: 4.3 G/DL (ref 3.4–5)
ALP SERPL-CCNC: 77 U/L (ref 33–120)
ALT SERPL W P-5'-P-CCNC: 16 U/L (ref 10–52)
ANION GAP BLDA CALCULATED.4IONS-SCNC: 27 MMO/L (ref 10–25)
ANION GAP SERPL CALC-SCNC: 31 MMOL/L (ref 10–20)
APPEARANCE UR: CLEAR
AST SERPL W P-5'-P-CCNC: 35 U/L (ref 9–39)
ATRIAL RATE: 100 BPM
BASE EXCESS BLDA CALC-SCNC: -19.4 MMOL/L (ref -2–3)
BASOPHILS # BLD AUTO: 0.07 X10*3/UL (ref 0–0.1)
BASOPHILS NFR BLD AUTO: 0.5 %
BILIRUB SERPL-MCNC: 0.7 MG/DL (ref 0–1.2)
BILIRUB UR STRIP.AUTO-MCNC: NEGATIVE MG/DL
BNP SERPL-MCNC: 62 PG/ML (ref 0–99)
BODY TEMPERATURE: ABNORMAL
BUN SERPL-MCNC: 14 MG/DL (ref 6–23)
CA-I BLDA-SCNC: 1.34 MMOL/L (ref 1.1–1.33)
CALCIUM SERPL-MCNC: 9.4 MG/DL (ref 8.6–10.3)
CARDIAC TROPONIN I PNL SERPL HS: 10 NG/L (ref 0–20)
CARDIAC TROPONIN I PNL SERPL HS: 10 NG/L (ref 0–20)
CHLORIDE BLDA-SCNC: 99 MMOL/L (ref 98–107)
CHLORIDE SERPL-SCNC: 93 MMOL/L (ref 98–107)
CO2 SERPL-SCNC: 7 MMOL/L (ref 21–32)
COLOR UR: YELLOW
CREAT SERPL-MCNC: 1.32 MG/DL (ref 0.5–1.3)
EGFRCR SERPLBLD CKD-EPI 2021: 63 ML/MIN/1.73M*2
EOSINOPHIL # BLD AUTO: 0.06 X10*3/UL (ref 0–0.7)
EOSINOPHIL NFR BLD AUTO: 0.4 %
ERYTHROCYTE [DISTWIDTH] IN BLOOD BY AUTOMATED COUNT: 14 % (ref 11.5–14.5)
FLUAV RNA RESP QL NAA+PROBE: NOT DETECTED
FLUBV RNA RESP QL NAA+PROBE: NOT DETECTED
GLUCOSE BLD MANUAL STRIP-MCNC: 161 MG/DL (ref 74–99)
GLUCOSE BLD MANUAL STRIP-MCNC: 172 MG/DL (ref 74–99)
GLUCOSE BLDA-MCNC: 168 MG/DL (ref 74–99)
GLUCOSE SERPL-MCNC: 166 MG/DL (ref 74–99)
GLUCOSE UR STRIP.AUTO-MCNC: ABNORMAL MG/DL
HCO3 BLDA-SCNC: 6 MMOL/L (ref 22–26)
HCT VFR BLD AUTO: 41.4 % (ref 41–52)
HCT VFR BLD EST: 41 % (ref 41–52)
HGB BLD-MCNC: 14.2 G/DL (ref 13.5–17.5)
HGB BLDA-MCNC: 13.8 G/DL (ref 13.5–17.5)
IMM GRANULOCYTES # BLD AUTO: 0.11 X10*3/UL (ref 0–0.7)
IMM GRANULOCYTES NFR BLD AUTO: 0.7 % (ref 0–0.9)
INHALED O2 CONCENTRATION: 21 %
KETONES UR STRIP.AUTO-MCNC: ABNORMAL MG/DL
LACTATE BLDA-SCNC: 0.9 MMOL/L (ref 0.4–2)
LACTATE SERPL-SCNC: 1.1 MMOL/L (ref 0.4–2)
LEUKOCYTE ESTERASE UR QL STRIP.AUTO: NEGATIVE
LIPASE SERPL-CCNC: 66 U/L (ref 9–82)
LYMPHOCYTES # BLD AUTO: 1.48 X10*3/UL (ref 1.2–4.8)
LYMPHOCYTES NFR BLD AUTO: 9.6 %
MCH RBC QN AUTO: 32.9 PG (ref 26–34)
MCHC RBC AUTO-ENTMCNC: 34.3 G/DL (ref 32–36)
MCV RBC AUTO: 96 FL (ref 80–100)
MONOCYTES # BLD AUTO: 1.25 X10*3/UL (ref 0.1–1)
MONOCYTES NFR BLD AUTO: 8.1 %
MUCOUS THREADS #/AREA URNS AUTO: NORMAL /LPF
NEUTROPHILS # BLD AUTO: 12.51 X10*3/UL (ref 1.2–7.7)
NEUTROPHILS NFR BLD AUTO: 80.7 %
NITRITE UR QL STRIP.AUTO: NEGATIVE
NRBC BLD-RTO: 0 /100 WBCS (ref 0–0)
OXYHGB MFR BLDA: 97.1 % (ref 94–98)
P AXIS: 33 DEGREES
P OFFSET: 208 MS
P ONSET: 151 MS
PCO2 BLDA: 15 MM HG (ref 38–42)
PH BLDA: 7.21 PH (ref 7.38–7.42)
PH UR STRIP.AUTO: 5 [PH]
PLATELET # BLD AUTO: 207 X10*3/UL (ref 150–450)
PO2 BLDA: 113 MM HG (ref 85–95)
POTASSIUM BLDA-SCNC: 4.9 MMOL/L (ref 3.5–5.3)
POTASSIUM SERPL-SCNC: 5.1 MMOL/L (ref 3.5–5.3)
PR INTERVAL: 150 MS
PROT SERPL-MCNC: 7.9 G/DL (ref 6.4–8.2)
PROT UR STRIP.AUTO-MCNC: ABNORMAL MG/DL
Q ONSET: 226 MS
QRS COUNT: 17 BEATS
QRS DURATION: 142 MS
QT INTERVAL: 390 MS
QTC CALCULATION(BAZETT): 503 MS
QTC FREDERICIA: 462 MS
R AXIS: 65 DEGREES
RBC # BLD AUTO: 4.31 X10*6/UL (ref 4.5–5.9)
RBC # UR STRIP.AUTO: NEGATIVE MG/DL
RBC #/AREA URNS AUTO: NORMAL /HPF
SAO2 % BLDA: 98 % (ref 94–100)
SARS-COV-2 RNA RESP QL NAA+PROBE: NOT DETECTED
SODIUM BLDA-SCNC: 127 MMOL/L (ref 136–145)
SODIUM SERPL-SCNC: 126 MMOL/L (ref 136–145)
SP GR UR STRIP.AUTO: 1.02
T AXIS: 26 DEGREES
T OFFSET: 421 MS
UROBILINOGEN UR STRIP.AUTO-MCNC: NORMAL MG/DL
VENTRICULAR RATE: 100 BPM
WBC # BLD AUTO: 15.5 X10*3/UL (ref 4.4–11.3)
WBC #/AREA URNS AUTO: NORMAL /HPF

## 2025-07-03 PROCEDURE — G0378 HOSPITAL OBSERVATION PER HR: HCPCS

## 2025-07-03 PROCEDURE — 94760 N-INVAS EAR/PLS OXIMETRY 1: CPT

## 2025-07-03 PROCEDURE — 36415 COLL VENOUS BLD VENIPUNCTURE: CPT | Performed by: EMERGENCY MEDICINE

## 2025-07-03 PROCEDURE — 2500000004 HC RX 250 GENERAL PHARMACY W/ HCPCS (ALT 636 FOR OP/ED): Performed by: EMERGENCY MEDICINE

## 2025-07-03 PROCEDURE — 81001 URINALYSIS AUTO W/SCOPE: CPT | Performed by: EMERGENCY MEDICINE

## 2025-07-03 PROCEDURE — 74177 CT ABD & PELVIS W/CONTRAST: CPT | Mod: FOREIGN READ | Performed by: RADIOLOGY

## 2025-07-03 PROCEDURE — 2500000001 HC RX 250 WO HCPCS SELF ADMINISTERED DRUGS (ALT 637 FOR MEDICARE OP): Performed by: PHYSICIAN ASSISTANT

## 2025-07-03 PROCEDURE — 36556 INSERT NON-TUNNEL CV CATH: CPT | Performed by: EMERGENCY MEDICINE

## 2025-07-03 PROCEDURE — 2500000004 HC RX 250 GENERAL PHARMACY W/ HCPCS (ALT 636 FOR OP/ED): Performed by: PHYSICIAN ASSISTANT

## 2025-07-03 PROCEDURE — 85025 COMPLETE CBC W/AUTO DIFF WBC: CPT | Performed by: EMERGENCY MEDICINE

## 2025-07-03 PROCEDURE — 83880 ASSAY OF NATRIURETIC PEPTIDE: CPT | Performed by: EMERGENCY MEDICINE

## 2025-07-03 PROCEDURE — 80307 DRUG TEST PRSMV CHEM ANLYZR: CPT | Performed by: PHYSICIAN ASSISTANT

## 2025-07-03 PROCEDURE — 82947 ASSAY GLUCOSE BLOOD QUANT: CPT

## 2025-07-03 PROCEDURE — 83690 ASSAY OF LIPASE: CPT | Performed by: EMERGENCY MEDICINE

## 2025-07-03 PROCEDURE — 87636 SARSCOV2 & INF A&B AMP PRB: CPT | Performed by: EMERGENCY MEDICINE

## 2025-07-03 PROCEDURE — 93005 ELECTROCARDIOGRAM TRACING: CPT

## 2025-07-03 PROCEDURE — 71275 CT ANGIOGRAPHY CHEST: CPT

## 2025-07-03 PROCEDURE — 70450 CT HEAD/BRAIN W/O DYE: CPT

## 2025-07-03 PROCEDURE — 2500000002 HC RX 250 W HCPCS SELF ADMINISTERED DRUGS (ALT 637 FOR MEDICARE OP, ALT 636 FOR OP/ED): Performed by: HOSPITALIST

## 2025-07-03 PROCEDURE — 2500000005 HC RX 250 GENERAL PHARMACY W/O HCPCS: Performed by: EMERGENCY MEDICINE

## 2025-07-03 PROCEDURE — 99285 EMERGENCY DEPT VISIT HI MDM: CPT | Mod: 25 | Performed by: EMERGENCY MEDICINE

## 2025-07-03 PROCEDURE — 71275 CT ANGIOGRAPHY CHEST: CPT | Mod: FOREIGN READ | Performed by: RADIOLOGY

## 2025-07-03 PROCEDURE — 2500000004 HC RX 250 GENERAL PHARMACY W/ HCPCS (ALT 636 FOR OP/ED)

## 2025-07-03 PROCEDURE — 36600 WITHDRAWAL OF ARTERIAL BLOOD: CPT

## 2025-07-03 PROCEDURE — 84484 ASSAY OF TROPONIN QUANT: CPT | Performed by: EMERGENCY MEDICINE

## 2025-07-03 PROCEDURE — 70450 CT HEAD/BRAIN W/O DYE: CPT | Performed by: RADIOLOGY

## 2025-07-03 PROCEDURE — 80053 COMPREHEN METABOLIC PANEL: CPT | Performed by: EMERGENCY MEDICINE

## 2025-07-03 PROCEDURE — 74177 CT ABD & PELVIS W/CONTRAST: CPT

## 2025-07-03 PROCEDURE — 2500000001 HC RX 250 WO HCPCS SELF ADMINISTERED DRUGS (ALT 637 FOR MEDICARE OP): Performed by: HOSPITALIST

## 2025-07-03 PROCEDURE — 83605 ASSAY OF LACTIC ACID: CPT | Performed by: EMERGENCY MEDICINE

## 2025-07-03 PROCEDURE — 96372 THER/PROPH/DIAG INJ SC/IM: CPT | Performed by: PHYSICIAN ASSISTANT

## 2025-07-03 PROCEDURE — 2550000001 HC RX 255 CONTRASTS: Performed by: EMERGENCY MEDICINE

## 2025-07-03 PROCEDURE — 2500000004 HC RX 250 GENERAL PHARMACY W/ HCPCS (ALT 636 FOR OP/ED): Performed by: HOSPITALIST

## 2025-07-03 PROCEDURE — 84132 ASSAY OF SERUM POTASSIUM: CPT | Performed by: EMERGENCY MEDICINE

## 2025-07-03 PROCEDURE — 87040 BLOOD CULTURE FOR BACTERIA: CPT | Mod: GENLAB | Performed by: EMERGENCY MEDICINE

## 2025-07-03 RX ORDER — INSULIN LISPRO 100 [IU]/ML
0-15 INJECTION, SOLUTION INTRAVENOUS; SUBCUTANEOUS EVERY 4 HOURS
Status: DISCONTINUED | OUTPATIENT
Start: 2025-07-06 | End: 2025-07-05

## 2025-07-03 RX ORDER — MORPHINE SULFATE 2 MG/ML
2 INJECTION, SOLUTION INTRAMUSCULAR; INTRAVENOUS EVERY 4 HOURS PRN
Status: DISCONTINUED | OUTPATIENT
Start: 2025-07-03 | End: 2025-07-04

## 2025-07-03 RX ORDER — DIPHENHYDRAMINE HCL 25 MG
25 CAPSULE ORAL EVERY 6 HOURS PRN
Status: DISPENSED | OUTPATIENT
Start: 2025-07-03

## 2025-07-03 RX ORDER — FAMOTIDINE 10 MG/ML
20 INJECTION, SOLUTION INTRAVENOUS 2 TIMES DAILY
Status: DISPENSED | OUTPATIENT
Start: 2025-07-03

## 2025-07-03 RX ORDER — ENOXAPARIN SODIUM 100 MG/ML
40 INJECTION SUBCUTANEOUS EVERY 24 HOURS
Status: DISPENSED | OUTPATIENT
Start: 2025-07-03

## 2025-07-03 RX ORDER — ONDANSETRON 4 MG/1
4 TABLET, FILM COATED ORAL EVERY 8 HOURS PRN
Status: ACTIVE | OUTPATIENT
Start: 2025-07-03

## 2025-07-03 RX ORDER — GABAPENTIN 300 MG/1
900 CAPSULE ORAL 3 TIMES DAILY
Status: DISCONTINUED | OUTPATIENT
Start: 2025-07-03 | End: 2025-07-04

## 2025-07-03 RX ORDER — ACETAMINOPHEN 650 MG/1
650 SUPPOSITORY RECTAL EVERY 4 HOURS PRN
Status: ACTIVE | OUTPATIENT
Start: 2025-07-03

## 2025-07-03 RX ORDER — PAROXETINE 20 MG/1
30 TABLET, FILM COATED ORAL NIGHTLY
Status: DISPENSED | OUTPATIENT
Start: 2025-07-04

## 2025-07-03 RX ORDER — TALC
6 POWDER (GRAM) TOPICAL NIGHTLY PRN
Status: DISPENSED | OUTPATIENT
Start: 2025-07-03

## 2025-07-03 RX ORDER — METFORMIN HYDROCHLORIDE 500 MG/1
1000 TABLET ORAL
Status: ACTIVE | OUTPATIENT
Start: 2025-07-04

## 2025-07-03 RX ORDER — ONDANSETRON HYDROCHLORIDE 2 MG/ML
4 INJECTION, SOLUTION INTRAVENOUS EVERY 8 HOURS PRN
Status: DISCONTINUED | OUTPATIENT
Start: 2025-07-03 | End: 2025-07-03

## 2025-07-03 RX ORDER — LIDOCAINE HYDROCHLORIDE 10 MG/ML
5 INJECTION, SOLUTION EPIDURAL; INFILTRATION; INTRACAUDAL; PERINEURAL ONCE
Status: COMPLETED | OUTPATIENT
Start: 2025-07-03 | End: 2025-07-03

## 2025-07-03 RX ORDER — ACETAMINOPHEN 325 MG/1
650 TABLET ORAL EVERY 4 HOURS PRN
Status: DISPENSED | OUTPATIENT
Start: 2025-07-03

## 2025-07-03 RX ORDER — ACETAMINOPHEN 160 MG/5ML
650 SOLUTION ORAL EVERY 4 HOURS PRN
Status: ACTIVE | OUTPATIENT
Start: 2025-07-03

## 2025-07-03 RX ORDER — ONDANSETRON HYDROCHLORIDE 2 MG/ML
4 INJECTION, SOLUTION INTRAVENOUS EVERY 4 HOURS PRN
Status: ACTIVE | OUTPATIENT
Start: 2025-07-03

## 2025-07-03 RX ORDER — NORTRIPTYLINE HYDROCHLORIDE 25 MG/1
25 CAPSULE ORAL NIGHTLY
Status: DISPENSED | OUTPATIENT
Start: 2025-07-03

## 2025-07-03 RX ORDER — POLYETHYLENE GLYCOL 3350 17 G/17G
17 POWDER, FOR SOLUTION ORAL DAILY
Status: DISCONTINUED | OUTPATIENT
Start: 2025-07-03 | End: 2025-07-06

## 2025-07-03 RX ORDER — ACETAMINOPHEN 325 MG/1
650 TABLET ORAL EVERY 4 HOURS PRN
Status: ACTIVE | OUTPATIENT
Start: 2025-07-03

## 2025-07-03 RX ORDER — FAMOTIDINE 20 MG/1
20 TABLET, FILM COATED ORAL 2 TIMES DAILY
Status: DISPENSED | OUTPATIENT
Start: 2025-07-03

## 2025-07-03 RX ORDER — SODIUM BICARBONATE 1 MEQ/ML
50 SYRINGE (ML) INTRAVENOUS ONCE
Status: COMPLETED | OUTPATIENT
Start: 2025-07-03 | End: 2025-07-03

## 2025-07-03 RX ORDER — ONDANSETRON 4 MG/1
4 TABLET, FILM COATED ORAL EVERY 8 HOURS PRN
Status: DISCONTINUED | OUTPATIENT
Start: 2025-07-03 | End: 2025-07-03

## 2025-07-03 RX ORDER — ROSUVASTATIN CALCIUM 10 MG/1
40 TABLET, COATED ORAL NIGHTLY
Status: DISPENSED | OUTPATIENT
Start: 2025-07-03

## 2025-07-03 RX ORDER — SODIUM CHLORIDE, SODIUM LACTATE, POTASSIUM CHLORIDE, CALCIUM CHLORIDE 600; 310; 30; 20 MG/100ML; MG/100ML; MG/100ML; MG/100ML
100 INJECTION, SOLUTION INTRAVENOUS CONTINUOUS
Status: DISCONTINUED | OUTPATIENT
Start: 2025-07-03 | End: 2025-07-04

## 2025-07-03 RX ADMIN — PIPERACILLIN SODIUM AND TAZOBACTAM SODIUM 4.5 G: 4; .5 INJECTION, SOLUTION INTRAVENOUS at 15:49

## 2025-07-03 RX ADMIN — GABAPENTIN 900 MG: 300 CAPSULE ORAL at 20:50

## 2025-07-03 RX ADMIN — SODIUM BICARBONATE 50 MEQ: 84 INJECTION INTRAVENOUS at 15:43

## 2025-07-03 RX ADMIN — LIDOCAINE HYDROCHLORIDE ANHYDROUS 50 MG: 10 INJECTION, SOLUTION INFILTRATION at 12:55

## 2025-07-03 RX ADMIN — FAMOTIDINE 20 MG: 20 TABLET, FILM COATED ORAL at 20:49

## 2025-07-03 RX ADMIN — BUSPIRONE HYDROCHLORIDE 15 MG: 10 TABLET ORAL at 21:30

## 2025-07-03 RX ADMIN — DIPHENHYDRAMINE HYDROCHLORIDE 25 MG: 25 CAPSULE ORAL at 20:49

## 2025-07-03 RX ADMIN — MORPHINE SULFATE 2 MG: 2 INJECTION, SOLUTION INTRAMUSCULAR; INTRAVENOUS at 20:50

## 2025-07-03 RX ADMIN — SODIUM CHLORIDE, SODIUM LACTATE, POTASSIUM CHLORIDE, AND CALCIUM CHLORIDE 1000 ML: .6; .31; .03; .02 INJECTION, SOLUTION INTRAVENOUS at 15:38

## 2025-07-03 RX ADMIN — IOHEXOL 100 ML: 350 INJECTION, SOLUTION INTRAVENOUS at 15:24

## 2025-07-03 RX ADMIN — INSULIN LISPRO 3 UNITS: 100 INJECTION, SOLUTION INTRAVENOUS; SUBCUTANEOUS at 21:22

## 2025-07-03 RX ADMIN — ROSUVASTATIN CALCIUM 40 MG: 10 TABLET, FILM COATED ORAL at 20:49

## 2025-07-03 RX ADMIN — SODIUM CHLORIDE, SODIUM LACTATE, POTASSIUM CHLORIDE, AND CALCIUM CHLORIDE 100 ML/HR: .6; .31; .03; .02 INJECTION, SOLUTION INTRAVENOUS at 21:41

## 2025-07-03 RX ADMIN — ENOXAPARIN SODIUM 40 MG: 40 INJECTION SUBCUTANEOUS at 20:44

## 2025-07-03 SDOH — SOCIAL STABILITY: SOCIAL INSECURITY: ABUSE: ADULT

## 2025-07-03 SDOH — SOCIAL STABILITY: SOCIAL INSECURITY: HAVE YOU HAD THOUGHTS OF HARMING ANYONE ELSE?: NO

## 2025-07-03 SDOH — SOCIAL STABILITY: SOCIAL INSECURITY: ARE THERE ANY APPARENT SIGNS OF INJURIES/BEHAVIORS THAT COULD BE RELATED TO ABUSE/NEGLECT?: NO

## 2025-07-03 SDOH — SOCIAL STABILITY: SOCIAL INSECURITY: DO YOU FEEL ANYONE HAS EXPLOITED OR TAKEN ADVANTAGE OF YOU FINANCIALLY OR OF YOUR PERSONAL PROPERTY?: NO

## 2025-07-03 SDOH — SOCIAL STABILITY: SOCIAL INSECURITY: HAS ANYONE EVER THREATENED TO HURT YOUR FAMILY OR YOUR PETS?: NO

## 2025-07-03 SDOH — SOCIAL STABILITY: SOCIAL INSECURITY: DO YOU FEEL UNSAFE GOING BACK TO THE PLACE WHERE YOU ARE LIVING?: NO

## 2025-07-03 SDOH — SOCIAL STABILITY: SOCIAL INSECURITY: HAVE YOU HAD ANY THOUGHTS OF HARMING ANYONE ELSE?: NO

## 2025-07-03 SDOH — SOCIAL STABILITY: SOCIAL INSECURITY: DOES ANYONE TRY TO KEEP YOU FROM HAVING/CONTACTING OTHER FRIENDS OR DOING THINGS OUTSIDE YOUR HOME?: NO

## 2025-07-03 SDOH — SOCIAL STABILITY: SOCIAL INSECURITY: ARE YOU OR HAVE YOU BEEN THREATENED OR ABUSED PHYSICALLY, EMOTIONALLY, OR SEXUALLY BY ANYONE?: NO

## 2025-07-03 ASSESSMENT — ACTIVITIES OF DAILY LIVING (ADL)
DRESSING YOURSELF: INDEPENDENT
HEARING - LEFT EAR: FUNCTIONAL
ASSISTIVE_DEVICE: WALKER
FEEDING YOURSELF: INDEPENDENT
BATHING: INDEPENDENT
LACK_OF_TRANSPORTATION: NO
ADEQUATE_TO_COMPLETE_ADL: YES
ASSISTIVE_DEVICE: EYEGLASSES;WALKER
HEARING - RIGHT EAR: FUNCTIONAL
GROOMING: INDEPENDENT
JUDGMENT_ADEQUATE_SAFELY_COMPLETE_DAILY_ACTIVITIES: YES
WALKS IN HOME: INDEPENDENT
PATIENT'S MEMORY ADEQUATE TO SAFELY COMPLETE DAILY ACTIVITIES?: YES
HEARING - RIGHT EAR: FUNCTIONAL
TOILETING: INDEPENDENT
HEARING - LEFT EAR: FUNCTIONAL

## 2025-07-03 ASSESSMENT — LIFESTYLE VARIABLES
AUDIT-C TOTAL SCORE: 0
HOW OFTEN DO YOU HAVE 6 OR MORE DRINKS ON ONE OCCASION: NEVER
SKIP TO QUESTIONS 9-10: 1
HOW MANY STANDARD DRINKS CONTAINING ALCOHOL DO YOU HAVE ON A TYPICAL DAY: PATIENT DOES NOT DRINK
HOW OFTEN DO YOU HAVE A DRINK CONTAINING ALCOHOL: NEVER
AUDIT-C TOTAL SCORE: 0
PRESCIPTION_ABUSE_PAST_12_MONTHS: NO
SUBSTANCE_ABUSE_PAST_12_MONTHS: NO

## 2025-07-03 ASSESSMENT — PAIN SCALES - GENERAL
PAINLEVEL_OUTOF10: 8
PAINLEVEL_OUTOF10: 5 - MODERATE PAIN
PAINLEVEL_OUTOF10: 8
PAINLEVEL_OUTOF10: 3

## 2025-07-03 ASSESSMENT — COGNITIVE AND FUNCTIONAL STATUS - GENERAL
MOBILITY SCORE: 24
TURNING FROM BACK TO SIDE WHILE IN FLAT BAD: A LITTLE
HELP NEEDED FOR BATHING: A LITTLE
DRESSING REGULAR LOWER BODY CLOTHING: A LITTLE
DAILY ACTIVITIY SCORE: 21
DRESSING REGULAR UPPER BODY CLOTHING: A LITTLE
PATIENT BASELINE BEDBOUND: NO
MOBILITY SCORE: 23

## 2025-07-03 ASSESSMENT — PATIENT HEALTH QUESTIONNAIRE - PHQ9
1. LITTLE INTEREST OR PLEASURE IN DOING THINGS: NOT AT ALL
2. FEELING DOWN, DEPRESSED OR HOPELESS: NOT AT ALL
SUM OF ALL RESPONSES TO PHQ9 QUESTIONS 1 & 2: 0

## 2025-07-03 ASSESSMENT — PAIN DESCRIPTION - DESCRIPTORS: DESCRIPTORS: ACHING;SHARP

## 2025-07-03 ASSESSMENT — PAIN DESCRIPTION - ORIENTATION: ORIENTATION: MID

## 2025-07-03 ASSESSMENT — PAIN - FUNCTIONAL ASSESSMENT
PAIN_FUNCTIONAL_ASSESSMENT: 0-10

## 2025-07-03 ASSESSMENT — PAIN DESCRIPTION - LOCATION
LOCATION: ABDOMEN
LOCATION: ABDOMEN

## 2025-07-03 ASSESSMENT — PAIN DESCRIPTION - PAIN TYPE: TYPE: ACUTE PAIN

## 2025-07-03 NOTE — ED PROVIDER NOTES
HPI   Chief Complaint   Patient presents with    Shortness of Breath    Abdominal Pain       HPI        Patient History   Medical History[1]  Surgical History[2]  Family History[3]  Social History[4]    Physical Exam   ED Triage Vitals [07/03/25 1040]   Temperature Heart Rate Respirations BP   36.7 °C (98 °F) (!) 102 (!) 26 173/85      Pulse Ox Temp Source Heart Rate Source Patient Position   100 % Temporal -- --      BP Location FiO2 (%)     -- --       Physical Exam  Constitutional:       General: He is not in acute distress.     Appearance: Normal appearance. He is not toxic-appearing.   HENT:      Head: Normocephalic and atraumatic.      Right Ear: Tympanic membrane normal.      Left Ear: Tympanic membrane normal.      Mouth/Throat:      Mouth: Mucous membranes are moist.      Pharynx: Oropharynx is clear.   Eyes:      Conjunctiva/sclera: Conjunctivae normal.      Pupils: Pupils are equal, round, and reactive to light.   Cardiovascular:      Rate and Rhythm: Normal rate and regular rhythm.      Pulses: Normal pulses.      Heart sounds: Normal heart sounds.   Pulmonary:      Effort: Pulmonary effort is normal. No respiratory distress.      Breath sounds: Normal breath sounds. No wheezing.   Abdominal:      General: Bowel sounds are normal.      Palpations: Abdomen is soft.      Tenderness: There is abdominal tenderness. There is no guarding or rebound.   Musculoskeletal:         General: Normal range of motion.      Cervical back: Normal range of motion.   Skin:     General: Skin is warm and dry.   Neurological:      General: No focal deficit present.      Mental Status: He is alert and oriented to person, place, and time.           ED Course & MDM   ED Course as of 07/03/25 1758   u Jul 03, 2025   1044 EKG performed at 1044 showing normal sinus rhythm no indication of a STEMI at this time ventricular rate of 100 interpreted by me. [KA]      ED Course User Index  [KA] Live Aguirre DO         Diagnoses as of  07/03/25 1758   Abdominal pain, unspecified abdominal location   Shortness of breath   Abnormal abdominal CT scan                 No data recorded     [place] Coma Scale Score: 15 (07/03/25 1039 : Mary Zee RN)                           Medical Decision Making  55-year-old gentleman presents to the ER with abdominal pain and slight shortness of breath.  Patient came to the ED for evaluation.  Reports that abdominal pain got progressively worse to the point came to the ED.  Patient had extensive workup clued imaging and blood work.  Patient does appear to be metabolic acidosis.  Patient given IV fluids bicarb antibiotics there is difficulty getting patient IV.  Multiple nurses attempted with ultrasound-guided including a midline was unable to get anything out had to place a femoral on the right.  Patient secondary to his stature he had very small internal jugular for this reason control of the femoral vein as the choice.  Once the workup was completed was able to reach out to the various team members.  General surgery in the inpatient team patient will be admitted to the hospital for further evaluation treatment.  The CT did come back as showing some inflammation around the pancreatic head.  In the duodenum.  Patient's vital signs have remained fairly stable hypertensive actually.  Little bit elevated heart rate.  Overall patient is well-appearing at this time patient will be admitted to the hospital for further evaluation treatment.        Procedure  Central Line    Performed by: Live Aguirre DO  Authorized by: Live Aguirre DO    Consent:     Consent obtained:  Written  Pre-procedure details:     Indication(s): insufficient peripheral access      Hand hygiene: Hand hygiene performed prior to insertion      Sterile barrier technique: All elements of maximal sterile technique followed      Skin preparation:  Chlorhexidine    Skin preparation agent: Skin preparation agent completely dried prior to  procedure    Sedation:     Sedation type:  None  Anesthesia:     Anesthesia method:  None  Procedure details:     Location:  R femoral    Site selection rationale:  Short neck, small internal jugular    Patient position:  Supine    Procedural supplies:  Triple lumen    Catheter size:  8.5 Fr    Landmarks identified: yes      Ultrasound guidance: yes      Ultrasound guidance timing: real time      Sterile ultrasound techniques: Sterile gel and sterile probe covers were used      Number of attempts:  1    Successful placement: yes    Post-procedure details:     Post-procedure:  Dressing applied    Assessment:  Blood return through all ports and free fluid flow    Procedure completion:  Tolerated         [1]   Past Medical History:  Diagnosis Date    Coronary artery disease     COVID-19 11/23/2020    Pneumonia due to COVID-19 virus    Depression, unspecified 10/26/2022    Depression    Diabetes mellitus (Multi)     Hypertension     Obstructive sleep apnea (adult) (pediatric) 07/11/2022    LITZY on CPAP    Other conditions influencing health status 05/16/2022    Diabetes type 2, uncontrolled    Personal history of other diseases of the digestive system 11/19/2021    H/O acute pancreatitis    Personal history of other diseases of the nervous system and sense organs     History of sleep apnea    Pure hypercholesterolemia, unspecified 10/26/2022    Hypercholesterolemia    Testicular hypofunction 07/01/2021    Hypogonadism male    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   [2]   Past Surgical History:  Procedure Laterality Date    CARDIAC CATHETERIZATION N/A 12/6/2023    Procedure: Left Heart Cath;  Surgeon: Lucas Pino MD;  Location: [facility] Cardiac Cath Lab;  Service: Cardiovascular;  Laterality: N/A;  12/6/ am for [facility] [id number] for CAD with angina I25.119 and abnormal cardiac CTA R93.1  Auth # for [employer]:  [id number] 11/10/23-05/08/24    CT ANGIO CORONARY ART WITH HEARTFLOW IF SCORE >30%  10/24/2023     CT ANGIO CORONARY ART WITH HEARTFLOW IF SCORE >30% 10/24/2023 [facility] CT    OTHER SURGICAL HISTORY  07/13/2020    Cholecystectomy    OTHER SURGICAL HISTORY  07/13/2020    Cranioplasty    OTHER SURGICAL HISTORY  07/13/2020    Tonsillectomy with adenoidectomy    OTHER SURGICAL HISTORY  07/13/2020    Complete colonoscopy    OTHER SURGICAL HISTORY  03/01/2022    Nasal septoplasty    OTHER SURGICAL HISTORY  03/01/2022    Submucous resection of nasal turbinate   [3]   Family History  Problem Relation Name Age of Onset    Prostate cancer Father      Narcolepsy Brother          with cataplexy   [4]   Social History  Tobacco Use    Smoking status: Never     Passive exposure: Never    Smokeless tobacco: Never   Vaping Use    Vaping status: Never Used   Substance Use Topics    Alcohol use: Never    Drug use: Never        Live Aguirre DO  07/03/25 [id number]

## 2025-07-03 NOTE — ED NOTES
RT to bedside to obtain ABG per MD order. Upon arrival, there is a sterile procedure in progress. Will follow up and attempt to obtain ABG at a later time.

## 2025-07-03 NOTE — ED NOTES
Unable to obtain PIV. ULS attempt x 2 and blood obtained but infiltrated with small saline flush. Order for midline obtained. Patient agreeable. No visible vein in left upper arm. Small basilic vein in right arm able to be accessed but unable to advance guidewire. Provider notified and to room to discuss options. Agreeable to central line.     Amanda Weir RN  07/03/25 1996

## 2025-07-04 ENCOUNTER — APPOINTMENT (OUTPATIENT)
Dept: RADIOLOGY | Facility: HOSPITAL | Age: 55
End: 2025-07-04
Payer: COMMERCIAL

## 2025-07-04 LAB
ALBUMIN SERPL BCP-MCNC: 4.2 G/DL (ref 3.4–5)
ALP SERPL-CCNC: 87 U/L (ref 33–120)
ALT SERPL W P-5'-P-CCNC: 13 U/L (ref 10–52)
AMPHETAMINES UR QL SCN: NORMAL
AMYLASE SERPL-CCNC: 66 U/L (ref 29–103)
ANION GAP BLDA CALCULATED.4IONS-SCNC: 30 MMO/L (ref 10–25)
ANION GAP BLDA CALCULATED.4IONS-SCNC: 32 MMO/L (ref 10–25)
ANION GAP SERPL CALC-SCNC: 27 MMOL/L (ref 10–20)
ANION GAP SERPL CALC-SCNC: 28 MMOL/L (ref 10–20)
ANION GAP SERPL CALC-SCNC: 29 MMOL/L (ref 10–20)
ANION GAP SERPL CALC-SCNC: 31 MMOL/L (ref 10–20)
AST SERPL W P-5'-P-CCNC: 14 U/L (ref 9–39)
BARBITURATES UR QL SCN: NORMAL
BASE EXCESS BLDA CALC-SCNC: -21.6 MMOL/L (ref -2–3)
BASE EXCESS BLDA CALC-SCNC: -23.6 MMOL/L (ref -2–3)
BENZODIAZ UR QL SCN: NORMAL
BILIRUB SERPL-MCNC: 0.5 MG/DL (ref 0–1.2)
BODY TEMPERATURE: ABNORMAL
BODY TEMPERATURE: ABNORMAL
BUN SERPL-MCNC: 16 MG/DL (ref 6–23)
BUN SERPL-MCNC: 17 MG/DL (ref 6–23)
BUN SERPL-MCNC: 18 MG/DL (ref 6–23)
BUN SERPL-MCNC: 18 MG/DL (ref 6–23)
BZE UR QL SCN: NORMAL
CA-I BLDA-SCNC: 1.27 MMOL/L (ref 1.1–1.33)
CA-I BLDA-SCNC: 1.28 MMOL/L (ref 1.1–1.33)
CALCIUM SERPL-MCNC: 9.4 MG/DL (ref 8.6–10.3)
CALCIUM SERPL-MCNC: 9.4 MG/DL (ref 8.6–10.3)
CALCIUM SERPL-MCNC: 9.5 MG/DL (ref 8.6–10.3)
CALCIUM SERPL-MCNC: 9.7 MG/DL (ref 8.6–10.3)
CANNABINOIDS UR QL SCN: NORMAL
CHLORIDE BLDA-SCNC: 102 MMOL/L (ref 98–107)
CHLORIDE BLDA-SCNC: 103 MMOL/L (ref 98–107)
CHLORIDE SERPL-SCNC: 100 MMOL/L (ref 98–107)
CHLORIDE SERPL-SCNC: 101 MMOL/L (ref 98–107)
CHLORIDE SERPL-SCNC: 95 MMOL/L (ref 98–107)
CHLORIDE SERPL-SCNC: 99 MMOL/L (ref 98–107)
CK SERPL-CCNC: 40 U/L (ref 0–325)
CO2 SERPL-SCNC: 5 MMOL/L (ref 21–32)
CO2 SERPL-SCNC: 6 MMOL/L (ref 21–32)
CO2 SERPL-SCNC: 7 MMOL/L (ref 21–32)
CO2 SERPL-SCNC: 8 MMOL/L (ref 21–32)
CREAT SERPL-MCNC: 1.23 MG/DL (ref 0.5–1.3)
CREAT SERPL-MCNC: 1.24 MG/DL (ref 0.5–1.3)
CREAT SERPL-MCNC: 1.24 MG/DL (ref 0.5–1.3)
CREAT SERPL-MCNC: 1.35 MG/DL (ref 0.5–1.3)
EGFRCR SERPLBLD CKD-EPI 2021: 61 ML/MIN/1.73M*2
EGFRCR SERPLBLD CKD-EPI 2021: 68 ML/MIN/1.73M*2
ERYTHROCYTE [DISTWIDTH] IN BLOOD BY AUTOMATED COUNT: 14.3 % (ref 11.5–14.5)
FENTANYL+NORFENTANYL UR QL SCN: NORMAL
GLUCOSE BLD MANUAL STRIP-MCNC: 131 MG/DL (ref 74–99)
GLUCOSE BLD MANUAL STRIP-MCNC: 137 MG/DL (ref 74–99)
GLUCOSE BLD MANUAL STRIP-MCNC: 149 MG/DL (ref 74–99)
GLUCOSE BLD MANUAL STRIP-MCNC: 150 MG/DL (ref 74–99)
GLUCOSE BLD MANUAL STRIP-MCNC: 152 MG/DL (ref 74–99)
GLUCOSE BLD MANUAL STRIP-MCNC: 158 MG/DL (ref 74–99)
GLUCOSE BLD MANUAL STRIP-MCNC: 161 MG/DL (ref 74–99)
GLUCOSE BLD MANUAL STRIP-MCNC: 167 MG/DL (ref 74–99)
GLUCOSE BLD MANUAL STRIP-MCNC: 171 MG/DL (ref 74–99)
GLUCOSE BLD MANUAL STRIP-MCNC: 181 MG/DL (ref 74–99)
GLUCOSE BLD MANUAL STRIP-MCNC: 195 MG/DL (ref 74–99)
GLUCOSE BLD MANUAL STRIP-MCNC: 213 MG/DL (ref 74–99)
GLUCOSE BLD MANUAL STRIP-MCNC: 228 MG/DL (ref 74–99)
GLUCOSE BLDA-MCNC: 189 MG/DL (ref 74–99)
GLUCOSE BLDA-MCNC: 194 MG/DL (ref 74–99)
GLUCOSE SERPL-MCNC: 143 MG/DL (ref 74–99)
GLUCOSE SERPL-MCNC: 164 MG/DL (ref 74–99)
GLUCOSE SERPL-MCNC: 186 MG/DL (ref 74–99)
GLUCOSE SERPL-MCNC: 197 MG/DL (ref 74–99)
HCO3 BLDA-SCNC: 3.5 MMOL/L (ref 22–26)
HCO3 BLDA-SCNC: 4.1 MMOL/L (ref 22–26)
HCT VFR BLD AUTO: 38.1 % (ref 41–52)
HCT VFR BLD EST: 41 % (ref 41–52)
HCT VFR BLD EST: 42 % (ref 41–52)
HGB BLD-MCNC: 13.1 G/DL (ref 13.5–17.5)
HGB BLDA-MCNC: 13.5 G/DL (ref 13.5–17.5)
HGB BLDA-MCNC: 14 G/DL (ref 13.5–17.5)
HOLD SPECIMEN: NORMAL
INHALED O2 CONCENTRATION: 21 %
INHALED O2 CONCENTRATION: 21 %
LACTATE BLDA-SCNC: 0.8 MMOL/L (ref 0.4–2)
LACTATE BLDA-SCNC: 0.9 MMOL/L (ref 0.4–2)
LDH SERPL L TO P-CCNC: 118 U/L (ref 84–246)
LIPASE SERPL-CCNC: 74 U/L (ref 9–82)
MAGNESIUM SERPL-MCNC: 2.34 MG/DL (ref 1.6–2.4)
MCH RBC QN AUTO: 32.1 PG (ref 26–34)
MCHC RBC AUTO-ENTMCNC: 34.4 G/DL (ref 32–36)
MCV RBC AUTO: 93 FL (ref 80–100)
METHADONE UR QL SCN: NORMAL
NRBC BLD-RTO: 0 /100 WBCS (ref 0–0)
OPIATES UR QL SCN: NORMAL
OXYCODONE+OXYMORPHONE UR QL SCN: NORMAL
OXYHGB MFR BLDA: 97.2 % (ref 94–98)
OXYHGB MFR BLDA: 97.5 % (ref 94–98)
PCO2 BLDA: 11 MM HG (ref 38–42)
PCO2 BLDA: 11 MM HG (ref 38–42)
PCP UR QL SCN: NORMAL
PH BLDA: 7.11 PH (ref 7.38–7.42)
PH BLDA: 7.18 PH (ref 7.38–7.42)
PHOSPHATE SERPL-MCNC: 3.8 MG/DL (ref 2.5–4.9)
PLATELET # BLD AUTO: 196 X10*3/UL (ref 150–450)
PO2 BLDA: 108 MM HG (ref 85–95)
PO2 BLDA: 116 MM HG (ref 85–95)
POTASSIUM BLDA-SCNC: 4.7 MMOL/L (ref 3.5–5.3)
POTASSIUM BLDA-SCNC: 5 MMOL/L (ref 3.5–5.3)
POTASSIUM SERPL-SCNC: 3.8 MMOL/L (ref 3.5–5.3)
POTASSIUM SERPL-SCNC: 3.9 MMOL/L (ref 3.5–5.3)
POTASSIUM SERPL-SCNC: 4.1 MMOL/L (ref 3.5–5.3)
POTASSIUM SERPL-SCNC: 4.2 MMOL/L (ref 3.5–5.3)
PROT SERPL-MCNC: 7.4 G/DL (ref 6.4–8.2)
RBC # BLD AUTO: 4.08 X10*6/UL (ref 4.5–5.9)
SAO2 % BLDA: 98 % (ref 94–100)
SAO2 % BLDA: 99 % (ref 94–100)
SODIUM BLDA-SCNC: 131 MMOL/L (ref 136–145)
SODIUM BLDA-SCNC: 133 MMOL/L (ref 136–145)
SODIUM SERPL-SCNC: 126 MMOL/L (ref 136–145)
SODIUM SERPL-SCNC: 131 MMOL/L (ref 136–145)
SODIUM SERPL-SCNC: 131 MMOL/L (ref 136–145)
SODIUM SERPL-SCNC: 132 MMOL/L (ref 136–145)
SPECIMEN DRAWN FROM PATIENT: ABNORMAL
SPECIMEN DRAWN FROM PATIENT: ABNORMAL
WBC # BLD AUTO: 13.7 X10*3/UL (ref 4.4–11.3)

## 2025-07-04 PROCEDURE — 2500000001 HC RX 250 WO HCPCS SELF ADMINISTERED DRUGS (ALT 637 FOR MEDICARE OP): Mod: IPSPLIT | Performed by: HOSPITALIST

## 2025-07-04 PROCEDURE — 83010 ASSAY OF HAPTOGLOBIN QUANT: CPT | Mod: GENLAB | Performed by: PHYSICIAN ASSISTANT

## 2025-07-04 PROCEDURE — 71045 X-RAY EXAM CHEST 1 VIEW: CPT | Mod: IPSPLIT

## 2025-07-04 PROCEDURE — 0D9670Z DRAINAGE OF STOMACH WITH DRAINAGE DEVICE, VIA NATURAL OR ARTIFICIAL OPENING: ICD-10-PCS | Performed by: PHYSICIAN ASSISTANT

## 2025-07-04 PROCEDURE — 2500000001 HC RX 250 WO HCPCS SELF ADMINISTERED DRUGS (ALT 637 FOR MEDICARE OP): Performed by: PHYSICIAN ASSISTANT

## 2025-07-04 PROCEDURE — 83690 ASSAY OF LIPASE: CPT | Performed by: PHYSICIAN ASSISTANT

## 2025-07-04 PROCEDURE — 85027 COMPLETE CBC AUTOMATED: CPT | Performed by: PHYSICIAN ASSISTANT

## 2025-07-04 PROCEDURE — 84132 ASSAY OF SERUM POTASSIUM: CPT | Mod: IPSPLIT

## 2025-07-04 PROCEDURE — 36600 WITHDRAWAL OF ARTERIAL BLOOD: CPT | Mod: IPSPLIT

## 2025-07-04 PROCEDURE — 82947 ASSAY GLUCOSE BLOOD QUANT: CPT | Mod: IPSPLIT

## 2025-07-04 PROCEDURE — 9420000001 HC RT PATIENT EDUCATION 5 MIN: Mod: IPSPLIT

## 2025-07-04 PROCEDURE — 99223 1ST HOSP IP/OBS HIGH 75: CPT | Performed by: PHYSICIAN ASSISTANT

## 2025-07-04 PROCEDURE — 84132 ASSAY OF SERUM POTASSIUM: CPT | Mod: IPSPLIT | Performed by: PHYSICIAN ASSISTANT

## 2025-07-04 PROCEDURE — 83735 ASSAY OF MAGNESIUM: CPT | Mod: IPSPLIT | Performed by: PHYSICIAN ASSISTANT

## 2025-07-04 PROCEDURE — 82150 ASSAY OF AMYLASE: CPT | Performed by: PHYSICIAN ASSISTANT

## 2025-07-04 PROCEDURE — 99223 1ST HOSP IP/OBS HIGH 75: CPT | Performed by: SURGERY

## 2025-07-04 PROCEDURE — 2500000004 HC RX 250 GENERAL PHARMACY W/ HCPCS (ALT 636 FOR OP/ED): Performed by: HOSPITALIST

## 2025-07-04 PROCEDURE — 84100 ASSAY OF PHOSPHORUS: CPT | Mod: IPSPLIT | Performed by: PHYSICIAN ASSISTANT

## 2025-07-04 PROCEDURE — 74019 RADEX ABDOMEN 2 VIEWS: CPT

## 2025-07-04 PROCEDURE — 83615 LACTATE (LD) (LDH) ENZYME: CPT | Mod: IPSPLIT | Performed by: PHYSICIAN ASSISTANT

## 2025-07-04 PROCEDURE — 74019 RADEX ABDOMEN 2 VIEWS: CPT | Performed by: RADIOLOGY

## 2025-07-04 PROCEDURE — 94760 N-INVAS EAR/PLS OXIMETRY 1: CPT | Mod: IPSPLIT

## 2025-07-04 PROCEDURE — 82550 ASSAY OF CK (CPK): CPT | Mod: IPSPLIT | Performed by: PHYSICIAN ASSISTANT

## 2025-07-04 PROCEDURE — 2500000002 HC RX 250 W HCPCS SELF ADMINISTERED DRUGS (ALT 637 FOR MEDICARE OP, ALT 636 FOR OP/ED): Mod: IPSPLIT | Performed by: HOSPITALIST

## 2025-07-04 PROCEDURE — 2500000005 HC RX 250 GENERAL PHARMACY W/O HCPCS: Mod: IPSPLIT | Performed by: PHYSICIAN ASSISTANT

## 2025-07-04 PROCEDURE — 99291 CRITICAL CARE FIRST HOUR: CPT | Performed by: PHYSICIAN ASSISTANT

## 2025-07-04 PROCEDURE — 74018 RADEX ABDOMEN 1 VIEW: CPT | Performed by: RADIOLOGY

## 2025-07-04 PROCEDURE — 2020000001 HC ICU ROOM DAILY: Mod: IPSPLIT

## 2025-07-04 PROCEDURE — 84075 ASSAY ALKALINE PHOSPHATASE: CPT | Performed by: PHYSICIAN ASSISTANT

## 2025-07-04 PROCEDURE — 2500000004 HC RX 250 GENERAL PHARMACY W/ HCPCS (ALT 636 FOR OP/ED): Mod: IPSPLIT | Performed by: PHYSICIAN ASSISTANT

## 2025-07-04 PROCEDURE — 2500000005 HC RX 250 GENERAL PHARMACY W/O HCPCS: Mod: IPSPLIT

## 2025-07-04 PROCEDURE — 82010 KETONE BODYS QUAN: CPT | Mod: GENLAB | Performed by: PHYSICIAN ASSISTANT

## 2025-07-04 PROCEDURE — 2500000001 HC RX 250 WO HCPCS SELF ADMINISTERED DRUGS (ALT 637 FOR MEDICARE OP): Mod: IPSPLIT | Performed by: PHYSICIAN ASSISTANT

## 2025-07-04 PROCEDURE — 2500000004 HC RX 250 GENERAL PHARMACY W/ HCPCS (ALT 636 FOR OP/ED): Performed by: SURGERY

## 2025-07-04 PROCEDURE — 71045 X-RAY EXAM CHEST 1 VIEW: CPT | Performed by: RADIOLOGY

## 2025-07-04 PROCEDURE — 37799 UNLISTED PX VASCULAR SURGERY: CPT | Mod: IPSPLIT | Performed by: PHYSICIAN ASSISTANT

## 2025-07-04 RX ORDER — GABAPENTIN 300 MG/1
600 CAPSULE ORAL
Status: DISPENSED | OUTPATIENT
Start: 2025-07-04

## 2025-07-04 RX ORDER — SODIUM CHLORIDE 9 MG/ML
10 INJECTION, SOLUTION INTRAVENOUS CONTINUOUS PRN
Status: ACTIVE | OUTPATIENT
Start: 2025-07-04 | End: 2026-07-04

## 2025-07-04 RX ORDER — OXYMETAZOLINE HCL 0.05 %
2 SPRAY, NON-AEROSOL (ML) NASAL ONCE
Status: COMPLETED | OUTPATIENT
Start: 2025-07-04 | End: 2025-07-04

## 2025-07-04 RX ORDER — SODIUM BICARBONATE 1 MEQ/ML
50 SYRINGE (ML) INTRAVENOUS ONCE
Status: COMPLETED | OUTPATIENT
Start: 2025-07-04 | End: 2025-07-04

## 2025-07-04 RX ORDER — GABAPENTIN 300 MG/1
900 CAPSULE ORAL NIGHTLY
Status: DISPENSED | OUTPATIENT
Start: 2025-07-04

## 2025-07-04 RX ORDER — VANCOMYCIN 1.75 G/350ML
1250 INJECTION, SOLUTION INTRAVENOUS EVERY 12 HOURS
Status: DISCONTINUED | OUTPATIENT
Start: 2025-07-04 | End: 2025-07-05

## 2025-07-04 RX ORDER — SODIUM CHLORIDE, SODIUM LACTATE, POTASSIUM CHLORIDE, CALCIUM CHLORIDE 600; 310; 30; 20 MG/100ML; MG/100ML; MG/100ML; MG/100ML
250 INJECTION, SOLUTION INTRAVENOUS CONTINUOUS
Status: ACTIVE | OUTPATIENT
Start: 2025-07-04 | End: 2025-07-05

## 2025-07-04 RX ORDER — MEROPENEM AND SODIUM CHLORIDE 1 G/50ML
1 INJECTION, SOLUTION INTRAVENOUS EVERY 8 HOURS
Status: DISPENSED | OUTPATIENT
Start: 2025-07-04

## 2025-07-04 RX ORDER — DEXTROSE MONOHYDRATE 100 MG/ML
150 INJECTION, SOLUTION INTRAVENOUS CONTINUOUS PRN
Status: ACTIVE | OUTPATIENT
Start: 2025-07-04 | End: 2025-07-05

## 2025-07-04 RX ORDER — DEXTROSE MONOHYDRATE AND SODIUM CHLORIDE 5; .45 G/100ML; G/100ML
150 INJECTION, SOLUTION INTRAVENOUS CONTINUOUS PRN
Status: ACTIVE | OUTPATIENT
Start: 2025-07-04 | End: 2025-07-05

## 2025-07-04 RX ORDER — DEXTROSE 50 % IN WATER (D50W) INTRAVENOUS SYRINGE
50
Status: ACTIVE | OUTPATIENT
Start: 2025-07-04

## 2025-07-04 RX ORDER — VANCOMYCIN HYDROCHLORIDE 1 G/20ML
INJECTION, POWDER, LYOPHILIZED, FOR SOLUTION INTRAVENOUS DAILY PRN
Status: DISCONTINUED | OUTPATIENT
Start: 2025-07-04 | End: 2025-07-05

## 2025-07-04 RX ORDER — SODIUM BICARBONATE 1 MEQ/ML
SYRINGE (ML) INTRAVENOUS
Status: COMPLETED
Start: 2025-07-04 | End: 2025-07-04

## 2025-07-04 RX ORDER — PANTOPRAZOLE SODIUM 40 MG/10ML
40 INJECTION, POWDER, LYOPHILIZED, FOR SOLUTION INTRAVENOUS DAILY
Status: DISPENSED | OUTPATIENT
Start: 2025-07-04

## 2025-07-04 RX ADMIN — GABAPENTIN 600 MG: 300 CAPSULE ORAL at 16:53

## 2025-07-04 RX ADMIN — SODIUM BICARBONATE 50 MEQ: 84 INJECTION INTRAVENOUS at 13:35

## 2025-07-04 RX ADMIN — INSULIN LISPRO 3 UNITS: 100 INJECTION, SOLUTION INTRAVENOUS; SUBCUTANEOUS at 09:21

## 2025-07-04 RX ADMIN — MORPHINE SULFATE 2 MG: 2 INJECTION, SOLUTION INTRAMUSCULAR; INTRAVENOUS at 00:46

## 2025-07-04 RX ADMIN — INSULIN LISPRO 3 UNITS: 100 INJECTION, SOLUTION INTRAVENOUS; SUBCUTANEOUS at 13:08

## 2025-07-04 RX ADMIN — INSULIN HUMAN 6 UNITS/HR: 1 INJECTION, SOLUTION INTRAVENOUS at 14:50

## 2025-07-04 RX ADMIN — GABAPENTIN 600 MG: 300 CAPSULE ORAL at 06:19

## 2025-07-04 RX ADMIN — PAROXETINE HYDROCHLORIDE 30 MG: 20 TABLET, FILM COATED ORAL at 21:55

## 2025-07-04 RX ADMIN — MEROPENEM AND SODIUM CHLORIDE 1 G: 1 INJECTION, SOLUTION INTRAVENOUS at 15:31

## 2025-07-04 RX ADMIN — MEROPENEM AND SODIUM CHLORIDE 1 G: 1 INJECTION, SOLUTION INTRAVENOUS at 08:26

## 2025-07-04 RX ADMIN — DEXTROSE MONOHYDRATE AND SODIUM CHLORIDE 150 ML/HR: 5; .45 INJECTION, SOLUTION INTRAVENOUS at 14:53

## 2025-07-04 RX ADMIN — VANCOMYCIN 1250 MG: 1.75 INJECTION, SOLUTION INTRAVENOUS at 09:24

## 2025-07-04 RX ADMIN — FAMOTIDINE 20 MG: 20 TABLET, FILM COATED ORAL at 21:55

## 2025-07-04 RX ADMIN — Medication 2 SPRAY: at 10:15

## 2025-07-04 RX ADMIN — DEXTROSE 150 ML/HR: 10 SOLUTION INTRAVENOUS at 19:04

## 2025-07-04 RX ADMIN — SODIUM BICARBONATE 50 ML/HR: 84 INJECTION INTRAVENOUS at 13:56

## 2025-07-04 RX ADMIN — PANTOPRAZOLE SODIUM 40 MG: 40 INJECTION, POWDER, LYOPHILIZED, FOR SOLUTION INTRAVENOUS at 09:23

## 2025-07-04 RX ADMIN — BUSPIRONE HYDROCHLORIDE 15 MG: 10 TABLET ORAL at 15:31

## 2025-07-04 RX ADMIN — GABAPENTIN 900 MG: 300 CAPSULE ORAL at 21:55

## 2025-07-04 RX ADMIN — FAMOTIDINE 20 MG: 10 INJECTION INTRAVENOUS at 09:27

## 2025-07-04 RX ADMIN — ENOXAPARIN SODIUM 40 MG: 40 INJECTION SUBCUTANEOUS at 21:56

## 2025-07-04 RX ADMIN — SODIUM CHLORIDE, SODIUM LACTATE, POTASSIUM CHLORIDE, AND CALCIUM CHLORIDE 100 ML/HR: .6; .31; .03; .02 INJECTION, SOLUTION INTRAVENOUS at 07:40

## 2025-07-04 RX ADMIN — MORPHINE SULFATE 2 MG: 2 INJECTION, SOLUTION INTRAMUSCULAR; INTRAVENOUS at 05:06

## 2025-07-04 RX ADMIN — GABAPENTIN 600 MG: 300 CAPSULE ORAL at 12:45

## 2025-07-04 RX ADMIN — VANCOMYCIN 1250 MG: 1.75 INJECTION, SOLUTION INTRAVENOUS at 20:25

## 2025-07-04 RX ADMIN — ROSUVASTATIN CALCIUM 40 MG: 10 TABLET, FILM COATED ORAL at 21:55

## 2025-07-04 RX ADMIN — BUSPIRONE HYDROCHLORIDE 15 MG: 10 TABLET ORAL at 21:55

## 2025-07-04 RX ADMIN — SODIUM BICARBONATE 50 MEQ: 84 INJECTION INTRAVENOUS at 13:08

## 2025-07-04 RX ADMIN — HYDROMORPHONE HYDROCHLORIDE 0.2 MG: 1 INJECTION, SOLUTION INTRAMUSCULAR; INTRAVENOUS; SUBCUTANEOUS at 10:39

## 2025-07-04 RX ADMIN — POLYETHYLENE GLYCOL 3350 17 G: 17 POWDER, FOR SOLUTION ORAL at 07:39

## 2025-07-04 RX ADMIN — NORTRIPTYLINE HYDROCHLORIDE 25 MG: 25 CAPSULE ORAL at 22:08

## 2025-07-04 RX ADMIN — Medication 50 MEQ: at 13:35

## 2025-07-04 SDOH — ECONOMIC STABILITY: FOOD INSECURITY: WITHIN THE PAST 12 MONTHS, YOU WORRIED THAT YOUR FOOD WOULD RUN OUT BEFORE YOU GOT THE MONEY TO BUY MORE.: NEVER TRUE

## 2025-07-04 SDOH — ECONOMIC STABILITY: FOOD INSECURITY: HOW HARD IS IT FOR YOU TO PAY FOR THE VERY BASICS LIKE FOOD, HOUSING, MEDICAL CARE, AND HEATING?: NOT HARD AT ALL

## 2025-07-04 SDOH — ECONOMIC STABILITY: FOOD INSECURITY: WITHIN THE PAST 12 MONTHS, THE FOOD YOU BOUGHT JUST DIDN'T LAST AND YOU DIDN'T HAVE MONEY TO GET MORE.: NEVER TRUE

## 2025-07-04 SDOH — ECONOMIC STABILITY: INCOME INSECURITY: IN THE PAST 12 MONTHS HAS THE ELECTRIC, GAS, OIL, OR WATER COMPANY THREATENED TO SHUT OFF SERVICES IN YOUR HOME?: NO

## 2025-07-04 SDOH — HEALTH STABILITY: PHYSICAL HEALTH
HOW OFTEN DO YOU NEED TO HAVE SOMEONE HELP YOU WHEN YOU READ INSTRUCTIONS, PAMPHLETS, OR OTHER WRITTEN MATERIAL FROM YOUR DOCTOR OR PHARMACY?: NEVER

## 2025-07-04 SDOH — SOCIAL STABILITY: SOCIAL INSECURITY: WITHIN THE LAST YEAR, HAVE YOU BEEN AFRAID OF YOUR PARTNER OR EX-PARTNER?: NO

## 2025-07-04 SDOH — SOCIAL STABILITY: SOCIAL INSECURITY: WITHIN THE LAST YEAR, HAVE YOU BEEN HUMILIATED OR EMOTIONALLY ABUSED IN OTHER WAYS BY YOUR PARTNER OR EX-PARTNER?: NO

## 2025-07-04 SDOH — HEALTH STABILITY: PHYSICAL HEALTH: ON AVERAGE, HOW MANY DAYS PER WEEK DO YOU ENGAGE IN MODERATE TO STRENUOUS EXERCISE (LIKE A BRISK WALK)?: 0 DAYS

## 2025-07-04 SDOH — HEALTH STABILITY: PHYSICAL HEALTH: ON AVERAGE, HOW MANY MINUTES DO YOU ENGAGE IN EXERCISE AT THIS LEVEL?: 0 MIN

## 2025-07-04 ASSESSMENT — ENCOUNTER SYMPTOMS
ABDOMINAL PAIN: 1
MUSCULOSKELETAL NEGATIVE: 1
FATIGUE: 1
VOMITING: 0
CHEST TIGHTNESS: 0
UNEXPECTED WEIGHT CHANGE: 0
CONFUSION: 1
FEVER: 0
CARDIOVASCULAR NEGATIVE: 1
WHEEZING: 0
DIAPHORESIS: 1
CHILLS: 1
APPETITE CHANGE: 1
NAUSEA: 0
SHORTNESS OF BREATH: 1
DIARRHEA: 0
COUGH: 0
CONSTIPATION: 1
EYES NEGATIVE: 1
CHOKING: 0
ACTIVITY CHANGE: 1

## 2025-07-04 ASSESSMENT — PAIN SCALES - GENERAL
PAINLEVEL_OUTOF10: 6
PAINLEVEL_OUTOF10: 4
PAINLEVEL_OUTOF10: 7
PAINLEVEL_OUTOF10: 4
PAINLEVEL_OUTOF10: 3
PAINLEVEL_OUTOF10: 2
PAINLEVEL_OUTOF10: 7

## 2025-07-04 ASSESSMENT — PAIN - FUNCTIONAL ASSESSMENT
PAIN_FUNCTIONAL_ASSESSMENT: 0-10

## 2025-07-04 ASSESSMENT — PAIN DESCRIPTION - ORIENTATION: ORIENTATION: RIGHT;LEFT

## 2025-07-04 ASSESSMENT — PAIN DESCRIPTION - LOCATION
LOCATION: ABDOMEN
LOCATION: ABDOMEN

## 2025-07-04 ASSESSMENT — PAIN DESCRIPTION - DESCRIPTORS: DESCRIPTORS: SHARP

## 2025-07-04 NOTE — NURSING NOTE
0805: Dr. Wang and SWEETIE Gipson seen pt. at bedside.    0853: X-Ray seen pt. At bedside    1105: 18 Fr NG placed to left nare without difficulty, secured at 60 cm with NG strip adhesive.  Immediate return of liquid brown drainage. Placed to [facility] radiology notified for placement X-Ray

## 2025-07-04 NOTE — CONSULTS
Vancomycin Dosing by Pharmacy- INITIAL    Jono Quinn is a 55 y.o. year old male who Pharmacy has been consulted for vancomycin dosing for sepsis unclear source, c/f intraabdominal. Based on the patient's indication and renal status this patient will be dosed based on a goal AUC of 400-600.     Renal function is currently stable.    Visit Vitals  /85   Pulse 110   Temp 36.3 °C (97.3 °F) (Temporal)   Resp 22        Lab Results   Component Value Date    CREATININE 1.23 2025    CREATININE 1.32 (H) 2025    CREATININE 0.99 2025    CREATININE 0.79 2025        Patient weight is as follows:   Vitals:    25 0500   Weight: 119 kg (261 lb 11 oz)       Cultures:  No results found for the encounter in last 14 days.        I/O last 3 completed shifts:  In: 1100 (9.3 mL/kg) [P.O.:1100]  Out: 2250 (19 mL/kg) [Urine:2250 (0.5 mL/kg/hr)]  Weight: 118.7 kg   I/O during current shift:  I/O this shift:  In: 931.7 [I.V.:931.7]  Out: 1300 [Urine:650; Emesis/NG output:650]    Temp (24hrs), Av.3 °C (97.4 °F), Min:36.2 °C (97.2 °F), Max:36.7 °C (98 °F)         Assessment/Plan     Patient will not be given a loading dose.  Will initiate vancomycin maintenance, 1250 mg every 12 hours.    This dosing regimen is predicted by [employer] to result in the following pharmacokinetic parameters:  Loading dose: N/A  Regimen: 1250 mg IV every 12 hours.  Start time: 21:24 on 2025  Exposure target: AUC24 (range) 400-600 mg/L.hr   [id number]: 471 mg/L.hr  AUC24,ss: 578 mg/L.hr  Probability of AUC24 > 400: 84 %  Ctrough,ss: 19.5 mg/L  Probability of Ctrough,ss > 20: 48 %    Follow-up level will be ordered on  at 0500 unless clinically indicated sooner.  Will continue to monitor renal function daily while on vancomycin and order serum creatinine at least every 48 hours if not already ordered.  Follow for continued vancomycin needs, clinical response, and signs/symptoms of toxicity.       Leigh Sánchez,  PharmD

## 2025-07-04 NOTE — NURSING NOTE
0810: Dr Wang & KATIE Gipson PAC at bedside. RN expressed concern re: tachypnea & bicarb level. Per Dr Wang, ABG to be checked at noon, feels bicarb will increase after IV fluids. Asked if airvo should be used for work of breathing- Dr Wang says not needed at this time.     1250: Spoke with KATIE Gipson PAC re: critical pH & HCO3. Labs reviewed. Aware pt anion gap was open on am labs, as well as pt had keytones in the urine. However, blood sugar not elevated over 200. Respiratory rate has not deviated greatly since assuming care at 0700.     1608: ROSEANNA Jose CNP notified of critical bicarb. Insulin drip just started at 1450, bicarb drip infusing. Orders BMP for 1700.

## 2025-07-04 NOTE — CONSULTS
Shortness of Breath  Associated symptoms include abdominal pain.   Abdominal Pain      This is a consult requested and the patient was admitted with generalized abdominal pain, tiredness and weakness and constipation.  This is a patient known to me who presented with a 2-day history of feeling unwell initially developed epigastric right-sided abdominal pain which was constant in nature.  He felt unwell.  He had weakness he had some nausea.  He complained of diffuse muscle weakness.  He then came to the emergency room and underwent a CT scan which confirmed edema around the head of the pancreas and an inflamed duodenum.  He also had a dilated common bile duct likely due to a previous open cholecystectomy.  Of note is a previous such history in 2020 and when the patient was admitted with COVID 19.  He admits to taking aspirin.  He does not take any proton pump inhibitors.  He is had no melena he denies any other abdominal symptoms except for some constipation.  Medical History[1]     Current Medications[2]     RX Allergies[3]     Review of Systems  Review of Systems   Respiratory:  Positive for shortness of breath.    Gastrointestinal:  Positive for abdominal pain.       Objective     Vital signs for last 24 hours:  Temp:  [36.2 °C (97.2 °F)-36.7 °C (98 °F)] 36.2 °C (97.2 °F)  Heart Rate:  [] 102  Resp:  [17-37] 31  BP: (140-173)/(68-85) 146/81    Intake/Output this shift:  I/O this shift:  In: 931.7 [I.V.:931.7]  Out: 650 [Urine:650]    Physical Exam  Physical Exam  Vitals reviewed.   Constitutional:       Appearance: Normal appearance. He is obese.   HENT:      Head: Normocephalic.   Cardiovascular:      Rate and Rhythm: Normal rate and regular rhythm.      Heart sounds: Normal heart sounds.   Pulmonary:      Effort: Pulmonary effort is normal.      Breath sounds: Normal breath sounds.   Abdominal:      General: Abdomen is flat. There is no distension.      Palpations: Abdomen is soft. There is no mass.       Tenderness: There is no abdominal tenderness. There is no guarding.   Musculoskeletal:         General: Normal range of motion.   Skin:     General: Skin is warm.   Neurological:      General: No focal deficit present.      Mental Status: He is lethargic.   Psychiatric:         Mood and Affect: Mood normal.         Labs & Radiology      Labs Reviewed   CBC WITH AUTO DIFFERENTIAL - Abnormal       Result Value    WBC 15.5 (*)     nRBC 0.0      RBC 4.31 (*)     Hemoglobin 14.2      Hematocrit 41.4      MCV 96      MCH 32.9      MCHC 34.3      RDW 14.0      Platelets 207      Neutrophils % 80.7      Immature Granulocytes %, Automated 0.7      Lymphocytes % 9.6      Monocytes % 8.1      Eosinophils % 0.4      Basophils % 0.5      Neutrophils Absolute 12.51 (*)     Immature Granulocytes Absolute, Automated 0.11      Lymphocytes Absolute 1.48      Monocytes Absolute 1.25 (*)     Eosinophils Absolute 0.06      Basophils Absolute 0.07     COMPREHENSIVE METABOLIC PANEL - Abnormal    Glucose 166 (*)     Sodium 126 (*)     Potassium 5.1      Chloride 93 (*)     Bicarbonate 7 (*)     Anion Gap 31 (*)     Urea Nitrogen 14      Creatinine 1.32 (*)     eGFR 63      Calcium 9.4      Albumin 4.3      Alkaline Phosphatase 77      Total Protein 7.9      AST 35      Bilirubin, Total 0.7      ALT 16     BLOOD GAS ARTERIAL FULL PANEL - Abnormal    POCT pH, Arterial 7.21 (*)     POCT pCO2, Arterial 15 (*)     POCT pO2, Arterial 113 (*)     POCT SO2, Arterial 98      POCT Oxy Hemoglobin, Arterial 97.1      POCT Hematocrit Calculated, Arterial 41.0      POCT Sodium, Arterial 127 (*)     POCT Potassium, Arterial 4.9      POCT Chloride, Arterial 99      POCT Ionized Calcium, Arterial 1.34 (*)     POCT Glucose, Arterial 168 (*)     POCT Lactate, Arterial 0.9      POCT Base Excess, Arterial -19.4 (*)     POCT HCO3 Calculated, Arterial 6.0 (*)     POCT Hemoglobin, Arterial 13.8      POCT Anion Gap, Arterial 27 (*)     Patient Temperature         FiO2 21     URINALYSIS WITH REFLEX MICROSCOPIC - Abnormal    Color, Urine Yellow      Appearance, Urine Clear      Specific Gravity, Urine 1.021      pH, Urine 5.0      Protein, Urine 30 (1+) (*)     Glucose, Urine OVER (4+) (*)     Blood, Urine NEGATIVE      Ketones, Urine OVER (4+) (*)     Bilirubin, Urine NEGATIVE      Urobilinogen, Urine Normal      Nitrite, Urine NEGATIVE      Leukocyte Esterase, Urine NEGATIVE      Narrative:     OVER is reported when the result is greater than the clinically reportable range.   CBC - Abnormal    WBC 13.7 (*)     nRBC 0.0      RBC 4.08 (*)     Hemoglobin 13.1 (*)     Hematocrit 38.1 (*)     MCV 93      MCH 32.1      MCHC 34.4      RDW 14.3      Platelets 196     COMPREHENSIVE METABOLIC PANEL - Abnormal    Glucose 143 (*)     Sodium 126 (*)     Potassium 4.2      Chloride 95 (*)     Bicarbonate 6 (*)     Anion Gap 29 (*)     Urea Nitrogen 16      Creatinine 1.23      eGFR 68      Calcium 9.4      Albumin 4.2      Alkaline Phosphatase 87      Total Protein 7.4      AST 14      Bilirubin, Total 0.5      ALT 13     POCT GLUCOSE - Abnormal    POCT Glucose 172 (*)    POCT GLUCOSE - Abnormal    POCT Glucose 161 (*)    POCT GLUCOSE - Abnormal    POCT Glucose 149 (*)    POCT GLUCOSE - Abnormal    POCT Glucose 131 (*)    BLOOD CULTURE - Normal    Blood Culture Loaded on Instrument - Culture in progress     BLOOD CULTURE - Normal    Blood Culture Loaded on Instrument - Culture in progress     B-TYPE NATRIURETIC PEPTIDE - Normal    BNP 62      Narrative:        <100 pg/mL - Heart failure unlikely                  100-299 pg/mL - Intermediate probability of acute heart                                  failure exacerbation. Correlate with clinical                                  context and patient history.                    >=300 pg/mL - Heart Failure likely. Correlate with clinical                                  context and patient history.                                    BNP  testing is performed using different testing methodology at [facility] than at other [facility]. Direct result comparisons should only be made within the same method.                      SERIAL TROPONIN-INITIAL - Normal    Troponin I, High Sensitivity 10      Narrative:     Less than 99th percentile of normal range cutoff-                  Female and children under 18 years old <14 ng/L; Male <21 ng/L: Negative                  Repeat testing should be performed if clinically indicated.                                     Female and children under 18 years old 14-50 ng/L; Male 21-50 ng/L:                  Consistent with possible cardiac damage and possible increased clinical                   risk. Serial measurements may help to assess extent of myocardial damage.                                     >50 ng/L: Consistent with cardiac damage, increased clinical risk and                  myocardial infarction. Serial measurements may help assess extent of                   myocardial damage.                                      NOTE: Children less than 1 year old may have higher baseline troponin                   levels and results should be interpreted in conjunction with the overall                   clinical context.                                     NOTE: Troponin I testing is performed using a different                   testing methodology at [facility] than at [facility]. Direct result comparisons should only                   be made within the same method.   SERIAL TROPONIN, 1 HOUR - Normal    Troponin I, High Sensitivity 10      Narrative:     Less than 99th percentile of normal range cutoff-                  Female and children under 18 years old <14 ng/L; Male <21 ng/L: Negative                  Repeat testing should be performed if clinically indicated.                                     Female and children under 18 years old 14-50  ng/L; Male 21-50 ng/L:                  Consistent with possible cardiac damage and possible increased clinical                   risk. Serial measurements may help to assess extent of myocardial damage.                                     >50 ng/L: Consistent with cardiac damage, increased clinical risk and                  myocardial infarction. Serial measurements may help assess extent of                   myocardial damage.                                      NOTE: Children less than 1 year old may have higher baseline troponin                   levels and results should be interpreted in conjunction with the overall                   clinical context.                                     NOTE: Troponin I testing is performed using a different                   testing methodology at [facility] than at other                   [facility]. Direct result comparisons should only                   be made within the same method.   LACTATE - Normal    Lactate 1.1      Narrative:     Venipuncture immediately after or during the administration of Metamizole may lead to falsely low results. Testing should be performed immediately prior to Metamizole dosing.   SARS-COV-2 AND INFLUENZA A/B PCR - Normal    Flu A Result Not Detected      Flu B Result Not Detected      Coronavirus 2019, PCR Not Detected      Narrative:     This assay is an FDA-cleared, in vitro diagnostic nucleic acid amplification test for the qualitative detection and differentiation of SARS CoV-2/ Influenza A/B from nasopharyngeal specimens collected from individuals with signs and symptoms of respiratory tract infections, and has been validated for use at [facility]. Negative results do not preclude COVID-19/ Influenza A/B infections and should not be used as the sole basis for diagnosis, treatment, or other management decisions. Testing for SARS CoV-2 is recommended only for patients who meet current clinical  and/or epidemiological criteria defined by federal, state, or local public health directives.   LIPASE - Normal    Lipase 66      Narrative:     Venipuncture immediately after or during the administration of Metamizole may lead to falsely low results. Testing should be performed immediately prior to Metamizole dosing.   AMYLASE - Normal    Amylase 66     LIPASE - Normal    Lipase 74      Narrative:     Venipuncture immediately after or during the administration of Metamizole may lead to falsely low results. Testing should be performed immediately prior to Metamizole dosing.   TROPONIN SERIES- (INITIAL, 1 HR)    Narrative:     The following orders were created for panel order Troponin I Series, High Sensitivity (0, 1 HR).                  Procedure                               Abnormality         Status                                     ---------                               -----------         ------                                     Troponin I, High Sensiti...[[id number]]  Normal              Final result                               Troponin, High Sensitivi...[[id number]]  Normal              Final result                                                 Please view results for these tests on the individual orders.   MICROSCOPIC ONLY, URINE    WBC, Urine NONE      RBC, Urine 1-2      Mucus, Urine FEW     POCT GLUCOSE METER   POCT GLUCOSE METER   POCT GLUCOSE METER     CT abdomen pelvis w IV contrast 07/03/2025    Narrative  STUDY:  CT Angiogram of the Chest, CT Abdomen and Pelvis with IV Contrast;  07/03/2025 at 3:27 PM  INDICATION:  Shortness of breath. Unspecified abdominal pain.  COMPARISON:  CTA chest 06/12/25, 10/24/23. CTA chest, CT abdomen and pelvis  08/29/24. XR chest 06/12/25.  ACCESSION NUMBER(S):  [id number], [id number]  ORDERING CLINICIAN:  TANNER MEJIA  TECHNIQUE:  CTA of the chest was performed following rapid injection  of intravenous contrast.  Images are reviewed and processed at  a  workstation according to the CT angiogram protocol with 3-D and/or MIP  post processing imaging generated.  CT of the abdomen and pelvis was  performed with intravenous contrast.  Omnipaque-350 100 mL was  administered intravenously; positive oral contrast was given.  Automated mA/kV exposure control was utilized and patient examination  was performed in strict accordance with principles of ALARA.  FINDINGS:  CTA CHEST:  Pulmonary arteries are adequately opacified without acute or chronic  filling defects.  The thoracic aorta is normal in course and caliber  without dissection or aneurysm.  The heart is normal in size without pericardial effusion.  Thoracic  lymph nodes are not enlarged.  There is no pleural effusion, pleural thickening, or pneumothorax.  The airways are patent.  Lungs are clear without consolidation, interstitial disease, or  suspicious nodules.  ABDOMEN:    LIVER:  No hepatomegaly.  Smooth surface contour.  Normal attenuation.    BILE DUCTS:  The common bile duct is dilated with a diameter of approximately 1.3  cm    GALLBLADDER:  The gallbladder is not seen and has been removed.    STOMACH:  No abnormalities identified.    PANCREAS:  There is a large amount of edema around the head of the pancreas. The  duodenum which passes through this area also appears inflamed.    SPLEEN:  No splenomegaly or focal splenic lesion.    ADRENAL GLANDS:  No thickening or nodules.    KIDNEYS AND URETERS:  Kidneys are normal in size and location.  No renal or ureteral  calculi.    PELVIS:    BLADDER:  No abnormalities identified.    REPRODUCTIVE ORGANS:  No abnormalities identified.    BOWEL:  As stated, the duodenum which passes adjacent to the inflamed pancreas  also appears inflamed.    VESSELS:  No abnormalities identified.  Abdominal aorta is normal in caliber.    PERITONEUM/RETROPERITONEUM/LYMPH NODES:  No free fluid.  No pneumoperitoneum.  No lymphadenopathy.    ABDOMINAL WALL:  No abnormalities  identified.  SOFT TISSUES:  No abnormalities identified.    BONES:  No acute fracture or aggressive osseous lesion.    Impression  1. There is a large amount of edema around the head of the pancreas.  The duodenum which passes through this area also appears inflamed.  This may represent pancreatitis and duodenitis.  2. The common bile duct is dilated with a diameter of approximately  1.3 cm. This may be due to the the previous cholecystectomy or  inflammation in the pancreas.  3. No pulmonary emboli or acute pulmonary pathology.  Signed by Ady Cruz MD      Impression  Nonspecific edema of the pancreas and duodenum.  Normal lipase.  Lethargy weakness.  Leukocytosis.  Anion gap metabolic acidosis  Plan   Continue intravenous fluids, agree with intravenous antibiotics and blood cultures.  Recommend MRCP.  Will add intravenous proton pump inhibitor.  Follow conservatively for now.  MiraLAX for constipation.  Nutrition supplementation protein shakes.  Repeat arterial blood gas.    Addendum-abdominal x-ray confirms a large gastric bubble consistent with a gastric ileus.  Recommend nasogastric tube.  Hold laxative.       [1]   Past Medical History:  Diagnosis Date    Coronary artery disease     COVID-19 11/23/2020    Pneumonia due to COVID-19 virus    Depression, unspecified 10/26/2022    Depression    Diabetes mellitus (Multi)     Hypertension     Obstructive sleep apnea (adult) (pediatric) 07/11/2022    ILTZY on CPAP    Other conditions influencing health status 05/16/2022    Diabetes type 2, uncontrolled    Personal history of other diseases of the digestive system 11/19/2021    H/O acute pancreatitis    Personal history of other diseases of the nervous system and sense organs     History of sleep apnea    Psoriasis     Pure hypercholesterolemia, unspecified 10/26/2022    Hypercholesterolemia    Testicular hypofunction 07/01/2021    Hypogonadism male    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   [2]    Current Facility-Administered Medications:     acetaminophen (Tylenol) tablet 650 mg, 650 mg, oral, q4h PRN **OR** acetaminophen (Tylenol) oral liquid 650 mg, 650 mg, nasogastric tube, q4h PRN **OR** acetaminophen (Tylenol) suppository 650 mg, 650 mg, rectal, q4h PRN, Adrienne Gipson PA-C    acetaminophen (Tylenol) tablet 650 mg, 650 mg, oral, q4h PRN **OR** acetaminophen (Tylenol) oral liquid 650 mg, 650 mg, oral, q4h PRN **OR** acetaminophen (Tylenol) suppository 650 mg, 650 mg, rectal, q4h PRN, Adrienne Gipson PA-C    busPIRone (Buspar) tablet 15 mg, 15 mg, oral, TID, Mariusz Marcus DO, 15 mg at 07/03/25 2130    diphenhydrAMINE (BENADryl) capsule 25 mg, 25 mg, oral, q6h PRN, Adrienne Gipson PA-C, 25 mg at 07/03/25 2049    empagliflozin (Jardiance) tablet 25 mg, 25 mg, oral, Daily, Mariusz Marcus DO    enoxaparin (Lovenox) syringe 40 mg, 40 mg, subcutaneous, q24h, Adrienne Gipson PA-C, 40 mg at 07/03/25 2044    famotidine (Pepcid) tablet 20 mg, 20 mg, oral, BID, 20 mg at 07/03/25 2049 **OR** famotidine PF (Pepcid) injection 20 mg, 20 mg, intravenous, BID, Adrienne Gipson PA-C    gabapentin (Neurontin) capsule 900 mg, 900 mg, oral, TID, Mariusz Marcus DO, 600 mg at 07/04/25 0619    insulin lispro injection 0-15 Units, 0-15 Units, subcutaneous, q4h, Mariusz Marcus DO, 3 Units at 07/03/25 2122    lactated Ringer's infusion, 100 mL/hr, intravenous, Continuous, Adrienne Gipson PA-C, Last Rate: 100 mL/hr at 07/03/25 2141, 100 mL/hr at 07/03/25 2141    melatonin tablet 6 mg, 6 mg, oral, Nightly PRN, Adrienne Gipson PA-C    [Held by provider] metFORMIN (Glucophage) tablet 1,000 mg, 1,000 mg, oral, BID, Mariusz Marcus DO    morphine injection 2 mg, 2 mg, intravenous, q4h PRN, Mariusz Marcus DO, 2 mg at 07/04/25 0506    nortriptyline (Pamelor) capsule 25 mg, 25 mg, oral, Nightly, Mariusz Marcus DO    ondansetron (Zofran) tablet 4 mg, 4 mg, oral, q8h PRN **OR** ondansetron (Zofran)  injection 4 mg, 4 mg, intravenous, q4h PRN, Mariusz Marcus DO    pantoprazole (Protonix) injection 40 mg, 40 mg, intravenous, Daily, Fermin Bynum MD    PARoxetine (Paxil) tablet 30 mg, 30 mg, oral, Nightly, Mariusz Marcus DO    polyethylene glycol (Glycolax, Miralax) packet 17 g, 17 g, oral, Daily, Adrienne Gipson PA-C    rosuvastatin (Crestor) tablet 40 mg, 40 mg, oral, Nightly, Mariusz Marcus DO, 40 mg at 07/03/25 2049  [3]   Allergies  Allergen Reactions    Perfume Anaphylaxis    Fluticasone Propionate Swelling    Liraglutide Other     Episode of acute panc while on this, though other possible causes at this time as well    Lisinopril Unknown    Other Hives     Flowers and perfumes    Pollen Extracts Itching    Zosyn [Piperacillin-Tazobactam] Rash

## 2025-07-04 NOTE — CARE PLAN
The patient's goals for the shift include Pain control    The clinical goals for the shift include Improvment of bicarb by end of shift.      Multiple issues with pt. Gastric distention caused NG tube to be placed, NG to LIWS. pH and bicarb downward trending most of the day two amps bicarb given and started on a bicarb drip. Mid shift it was felt that pt. Was in DKA/ euglycemic ketoacidosis insulin drip started along with dextrose containing IV fluids. BMP every 4 hours.

## 2025-07-04 NOTE — NURSING NOTE
Rash documented with photos in EMR. Provider SANTA Harrison, made aware of rash and in to assess patient at bedside.

## 2025-07-04 NOTE — CARE PLAN
The patient's goals for the shift include Pain control    The clinical goals for the shift include Monitor pain with a goal of 3 per patient on a 1-10 scale    Over the shift, the patient did  make progress toward the following goals. Medication every 4 hours approximately has given the patient ability to relax and sleep. Movement triggers sharp pain and tenderness continues. Will ask to initiate incentive spirometer. Patient remain in the 30 range.

## 2025-07-04 NOTE — NURSING NOTE
"Patient admitted from ED for abdominal pain. Patient c/o \"8\"/10 pain to LLQ and RLQ radiating around to right lower back. Patient denies nausea, but c/o thirst. Lower lip dry and cracked. Patient has a pinpoint red rash to extremities. Patient is short of breath with minimal exertion. Patient afebrile. Patient states he was having dizziness, shortness of breath, and abdominal pain while at work earlier today. Patient states he has not had a BM for five days. Patient oriented to room & unit. Call light within reach.   "

## 2025-07-04 NOTE — H&P
History Of Present Illness  Jono Quinn is a 55 y.o. male presenting with a chief complaint of abdominal pain, no BM x6 days. States that the pain became  more intense starting yesterday. States that he feels generally unwell, fatigued. Abdominal pain began in the epigastric region, reports that it was consistent with his pain from a prior episode of pancreatitis. States that this was triggered by drinking energy drinks. Since then he has cut out energy drinks entirely. States that as the pain became more intense it progressed downwards into his lower abdomen, band like across the bottom of his abdomen. No improvement symptom wise with NPO, IVF or antibiotics.   This AM patient reports feeling short of breath, increasingly fatigued with ongoing pain in his lower abdomen.   After getting morning meds had an episode of emesis. KUB revealed gastric distention, NGT was placed to LIWS.   Repeat blood gas revealed worsening acidosis with Bicarb <5. Two amp bicarc given with repeat ABG pending.          Past Medical History  Medical History[1]    Surgical History  Surgical History[2]     Social History  He reports that he has never smoked. He has never been exposed to tobacco smoke. He has never used smokeless tobacco. He reports that he does not drink alcohol and does not use drugs.    Family History  Family History[3]     Allergies  Perfume, Fluticasone propionate, Liraglutide, Lisinopril, Other, Pollen extracts, and Zosyn [piperacillin-tazobactam]    Review of Systems   Constitutional:  Positive for activity change, appetite change, chills, diaphoresis and fatigue. Negative for fever and unexpected weight change.   HENT: Negative.     Eyes: Negative.    Respiratory:  Positive for shortness of breath. Negative for cough, choking, chest tightness and wheezing.    Cardiovascular: Negative.  Negative for leg swelling.   Gastrointestinal:  Positive for abdominal pain and constipation. Negative for diarrhea, nausea and  "vomiting.   Genitourinary: Negative.    Musculoskeletal: Negative.    Skin:  Positive for rash.   Psychiatric/Behavioral:  Positive for confusion.         Physical Exam  Constitutional:       Appearance: He is obese. He is ill-appearing and toxic-appearing.   HENT:      Head: Normocephalic.      Nose: Nose normal.   Eyes:      Conjunctiva/sclera: Conjunctivae normal.   Cardiovascular:      Rate and Rhythm: Regular rhythm. Tachycardia present.      Heart sounds: No murmur heard.  Pulmonary:      Breath sounds: No wheezing, rhonchi or rales.      Comments: Increased work of breathing, tachypnea.     Abdominal:      General: There is no distension.      Palpations: Abdomen is soft. There is no mass.      Tenderness: There is abdominal tenderness. There is no guarding or rebound.   Musculoskeletal:         General: Normal range of motion.      Cervical back: No rigidity.      Right lower leg: No edema.      Left lower leg: No edema.   Skin:     General: Skin is warm.   Neurological:      General: No focal deficit present.      Mental Status: Mental status is at baseline.   Psychiatric:         Mood and Affect: Mood normal.         Behavior: Behavior normal.          Last Recorded Vitals  Blood pressure 156/86, pulse 108, temperature 36.3 °C (97.3 °F), temperature source Temporal, resp. rate (!) 30, height 1.78 m (5' 10.08\"), weight 119 kg (261 lb 11 oz), SpO2 98%.    Relevant Results        Scheduled medications  Scheduled Medications[4]  Continuous medications  Continuous Medications[5]  PRN medications  PRN Medications[6]    Results for orders placed or performed during the hospital encounter of 07/03/25 (from the past 24 hours)   Troponin, High Sensitivity, 1 Hour   Result Value Ref Range    Troponin I, High Sensitivity 10 0 - 20 ng/L   Blood Gas Arterial Full Panel   Result Value Ref Range    POCT pH, Arterial 7.21 (LL) 7.38 - 7.42 pH    POCT pCO2, Arterial 15 (L) 38 - 42 mm Hg    POCT pO2, Arterial 113 (H) 85 - 95 " mm Hg    POCT SO2, Arterial 98 94 - 100 %    POCT Oxy Hemoglobin, Arterial 97.1 94.0 - 98.0 %    POCT Hematocrit Calculated, Arterial 41.0 41.0 - 52.0 %    POCT Sodium, Arterial 127 (L) 136 - 145 mmol/L    POCT Potassium, Arterial 4.9 3.5 - 5.3 mmol/L    POCT Chloride, Arterial 99 98 - 107 mmol/L    POCT Ionized Calcium, Arterial 1.34 (H) 1.10 - 1.33 mmol/L    POCT Glucose, Arterial 168 (H) 74 - 99 mg/dL    POCT Lactate, Arterial 0.9 0.4 - 2.0 mmol/L    POCT Base Excess, Arterial -19.4 (L) -2.0 - 3.0 mmol/L    POCT HCO3 Calculated, Arterial 6.0 (L) 22.0 - 26.0 mmol/L    POCT Hemoglobin, Arterial 13.8 13.5 - 17.5 g/dL    POCT Anion Gap, Arterial 27 (H) 10 - 25 mmo/L    Patient Temperature      FiO2 21 %   Lactate   Result Value Ref Range    Lactate 1.1 0.4 - 2.0 mmol/L   Blood Culture    Specimen: Peripheral Venipuncture; Blood culture   Result Value Ref Range    Blood Culture Loaded on Instrument - Culture in progress    Blood Culture    Specimen: Peripheral Venipuncture; Blood culture   Result Value Ref Range    Blood Culture Loaded on Instrument - Culture in progress    Sars-CoV-2 and Influenza A/B PCR   Result Value Ref Range    Flu A Result Not Detected Not Detected    Flu B Result Not Detected Not Detected    Coronavirus 2019, PCR Not Detected Not Detected   POCT GLUCOSE   Result Value Ref Range    POCT Glucose 161 (H) 74 - 99 mg/dL   POCT GLUCOSE   Result Value Ref Range    POCT Glucose 149 (H) 74 - 99 mg/dL   POCT GLUCOSE   Result Value Ref Range    POCT Glucose 131 (H) 74 - 99 mg/dL   CBC   Result Value Ref Range    WBC 13.7 (H) 4.4 - 11.3 x10*3/uL    nRBC 0.0 0.0 - 0.0 /100 WBCs    RBC 4.08 (L) 4.50 - 5.90 x10*6/uL    Hemoglobin 13.1 (L) 13.5 - 17.5 g/dL    Hematocrit 38.1 (L) 41.0 - 52.0 %    MCV 93 80 - 100 fL    MCH 32.1 26.0 - 34.0 pg    MCHC 34.4 32.0 - 36.0 g/dL    RDW 14.3 11.5 - 14.5 %    Platelets 196 150 - 450 x10*3/uL   Comprehensive metabolic panel   Result Value Ref Range    Glucose 143 (H) 74  - 99 mg/dL    Sodium 126 (L) 136 - 145 mmol/L    Potassium 4.2 3.5 - 5.3 mmol/L    Chloride 95 (L) 98 - 107 mmol/L    Bicarbonate 6 (LL) 21 - 32 mmol/L    Anion Gap 29 (H) 10 - 20 mmol/L    Urea Nitrogen 16 6 - 23 mg/dL    Creatinine 1.23 0.50 - 1.30 mg/dL    eGFR 68 >60 mL/min/1.73m*2    Calcium 9.4 8.6 - 10.3 mg/dL    Albumin 4.2 3.4 - 5.0 g/dL    Alkaline Phosphatase 87 33 - 120 U/L    Total Protein 7.4 6.4 - 8.2 g/dL    AST 14 9 - 39 U/L    Bilirubin, Total 0.5 0.0 - 1.2 mg/dL    ALT 13 10 - 52 U/L   Amylase   Result Value Ref Range    Amylase 66 29 - 103 U/L   Lipase   Result Value Ref Range    Lipase 74 9 - 82 U/L   SST TOP   Result Value Ref Range    Extra Tube Hold for add-ons.    Creatine Kinase   Result Value Ref Range    Creatine Kinase 40 0 - 325 U/L   POCT GLUCOSE   Result Value Ref Range    POCT Glucose 152 (H) 74 - 99 mg/dL   Blood Gas Arterial Full Panel   Result Value Ref Range    POCT pH, Arterial 7.11 (LL) 7.38 - 7.42 pH    POCT pCO2, Arterial 11 (L) 38 - 42 mm Hg    POCT pO2, Arterial 116 (H) 85 - 95 mm Hg    POCT SO2, Arterial 99 94 - 100 %    POCT Oxy Hemoglobin, Arterial 97.5 94.0 - 98.0 %    POCT Hematocrit Calculated, Arterial 42.0 41.0 - 52.0 %    POCT Sodium, Arterial 131 (L) 136 - 145 mmol/L    POCT Potassium, Arterial 5.0 3.5 - 5.3 mmol/L    POCT Chloride, Arterial 103 98 - 107 mmol/L    POCT Ionized Calcium, Arterial 1.27 1.10 - 1.33 mmol/L    POCT Glucose, Arterial 194 (H) 74 - 99 mg/dL    POCT Lactate, Arterial 0.8 0.4 - 2.0 mmol/L    POCT Base Excess, Arterial -23.6 (L) -2.0 - 3.0 mmol/L    POCT HCO3 Calculated, Arterial 3.5 (L) 22.0 - 26.0 mmol/L    POCT Hemoglobin, Arterial 14.0 13.5 - 17.5 g/dL    POCT Anion Gap, Arterial 30 (H) 10 - 25 mmo/L    Patient Temperature      FiO2 21 %    Site of Arterial Puncture Brachial Left    POCT GLUCOSE   Result Value Ref Range    POCT Glucose 181 (H) 74 - 99 mg/dL           Assessment & Plan  Abdominal pain, unspecified abdominal  location      Jono Quinn is a 56 yo male with a PMH of HTN, HLD, LITZY, GERD, DM type II, Depression who presented with a chief complaint of abdominal pain.     Primary Problem:   #Anion Gap  metabolic acidosis  -initial ABG 7.21/15/113/6.0 > 7.11/11/116/3.5 s/p 2 amp  bicarb 7.18/11/108/4.1  -Bicarb 6 on initial BMP  -Bicarb infusion started.   -+ ketones in urine.  - urine tox negative  - lactate normal, BP stable, renal function normal w/ good urine output, ck pending  -Initiating DKA protocol with D5.    NEURO/PSYCH: Metabolic encephalopathy 2/2 acidosis; Depression  -GCS 14  -Treat underlying  -Home meds held   -  CV: HTN, HLD  -Holding home PO meds given NPO w/ NG tube  -IV PRN  -HD stable currently  -Monitor on tele    PUL: Tachypnea, increased work of breathing  -Inc resp rate 2/2 metabolic acidosis. Not hypoxic.   -CTA chest on admission w/o PE, no vascular changes. No lymphadenopathy, airway patent, clear. No consolidation, interstitial disease or suspicious nodules.     RENAL: No acute issues  -Bun/Cr  today 16/1.23  -Urine output 3500 today    GI: Pancreatitis w/ duodenitis; GERD  -Amylase, Lipase w/o  -pt c/o abd pain with CT showing pancreatic head edema.  -General Surgery following  -KUB with gastric air c/f ileus. NGT placed to LIWS with good output post emesis. Output thus far 650.   -PPI for prophylaxis  -monitor for bowel regimen    ENDO: DM Type II w/ c/f euglycemic diabetic ketoacidosis  -Hold Jardiance  -Start DKA protocol  -BHB pending     ID: Leukocytosis w/ left shift, unclear source  -Vanc, Meropenem as coverage for pancreatitis  -Bcx collected     HEME: Nomocytic normochromic anemia  -H/H 13.1/38.1  -Hapto, LDH pending  -Plt 196    F: per DKA Protocol   E: Na 126/K 4.2/Mg 2.34/Phos pending   N: NPO   A: R femoral triple lumen (placed 7/3/2025)    Chief Complaint   Patient presents with    Shortness of Breath    Abdominal Pain       Disposition: Patient requires more than 2 inpatient days.  This critically ill patient continues to be at-risk for clinically significant deterioration / failure due to the above mentioned dysfunctional, unstable organ systems.  I have personally identified and managed all complex critical care issues to prevent aforementioned clinical deterioration.  Critical care time is spent at bedside and/or the immediate area and has included, but is not limited to, the review of diagnostic tests, labs, radiographs, serial assessments of hemodynamics, respiratory status, ventilatory management, and family updates.  Time spent in procedures and teaching are reported separately.   -Critical Care 45 minutes    Code Status: Full Code       Adrienne Gipson PA-C  Attending Attestation:    Patient was seen and examined face to face, history and physical was taken personally at bedside the APRN-CNP, was present for the whole duration of the exam who participated in the documentation of this note. I performed the medical decision-making components (assessment and plan of care). I have reviewed the documentation and verified the findings in the note as written with additions or exceptions as stated in the body of this note.  He is awake, tachypneic he is looking sick, morbidly obese gentleman with history of diabetes had 1 episode of pancreatitis because of energy drink in the past brought to the hospital secondary to epigastric pain at the beginning nausea, and then pain located to lower abdomen.  This morning patient threw up.  He is awake he is talking, he feels a little bit fogginess in his brain, still continue to have abdominal pain, did not have bowel movements for 6 days.  So far he received IV fluid he is hemodynamically stable, CT scan of chest abdomen pelvis no PE, no any acute finding, he is profoundly acidotic with high anion gap low bicarb, no clear sign of infection.  On exam he is slightly tachypneic but lungs fairly clear, heart regular, abdomen soft there is some  tenderness on the left lower quadrant no rebound tenderness there is also tenderness on the right lower quadrant, no significant epigastric pain or right upper quadrant bowel sounds are hypoactive, reviewing labs my differential diagnosis including sepsis with unclear origin, acute pancreatitis, there were some ketones in urine patient blood sugar is not significantly elevated, we will check for DKA, lactic acidosis negative, mesenteric ischemia also of consideration even though patient denies having any postprandial pain, for time being we will give more IV fluid, patient apparently had some reaction with Zosyn, start patient on meropenem and vancomycin, review with radiology regarding mesenteric arteries, make patient n.p.o., surgery is consulted, repeat ABG at noon, if bicarb still low and pH less than 7.2 we will place patient on bicarb drip, I saw patient was started on insulin drip, patient's prognosis is guarded.    Dr. Ras Clements MD  Internal Medicine        [1]   Past Medical History:  Diagnosis Date    Coronary artery disease     COVID-19 11/23/2020    Pneumonia due to COVID-19 virus    Depression, unspecified 10/26/2022    Depression    Diabetes mellitus (Multi)     Hypertension     Obstructive sleep apnea (adult) (pediatric) 07/11/2022    LITZY on CPAP    Other conditions influencing health status 05/16/2022    Diabetes type 2, uncontrolled    Personal history of other diseases of the digestive system 11/19/2021    H/O acute pancreatitis    Personal history of other diseases of the nervous system and sense organs     History of sleep apnea    Psoriasis     Pure hypercholesterolemia, unspecified 10/26/2022    Hypercholesterolemia    Testicular hypofunction 07/01/2021    Hypogonadism male    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 Cardiac [facility];  Service: Cardiovascular;  Laterality: N/A;  12/6/ am for [facility] [address] for CAD with angina I25.119 and abnormal cardiac CTA R93.1  Auth # for [employer]:  [id number] 11/10/23-05/08/24    CHOLECYSTECTOMY      CRANIOPLASTY FOR CRANIAL DEFECT      CT ANGIO CORONARY ART WITH HEARTFLOW IF SCORE >30%  10/24/2023    CT ANGIO CORONARY ART WITH HEARTFLOW IF SCORE >30% 10/24/2023 U CT    OTHER SURGICAL HISTORY  07/13/2020    Tonsillectomy with adenoidectomy    OTHER SURGICAL HISTORY  07/13/2020    Complete colonoscopy    OTHER SURGICAL HISTORY  03/01/2022    Nasal septoplasty    OTHER SURGICAL HISTORY  03/01/2022    Submucous resection of nasal turbinate   [3]   Family History  Problem Relation Name Age of Onset    Prostate cancer Father      Narcolepsy Brother          with cataplexy   [4] busPIRone, 15 mg, oral, TID  empagliflozin, 25 mg, oral, Daily  enoxaparin, 40 mg, subcutaneous, q24h  famotidine, 20 mg, oral, BID   Or  famotidine, 20 mg, intravenous, BID  gabapentin, 600 mg, oral, TID  gabapentin, 900 mg, oral, Nightly  insulin lispro, 0-15 Units, subcutaneous, q4h  lactated Ringer's, 1,000 mL, intravenous, Once  meropenem, 1 g, intravenous, q8h  [Held by provider] metFORMIN, 1,000 mg, oral, BID  nortriptyline, 25 mg, oral, Nightly  pantoprazole, 40 mg, intravenous, Daily  PARoxetine, 30 mg, oral, Nightly  polyethylene glycol, 17 g, oral, Daily  rosuvastatin, 40 mg, oral, Nightly  vancomycin, 1,250 mg, intravenous, q12h  [5] dextrose 10 % in water (D10W), 150 mL/hr  dextrose 10 % in water (D10W), 150 mL/hr  dextrose 5%-0.45 % sodium chloride, 150 mL/hr  lactated Ringer's, 100 mL/hr, Last Rate: 100 mL/hr (07/04/25 0740)  lactated Ringer's, 250 mL/hr  sodium bicarbonate 1 mEq/mL (8.4 %) 150 mEq in dextrose 5% 1,150 mL infusion, 50 mL/hr  sodium chloride 0.9%, 10 mL/hr  [6] PRN medications: acetaminophen **OR** acetaminophen **OR** acetaminophen,  acetaminophen **OR** acetaminophen **OR** acetaminophen, dextrose 10 % in water (D10W), dextrose 10 % in water (D10W), dextrose 5%-0.45 % sodium chloride, dextrose, diphenhydrAMINE, HYDROmorphone, melatonin, ondansetron **OR** ondansetron, sodium chloride 0.9%, vancomycin

## 2025-07-05 PROBLEM — E10.10 TYPE 1 DIABETES MELLITUS WITH KETOACIDOSIS WITHOUT COMA: Status: ACTIVE | Noted: 2025-07-05

## 2025-07-05 PROBLEM — K29.80 DUODENITIS: Status: ACTIVE | Noted: 2025-07-05

## 2025-07-05 LAB
ANION GAP BLDV CALCULATED.4IONS-SCNC: 17 MMOL/L (ref 10–25)
ANION GAP SERPL CALC-SCNC: 14 MMOL/L (ref 10–20)
ANION GAP SERPL CALC-SCNC: 17 MMOL/L (ref 10–20)
ANION GAP SERPL CALC-SCNC: 17 MMOL/L (ref 10–20)
ANION GAP SERPL CALC-SCNC: 19 MMOL/L (ref 10–20)
ANION GAP SERPL CALC-SCNC: 21 MMOL/L (ref 10–20)
ANION GAP SERPL CALC-SCNC: 25 MMOL/L (ref 10–20)
B-OH-BUTYR SERPL-SCNC: 8.74 MMOL/L (ref 0.02–0.27)
BASE EXCESS BLDV CALC-SCNC: -7.7 MMOL/L (ref -2–3)
BODY TEMPERATURE: ABNORMAL
BUN SERPL-MCNC: 11 MG/DL (ref 6–23)
BUN SERPL-MCNC: 12 MG/DL (ref 6–23)
BUN SERPL-MCNC: 13 MG/DL (ref 6–23)
BUN SERPL-MCNC: 14 MG/DL (ref 6–23)
BUN SERPL-MCNC: 16 MG/DL (ref 6–23)
BUN SERPL-MCNC: 17 MG/DL (ref 6–23)
CA-I BLDV-SCNC: 1.31 MMOL/L (ref 1.1–1.33)
CALCIUM SERPL-MCNC: 9.2 MG/DL (ref 8.6–10.3)
CALCIUM SERPL-MCNC: 9.2 MG/DL (ref 8.6–10.3)
CALCIUM SERPL-MCNC: 9.3 MG/DL (ref 8.6–10.3)
CALCIUM SERPL-MCNC: 9.4 MG/DL (ref 8.6–10.3)
CHLORIDE BLDV-SCNC: 105 MMOL/L (ref 98–107)
CHLORIDE SERPL-SCNC: 100 MMOL/L (ref 98–107)
CHLORIDE SERPL-SCNC: 101 MMOL/L (ref 98–107)
CHLORIDE SERPL-SCNC: 102 MMOL/L (ref 98–107)
CHLORIDE SERPL-SCNC: 104 MMOL/L (ref 98–107)
CO2 SERPL-SCNC: 13 MMOL/L (ref 21–32)
CO2 SERPL-SCNC: 17 MMOL/L (ref 21–32)
CO2 SERPL-SCNC: 18 MMOL/L (ref 21–32)
CO2 SERPL-SCNC: 20 MMOL/L (ref 21–32)
CO2 SERPL-SCNC: 22 MMOL/L (ref 21–32)
CO2 SERPL-SCNC: 9 MMOL/L (ref 21–32)
CREAT SERPL-MCNC: 0.93 MG/DL (ref 0.5–1.3)
CREAT SERPL-MCNC: 0.99 MG/DL (ref 0.5–1.3)
CREAT SERPL-MCNC: 1.04 MG/DL (ref 0.5–1.3)
CREAT SERPL-MCNC: 1.06 MG/DL (ref 0.5–1.3)
CREAT SERPL-MCNC: 1.14 MG/DL (ref 0.5–1.3)
CREAT SERPL-MCNC: 1.21 MG/DL (ref 0.5–1.3)
EGFRCR SERPLBLD CKD-EPI 2021: 70 ML/MIN/1.73M*2
EGFRCR SERPLBLD CKD-EPI 2021: 75 ML/MIN/1.73M*2
EGFRCR SERPLBLD CKD-EPI 2021: 82 ML/MIN/1.73M*2
EGFRCR SERPLBLD CKD-EPI 2021: 84 ML/MIN/1.73M*2
EGFRCR SERPLBLD CKD-EPI 2021: 89 ML/MIN/1.73M*2
EGFRCR SERPLBLD CKD-EPI 2021: >90 ML/MIN/1.73M*2
ERYTHROCYTE [DISTWIDTH] IN BLOOD BY AUTOMATED COUNT: 14.4 % (ref 11.5–14.5)
GLUCOSE BLD MANUAL STRIP-MCNC: 147 MG/DL (ref 74–99)
GLUCOSE BLD MANUAL STRIP-MCNC: 148 MG/DL (ref 74–99)
GLUCOSE BLD MANUAL STRIP-MCNC: 152 MG/DL (ref 74–99)
GLUCOSE BLD MANUAL STRIP-MCNC: 161 MG/DL (ref 74–99)
GLUCOSE BLD MANUAL STRIP-MCNC: 166 MG/DL (ref 74–99)
GLUCOSE BLD MANUAL STRIP-MCNC: 167 MG/DL (ref 74–99)
GLUCOSE BLD MANUAL STRIP-MCNC: 167 MG/DL (ref 74–99)
GLUCOSE BLD MANUAL STRIP-MCNC: 173 MG/DL (ref 74–99)
GLUCOSE BLD MANUAL STRIP-MCNC: 180 MG/DL (ref 74–99)
GLUCOSE BLD MANUAL STRIP-MCNC: 188 MG/DL (ref 74–99)
GLUCOSE BLD MANUAL STRIP-MCNC: 190 MG/DL (ref 74–99)
GLUCOSE BLD MANUAL STRIP-MCNC: 205 MG/DL (ref 74–99)
GLUCOSE BLD MANUAL STRIP-MCNC: 205 MG/DL (ref 74–99)
GLUCOSE BLD MANUAL STRIP-MCNC: 208 MG/DL (ref 74–99)
GLUCOSE BLD MANUAL STRIP-MCNC: 212 MG/DL (ref 74–99)
GLUCOSE BLD MANUAL STRIP-MCNC: 216 MG/DL (ref 74–99)
GLUCOSE BLD MANUAL STRIP-MCNC: 223 MG/DL (ref 74–99)
GLUCOSE BLD MANUAL STRIP-MCNC: 225 MG/DL (ref 74–99)
GLUCOSE BLD MANUAL STRIP-MCNC: 230 MG/DL (ref 74–99)
GLUCOSE BLD MANUAL STRIP-MCNC: 230 MG/DL (ref 74–99)
GLUCOSE BLD MANUAL STRIP-MCNC: 236 MG/DL (ref 74–99)
GLUCOSE BLD MANUAL STRIP-MCNC: 238 MG/DL (ref 74–99)
GLUCOSE BLD MANUAL STRIP-MCNC: 238 MG/DL (ref 74–99)
GLUCOSE BLD MANUAL STRIP-MCNC: 242 MG/DL (ref 74–99)
GLUCOSE BLDV-MCNC: 254 MG/DL (ref 74–99)
GLUCOSE SERPL-MCNC: 157 MG/DL (ref 74–99)
GLUCOSE SERPL-MCNC: 170 MG/DL (ref 74–99)
GLUCOSE SERPL-MCNC: 197 MG/DL (ref 74–99)
GLUCOSE SERPL-MCNC: 233 MG/DL (ref 74–99)
GLUCOSE SERPL-MCNC: 234 MG/DL (ref 74–99)
GLUCOSE SERPL-MCNC: 242 MG/DL (ref 74–99)
HAPTOGLOB SERPL NEPH-MCNC: 241 MG/DL (ref 30–200)
HCO3 BLDV-SCNC: 16.2 MMOL/L (ref 22–26)
HCT VFR BLD AUTO: 34.6 % (ref 41–52)
HCT VFR BLD EST: 37 % (ref 41–52)
HGB BLD-MCNC: 12 G/DL (ref 13.5–17.5)
HGB BLDV-MCNC: 12.3 G/DL (ref 13.5–17.5)
HOLD SPECIMEN: NORMAL
INHALED O2 CONCENTRATION: 21 %
LACTATE BLDV-SCNC: 0.7 MMOL/L (ref 0.4–2)
MAGNESIUM SERPL-MCNC: 2.34 MG/DL (ref 1.6–2.4)
MAGNESIUM SERPL-MCNC: 2.4 MG/DL (ref 1.6–2.4)
MCH RBC QN AUTO: 31.3 PG (ref 26–34)
MCHC RBC AUTO-ENTMCNC: 34.7 G/DL (ref 32–36)
MCV RBC AUTO: 90 FL (ref 80–100)
NRBC BLD-RTO: 0 /100 WBCS (ref 0–0)
OXYHGB MFR BLDV: 88.7 % (ref 45–75)
PCO2 BLDV: 28 MM HG (ref 41–51)
PH BLDV: 7.37 PH (ref 7.33–7.43)
PHOSPHATE SERPL-MCNC: 1.5 MG/DL (ref 2.5–4.9)
PLATELET # BLD AUTO: 177 X10*3/UL (ref 150–450)
PO2 BLDV: 57 MM HG (ref 35–45)
POTASSIUM BLDV-SCNC: 3.5 MMOL/L (ref 3.5–5.3)
POTASSIUM SERPL-SCNC: 3.3 MMOL/L (ref 3.5–5.3)
POTASSIUM SERPL-SCNC: 3.4 MMOL/L (ref 3.5–5.3)
POTASSIUM SERPL-SCNC: 3.5 MMOL/L (ref 3.5–5.3)
POTASSIUM SERPL-SCNC: 3.6 MMOL/L (ref 3.5–5.3)
RBC # BLD AUTO: 3.83 X10*6/UL (ref 4.5–5.9)
SAO2 % BLDV: 90 % (ref 45–75)
SODIUM BLDV-SCNC: 135 MMOL/L (ref 136–145)
SODIUM SERPL-SCNC: 130 MMOL/L (ref 136–145)
SODIUM SERPL-SCNC: 132 MMOL/L (ref 136–145)
SODIUM SERPL-SCNC: 133 MMOL/L (ref 136–145)
SODIUM SERPL-SCNC: 135 MMOL/L (ref 136–145)
SODIUM SERPL-SCNC: 135 MMOL/L (ref 136–145)
SODIUM SERPL-SCNC: 136 MMOL/L (ref 136–145)
VANCOMYCIN SERPL-MCNC: 12 UG/ML (ref 5–20)
WBC # BLD AUTO: 8.7 X10*3/UL (ref 4.4–11.3)

## 2025-07-05 PROCEDURE — 2500000005 HC RX 250 GENERAL PHARMACY W/O HCPCS: Mod: IPSPLIT | Performed by: STUDENT IN AN ORGANIZED HEALTH CARE EDUCATION/TRAINING PROGRAM

## 2025-07-05 PROCEDURE — 2500000001 HC RX 250 WO HCPCS SELF ADMINISTERED DRUGS (ALT 637 FOR MEDICARE OP): Mod: IPSPLIT | Performed by: PHYSICIAN ASSISTANT

## 2025-07-05 PROCEDURE — 2500000004 HC RX 250 GENERAL PHARMACY W/ HCPCS (ALT 636 FOR OP/ED): Mod: IPSPLIT | Performed by: STUDENT IN AN ORGANIZED HEALTH CARE EDUCATION/TRAINING PROGRAM

## 2025-07-05 PROCEDURE — 37799 UNLISTED PX VASCULAR SURGERY: CPT | Mod: IPSPLIT | Performed by: PHYSICIAN ASSISTANT

## 2025-07-05 PROCEDURE — 84100 ASSAY OF PHOSPHORUS: CPT | Mod: IPSPLIT | Performed by: SURGERY

## 2025-07-05 PROCEDURE — 36415 COLL VENOUS BLD VENIPUNCTURE: CPT | Mod: IPSPLIT | Performed by: STUDENT IN AN ORGANIZED HEALTH CARE EDUCATION/TRAINING PROGRAM

## 2025-07-05 PROCEDURE — 80048 BASIC METABOLIC PNL TOTAL CA: CPT | Mod: IPSPLIT | Performed by: PHYSICIAN ASSISTANT

## 2025-07-05 PROCEDURE — 85027 COMPLETE CBC AUTOMATED: CPT | Mod: IPSPLIT | Performed by: PHYSICIAN ASSISTANT

## 2025-07-05 PROCEDURE — 83735 ASSAY OF MAGNESIUM: CPT | Mod: IPSPLIT | Performed by: INTERNAL MEDICINE

## 2025-07-05 PROCEDURE — 2500000001 HC RX 250 WO HCPCS SELF ADMINISTERED DRUGS (ALT 637 FOR MEDICARE OP): Mod: IPSPLIT | Performed by: HOSPITALIST

## 2025-07-05 PROCEDURE — 2500000005 HC RX 250 GENERAL PHARMACY W/O HCPCS: Mod: IPSPLIT | Performed by: PHYSICIAN ASSISTANT

## 2025-07-05 PROCEDURE — 2500000004 HC RX 250 GENERAL PHARMACY W/ HCPCS (ALT 636 FOR OP/ED): Mod: IPSPLIT | Performed by: INTERNAL MEDICINE

## 2025-07-05 PROCEDURE — 2500000002 HC RX 250 W HCPCS SELF ADMINISTERED DRUGS (ALT 637 FOR MEDICARE OP, ALT 636 FOR OP/ED): Mod: IPSPLIT | Performed by: HOSPITALIST

## 2025-07-05 PROCEDURE — 83735 ASSAY OF MAGNESIUM: CPT | Mod: IPSPLIT | Performed by: SURGERY

## 2025-07-05 PROCEDURE — 99233 SBSQ HOSP IP/OBS HIGH 50: CPT | Performed by: PHYSICIAN ASSISTANT

## 2025-07-05 PROCEDURE — 82947 ASSAY GLUCOSE BLOOD QUANT: CPT | Mod: IPSPLIT

## 2025-07-05 PROCEDURE — 2500000004 HC RX 250 GENERAL PHARMACY W/ HCPCS (ALT 636 FOR OP/ED): Mod: IPSPLIT | Performed by: SURGERY

## 2025-07-05 PROCEDURE — 84132 ASSAY OF SERUM POTASSIUM: CPT | Mod: IPSPLIT | Performed by: STUDENT IN AN ORGANIZED HEALTH CARE EDUCATION/TRAINING PROGRAM

## 2025-07-05 PROCEDURE — 2500000004 HC RX 250 GENERAL PHARMACY W/ HCPCS (ALT 636 FOR OP/ED): Mod: IPSPLIT | Performed by: PHYSICIAN ASSISTANT

## 2025-07-05 PROCEDURE — 99233 SBSQ HOSP IP/OBS HIGH 50: CPT | Performed by: SURGERY

## 2025-07-05 PROCEDURE — 2500000002 HC RX 250 W HCPCS SELF ADMINISTERED DRUGS (ALT 637 FOR MEDICARE OP, ALT 636 FOR OP/ED): Mod: IPSPLIT | Performed by: PHYSICIAN ASSISTANT

## 2025-07-05 PROCEDURE — 84132 ASSAY OF SERUM POTASSIUM: CPT | Mod: IPSPLIT | Performed by: SURGERY

## 2025-07-05 PROCEDURE — 80048 BASIC METABOLIC PNL TOTAL CA: CPT | Mod: IPSPLIT | Performed by: STUDENT IN AN ORGANIZED HEALTH CARE EDUCATION/TRAINING PROGRAM

## 2025-07-05 PROCEDURE — 2020000001 HC ICU ROOM DAILY: Mod: IPSPLIT

## 2025-07-05 PROCEDURE — 94760 N-INVAS EAR/PLS OXIMETRY 1: CPT | Mod: IPSPLIT

## 2025-07-05 PROCEDURE — 80202 ASSAY OF VANCOMYCIN: CPT | Mod: IPSPLIT | Performed by: PHYSICIAN ASSISTANT

## 2025-07-05 RX ORDER — DEXTROSE MONOHYDRATE 100 MG/ML
150 INJECTION, SOLUTION INTRAVENOUS CONTINUOUS PRN
Status: DISCONTINUED | OUTPATIENT
Start: 2025-07-05 | End: 2025-07-05

## 2025-07-05 RX ORDER — POTASSIUM CHLORIDE 20 MEQ/1
40 TABLET, EXTENDED RELEASE ORAL ONCE
Status: COMPLETED | OUTPATIENT
Start: 2025-07-05 | End: 2025-07-05

## 2025-07-05 RX ORDER — SODIUM CHLORIDE, SODIUM LACTATE, POTASSIUM CHLORIDE, CALCIUM CHLORIDE 600; 310; 30; 20 MG/100ML; MG/100ML; MG/100ML; MG/100ML
250 INJECTION, SOLUTION INTRAVENOUS CONTINUOUS
Status: DISCONTINUED | OUTPATIENT
Start: 2025-07-05 | End: 2025-07-05

## 2025-07-05 RX ORDER — POTASSIUM CHLORIDE 14.9 MG/ML
20 INJECTION INTRAVENOUS
Status: COMPLETED | OUTPATIENT
Start: 2025-07-05 | End: 2025-07-05

## 2025-07-05 RX ORDER — INSULIN LISPRO 100 [IU]/ML
0-15 INJECTION, SOLUTION INTRAVENOUS; SUBCUTANEOUS EVERY 4 HOURS
Status: DISPENSED | OUTPATIENT
Start: 2025-07-06

## 2025-07-05 RX ORDER — POTASSIUM CHLORIDE 29.8 MG/ML
40 INJECTION INTRAVENOUS ONCE
Status: DISCONTINUED | OUTPATIENT
Start: 2025-07-05 | End: 2025-07-05

## 2025-07-05 RX ORDER — DEXTROSE MONOHYDRATE AND SODIUM CHLORIDE 5; .45 G/100ML; G/100ML
150 INJECTION, SOLUTION INTRAVENOUS CONTINUOUS PRN
Status: DISCONTINUED | OUTPATIENT
Start: 2025-07-05 | End: 2025-07-05

## 2025-07-05 RX ORDER — SODIUM CHLORIDE, SODIUM LACTATE, POTASSIUM CHLORIDE, CALCIUM CHLORIDE 600; 310; 30; 20 MG/100ML; MG/100ML; MG/100ML; MG/100ML
75 INJECTION, SOLUTION INTRAVENOUS CONTINUOUS
Status: DISCONTINUED | OUTPATIENT
Start: 2025-07-05 | End: 2025-07-06

## 2025-07-05 RX ADMIN — ENOXAPARIN SODIUM 40 MG: 40 INJECTION SUBCUTANEOUS at 21:30

## 2025-07-05 RX ADMIN — BUSPIRONE HYDROCHLORIDE 15 MG: 10 TABLET ORAL at 21:20

## 2025-07-05 RX ADMIN — FAMOTIDINE 20 MG: 20 TABLET, FILM COATED ORAL at 09:33

## 2025-07-05 RX ADMIN — POTASSIUM CHLORIDE 20 MEQ: 14.9 INJECTION, SOLUTION INTRAVENOUS at 08:55

## 2025-07-05 RX ADMIN — SODIUM CHLORIDE, SODIUM LACTATE, POTASSIUM CHLORIDE, AND CALCIUM CHLORIDE 250 ML/HR: .6; .31; .03; .02 INJECTION, SOLUTION INTRAVENOUS at 14:03

## 2025-07-05 RX ADMIN — GABAPENTIN 600 MG: 300 CAPSULE ORAL at 17:06

## 2025-07-05 RX ADMIN — PANTOPRAZOLE SODIUM 40 MG: 40 INJECTION, POWDER, LYOPHILIZED, FOR SOLUTION INTRAVENOUS at 09:37

## 2025-07-05 RX ADMIN — NORTRIPTYLINE HYDROCHLORIDE 25 MG: 25 CAPSULE ORAL at 21:22

## 2025-07-05 RX ADMIN — ROSUVASTATIN CALCIUM 40 MG: 10 TABLET, FILM COATED ORAL at 21:20

## 2025-07-05 RX ADMIN — BUSPIRONE HYDROCHLORIDE 15 MG: 10 TABLET ORAL at 15:26

## 2025-07-05 RX ADMIN — SODIUM CHLORIDE, SODIUM LACTATE, POTASSIUM CHLORIDE, AND CALCIUM CHLORIDE 250 ML/HR: .6; .31; .03; .02 INJECTION, SOLUTION INTRAVENOUS at 18:16

## 2025-07-05 RX ADMIN — BUSPIRONE HYDROCHLORIDE 15 MG: 10 TABLET ORAL at 09:33

## 2025-07-05 RX ADMIN — MEROPENEM AND SODIUM CHLORIDE 1 G: 1 INJECTION, SOLUTION INTRAVENOUS at 08:55

## 2025-07-05 RX ADMIN — PAROXETINE HYDROCHLORIDE 30 MG: 20 TABLET, FILM COATED ORAL at 21:21

## 2025-07-05 RX ADMIN — POTASSIUM PHOSPHATE, MONOBASIC POTASSIUM PHOSPHATE, DIBASIC 21 MMOL: 224; 236 INJECTION, SOLUTION, CONCENTRATE INTRAVENOUS at 14:41

## 2025-07-05 RX ADMIN — GABAPENTIN 600 MG: 300 CAPSULE ORAL at 09:33

## 2025-07-05 RX ADMIN — GABAPENTIN 600 MG: 300 CAPSULE ORAL at 12:29

## 2025-07-05 RX ADMIN — FAMOTIDINE 20 MG: 20 TABLET, FILM COATED ORAL at 21:20

## 2025-07-05 RX ADMIN — MEROPENEM AND SODIUM CHLORIDE 1 G: 1 INJECTION, SOLUTION INTRAVENOUS at 16:35

## 2025-07-05 RX ADMIN — DEXTROSE MONOHYDRATE AND SODIUM CHLORIDE 150 ML/HR: 5; .45 INJECTION, SOLUTION INTRAVENOUS at 13:40

## 2025-07-05 RX ADMIN — SODIUM BICARBONATE 75 ML/HR: 84 INJECTION INTRAVENOUS at 08:37

## 2025-07-05 RX ADMIN — POTASSIUM CHLORIDE 20 MEQ: 14.9 INJECTION, SOLUTION INTRAVENOUS at 15:47

## 2025-07-05 RX ADMIN — POTASSIUM CHLORIDE 20 MEQ: 14.9 INJECTION, SOLUTION INTRAVENOUS at 06:06

## 2025-07-05 RX ADMIN — SODIUM CHLORIDE 10 ML/HR: 9 INJECTION INTRAMUSCULAR; INTRAVENOUS; SUBCUTANEOUS at 09:37

## 2025-07-05 RX ADMIN — POTASSIUM CHLORIDE 20 MEQ: 14.9 INJECTION, SOLUTION INTRAVENOUS at 13:47

## 2025-07-05 RX ADMIN — ACETAMINOPHEN 650 MG: 325 TABLET, FILM COATED ORAL at 21:20

## 2025-07-05 RX ADMIN — HYDROMORPHONE HYDROCHLORIDE 0.2 MG: 1 INJECTION, SOLUTION INTRAMUSCULAR; INTRAVENOUS; SUBCUTANEOUS at 17:07

## 2025-07-05 RX ADMIN — HYDROMORPHONE HYDROCHLORIDE 0.2 MG: 1 INJECTION, SOLUTION INTRAMUSCULAR; INTRAVENOUS; SUBCUTANEOUS at 10:19

## 2025-07-05 RX ADMIN — DEXTROSE MONOHYDRATE AND SODIUM CHLORIDE 150 ML/HR: 5; .45 INJECTION, SOLUTION INTRAVENOUS at 18:28

## 2025-07-05 RX ADMIN — POTASSIUM CHLORIDE 40 MEQ: 1500 TABLET, EXTENDED RELEASE ORAL at 09:33

## 2025-07-05 RX ADMIN — GABAPENTIN 900 MG: 300 CAPSULE ORAL at 21:20

## 2025-07-05 RX ADMIN — INSULIN HUMAN 3.8 UNITS/HR: 1 INJECTION, SOLUTION INTRAVENOUS at 08:53

## 2025-07-05 RX ADMIN — HYDROMORPHONE HYDROCHLORIDE 0.2 MG: 1 INJECTION, SOLUTION INTRAMUSCULAR; INTRAVENOUS; SUBCUTANEOUS at 04:58

## 2025-07-05 RX ADMIN — DEXTROSE MONOHYDRATE AND SODIUM CHLORIDE 150 ML/HR: 5; .45 INJECTION, SOLUTION INTRAVENOUS at 08:47

## 2025-07-05 RX ADMIN — MEROPENEM AND SODIUM CHLORIDE 1 G: 1 INJECTION, SOLUTION INTRAVENOUS at 00:12

## 2025-07-05 ASSESSMENT — PAIN - FUNCTIONAL ASSESSMENT
PAIN_FUNCTIONAL_ASSESSMENT: 0-10

## 2025-07-05 ASSESSMENT — PAIN DESCRIPTION - LOCATION
LOCATION: ABDOMEN

## 2025-07-05 ASSESSMENT — PAIN DESCRIPTION - DESCRIPTORS
DESCRIPTORS: CRAMPING;SHARP
DESCRIPTORS: DULL
DESCRIPTORS: ACHING;SHARP
DESCRIPTORS: DULL

## 2025-07-05 ASSESSMENT — PAIN DESCRIPTION - ORIENTATION
ORIENTATION: RIGHT
ORIENTATION: RIGHT
ORIENTATION: RIGHT;LOWER
ORIENTATION: RIGHT;LOWER

## 2025-07-05 ASSESSMENT — PAIN SCALES - GENERAL
PAINLEVEL_OUTOF10: 4
PAINLEVEL_OUTOF10: 7
PAINLEVEL_OUTOF10: 2
PAINLEVEL_OUTOF10: 0 - NO PAIN
PAINLEVEL_OUTOF10: 5 - MODERATE PAIN
PAINLEVEL_OUTOF10: 7
PAINLEVEL_OUTOF10: 2
PAINLEVEL_OUTOF10: 7
PAINLEVEL_OUTOF10: 0 - NO PAIN
PAINLEVEL_OUTOF10: 0 - NO PAIN
PAINLEVEL_OUTOF10: 3

## 2025-07-05 NOTE — CARE PLAN
The patient's goals for the shift include Pain control    The clinical goals for the shift include patient will wean off insulin drip by end of shift    Over the shift, the patient continues with BMP and hourly blood glucose. Bicarb has improved, anion gap remains closed.

## 2025-07-05 NOTE — PROGRESS NOTES
"Jono Quinn is a 55 y.o. male on day 2 of admission presenting with Abdominal pain, unspecified abdominal location.    Subjective   Patient is more awake.  Diagnosed with euglycemic diabetic ketoacidosis secondary to Jardiance.  Had a significant anion gap metabolic acidosis as a result of this.  On a bicarbonate drip.  Venous pH 7.35.  Off Jardiance at this time.  On dextrose and insulin.  Has some nonspecific abdominal pain.  Has a nasogastric tube in for an ileus       Objective     Physical Exam  Constitutional:       Appearance: Normal appearance. He is obese.   Abdominal:      Palpations: Abdomen is soft. There is no mass.      Tenderness: There is abdominal tenderness in the right upper quadrant. There is no guarding.         Last Recorded Vitals  Blood pressure 132/64, pulse 98, temperature 37 °C (98.6 °F), temperature source Temporal, resp. rate (!) 30, height 1.78 m (5' 10.08\"), weight 119 kg (261 lb 11 oz), SpO2 96%.  Intake/Output last 3 Shifts:  I/O last 3 completed shifts:  In: 3800.7 (32 mL/kg) [P.O.:1100; I.V.:2450.7 (20.6 mL/kg); IV Piggyback:250]  Out: 5950 (50.1 mL/kg) [Urine:4800 (1.1 mL/kg/hr); Emesis/NG output:1150]  Weight: 118.7 kg     Relevant Results  pH 7.35.  Potassium 3.3 sodium 133        Assessment & Plan  Abdominal pain, unspecified abdominal location    Type 1 diabetes mellitus with ketoacidosis without coma    Acute pancreatitis, unspecified complication status, unspecified pancreatitis type ([facility])    Duodenitis      Plan:   CNS -improved  RS-pulmonary tolerating  CVS -stable  RENAL -continue bicarbonate drip, replace potassium, check magnesium phosphorus  GI -remove NG in 24 hours, MRCP on Monday to assess pancreatitis, no antibiotics insulin indicated for his pancreatitis.  ID -antibiotics not indicated at this time.  HEME -follow H&H  ENDO - continue insulin dextrose drip as ordered.  Meds-reviewed, needs DVT prophylaxis      Fermin Bynum MD    "

## 2025-07-05 NOTE — PROGRESS NOTES
Vancomycin Dosing by Pharmacy- Cessation of Therapy    Consult to pharmacy for vancomycin dosing has been discontinued by the prescriber, pharmacy will sign off at this time.    Please call pharmacy if there are further questions or re-enter a consult if vancomycin is resumed.     Leigh Sánchez, AdamD

## 2025-07-05 NOTE — PROGRESS NOTES
Vancomycin Dosing by Pharmacy- FOLLOW UP    Jono Quinn is a 55 y.o. year old male who Pharmacy has been consulted for vancomycin dosing for other sepsis unclear source, c/f intraabdominal. Based on the patient's indication and renal status this patient is being dosed based on a goal AUC of 400-600. The vancomycin order has been put on hold by provider. Monitor for change in order status I.e. discontinuation vs un-suspend.     Renal function is currently stable.    Vancomycin dose prior to order being held: 1250 mg given every 12 hours    Estimated vancomycin AUC on current dose: 512 mg/L.hr     Visit Vitals  /87   Pulse 99   Temp 36.8 °C (98.2 °F) (Temporal)   Resp 23        Lab Results   Component Value Date    CREATININE 1.04 2025    CREATININE 1.14 2025    CREATININE 1.21 2025    CREATININE 1.24 2025        Patient weight is as follows:   Vitals:    25 0812   Weight: 116 kg (255 lb 8.2 oz)       Cultures:  No results found for the encounter in last 14 days.       I/O last 3 completed shifts:  In: 5556.1 (46.8 mL/kg) [P.O.:300; I.V.:4606.1 (38.8 mL/kg); IV Piggyback:650]  Out: 7500 (63.2 mL/kg) [Urine:6350 (1.5 mL/kg/hr); Emesis/NG output:1150]  Weight: 118.7 kg   I/O during current shift:  I/O this shift:  In: 386.3 [I.V.:286.3; IV Piggyback:100]  Out: 600 [Emesis/NG output:600]    Temp (24hrs), Av.8 °C (98.3 °F), Min:36.6 °C (97.9 °F), Max:37 °C (98.6 °F)      Assessment/Plan    Vancomycin order was put on hold by provider. Monitor for change in order status.     This dosing regimen is predicted by [employer] to result in the following pharmacokinetic parameters:  Loading dose: N/A  Regimen: 1250 mg IV every 12 hours.  Start time: 09:31 on 2025  Exposure target: AUC24 (range) 400-600 mg/L.hr   GBP30-10: 458 mg/L.hr  AUC24,ss: 512 mg/L.hr  Probability of AUC24 > 400: 85 %  Ctrough,ss: 16.9 mg/L  Probability of Ctrough,ss > 20: 31 %    No level to be ordered while  vancomycin order is on hold. Will re-assess if order is un-suspended. May be obtained sooner if clinically indicated.   Will continue to monitor renal function daily while on vancomycin and order serum creatinine at least every 48 hours if not already ordered.  Follow for continued vancomycin needs, clinical response, and signs/symptoms of toxicity.       Leigh Sánchez, PharmD

## 2025-07-05 NOTE — NURSING NOTE
0700: assumed care of patient, NG in place at 59 cm draining brown colored bile to low intermittent suction. patient remains on an insulin drip, labs due to be collected at 0800, will continue to monitor   0746: Verified with Dr. Bynum, able to give oral meds and clamp NG for med administration. Patient also allowed to have ice chips and popsicles   0815: Lab at bedside to obtain BMP, mag and phosphorus levels   0935: SANTA Harrison at bedside for rounds, she states to keep insulin drip at current rate, anion gap is closed however bicarb only at 17. Will reassess at next BMP draw.   1221: BMP drawn and walked to lab   1300: Adrienne Gipson aware of results of BMP, she states to continue insulin drip at current rate (please see MAR) and to continue with current DKA orders until Bicarb at least 20.  1400: Clarified with Adrienne PRATT if intensive insulin algorithm needed and she stated to continue with current ordered DKA protocol.   1730: Dr. Wang aware of BMP results and states to discontinue DKA protocol and start LR at 125ml/hr at shift change.    1900: After review with nursing supervisor, planning to call [facility] to clarify order regarding stopping insulin drip   1914: Nurse to Nurse report given to NOE Foote who called [facility] to review case.

## 2025-07-05 NOTE — PROGRESS NOTES
Subjective Data:  Improved today, mentation improved. Complains of feeling tired, thirsty, dry mouth.   No chest pain or pressure, denies abd pain at this time.     Overnight Events:    No acute issues reported overnight.      Objective Data:  Last Recorded Vitals:  Vitals:    07/05/25 0400 07/05/25 0500 07/05/25 0600 07/05/25 0700   BP: 126/56 139/61 132/64 139/70   BP Location:    Right arm   Patient Position:    Lying   Pulse: 103 99 98 97   Resp:    22   Temp: 37 °C (98.6 °F)      TempSrc: Temporal      SpO2: 97% 100% 96% 97%   Weight:       Height:           Last Labs:  CBC - 7/5/2025:  4:15 AM  8.7 12.0 177    34.6      CMP - 7/5/2025:  4:15 AM  9.3 7.4 14 --- 0.5   3.8 4.2 13 87      PTT - 8/27/2024:  1:35 PM  0.9   10.4 32     TROPHS   Date/Time Value Ref Range Status   07/03/2025 02:06 PM 10 0 - 20 ng/L Final   07/03/2025 11:19 AM 10 0 - 20 ng/L Final   06/12/2025 04:44 PM <3 0 - 20 ng/L Final     BNP   Date/Time Value Ref Range Status   07/03/2025 11:19 AM 62 0 - 99 pg/mL Final   06/12/2025 03:41 PM 20 0 - 99 pg/mL Final     HGBA1C   Date/Time Value Ref Range Status   03/22/2025 08:39 AM 7.5 <5.7 % of total Hgb Final     Comment:     For someone without known diabetes, a hemoglobin A1c  value of 6.5% or greater indicates that they may have   diabetes and this should be confirmed with a follow-up   test.     For someone with known diabetes, a value <7% indicates   that their diabetes is well controlled and a value   greater than or equal to 7% indicates suboptimal   control. A1c targets should be individualized based on   duration of diabetes, age, comorbid conditions, and   other considerations.     Currently, no consensus exists regarding use of  hemoglobin A1c for diagnosis of diabetes for children.         02/19/2025 01:52 PM 7.2 4.2 - 6.5 % Final   08/27/2024 01:35 PM 8.0 see below % Final   05/03/2024 12:01 PM 7.2 4.2 - 6.5 % Final     LDLCALC   Date/Time Value Ref Range Status   10/26/2024 09:32 AM    Final     Comment:     The calculation of LDL and VLDL are inaccurate when the Triglycerides are greater than 400 mg/dL or when the patient is non-fasting. If LDL measurement is necessary contact the testing laboratory for an alternative LDL assay.                                  Near   Borderline      AGE      Desirable  Optimal    High     High     Very High     0-19 Y     0 - 109     ---    110-129   >/= 130     ----    20-24 Y     0 - 119     ---    120-159   >/= 160     ----      >24 Y     0 -  99   100-129  130-159   160-189     >/=190     08/27/2024 01:35 PM   Final     Comment:     The calculation of LDL and VLDL are inaccurate when the Triglycerides are greater than 400 mg/dL or when the patient is non-fasting. If LDL measurement is necessary contact the testing laboratory for an alternative LDL assay.       11/18/2023 08:46 AM   Final     Comment:     The calculation of LDL and VLDL are inaccurate when the Triglycerides are greater than 400 mg/dL or when the patient is non-fasting. If LDL measurement is necessary contact the testing laboratory for an alternative LDL assay.                                  Near   Borderline      AGE      Desirable  Optimal    High     High     Very High     0-19 Y     0 - 109     ---    110-129   >/= 130     ----    20-24 Y     0 - 119     ---    120-159   >/= 160     ----      >24 Y     0 -  99   100-129  130-159   160-189     >/=190       VLDL   Date/Time Value Ref Range Status   10/26/2024 09:32 AM   Final     Comment:     Unable to calculate VLDL.   08/27/2024 01:35 PM   Final     Comment:     Unable to calculate VLDL.   11/18/2023 08:46 AM   Final     Comment:     Unable to calculate VLDL.      Last I/O:  I/O last 3 completed shifts:  In: 5556.1 (46.8 mL/kg) [P.O.:300; I.V.:4606.1 (38.8 mL/kg); IV Piggyback:650]  Out: 7500 (63.2 mL/kg) [Urine:6350 (1.5 mL/kg/hr); Emesis/NG output:1150]  Weight: 118.7 kg     Past Cardiology Tests (Last 3 Years):  EKG:  ECG 12 lead  07/03/2025 (Preliminary)      ECG 12 lead 06/12/2025 (Preliminary)      ECG 12 lead 06/12/2025 (Preliminary)      ECG 12 lead (Clinic Performed) 06/12/2025      ECG 12 lead 08/29/2024      ECG 12 lead 08/27/2024      ECG 12 lead (Clinic Performed) 05/23/2024      ECG 12 lead (Clinic Performed) 04/26/2023    Echo:  Transthoracic Echo Complete 06/13/2025      Transthoracic Echo (TTE) Complete 08/28/2024    Ejection Fractions:  EF   Date/Time Value Ref Range Status   06/13/2025 10:00 AM 68 %    08/28/2024 10:00 AM 68 %      Cath:  Cardiac catheterization - coronary 12/06/2023    Stress Test:  No results found for this or any previous visit from the past 1095 days.    Cardiac Imaging:  XR chest abdomen for OG NG placement 07/04/2025      CT angio coronary art with heartflow if score >30% 10/24/2023    XR abdomen 2 views supine and erect or decub  Result Date: 7/4/2025  Interpreted By:  Philomena Kim, STUDY: XR ABDOMEN 2 VIEWS SUPINE AND ERECT OR DECUB;  7/4/2025 9:02 am   INDICATION: Signs/Symptoms:vomiting.     COMPARISON:  image from abdominal CT 07/03/2025   ACCESSION NUMBER(S): [id number]   ORDERING CLINICIAN: COREY VERDUGO   FINDINGS: 3 portable supine and erect views of the abdomen obtained.   Monitoring leads overlying the lower chest and upper abdomen. Distended gas-filled stomach. Scattered gas and fecal debris in mildly distended loops of bowel. Rectal gas is present. No obvious abdominal mass effect. No gross free air or upper abdominal air-fluid levels on upright view. Right upper quadrant surgical clips and pelvic catheter overlying the medial right pelvis again seen.       Gaseous distention of the stomach. Otherwise nonspecific, nonobstructive bowel gas pattern.   MACRO: None.   Signed by: Philomena Kim 7/4/2025 12:19 PM Dictation workstation:   [id number]    XR chest abdomen for OG NG placement  Result Date: 7/4/2025  Interpreted By:  Schoenberger, Joseph, STUDY: XR CHEST ABDOMEN FOR OG NG  PLACEMENT; ;  7/4/2025 11:46 am   INDICATION: Signs/Symptoms:NG tube placement.   None   COMPARISON: 07/04/2025   ACCESSION NUMBER(S): [id number]   ORDERING CLINICIAN: MARRY COLEMAN   FINDINGS: Nasogastric tube is been placed. The catheter tip is in the gastric fundus. The stomach is decompressed.       See above     MACRO: None   Signed by: Joseph Schoenberger 7/4/2025 11:58 AM Dictation workstation:   [id number]    CT angio chest for pulmonary embolism  Result Date: 7/3/2025  STUDY: CT Angiogram of the Chest, CT Abdomen and Pelvis with IV Contrast; 07/03/2025 at 3:27 PM INDICATION: Shortness of breath. Unspecified abdominal pain. COMPARISON: CTA chest 06/12/25, 10/24/23. CTA chest, CT abdomen and pelvis 08/29/24. XR chest 06/12/25. ACCESSION NUMBER(S): [id number], [id number] ORDERING CLINICIAN: TANNER MEJIA TECHNIQUE:  CTA of the chest was performed following rapid injection of intravenous contrast.  Images are reviewed and processed at a workstation according to the CT angiogram protocol with 3-D and/or MIP post processing imaging generated.  CT of the abdomen and pelvis was performed with intravenous contrast.  Omnipaque-350 100 mL was administered intravenously; positive oral contrast was given.  Automated mA/kV exposure control was utilized and patient examination was performed in strict accordance with principles of ALARA. FINDINGS:  CTA CHEST: Pulmonary arteries are adequately opacified without acute or chronic filling defects.  The thoracic aorta is normal in course and caliber without dissection or aneurysm. The heart is normal in size without pericardial effusion.  Thoracic lymph nodes are not enlarged. There is no pleural effusion, pleural thickening, or pneumothorax. The airways are patent. Lungs are clear without consolidation, interstitial disease, or suspicious nodules. ABDOMEN:  LIVER: No hepatomegaly.  Smooth surface contour.  Normal attenuation.  BILE DUCTS: The common bile duct is  dilated with a diameter of approximately 1.3 cm  GALLBLADDER: The gallbladder is not seen and has been removed.  STOMACH: No abnormalities identified.  PANCREAS: There is a large amount of edema around the head of the pancreas. The duodenum which passes through this area also appears inflamed.  SPLEEN: No splenomegaly or focal splenic lesion.  ADRENAL GLANDS: No thickening or nodules.  KIDNEYS AND URETERS: Kidneys are normal in size and location.  No renal or ureteral calculi.  PELVIS:  BLADDER: No abnormalities identified.  REPRODUCTIVE ORGANS: No abnormalities identified.  BOWEL: As stated, the duodenum which passes adjacent to the inflamed pancreas also appears inflamed.  VESSELS: No abnormalities identified.  Abdominal aorta is normal in caliber.  PERITONEUM/RETROPERITONEUM/LYMPH NODES: No free fluid.  No pneumoperitoneum. No lymphadenopathy.  ABDOMINAL WALL: No abnormalities identified. SOFT TISSUES: No abnormalities identified.  BONES: No acute fracture or aggressive osseous lesion.    1. There is a large amount of edema around the head of the pancreas. The duodenum which passes through this area also appears inflamed. This may represent pancreatitis and duodenitis. 2. The common bile duct is dilated with a diameter of approximately 1.3 cm. This may be due to the the previous cholecystectomy or inflammation in the pancreas. 3. No pulmonary emboli or acute pulmonary pathology. Signed by Ady Cruz MD    CT abdomen pelvis w IV contrast  Result Date: 7/3/2025  STUDY: CT Angiogram of the Chest, CT Abdomen and Pelvis with IV Contrast; 07/03/2025 at 3:27 PM INDICATION: Shortness of breath. Unspecified abdominal pain. COMPARISON: CTA chest 06/12/25, 10/24/23. CTA chest, CT abdomen and pelvis 08/29/24. XR chest 06/12/25. ACCESSION NUMBER(S): [id number], [id number] ORDERING CLINICIAN: TANNER MEJIA TECHNIQUE:  CTA of the chest was performed following rapid injection of intravenous contrast.  Images are  reviewed and processed at a workstation according to the CT angiogram protocol with 3-D and/or MIP post processing imaging generated.  CT of the abdomen and pelvis was performed with intravenous contrast.  Omnipaque-350 100 mL was administered intravenously; positive oral contrast was given.  Automated mA/kV exposure control was utilized and patient examination was performed in strict accordance with principles of ALARA. FINDINGS:  CTA CHEST: Pulmonary arteries are adequately opacified without acute or chronic filling defects.  The thoracic aorta is normal in course and caliber without dissection or aneurysm. The heart is normal in size without pericardial effusion.  Thoracic lymph nodes are not enlarged. There is no pleural effusion, pleural thickening, or pneumothorax. The airways are patent. Lungs are clear without consolidation, interstitial disease, or suspicious nodules. ABDOMEN:  LIVER: No hepatomegaly.  Smooth surface contour.  Normal attenuation.  BILE DUCTS: The common bile duct is dilated with a diameter of approximately 1.3 cm  GALLBLADDER: The gallbladder is not seen and has been removed.  STOMACH: No abnormalities identified.  PANCREAS: There is a large amount of edema around the head of the pancreas. The duodenum which passes through this area also appears inflamed.  SPLEEN: No splenomegaly or focal splenic lesion.  ADRENAL GLANDS: No thickening or nodules.  KIDNEYS AND URETERS: Kidneys are normal in size and location.  No renal or ureteral calculi.  PELVIS:  BLADDER: No abnormalities identified.  REPRODUCTIVE ORGANS: No abnormalities identified.  BOWEL: As stated, the duodenum which passes adjacent to the inflamed pancreas also appears inflamed.  VESSELS: No abnormalities identified.  Abdominal aorta is normal in caliber.  PERITONEUM/RETROPERITONEUM/LYMPH NODES: No free fluid.  No pneumoperitoneum. No lymphadenopathy.  ABDOMINAL WALL: No abnormalities identified. SOFT TISSUES: No abnormalities  identified.  BONES: No acute fracture or aggressive osseous lesion.    1. There is a large amount of edema around the head of the pancreas. The duodenum which passes through this area also appears inflamed. This may represent pancreatitis and duodenitis. 2. The common bile duct is dilated with a diameter of approximately 1.3 cm. This may be due to the the previous cholecystectomy or inflammation in the pancreas. 3. No pulmonary emboli or acute pulmonary pathology. Signed by Ady Cruz MD    CT head wo IV contrast  Result Date: 7/3/2025  Interpreted By:  Romulo Toure, STUDY: CT HEAD WO IV CONTRAST; ;  7/3/2025 3:22 pm   INDICATION: Signs/Symptoms:headache.   COMPARISON: 08/27/2024   ACCESSION NUMBER(S): [id number]   ORDERING CLINICIAN: TANNER MEJIA   TECHNIQUE: Contiguous unenhanced axial CT sections are performed from the skull base to the vertex.   FINDINGS: The osseous structures are intact. The visualized portions of the paranasal sinuses and mastoid air cells are clear.   The cortical sulci and CSF spaces are symmetric in appearance. There is no sign of parenchymal hematoma or dense extra-axial fluid collection. There is no localized parenchymal edema, mass effect, or shift of the midline. The gray matter/white-matter differentiation is preserved.       No CT evidence of acute intracranial abnormality.     MACRO: None   Signed by: Romulo Toure 7/3/2025 3:26 PM Dictation workstation:   [id number]    Results for orders placed or performed during the hospital encounter of 07/03/25 (from the past 24 hours)   Blood Gas Arterial Full Panel   Result Value Ref Range    POCT pH, Arterial 7.18 (LL) 7.38 - 7.42 pH    POCT pCO2, Arterial 11 (L) 38 - 42 mm Hg    POCT pO2, Arterial 108 (H) 85 - 95 mm Hg    POCT SO2, Arterial 98 94 - 100 %    POCT Oxy Hemoglobin, Arterial 97.2 94.0 - 98.0 %    POCT Hematocrit Calculated, Arterial 41.0 41.0 - 52.0 %    POCT Sodium, Arterial 133 (L) 136 - 145 mmol/L     POCT Potassium, Arterial 4.7 3.5 - 5.3 mmol/L    POCT Chloride, Arterial 102 98 - 107 mmol/L    POCT Ionized Calcium, Arterial 1.28 1.10 - 1.33 mmol/L    POCT Glucose, Arterial 189 (H) 74 - 99 mg/dL    POCT Lactate, Arterial 0.9 0.4 - 2.0 mmol/L    POCT Base Excess, Arterial -21.6 (L) -2.0 - 3.0 mmol/L    POCT HCO3 Calculated, Arterial 4.1 (L) 22.0 - 26.0 mmol/L    POCT Hemoglobin, Arterial 13.5 13.5 - 17.5 g/dL    POCT Anion Gap, Arterial 32 (H) 10 - 25 mmo/L    Patient Temperature      FiO2 21 %    Site of Arterial Puncture Brachial Left    POCT GLUCOSE   Result Value Ref Range    POCT Glucose 161 (H) 74 - 99 mg/dL   POCT GLUCOSE   Result Value Ref Range    POCT Glucose 171 (H) 74 - 99 mg/dL   Basic metabolic panel   Result Value Ref Range    Glucose 186 (H) 74 - 99 mg/dL    Sodium 131 (L) 136 - 145 mmol/L    Potassium 4.1 3.5 - 5.3 mmol/L    Chloride 99 98 - 107 mmol/L    Bicarbonate 5 (LL) 21 - 32 mmol/L    Anion Gap 31 (H) 10 - 20 mmol/L    Urea Nitrogen 18 6 - 23 mg/dL    Creatinine 1.24 0.50 - 1.30 mg/dL    eGFR 68 >60 mL/min/1.73m*2    Calcium 9.7 8.6 - 10.3 mg/dL   POCT GLUCOSE   Result Value Ref Range    POCT Glucose 167 (H) 74 - 99 mg/dL   POCT GLUCOSE   Result Value Ref Range    POCT Glucose 158 (H) 74 - 99 mg/dL   Basic metabolic panel   Result Value Ref Range    Glucose 164 (H) 74 - 99 mg/dL    Sodium 131 (L) 136 - 145 mmol/L    Potassium 3.8 3.5 - 5.3 mmol/L    Chloride 100 98 - 107 mmol/L    Bicarbonate 7 (LL) 21 - 32 mmol/L    Anion Gap 28 (H) 10 - 20 mmol/L    Urea Nitrogen 18 6 - 23 mg/dL    Creatinine 1.35 (H) 0.50 - 1.30 mg/dL    eGFR 61 >60 mL/min/1.73m*2    Calcium 9.5 8.6 - 10.3 mg/dL   POCT GLUCOSE   Result Value Ref Range    POCT Glucose 150 (H) 74 - 99 mg/dL   POCT GLUCOSE   Result Value Ref Range    POCT Glucose 137 (H) 74 - 99 mg/dL   POCT GLUCOSE   Result Value Ref Range    POCT Glucose 195 (H) 74 - 99 mg/dL   Basic metabolic panel   Result Value Ref Range    Glucose 197 (H) 74 - 99  mg/dL    Sodium 132 (L) 136 - 145 mmol/L    Potassium 3.9 3.5 - 5.3 mmol/L    Chloride 101 98 - 107 mmol/L    Bicarbonate 8 (LL) 21 - 32 mmol/L    Anion Gap 27 (H) 10 - 20 mmol/L    Urea Nitrogen 17 6 - 23 mg/dL    Creatinine 1.24 0.50 - 1.30 mg/dL    eGFR 68 >60 mL/min/1.73m*2    Calcium 9.4 8.6 - 10.3 mg/dL   POCT GLUCOSE   Result Value Ref Range    POCT Glucose 213 (H) 74 - 99 mg/dL   POCT GLUCOSE   Result Value Ref Range    POCT Glucose 228 (H) 74 - 99 mg/dL   POCT GLUCOSE   Result Value Ref Range    POCT Glucose 216 (H) 74 - 99 mg/dL   Basic metabolic panel   Result Value Ref Range    Glucose 242 (H) 74 - 99 mg/dL    Sodium 130 (L) 136 - 145 mmol/L    Potassium 3.6 3.5 - 5.3 mmol/L    Chloride 100 98 - 107 mmol/L    Bicarbonate 9 (LL) 21 - 32 mmol/L    Anion Gap 25 (H) 10 - 20 mmol/L    Urea Nitrogen 17 6 - 23 mg/dL    Creatinine 1.21 0.50 - 1.30 mg/dL    eGFR 70 >60 mL/min/1.73m*2    Calcium 9.2 8.6 - 10.3 mg/dL   POCT GLUCOSE   Result Value Ref Range    POCT Glucose 236 (H) 74 - 99 mg/dL   POCT GLUCOSE   Result Value Ref Range    POCT Glucose 242 (H) 74 - 99 mg/dL   POCT GLUCOSE   Result Value Ref Range    POCT Glucose 223 (H) 74 - 99 mg/dL   POCT GLUCOSE   Result Value Ref Range    POCT Glucose 230 (H) 74 - 99 mg/dL   POCT GLUCOSE   Result Value Ref Range    POCT Glucose 225 (H) 74 - 99 mg/dL   Vancomycin   Result Value Ref Range    Vancomycin 12.0 5.0 - 20.0 ug/mL   CBC   Result Value Ref Range    WBC 8.7 4.4 - 11.3 x10*3/uL    nRBC 0.0 0.0 - 0.0 /100 WBCs    RBC 3.83 (L) 4.50 - 5.90 x10*6/uL    Hemoglobin 12.0 (L) 13.5 - 17.5 g/dL    Hematocrit 34.6 (L) 41.0 - 52.0 %    MCV 90 80 - 100 fL    MCH 31.3 26.0 - 34.0 pg    MCHC 34.7 32.0 - 36.0 g/dL    RDW 14.4 11.5 - 14.5 %    Platelets 177 150 - 450 x10*3/uL   Basic metabolic panel   Result Value Ref Range    Glucose 233 (H) 74 - 99 mg/dL    Sodium 133 (L) 136 - 145 mmol/L    Potassium 3.3 (L) 3.5 - 5.3 mmol/L    Chloride 102 98 - 107 mmol/L    Bicarbonate 13  (L) 21 - 32 mmol/L    Anion Gap 21 (H) 10 - 20 mmol/L    Urea Nitrogen 16 6 - 23 mg/dL    Creatinine 1.14 0.50 - 1.30 mg/dL    eGFR 75 >60 mL/min/1.73m*2    Calcium 9.3 8.6 - 10.3 mg/dL   POCT GLUCOSE   Result Value Ref Range    POCT Glucose 212 (H) 74 - 99 mg/dL   POCT GLUCOSE   Result Value Ref Range    POCT Glucose 238 (H) 74 - 99 mg/dL   Blood Gas Venous Full Panel   Result Value Ref Range    POCT pH, Venous 7.37 7.33 - 7.43 pH    POCT pCO2, Venous 28 (L) 41 - 51 mm Hg    POCT pO2, Venous 57 (H) 35 - 45 mm Hg    POCT SO2, Venous 90 (H) 45 - 75 %    POCT Oxy Hemoglobin, Venous 88.7 (H) 45.0 - 75.0 %    POCT Hematocrit Calculated, Venous 37.0 (L) 41.0 - 52.0 %    POCT Sodium, Venous 135 (L) 136 - 145 mmol/L    POCT Potassium, Venous 3.5 3.5 - 5.3 mmol/L    POCT Chloride, Venous 105 98 - 107 mmol/L    POCT Ionized Calicum, Venous 1.31 1.10 - 1.33 mmol/L    POCT Glucose, Venous 254 (H) 74 - 99 mg/dL    POCT Lactate, Venous 0.7 0.4 - 2.0 mmol/L    POCT Base Excess, Venous -7.7 (L) -2.0 - 3.0 mmol/L    POCT HCO3 Calculated, Venous 16.2 (L) 22.0 - 26.0 mmol/L    POCT Hemoglobin, Venous 12.3 (L) 13.5 - 17.5 g/dL    POCT Anion Gap, Venous 17.0 10.0 - 25.0 mmol/L    Patient Temperature      FiO2 21 %   POCT GLUCOSE   Result Value Ref Range    POCT Glucose 238 (H) 74 - 99 mg/dL   POCT GLUCOSE   Result Value Ref Range    POCT Glucose 205 (H) 74 - 99 mg/dL   Basic metabolic panel   Result Value Ref Range    Glucose 234 (H) 74 - 99 mg/dL    Sodium 132 (L) 136 - 145 mmol/L    Potassium 3.4 (L) 3.5 - 5.3 mmol/L    Chloride 101 98 - 107 mmol/L    Bicarbonate 17 (L) 21 - 32 mmol/L    Anion Gap 17 10 - 20 mmol/L    Urea Nitrogen 14 6 - 23 mg/dL    Creatinine 1.04 0.50 - 1.30 mg/dL    eGFR 84 >60 mL/min/1.73m*2    Calcium 9.4 8.6 - 10.3 mg/dL   Magnesium   Result Value Ref Range    Magnesium 2.40 1.60 - 2.40 mg/dL   Phosphorus   Result Value Ref Range    Phosphorus 1.5 (L) 2.5 - 4.9 mg/dL   Lavender Top   Result Value Ref Range     Extra Tube Hold for add-ons.    POCT GLUCOSE   Result Value Ref Range    POCT Glucose 230 (H) 74 - 99 mg/dL   POCT GLUCOSE   Result Value Ref Range    POCT Glucose 205 (H) 74 - 99 mg/dL   POCT GLUCOSE   Result Value Ref Range    POCT Glucose 208 (H) 74 - 99 mg/dL   POCT GLUCOSE   Result Value Ref Range    POCT Glucose 190 (H) 74 - 99 mg/dL   Basic metabolic panel   Result Value Ref Range    Glucose 197 (H) 74 - 99 mg/dL    Sodium 135 (L) 136 - 145 mmol/L    Potassium 3.5 3.5 - 5.3 mmol/L    Chloride 102 98 - 107 mmol/L    Bicarbonate 18 (L) 21 - 32 mmol/L    Anion Gap 19 10 - 20 mmol/L    Urea Nitrogen 13 6 - 23 mg/dL    Creatinine 0.99 0.50 - 1.30 mg/dL    eGFR 89 >60 mL/min/1.73m*2    Calcium 9.4 8.6 - 10.3 mg/dL   POCT GLUCOSE   Result Value Ref Range    POCT Glucose 188 (H) 74 - 99 mg/dL         Inpatient Medications:  Scheduled Medications[1]  PRN Medications[2]  Continuous Medications[3]    Physical Exam:  Physical Exam  Constitutional:       General: He is not in acute distress.     Appearance: He is obese.   HENT:      Head: Normocephalic.      Nose: Nose normal.      Mouth/Throat:      Mouth: Mucous membranes are moist.   Eyes:      Conjunctiva/sclera: Conjunctivae normal.   Cardiovascular:      Rate and Rhythm: Normal rate and regular rhythm.   Pulmonary:      Effort: Pulmonary effort is normal.   Abdominal:      General: Bowel sounds are normal.      Palpations: Abdomen is soft.   Musculoskeletal:      Cervical back: Normal range of motion.      Right lower leg: No edema.      Left lower leg: No edema.   Neurological:      General: No focal deficit present.      Mental Status: Mental status is at baseline.   Psychiatric:         Mood and Affect: Mood normal.            Assessment/Plan   Jono Quinn is a 56 yo male with a PMH of HTN, HLD, LITZY, GERD, DM type II, Depression who presented with a chief complaint of abdominal pain. Admitted for pancreatitis. AG metabolic acidosis, source being euglycemic  diabetic ketoacidosis.      Primary Problem:        NEURO/PSYCH: Metabolic encephalopathy 2/2 acidosis; Depression  -mentation significantly improved today.   -Treat underlying  -Home meds held     CV: HTN, HLD  -Holding home PO meds given NPO w/ NG tube  -IV PRN  -HD stable currently  -Monitor on tele     PUL: Tachypnea, increased work of breathing, resolved.   -Inc resp rate 2/2 metabolic acidosis. Not hypoxic.   -CTA chest on admission w/o PE, no vascular changes. No lymphadenopathy, airway patent, clear. No consolidation, interstitial disease or suspicious nodules.   -On minimal supplemental O2    RENAL: Hypokalemia, hypophosphatemia  -Bun/Cr  16/1.23> 13/0.99  -Urine output 3500 today  -Replete K per protocol (Goal K 4/Phos3/Mg2.40)  -Last K 3.5 >Phos 1.5  -repeat values w/ next BMP @ ~1600     GI: Pancreatitis w/ duodenitis; GERD  -Amylase, Lipase WNL  -pt c/o abd pain with CT showing pancreatic head edema.  -General Surgery following  -KUB with gastric air c/f ileus. NGT placed to LIWS with good output post emesis. Output thus far 650.   -PPI for prophylaxis  -monitor for bowel regimen  -c/w NGT today     ENDO: Anion Gap  metabolic acidosis--Euglycemic ketoacidosis  -initial ABG 7.21/15/113/6.0 > 7.11/11/116/3.5 s/p 2 amp  bicarb 7.18/11/108/4.1. Most recent VBG this AM (7/5) 7.37/28/57/16.2  -Most recent BMP today 12:20 with closed AG 19, Bicarb 18.   -+ ketones in urine.  - urine tox negative  -d/c Bicarb gtt  -c/w DKA protocol until Bicarb normalized, gap remains closed.   -Mentation significantly improved closing of gap.   -discontinue Jardiance, added as allergy.   -BHB elevated @ 8.74      ID: Leukocytosis w/ left shift, unclear source  -Meropenem as coverage for pancreatitis  -Bcx with NG x1 day    -D/c Vanc     HEME: Nomocytic normochromic anemia  -H/H 13.1/38.1> 12.0/34.6 (likely dilutional)  -Hapto 241 though mild hemolysis detected  -  -Plt 196     F: per DKA Protocol   E: Replete per  protocol    N: NPO   A: R femoral triple lumen (placed 7/3/2025)    CVC 07/03/25 Triple lumen Right Femoral (Active)   Site Assessment Clean;Dry;Intact 07/04/25 2043   Dressing Status Clean;Dry;Occlusive 07/04/25 2043   Number of days: 2       NG/OG/Feeding Tube Nasogastric 18 Fr Left nostril (Active)   Output (mL) 600 mL 07/05/25 0700   Number of days: 1       Code Status:  Full Code    I spent  minutes in the professional and overall care of this patient.        Adrienne Gipson PA-C         [1]   Scheduled medications   Medication Dose Route Frequency    busPIRone  15 mg oral TID    enoxaparin  40 mg subcutaneous q24h    famotidine  20 mg oral BID    Or    famotidine  20 mg intravenous BID    gabapentin  600 mg oral TID    gabapentin  900 mg oral Nightly    [Held by provider] insulin lispro  0-15 Units subcutaneous q4h    meropenem  1 g intravenous q8h    [Held by provider] metFORMIN  1,000 mg oral BID    nortriptyline  25 mg oral Nightly    pantoprazole  40 mg intravenous Daily    PARoxetine  30 mg oral Nightly    polyethylene glycol  17 g oral Daily    potassium chloride  20 mEq intravenous q2h    potassium chloride CR  40 mEq oral Once    rosuvastatin  40 mg oral Nightly    [Held by provider] vancomycin  1,250 mg intravenous q12h   [2]   PRN medications   Medication    acetaminophen    Or    acetaminophen    Or    acetaminophen    acetaminophen    Or    acetaminophen    Or    acetaminophen    dextrose 10 % in water (D10W)    dextrose 10 % in water (D10W)    dextrose 5%-0.45 % sodium chloride    dextrose    diphenhydrAMINE    HYDROmorphone    insulin regular    melatonin    ondansetron    Or    ondansetron    sodium chloride 0.9%    vancomycin   [3]   Continuous Medications   Medication Dose Last Rate    dextrose 10 % in water (D10W)  150 mL/hr 150 mL/hr (07/05/25 0448)    dextrose 10 % in water (D10W)  150 mL/hr      dextrose 5%-0.45 % sodium chloride  150 mL/hr Stopped (07/04/25 1904)    insulin regular   0-50 Units/hr 3.75 Units/hr (07/05/25 7708)    lactated Ringer's  250 mL/hr      sodium bicarbonate 1 mEq/mL (8.4 %) 150 mEq in dextrose 5% 1,150 mL infusion  75 mL/hr 75 mL/hr (07/05/25 5868)    sodium chloride 0.9%  10 mL/hr

## 2025-07-06 LAB
ANION GAP SERPL CALC-SCNC: 15 MMOL/L (ref 10–20)
ANION GAP SERPL CALC-SCNC: 16 MMOL/L (ref 10–20)
ANION GAP SERPL CALC-SCNC: 18 MMOL/L (ref 10–20)
ATRIAL RATE: 85 BPM
ATRIAL RATE: 89 BPM
BACTERIA BLD CULT: NORMAL
BACTERIA BLD CULT: NORMAL
BASOPHILS # BLD AUTO: 0.03 X10*3/UL (ref 0–0.1)
BASOPHILS NFR BLD AUTO: 0.8 %
BUN SERPL-MCNC: 10 MG/DL (ref 6–23)
BUN SERPL-MCNC: 12 MG/DL (ref 6–23)
BUN SERPL-MCNC: 12 MG/DL (ref 6–23)
CALCIUM SERPL-MCNC: 8.7 MG/DL (ref 8.6–10.3)
CALCIUM SERPL-MCNC: 8.9 MG/DL (ref 8.6–10.3)
CALCIUM SERPL-MCNC: 9.2 MG/DL (ref 8.6–10.3)
CHLORIDE SERPL-SCNC: 103 MMOL/L (ref 98–107)
CHLORIDE SERPL-SCNC: 104 MMOL/L (ref 98–107)
CHLORIDE SERPL-SCNC: 105 MMOL/L (ref 98–107)
CO2 SERPL-SCNC: 18 MMOL/L (ref 21–32)
CO2 SERPL-SCNC: 20 MMOL/L (ref 21–32)
CO2 SERPL-SCNC: 22 MMOL/L (ref 21–32)
CREAT SERPL-MCNC: 0.8 MG/DL (ref 0.5–1.3)
CREAT SERPL-MCNC: 0.89 MG/DL (ref 0.5–1.3)
CREAT SERPL-MCNC: 0.92 MG/DL (ref 0.5–1.3)
EGFRCR SERPLBLD CKD-EPI 2021: >90 ML/MIN/1.73M*2
EOSINOPHIL # BLD AUTO: 0.12 X10*3/UL (ref 0–0.7)
EOSINOPHIL NFR BLD AUTO: 3.3 %
ERYTHROCYTE [DISTWIDTH] IN BLOOD BY AUTOMATED COUNT: 14.2 % (ref 11.5–14.5)
GLUCOSE BLD MANUAL STRIP-MCNC: 116 MG/DL (ref 74–99)
GLUCOSE BLD MANUAL STRIP-MCNC: 129 MG/DL (ref 74–99)
GLUCOSE BLD MANUAL STRIP-MCNC: 137 MG/DL (ref 74–99)
GLUCOSE BLD MANUAL STRIP-MCNC: 141 MG/DL (ref 74–99)
GLUCOSE BLD MANUAL STRIP-MCNC: 143 MG/DL (ref 74–99)
GLUCOSE BLD MANUAL STRIP-MCNC: 145 MG/DL (ref 74–99)
GLUCOSE BLD MANUAL STRIP-MCNC: 147 MG/DL (ref 74–99)
GLUCOSE BLD MANUAL STRIP-MCNC: 152 MG/DL (ref 74–99)
GLUCOSE BLD MANUAL STRIP-MCNC: 168 MG/DL (ref 74–99)
GLUCOSE BLD MANUAL STRIP-MCNC: 169 MG/DL (ref 74–99)
GLUCOSE BLD MANUAL STRIP-MCNC: 205 MG/DL (ref 74–99)
GLUCOSE BLD MANUAL STRIP-MCNC: 265 MG/DL (ref 74–99)
GLUCOSE SERPL-MCNC: 151 MG/DL (ref 74–99)
GLUCOSE SERPL-MCNC: 157 MG/DL (ref 74–99)
GLUCOSE SERPL-MCNC: 192 MG/DL (ref 74–99)
HCT VFR BLD AUTO: 30.8 % (ref 41–52)
HGB BLD-MCNC: 10.5 G/DL (ref 13.5–17.5)
IMM GRANULOCYTES # BLD AUTO: 0.05 X10*3/UL (ref 0–0.7)
IMM GRANULOCYTES NFR BLD AUTO: 1.4 % (ref 0–0.9)
LYMPHOCYTES # BLD AUTO: 0.58 X10*3/UL (ref 1.2–4.8)
LYMPHOCYTES NFR BLD AUTO: 16.1 %
MAGNESIUM SERPL-MCNC: 2.26 MG/DL (ref 1.6–2.4)
MAGNESIUM SERPL-MCNC: 2.27 MG/DL (ref 1.6–2.4)
MCH RBC QN AUTO: 31.2 PG (ref 26–34)
MCHC RBC AUTO-ENTMCNC: 34.1 G/DL (ref 32–36)
MCV RBC AUTO: 91 FL (ref 80–100)
MONOCYTES # BLD AUTO: 0.43 X10*3/UL (ref 0.1–1)
MONOCYTES NFR BLD AUTO: 11.9 %
NEUTROPHILS # BLD AUTO: 2.4 X10*3/UL (ref 1.2–7.7)
NEUTROPHILS NFR BLD AUTO: 66.5 %
NRBC BLD-RTO: 0 /100 WBCS (ref 0–0)
P AXIS: 41 DEGREES
P AXIS: 50 DEGREES
P OFFSET: 208 MS
P OFFSET: 210 MS
P ONSET: 157 MS
P ONSET: 158 MS
PHOSPHATE SERPL-MCNC: 2 MG/DL (ref 2.5–4.9)
PHOSPHATE SERPL-MCNC: 2.1 MG/DL (ref 2.5–4.9)
PLATELET # BLD AUTO: 141 X10*3/UL (ref 150–450)
POTASSIUM SERPL-SCNC: 3.3 MMOL/L (ref 3.5–5.3)
POTASSIUM SERPL-SCNC: 3.6 MMOL/L (ref 3.5–5.3)
POTASSIUM SERPL-SCNC: 3.6 MMOL/L (ref 3.5–5.3)
PR INTERVAL: 132 MS
PR INTERVAL: 134 MS
Q ONSET: 224 MS
Q ONSET: 224 MS
QRS COUNT: 14 BEATS
QRS COUNT: 15 BEATS
QRS DURATION: 132 MS
QRS DURATION: 138 MS
QT INTERVAL: 400 MS
QT INTERVAL: 406 MS
QTC CALCULATION(BAZETT): 476 MS
QTC CALCULATION(BAZETT): 493 MS
QTC FREDERICIA: 449 MS
QTC FREDERICIA: 462 MS
R AXIS: 11 DEGREES
R AXIS: 31 DEGREES
RBC # BLD AUTO: 3.37 X10*6/UL (ref 4.5–5.9)
SODIUM SERPL-SCNC: 136 MMOL/L (ref 136–145)
SODIUM SERPL-SCNC: 136 MMOL/L (ref 136–145)
SODIUM SERPL-SCNC: 138 MMOL/L (ref 136–145)
T AXIS: 20 DEGREES
T AXIS: 27 DEGREES
T OFFSET: 424 MS
T OFFSET: 427 MS
VENTRICULAR RATE: 85 BPM
VENTRICULAR RATE: 89 BPM
WBC # BLD AUTO: 3.6 X10*3/UL (ref 4.4–11.3)

## 2025-07-06 PROCEDURE — 94760 N-INVAS EAR/PLS OXIMETRY 1: CPT | Mod: IPSPLIT

## 2025-07-06 PROCEDURE — 2500000005 HC RX 250 GENERAL PHARMACY W/O HCPCS: Mod: IPSPLIT | Performed by: PHYSICIAN ASSISTANT

## 2025-07-06 PROCEDURE — 2500000002 HC RX 250 W HCPCS SELF ADMINISTERED DRUGS (ALT 637 FOR MEDICARE OP, ALT 636 FOR OP/ED): Mod: IPSPLIT | Performed by: HOSPITALIST

## 2025-07-06 PROCEDURE — 84100 ASSAY OF PHOSPHORUS: CPT | Mod: IPSPLIT | Performed by: STUDENT IN AN ORGANIZED HEALTH CARE EDUCATION/TRAINING PROGRAM

## 2025-07-06 PROCEDURE — 2500000001 HC RX 250 WO HCPCS SELF ADMINISTERED DRUGS (ALT 637 FOR MEDICARE OP): Mod: IPSPLIT | Performed by: PHYSICIAN ASSISTANT

## 2025-07-06 PROCEDURE — 2500000004 HC RX 250 GENERAL PHARMACY W/ HCPCS (ALT 636 FOR OP/ED): Mod: IPSPLIT | Performed by: STUDENT IN AN ORGANIZED HEALTH CARE EDUCATION/TRAINING PROGRAM

## 2025-07-06 PROCEDURE — 2500000001 HC RX 250 WO HCPCS SELF ADMINISTERED DRUGS (ALT 637 FOR MEDICARE OP): Mod: IPSPLIT | Performed by: HOSPITALIST

## 2025-07-06 PROCEDURE — 2500000004 HC RX 250 GENERAL PHARMACY W/ HCPCS (ALT 636 FOR OP/ED): Mod: IPSPLIT | Performed by: HOSPITALIST

## 2025-07-06 PROCEDURE — 82947 ASSAY GLUCOSE BLOOD QUANT: CPT | Mod: IPSPLIT

## 2025-07-06 PROCEDURE — 85025 COMPLETE CBC W/AUTO DIFF WBC: CPT | Mod: IPSPLIT | Performed by: STUDENT IN AN ORGANIZED HEALTH CARE EDUCATION/TRAINING PROGRAM

## 2025-07-06 PROCEDURE — 36415 COLL VENOUS BLD VENIPUNCTURE: CPT | Mod: IPSPLIT | Performed by: STUDENT IN AN ORGANIZED HEALTH CARE EDUCATION/TRAINING PROGRAM

## 2025-07-06 PROCEDURE — 37799 UNLISTED PX VASCULAR SURGERY: CPT | Mod: IPSPLIT | Performed by: STUDENT IN AN ORGANIZED HEALTH CARE EDUCATION/TRAINING PROGRAM

## 2025-07-06 PROCEDURE — 80048 BASIC METABOLIC PNL TOTAL CA: CPT | Mod: IPSPLIT | Performed by: STUDENT IN AN ORGANIZED HEALTH CARE EDUCATION/TRAINING PROGRAM

## 2025-07-06 PROCEDURE — 84100 ASSAY OF PHOSPHORUS: CPT | Mod: IPSPLIT | Performed by: PHYSICIAN ASSISTANT

## 2025-07-06 PROCEDURE — 1100000001 HC PRIVATE ROOM DAILY: Mod: IPSPLIT

## 2025-07-06 PROCEDURE — 2500000004 HC RX 250 GENERAL PHARMACY W/ HCPCS (ALT 636 FOR OP/ED): Mod: IPSPLIT | Performed by: PHYSICIAN ASSISTANT

## 2025-07-06 PROCEDURE — 83735 ASSAY OF MAGNESIUM: CPT | Mod: IPSPLIT | Performed by: STUDENT IN AN ORGANIZED HEALTH CARE EDUCATION/TRAINING PROGRAM

## 2025-07-06 PROCEDURE — 2500000004 HC RX 250 GENERAL PHARMACY W/ HCPCS (ALT 636 FOR OP/ED): Mod: IPSPLIT | Performed by: SURGERY

## 2025-07-06 PROCEDURE — 99232 SBSQ HOSP IP/OBS MODERATE 35: CPT | Performed by: SURGERY

## 2025-07-06 PROCEDURE — 99232 SBSQ HOSP IP/OBS MODERATE 35: CPT | Performed by: PHYSICIAN ASSISTANT

## 2025-07-06 PROCEDURE — 2500000002 HC RX 250 W HCPCS SELF ADMINISTERED DRUGS (ALT 637 FOR MEDICARE OP, ALT 636 FOR OP/ED): Mod: IPSPLIT | Performed by: PHYSICIAN ASSISTANT

## 2025-07-06 PROCEDURE — 2500000005 HC RX 250 GENERAL PHARMACY W/O HCPCS: Mod: IPSPLIT | Performed by: INTERNAL MEDICINE

## 2025-07-06 RX ORDER — DEXTROSE MONOHYDRATE 100 MG/ML
150 INJECTION, SOLUTION INTRAVENOUS CONTINUOUS PRN
Status: DISCONTINUED | OUTPATIENT
Start: 2025-07-06 | End: 2025-07-06

## 2025-07-06 RX ORDER — DEXTROSE 50 % IN WATER (D50W) INTRAVENOUS SYRINGE
12.5
Status: ACTIVE | OUTPATIENT
Start: 2025-07-06

## 2025-07-06 RX ORDER — POLYETHYLENE GLYCOL 3350 17 G/17G
17 POWDER, FOR SOLUTION ORAL 2 TIMES DAILY
Status: DISPENSED | OUTPATIENT
Start: 2025-07-06

## 2025-07-06 RX ORDER — DEXTROSE 50 % IN WATER (D50W) INTRAVENOUS SYRINGE
50
Status: ACTIVE | OUTPATIENT
Start: 2025-07-06

## 2025-07-06 RX ORDER — BISACODYL 10 MG/1
10 SUPPOSITORY RECTAL DAILY PRN
Status: ACTIVE | OUTPATIENT
Start: 2025-07-06

## 2025-07-06 RX ORDER — DEXTROSE 50 % IN WATER (D50W) INTRAVENOUS SYRINGE
25
Status: ACTIVE | OUTPATIENT
Start: 2025-07-06

## 2025-07-06 RX ORDER — SODIUM CHLORIDE 9 MG/ML
150 INJECTION, SOLUTION INTRAVENOUS CONTINUOUS
Status: ACTIVE | OUTPATIENT
Start: 2025-07-06 | End: 2025-07-07

## 2025-07-06 RX ORDER — DEXTROSE MONOHYDRATE AND SODIUM CHLORIDE 5; .45 G/100ML; G/100ML
150 INJECTION, SOLUTION INTRAVENOUS CONTINUOUS PRN
Status: DISCONTINUED | OUTPATIENT
Start: 2025-07-06 | End: 2025-07-06

## 2025-07-06 RX ADMIN — ENOXAPARIN SODIUM 40 MG: 40 INJECTION SUBCUTANEOUS at 21:38

## 2025-07-06 RX ADMIN — FAMOTIDINE 20 MG: 20 TABLET, FILM COATED ORAL at 21:37

## 2025-07-06 RX ADMIN — BUSPIRONE HYDROCHLORIDE 15 MG: 10 TABLET ORAL at 16:37

## 2025-07-06 RX ADMIN — INSULIN HUMAN 3.8 UNITS/HR: 1 INJECTION, SOLUTION INTRAVENOUS at 04:01

## 2025-07-06 RX ADMIN — POLYETHYLENE GLYCOL 3350 17 G: 17 POWDER, FOR SOLUTION ORAL at 22:26

## 2025-07-06 RX ADMIN — BUSPIRONE HYDROCHLORIDE 15 MG: 10 TABLET ORAL at 08:16

## 2025-07-06 RX ADMIN — MEROPENEM AND SODIUM CHLORIDE 1 G: 1 INJECTION, SOLUTION INTRAVENOUS at 08:14

## 2025-07-06 RX ADMIN — SODIUM CHLORIDE 1000 ML: 9 INJECTION, SOLUTION INTRAVENOUS at 03:00

## 2025-07-06 RX ADMIN — DEXTROSE MONOHYDRATE AND SODIUM CHLORIDE 150 ML/HR: 5; .45 INJECTION, SOLUTION INTRAVENOUS at 04:01

## 2025-07-06 RX ADMIN — GABAPENTIN 600 MG: 300 CAPSULE ORAL at 16:31

## 2025-07-06 RX ADMIN — SODIUM CHLORIDE, SODIUM LACTATE, POTASSIUM CHLORIDE, AND CALCIUM CHLORIDE 75 ML/HR: .6; .31; .03; .02 INJECTION, SOLUTION INTRAVENOUS at 00:08

## 2025-07-06 RX ADMIN — Medication 2 L/MIN: at 12:00

## 2025-07-06 RX ADMIN — MEROPENEM AND SODIUM CHLORIDE 1 G: 1 INJECTION, SOLUTION INTRAVENOUS at 15:46

## 2025-07-06 RX ADMIN — INSULIN LISPRO 3 UNITS: 100 INJECTION, SOLUTION INTRAVENOUS; SUBCUTANEOUS at 21:37

## 2025-07-06 RX ADMIN — NORTRIPTYLINE HYDROCHLORIDE 25 MG: 25 CAPSULE ORAL at 21:38

## 2025-07-06 RX ADMIN — ROSUVASTATIN CALCIUM 40 MG: 10 TABLET, FILM COATED ORAL at 21:37

## 2025-07-06 RX ADMIN — GABAPENTIN 900 MG: 300 CAPSULE ORAL at 21:38

## 2025-07-06 RX ADMIN — GABAPENTIN 600 MG: 300 CAPSULE ORAL at 08:16

## 2025-07-06 RX ADMIN — POTASSIUM PHOSPHATE, MONOBASIC POTASSIUM PHOSPHATE, DIBASIC 15 MMOL: 224; 236 INJECTION, SOLUTION, CONCENTRATE INTRAVENOUS at 09:02

## 2025-07-06 RX ADMIN — Medication 6 MG: at 00:08

## 2025-07-06 RX ADMIN — MEROPENEM AND SODIUM CHLORIDE 1 G: 1 INJECTION, SOLUTION INTRAVENOUS at 00:08

## 2025-07-06 RX ADMIN — INSULIN LISPRO 9 UNITS: 100 INJECTION, SOLUTION INTRAVENOUS; SUBCUTANEOUS at 12:07

## 2025-07-06 RX ADMIN — SODIUM CHLORIDE 150 ML/HR: 9 INJECTION, SOLUTION INTRAVENOUS at 10:10

## 2025-07-06 RX ADMIN — BUSPIRONE HYDROCHLORIDE 15 MG: 10 TABLET ORAL at 21:38

## 2025-07-06 RX ADMIN — Medication 2 L/MIN: at 22:11

## 2025-07-06 RX ADMIN — Medication 2 L/MIN: at 08:57

## 2025-07-06 RX ADMIN — INSULIN HUMAN 20 UNITS: 100 INJECTION, SUSPENSION SUBCUTANEOUS at 10:09

## 2025-07-06 RX ADMIN — GABAPENTIN 600 MG: 300 CAPSULE ORAL at 12:54

## 2025-07-06 RX ADMIN — PAROXETINE HYDROCHLORIDE 30 MG: 20 TABLET, FILM COATED ORAL at 21:37

## 2025-07-06 RX ADMIN — FAMOTIDINE 20 MG: 20 TABLET, FILM COATED ORAL at 08:14

## 2025-07-06 RX ADMIN — MEROPENEM AND SODIUM CHLORIDE 1 G: 1 INJECTION, SOLUTION INTRAVENOUS at 23:44

## 2025-07-06 RX ADMIN — POLYETHYLENE GLYCOL 3350 17 G: 17 POWDER, FOR SOLUTION ORAL at 08:27

## 2025-07-06 RX ADMIN — PANTOPRAZOLE SODIUM 40 MG: 40 INJECTION, POWDER, LYOPHILIZED, FOR SOLUTION INTRAVENOUS at 08:25

## 2025-07-06 ASSESSMENT — PAIN - FUNCTIONAL ASSESSMENT: PAIN_FUNCTIONAL_ASSESSMENT: 0-10

## 2025-07-06 ASSESSMENT — PAIN SCALES - GENERAL: PAINLEVEL_OUTOF10: 0 - NO PAIN

## 2025-07-06 NOTE — PROGRESS NOTES
"Jono Quinn is a 55 y.o. male on day 3 of admission presenting with Abdominal pain, unspecified abdominal location.    Subjective   Feels better.  Still on insulin infusion.  Passed gas.  Less nauseated.       Objective     Physical Exam  Constitutional:       Appearance: Normal appearance.   Abdominal:      Palpations: Abdomen is soft. There is no mass.      Tenderness: There is no abdominal tenderness. There is no guarding.      Comments: Nasogastric tube in place         Last Recorded Vitals  Blood pressure 156/82, pulse 90, temperature 36.8 °C (98.2 °F), temperature source Temporal, resp. rate 25, height 1.78 m (5' 10.08\"), weight 116 kg (255 lb 8.2 oz), SpO2 99%.  Intake/Output last 3 Shifts:  I/O last 3 completed shifts:  In: 8141.8 (70.2 mL/kg) [I.V.:5934.8 (51.2 mL/kg); IV Piggyback:2207]  Out: 5850 (50.5 mL/kg) [Urine:4650 (1.1 mL/kg/hr); Emesis/NG output:1200]  Weight: 115.9 kg     Relevant Results  Reviewed    Assessment & Plan  Abdominal pain, unspecified abdominal location    Type 1 diabetes mellitus with ketoacidosis without coma    Acute pancreatitis, unspecified complication status, unspecified pancreatitis type ([facility])    Duodenitis    Plan-advance diet, other plans per medicine.  Slow progress      Fermin Bynum MD    "

## 2025-07-06 NOTE — PROGRESS NOTES
Subjective Data:  Feeling much better, appetite improving.   No abdominal pain, n/v/d/c.     Overnight Events:    No acute events reported overnight.      Objective Data:  Last Recorded Vitals:  Vitals:    07/06/25 0500 07/06/25 0600 07/06/25 0700 07/06/25 0800   BP: 152/76 167/90 156/82 149/77   Pulse: 97 97 90 99   Resp: (!) 27 22 25 22   Temp:       TempSrc:       SpO2: 100% 99% 99% 96%   Weight:       Height:           Last Labs:  CBC - 7/6/2025:  2:02 AM  3.6 10.5 141    30.8      CMP - 7/6/2025:  2:02 AM  9.2 7.4 14 --- 0.5   2.1 4.2 13 87      PTT - 8/27/2024:  1:35 PM  0.9   10.4 32     TROPHS   Date/Time Value Ref Range Status   07/03/2025 02:06 PM 10 0 - 20 ng/L Final   07/03/2025 11:19 AM 10 0 - 20 ng/L Final   06/12/2025 04:44 PM <3 0 - 20 ng/L Final     BNP   Date/Time Value Ref Range Status   07/03/2025 11:19 AM 62 0 - 99 pg/mL Final   06/12/2025 03:41 PM 20 0 - 99 pg/mL Final     HGBA1C   Date/Time Value Ref Range Status   03/22/2025 08:39 AM 7.5 <5.7 % of total Hgb Final     Comment:     For someone without known diabetes, a hemoglobin A1c  value of 6.5% or greater indicates that they may have   diabetes and this should be confirmed with a follow-up   test.     For someone with known diabetes, a value <7% indicates   that their diabetes is well controlled and a value   greater than or equal to 7% indicates suboptimal   control. A1c targets should be individualized based on   duration of diabetes, age, comorbid conditions, and   other considerations.     Currently, no consensus exists regarding use of  hemoglobin A1c for diagnosis of diabetes for children.         02/19/2025 01:52 PM 7.2 4.2 - 6.5 % Final   08/27/2024 01:35 PM 8.0 see below % Final   05/03/2024 12:01 PM 7.2 4.2 - 6.5 % Final     LDLCALC   Date/Time Value Ref Range Status   10/26/2024 09:32 AM   Final     Comment:     The calculation of LDL and VLDL are inaccurate when the Triglycerides are greater than 400 mg/dL or when the patient is  non-fasting. If LDL measurement is necessary contact the testing laboratory for an alternative LDL assay.                                  Near   Borderline      AGE      Desirable  Optimal    High     High     Very High     0-19 Y     0 - 109     ---    110-129   >/= 130     ----    20-24 Y     0 - 119     ---    120-159   >/= 160     ----      >24 Y     0 -  99   100-129  130-159   160-189     >/=190     08/27/2024 01:35 PM   Final     Comment:     The calculation of LDL and VLDL are inaccurate when the Triglycerides are greater than 400 mg/dL or when the patient is non-fasting. If LDL measurement is necessary contact the testing laboratory for an alternative LDL assay.       11/18/2023 08:46 AM   Final     Comment:     The calculation of LDL and VLDL are inaccurate when the Triglycerides are greater than 400 mg/dL or when the patient is non-fasting. If LDL measurement is necessary contact the testing laboratory for an alternative LDL assay.                                  Near   Borderline      AGE      Desirable  Optimal    High     High     Very High     0-19 Y     0 - 109     ---    110-129   >/= 130     ----    20-24 Y     0 - 119     ---    120-159   >/= 160     ----      >24 Y     0 -  99   100-129  130-159   160-189     >/=190       VLDL   Date/Time Value Ref Range Status   10/26/2024 09:32 AM   Final     Comment:     Unable to calculate VLDL.   08/27/2024 01:35 PM   Final     Comment:     Unable to calculate VLDL.   11/18/2023 08:46 AM   Final     Comment:     Unable to calculate VLDL.      Last I/O:  I/O last 3 completed shifts:  In: 8141.8 (70.2 mL/kg) [I.V.:5934.8 (51.2 mL/kg); IV Piggyback:2207]  Out: 5850 (50.5 mL/kg) [Urine:4650 (1.1 mL/kg/hr); Emesis/NG output:1200]  Weight: 115.9 kg     Past Cardiology Tests (Last 3 Years):  EKG:  ECG 12 lead 07/03/2025 (Preliminary)      ECG 12 lead 06/12/2025 (Preliminary)      ECG 12 lead 06/12/2025 (Preliminary)      ECG 12 lead (Clinic Performed)  06/12/2025      ECG 12 lead 08/29/2024      ECG 12 lead 08/27/2024      ECG 12 lead (Clinic Performed) 05/23/2024      ECG 12 lead (Clinic Performed) 04/26/2023    Echo:  Transthoracic Echo Complete 06/13/2025      Transthoracic Echo (TTE) Complete 08/28/2024    Ejection Fractions:  EF   Date/Time Value Ref Range Status   06/13/2025 10:00 AM 68 %    08/28/2024 10:00 AM 68 %      Cath:  Cardiac catheterization - coronary 12/06/2023    Stress Test:  No results found for this or any previous visit from the past 1095 days.    Cardiac Imaging:  XR chest abdomen for OG NG placement 07/04/2025      CT angio coronary art with heartflow if score >30% 10/24/2023      No results found.  Results for orders placed or performed during the hospital encounter of 07/03/25 (from the past 24 hours)   POCT GLUCOSE   Result Value Ref Range    POCT Glucose 166 (H) 74 - 99 mg/dL   POCT GLUCOSE   Result Value Ref Range    POCT Glucose 173 (H) 74 - 99 mg/dL   POCT GLUCOSE   Result Value Ref Range    POCT Glucose 161 (H) 74 - 99 mg/dL   POCT GLUCOSE   Result Value Ref Range    POCT Glucose 152 (H) 74 - 99 mg/dL   Basic metabolic panel   Result Value Ref Range    Glucose 157 (H) 74 - 99 mg/dL    Sodium 136 136 - 145 mmol/L    Potassium 3.5 3.5 - 5.3 mmol/L    Chloride 104 98 - 107 mmol/L    Bicarbonate 22 21 - 32 mmol/L    Anion Gap 14 10 - 20 mmol/L    Urea Nitrogen 11 6 - 23 mg/dL    Creatinine 0.93 0.50 - 1.30 mg/dL    eGFR >90 >60 mL/min/1.73m*2    Calcium 9.2 8.6 - 10.3 mg/dL   POCT GLUCOSE   Result Value Ref Range    POCT Glucose 147 (H) 74 - 99 mg/dL   POCT GLUCOSE   Result Value Ref Range    POCT Glucose 148 (H) 74 - 99 mg/dL   POCT GLUCOSE   Result Value Ref Range    POCT Glucose 143 (H) 74 - 99 mg/dL   Phosphorus   Result Value Ref Range    Phosphorus 2.1 (L) 2.5 - 4.9 mg/dL   CBC and Auto Differential   Result Value Ref Range    WBC 3.6 (L) 4.4 - 11.3 x10*3/uL    nRBC 0.0 0.0 - 0.0 /100 WBCs    RBC 3.37 (L) 4.50 - 5.90 x10*6/uL     Hemoglobin 10.5 (L) 13.5 - 17.5 g/dL    Hematocrit 30.8 (L) 41.0 - 52.0 %    MCV 91 80 - 100 fL    MCH 31.2 26.0 - 34.0 pg    MCHC 34.1 32.0 - 36.0 g/dL    RDW 14.2 11.5 - 14.5 %    Platelets 141 (L) 150 - 450 x10*3/uL    Neutrophils % 66.5 40.0 - 80.0 %    Immature Granulocytes %, Automated 1.4 (H) 0.0 - 0.9 %    Lymphocytes % 16.1 13.0 - 44.0 %    Monocytes % 11.9 2.0 - 10.0 %    Eosinophils % 3.3 0.0 - 6.0 %    Basophils % 0.8 0.0 - 2.0 %    Neutrophils Absolute 2.40 1.20 - 7.70 x10*3/uL    Immature Granulocytes Absolute, Automated 0.05 0.00 - 0.70 x10*3/uL    Lymphocytes Absolute 0.58 (L) 1.20 - 4.80 x10*3/uL    Monocytes Absolute 0.43 0.10 - 1.00 x10*3/uL    Eosinophils Absolute 0.12 0.00 - 0.70 x10*3/uL    Basophils Absolute 0.03 0.00 - 0.10 x10*3/uL   Basic metabolic panel   Result Value Ref Range    Glucose 157 (H) 74 - 99 mg/dL    Sodium 136 136 - 145 mmol/L    Potassium 3.6 3.5 - 5.3 mmol/L    Chloride 104 98 - 107 mmol/L    Bicarbonate 18 (L) 21 - 32 mmol/L    Anion Gap 18 10 - 20 mmol/L    Urea Nitrogen 12 6 - 23 mg/dL    Creatinine 0.92 0.50 - 1.30 mg/dL    eGFR >90 >60 mL/min/1.73m*2    Calcium 9.2 8.6 - 10.3 mg/dL   Magnesium   Result Value Ref Range    Magnesium 2.27 1.60 - 2.40 mg/dL   POCT GLUCOSE   Result Value Ref Range    POCT Glucose 147 (H) 74 - 99 mg/dL   POCT GLUCOSE   Result Value Ref Range    POCT Glucose 145 (H) 74 - 99 mg/dL   POCT GLUCOSE   Result Value Ref Range    POCT Glucose 169 (H) 74 - 99 mg/dL   Basic metabolic panel   Result Value Ref Range    Glucose 151 (H) 74 - 99 mg/dL    Sodium 138 136 - 145 mmol/L    Potassium 3.3 (L) 3.5 - 5.3 mmol/L    Chloride 105 98 - 107 mmol/L    Bicarbonate 20 (L) 21 - 32 mmol/L    Anion Gap 16 10 - 20 mmol/L    Urea Nitrogen 12 6 - 23 mg/dL    Creatinine 0.89 0.50 - 1.30 mg/dL    eGFR >90 >60 mL/min/1.73m*2    Calcium 8.9 8.6 - 10.3 mg/dL   Magnesium   Result Value Ref Range    Magnesium 2.26 1.60 - 2.40 mg/dL   Phosphorus   Result Value Ref Range     Phosphorus 2.0 (L) 2.5 - 4.9 mg/dL   POCT GLUCOSE   Result Value Ref Range    POCT Glucose 152 (H) 74 - 99 mg/dL   POCT GLUCOSE   Result Value Ref Range    POCT Glucose 141 (H) 74 - 99 mg/dL   POCT GLUCOSE   Result Value Ref Range    POCT Glucose 137 (H) 74 - 99 mg/dL   POCT GLUCOSE   Result Value Ref Range    POCT Glucose 205 (H) 74 - 99 mg/dL   POCT GLUCOSE   Result Value Ref Range    POCT Glucose 265 (H) 74 - 99 mg/dL   Basic metabolic panel   Result Value Ref Range    Glucose 192 (H) 74 - 99 mg/dL    Sodium 136 136 - 145 mmol/L    Potassium 3.6 3.5 - 5.3 mmol/L    Chloride 103 98 - 107 mmol/L    Bicarbonate 22 21 - 32 mmol/L    Anion Gap 15 10 - 20 mmol/L    Urea Nitrogen 10 6 - 23 mg/dL    Creatinine 0.80 0.50 - 1.30 mg/dL    eGFR >90 >60 mL/min/1.73m*2    Calcium 8.7 8.6 - 10.3 mg/dL   POCT GLUCOSE   Result Value Ref Range    POCT Glucose 116 (H) 74 - 99 mg/dL     XR abdomen 2 views supine and erect or decub  Result Date: 7/4/2025  Interpreted By:  Philomena Kim, STUDY: XR ABDOMEN 2 VIEWS SUPINE AND ERECT OR DECUB;  7/4/2025 9:02 am   INDICATION: Signs/Symptoms:vomiting.     COMPARISON:  image from abdominal CT 07/03/2025   ACCESSION NUMBER(S): [id number]   ORDERING CLINICIAN: COREY VERDUGO   FINDINGS: 3 portable supine and erect views of the abdomen obtained.   Monitoring leads overlying the lower chest and upper abdomen. Distended gas-filled stomach. Scattered gas and fecal debris in mildly distended loops of bowel. Rectal gas is present. No obvious abdominal mass effect. No gross free air or upper abdominal air-fluid levels on upright view. Right upper quadrant surgical clips and pelvic catheter overlying the medial right pelvis again seen.        Gaseous distention of the stomach. Otherwise nonspecific, nonobstructive bowel gas pattern.   MACRO: None.   Signed by: Philomena Kim 7/4/2025 12:19 PM Dictation workstation:   [id number]     XR chest abdomen for OG NG placement  Result Date:  7/4/2025  Interpreted By:  Schoenberger, Joseph, STUDY: XR CHEST ABDOMEN FOR OG NG PLACEMENT; ;  7/4/2025 11:46 am   INDICATION: Signs/Symptoms:NG tube placement.   None   COMPARISON: 07/04/2025   ACCESSION NUMBER(S): [id number]   ORDERING CLINICIAN: MARRY COLEMAN   FINDINGS: Nasogastric tube is been placed. The catheter tip is in the gastric fundus. The stomach is decompressed.        See above     MACRO: None   Signed by: Joseph Schoenberger 7/4/2025 11:58 AM Dictation workstation:   [id number]     CT angio chest for pulmonary embolism  Result Date: 7/3/2025  STUDY: CT Angiogram of the Chest, CT Abdomen and Pelvis with IV Contrast; 07/03/2025 at 3:27 PM INDICATION: Shortness of breath. Unspecified abdominal pain. COMPARISON: CTA chest 06/12/25, 10/24/23. CTA chest, CT abdomen and pelvis 08/29/24. XR chest 06/12/25. ACCESSION NUMBER(S): [id number], [id number] ORDERING CLINICIAN: TANNER MEJIA TECHNIQUE:  CTA of the chest was performed following rapid injection of intravenous contrast.  Images are reviewed and processed at a workstation according to the CT angiogram protocol with 3-D and/or MIP post processing imaging generated.  CT of the abdomen and pelvis was performed with intravenous contrast.  Omnipaque-350 100 mL was administered intravenously; positive oral contrast was given.  Automated mA/kV exposure control was utilized and patient examination was performed in strict accordance with principles of ALARA. FINDINGS:  CTA CHEST: Pulmonary arteries are adequately opacified without acute or chronic filling defects.  The thoracic aorta is normal in course and caliber without dissection or aneurysm. The heart is normal in size without pericardial effusion.  Thoracic lymph nodes are not enlarged. There is no pleural effusion, pleural thickening, or pneumothorax. The airways are patent. Lungs are clear without consolidation, interstitial disease, or suspicious nodules. ABDOMEN:  LIVER: No hepatomegaly.   Smooth surface contour.  Normal attenuation.  BILE DUCTS: The common bile duct is dilated with a diameter of approximately 1.3 cm  GALLBLADDER: The gallbladder is not seen and has been removed.  STOMACH: No abnormalities identified.  PANCREAS: There is a large amount of edema around the head of the pancreas. The duodenum which passes through this area also appears inflamed.  SPLEEN: No splenomegaly or focal splenic lesion.  ADRENAL GLANDS: No thickening or nodules.  KIDNEYS AND URETERS: Kidneys are normal in size and location.  No renal or ureteral calculi.  PELVIS:  BLADDER: No abnormalities identified.  REPRODUCTIVE ORGANS: No abnormalities identified.  BOWEL: As stated, the duodenum which passes adjacent to the inflamed pancreas also appears inflamed.  VESSELS: No abnormalities identified.  Abdominal aorta is normal in caliber.  PERITONEUM/RETROPERITONEUM/LYMPH NODES: No free fluid.  No pneumoperitoneum. No lymphadenopathy.  ABDOMINAL WALL: No abnormalities identified. SOFT TISSUES: No abnormalities identified.  BONES: No acute fracture or aggressive osseous lesion.     1. There is a large amount of edema around the head of the pancreas. The duodenum which passes through this area also appears inflamed. This may represent pancreatitis and duodenitis. 2. The common bile duct is dilated with a diameter of approximately 1.3 cm. This may be due to the the previous cholecystectomy or inflammation in the pancreas. 3. No pulmonary emboli or acute pulmonary pathology. Signed by Ady Cruz MD     CT abdomen pelvis w IV contrast  Result Date: 7/3/2025  STUDY: CT Angiogram of the Chest, CT Abdomen and Pelvis with IV Contrast; 07/03/2025 at 3:27 PM INDICATION: Shortness of breath. Unspecified abdominal pain. COMPARISON: CTA chest 06/12/25, 10/24/23. CTA chest, CT abdomen and pelvis 08/29/24. XR chest 06/12/25. ACCESSION NUMBER(S): [id number], [id number] ORDERING CLINICIAN: TANNER MEJIA TECHNIQUE:  CTA of the chest  was performed following rapid injection of intravenous contrast.  Images are reviewed and processed at a workstation according to the CT angiogram protocol with 3-D and/or MIP post processing imaging generated.  CT of the abdomen and pelvis was performed with intravenous contrast.  Omnipaque-350 100 mL was administered intravenously; positive oral contrast was given.  Automated mA/kV exposure control was utilized and patient examination was performed in strict accordance with principles of ALARA. FINDINGS:  CTA CHEST: Pulmonary arteries are adequately opacified without acute or chronic filling defects.  The thoracic aorta is normal in course and caliber without dissection or aneurysm. The heart is normal in size without pericardial effusion.  Thoracic lymph nodes are not enlarged. There is no pleural effusion, pleural thickening, or pneumothorax. The airways are patent. Lungs are clear without consolidation, interstitial disease, or suspicious nodules. ABDOMEN:  LIVER: No hepatomegaly.  Smooth surface contour.  Normal attenuation.  BILE DUCTS: The common bile duct is dilated with a diameter of approximately 1.3 cm  GALLBLADDER: The gallbladder is not seen and has been removed.  STOMACH: No abnormalities identified.  PANCREAS: There is a large amount of edema around the head of the pancreas. The duodenum which passes through this area also appears inflamed.  SPLEEN: No splenomegaly or focal splenic lesion.  ADRENAL GLANDS: No thickening or nodules.  KIDNEYS AND URETERS: Kidneys are normal in size and location.  No renal or ureteral calculi.  PELVIS:  BLADDER: No abnormalities identified.  REPRODUCTIVE ORGANS: No abnormalities identified.  BOWEL: As stated, the duodenum which passes adjacent to the inflamed pancreas also appears inflamed.  VESSELS: No abnormalities identified.  Abdominal aorta is normal in caliber.  PERITONEUM/RETROPERITONEUM/LYMPH NODES: No free fluid.  No pneumoperitoneum. No lymphadenopathy.   ABDOMINAL WALL: No abnormalities identified. SOFT TISSUES: No abnormalities identified.  BONES: No acute fracture or aggressive osseous lesion.     1. There is a large amount of edema around the head of the pancreas. The duodenum which passes through this area also appears inflamed. This may represent pancreatitis and duodenitis. 2. The common bile duct is dilated with a diameter of approximately 1.3 cm. This may be due to the the previous cholecystectomy or inflammation in the pancreas. 3. No pulmonary emboli or acute pulmonary pathology. Signed by Ady Cruz MD     CT head wo IV contrast  Result Date: 7/3/2025  Interpreted By:  Romulo Toure, STUDY: CT HEAD WO IV CONTRAST; ;  7/3/2025 3:22 pm   INDICATION: Signs/Symptoms:headache.   COMPARISON: 08/27/2024   ACCESSION NUMBER(S): [id number]   ORDERING CLINICIAN: TANNER MEJIA   TECHNIQUE: Contiguous unenhanced axial CT sections are performed from the skull base to the vertex.   FINDINGS: The osseous structures are intact. The visualized portions of the paranasal sinuses and mastoid air cells are clear.   The cortical sulci and CSF spaces are symmetric in appearance. There is no sign of parenchymal hematoma or dense extra-axial fluid collection. There is no localized parenchymal edema, mass effect, or shift of the midline. The gray matter/white-matter differentiation is preserved.        No CT evidence of acute intracranial abnormality.     MACRO: None   Signed by: Romulo Toure 7/3/2025 3:26 PM Dictation workstation:   [id number]  Inpatient Medications:  Scheduled Medications[1]  PRN Medications[2]  Continuous Medications[3]    Physical Exam:  Physical Exam  Constitutional:       General: He is not in acute distress.     Appearance: He is obese.   HENT:      Head: Normocephalic.      Nose: Nose normal.      Mouth/Throat:      Mouth: Mucous membranes are moist.   Cardiovascular:      Rate and Rhythm: Normal rate and regular rhythm.      Pulses:  Normal pulses.      Heart sounds: Normal heart sounds. No murmur heard.  Pulmonary:      Effort: Pulmonary effort is normal. No respiratory distress.      Breath sounds: Normal breath sounds.   Abdominal:      General: Abdomen is flat. Bowel sounds are normal.      Palpations: Abdomen is soft.   Musculoskeletal:         General: No swelling.      Cervical back: Neck supple.      Right lower leg: No edema.      Left lower leg: No edema.   Skin:     General: Skin is warm and dry.   Neurological:      General: No focal deficit present.      Mental Status: He is alert. Mental status is at baseline.   Psychiatric:         Mood and Affect: Mood normal.         Behavior: Behavior normal.            Assessment/Plan     Jono Quinn is a 56 yo male with a PMH of HTN, HLD, LITZY, GERD, DM type II, Depression who presented with a chief complaint of abdominal pain. Admitted for pancreatitis. AG metabolic acidosis, source being euglycemic diabetic ketoacidosis.      #Anion Gap  metabolic acidosis--Euglycemic ketoacidosis, resolved  -initial ABG 7.21/15/113/6.0 > 7.11/11/116/3.5 s/p 2 amp  bicarb 7.18/11/108/4.1. Most recent VBG this AM (7/5) 7.37/28/57/16.2  -+ ketones in urine.  - urine tox negative  -d/c Bicarb gtt  -d/c insulin gtt  -NPH 12U BID with q6h SSI   -Mentation significantly improved closing of gap.   -discontinue Jardiance, added as allergy.   -BHB elevated @ 8.74     #Pancreatitis w/ duodenitis  #GERD  -Amylase, Lipase WNL  -pt c/o abd pain with CT showing pancreatic head edema.  -General Surgery following  -KUB with gastric air c/f ileus. NGT placed to LIWS with good output post emesis.  Removed this AM. Full liquid diet.   -PPI for prophylaxis  -monitor for bowel regimen  -c/w meropenem  -MRCP in AM    #Hypokalemia  #Hypophosphatemia  -Goal K4/Phos3/Mg2  -repeat levels with next labs, replete per protocol    #Metabolic encephalopathy 2/2 acidosis, resolved.   #Depression  -Treat underlying  -Resume home Buspar,  Paxil, nortrityline     #HTN  #HLD  -resume home Rosuvastatin  -monitor BP     F: PRN  E: Replete per protocol    N: NPO   A: R femoral triple lumen (placed 7/3/2025), PIV  Dipso: Pt requires inpatient mgmt at this time. [facility]     CVC 07/03/25 Triple lumen Right Femoral (Active)   Site Assessment Clean;Dry;Intact 07/05/25 2100   Dressing Status Clean;Dry;Occlusive 07/05/25 2100   Number of days: 3       Peripheral IV 07/05/25 20 G Anterior;Right;Upper Arm (Active)   Site Assessment Clean;Dry;Intact 07/05/25 2100   Dressing Status Clean;Dry;Occlusive 07/05/25 2100   Number of days: 1       Peripheral IV 07/05/25 20 G Left Antecubital (Active)   Site Assessment Clean;Dry;Intact 07/05/25 2100   Dressing Status Clean;Dry;Occlusive 07/05/25 2100   Number of days: 1       Code Status:  Full Code    I spent  minutes in the professional and overall care of this patient.        Adrienne Gipson PA-C         [1]   Scheduled medications   Medication Dose Route Frequency    busPIRone  15 mg oral TID    enoxaparin  40 mg subcutaneous q24h    famotidine  20 mg oral BID    Or    famotidine  20 mg intravenous BID    gabapentin  600 mg oral TID    gabapentin  900 mg oral Nightly    [Held by provider] insulin lispro  0-15 Units subcutaneous q4h    meropenem  1 g intravenous q8h    [Held by provider] metFORMIN  1,000 mg oral BID    nortriptyline  25 mg oral Nightly    pantoprazole  40 mg intravenous Daily    PARoxetine  30 mg oral Nightly    polyethylene glycol  17 g oral Daily    potassium phosphate  15 mmol intravenous Once    rosuvastatin  40 mg oral Nightly   [2]   PRN medications   Medication    acetaminophen    Or    acetaminophen    Or    acetaminophen    acetaminophen    Or    acetaminophen    Or    acetaminophen    dextrose 10 % in water (D10W)    dextrose 10 % in water (D10W)    dextrose 5%-0.45 % sodium chloride    dextrose    dextrose    diphenhydrAMINE    HYDROmorphone    insulin regular    insulin  regular    melatonin    ondansetron    Or    ondansetron    sodium chloride 0.9%   [3]   Continuous Medications   Medication Dose Last Rate    dextrose 10 % in water (D10W)  150 mL/hr      dextrose 10 % in water (D10W)  150 mL/hr      dextrose 5%-0.45 % sodium chloride  150 mL/hr 150 mL/hr (07/06/25 0401)    insulin regular  0-50 Units/hr 3.8 Units/hr (07/06/25 0401)    sodium chloride 0.9%  10 mL/hr 10 mL/hr (07/05/25 0937)    sodium chloride 0.9%  250 mL/hr

## 2025-07-07 ENCOUNTER — APPOINTMENT (OUTPATIENT)
Dept: RADIOLOGY | Facility: HOSPITAL | Age: 55
End: 2025-07-07
Payer: COMMERCIAL

## 2025-07-07 ENCOUNTER — APPOINTMENT (OUTPATIENT)
Dept: GASTROENTEROLOGY | Facility: HOSPITAL | Age: 55
End: 2025-07-07
Payer: COMMERCIAL

## 2025-07-07 ENCOUNTER — ANESTHESIA (OUTPATIENT)
Dept: GASTROENTEROLOGY | Facility: HOSPITAL | Age: 55
End: 2025-07-07
Payer: COMMERCIAL

## 2025-07-07 VITALS
OXYGEN SATURATION: 97 % | TEMPERATURE: 98.2 F | HEIGHT: 70 IN | HEART RATE: 81 BPM | BODY MASS INDEX: 38.28 KG/M2 | SYSTOLIC BLOOD PRESSURE: 149 MMHG | WEIGHT: 267.42 LBS | RESPIRATION RATE: 24 BRPM | DIASTOLIC BLOOD PRESSURE: 77 MMHG

## 2025-07-07 LAB
ANION GAP SERPL CALC-SCNC: 13 MMOL/L (ref 10–20)
BACTERIA BLD CULT: NORMAL
BACTERIA BLD CULT: NORMAL
BASOPHILS # BLD AUTO: 0.02 X10*3/UL (ref 0–0.1)
BASOPHILS NFR BLD AUTO: 0.6 %
BUN SERPL-MCNC: 8 MG/DL (ref 6–23)
CALCIUM SERPL-MCNC: 8.5 MG/DL (ref 8.6–10.3)
CHLORIDE SERPL-SCNC: 103 MMOL/L (ref 98–107)
CO2 SERPL-SCNC: 24 MMOL/L (ref 21–32)
CREAT SERPL-MCNC: 0.73 MG/DL (ref 0.5–1.3)
EGFRCR SERPLBLD CKD-EPI 2021: >90 ML/MIN/1.73M*2
EOSINOPHIL # BLD AUTO: 0.17 X10*3/UL (ref 0–0.7)
EOSINOPHIL NFR BLD AUTO: 5.5 %
ERYTHROCYTE [DISTWIDTH] IN BLOOD BY AUTOMATED COUNT: 13.5 % (ref 11.5–14.5)
GLUCOSE BLD MANUAL STRIP-MCNC: 106 MG/DL (ref 74–99)
GLUCOSE BLD MANUAL STRIP-MCNC: 130 MG/DL (ref 74–99)
GLUCOSE BLD MANUAL STRIP-MCNC: 146 MG/DL (ref 74–99)
GLUCOSE BLD MANUAL STRIP-MCNC: 149 MG/DL (ref 74–99)
GLUCOSE BLD MANUAL STRIP-MCNC: 151 MG/DL (ref 74–99)
GLUCOSE BLD MANUAL STRIP-MCNC: 241 MG/DL (ref 74–99)
GLUCOSE SERPL-MCNC: 121 MG/DL (ref 74–99)
HCT VFR BLD AUTO: 29.2 % (ref 41–52)
HGB BLD-MCNC: 10 G/DL (ref 13.5–17.5)
HOLD SPECIMEN: NORMAL
HOLD SPECIMEN: NORMAL
IMM GRANULOCYTES # BLD AUTO: 0.04 X10*3/UL (ref 0–0.7)
IMM GRANULOCYTES NFR BLD AUTO: 1.3 % (ref 0–0.9)
LYMPHOCYTES # BLD AUTO: 1.17 X10*3/UL (ref 1.2–4.8)
LYMPHOCYTES NFR BLD AUTO: 37.6 %
MAGNESIUM SERPL-MCNC: 2.02 MG/DL (ref 1.6–2.4)
MCH RBC QN AUTO: 31.3 PG (ref 26–34)
MCHC RBC AUTO-ENTMCNC: 34.2 G/DL (ref 32–36)
MCV RBC AUTO: 92 FL (ref 80–100)
MONOCYTES # BLD AUTO: 0.44 X10*3/UL (ref 0.1–1)
MONOCYTES NFR BLD AUTO: 14.1 %
NEUTROPHILS # BLD AUTO: 1.27 X10*3/UL (ref 1.2–7.7)
NEUTROPHILS NFR BLD AUTO: 40.9 %
NRBC BLD-RTO: 0 /100 WBCS (ref 0–0)
PHOSPHATE SERPL-MCNC: 2.7 MG/DL (ref 2.5–4.9)
PLATELET # BLD AUTO: 132 X10*3/UL (ref 150–450)
POTASSIUM SERPL-SCNC: 3.1 MMOL/L (ref 3.5–5.3)
RBC # BLD AUTO: 3.19 X10*6/UL (ref 4.5–5.9)
SODIUM SERPL-SCNC: 137 MMOL/L (ref 136–145)
WBC # BLD AUTO: 3.1 X10*3/UL (ref 4.4–11.3)

## 2025-07-07 PROCEDURE — 2500000001 HC RX 250 WO HCPCS SELF ADMINISTERED DRUGS (ALT 637 FOR MEDICARE OP): Mod: IPSPLIT | Performed by: HOSPITALIST

## 2025-07-07 PROCEDURE — 2500000004 HC RX 250 GENERAL PHARMACY W/ HCPCS (ALT 636 FOR OP/ED): Mod: IPSPLIT | Performed by: PHYSICIAN ASSISTANT

## 2025-07-07 PROCEDURE — 82374 ASSAY BLOOD CARBON DIOXIDE: CPT | Mod: IPSPLIT | Performed by: STUDENT IN AN ORGANIZED HEALTH CARE EDUCATION/TRAINING PROGRAM

## 2025-07-07 PROCEDURE — 2500000001 HC RX 250 WO HCPCS SELF ADMINISTERED DRUGS (ALT 637 FOR MEDICARE OP): Mod: IPSPLIT | Performed by: PHYSICIAN ASSISTANT

## 2025-07-07 PROCEDURE — 2550000001 HC RX 255 CONTRASTS: Mod: JW,IPSPLIT | Performed by: INTERNAL MEDICINE

## 2025-07-07 PROCEDURE — 2500000005 HC RX 250 GENERAL PHARMACY W/O HCPCS: Mod: IPSPLIT | Performed by: INTERNAL MEDICINE

## 2025-07-07 PROCEDURE — 74183 MRI ABD W/O CNTR FLWD CNTR: CPT | Mod: IPSPLIT

## 2025-07-07 PROCEDURE — 37799 UNLISTED PX VASCULAR SURGERY: CPT | Mod: IPSPLIT | Performed by: STUDENT IN AN ORGANIZED HEALTH CARE EDUCATION/TRAINING PROGRAM

## 2025-07-07 PROCEDURE — 76376 3D RENDER W/INTRP POSTPROCES: CPT | Performed by: RADIOLOGY

## 2025-07-07 PROCEDURE — 2500000002 HC RX 250 W HCPCS SELF ADMINISTERED DRUGS (ALT 637 FOR MEDICARE OP, ALT 636 FOR OP/ED): Mod: IPSPLIT | Performed by: HOSPITALIST

## 2025-07-07 PROCEDURE — 85025 COMPLETE CBC W/AUTO DIFF WBC: CPT | Mod: IPSPLIT | Performed by: STUDENT IN AN ORGANIZED HEALTH CARE EDUCATION/TRAINING PROGRAM

## 2025-07-07 PROCEDURE — 83735 ASSAY OF MAGNESIUM: CPT | Mod: IPSPLIT | Performed by: NURSE PRACTITIONER

## 2025-07-07 PROCEDURE — 2500000002 HC RX 250 W HCPCS SELF ADMINISTERED DRUGS (ALT 637 FOR MEDICARE OP, ALT 636 FOR OP/ED): Mod: IPSPLIT | Performed by: NURSE PRACTITIONER

## 2025-07-07 PROCEDURE — 2500000004 HC RX 250 GENERAL PHARMACY W/ HCPCS (ALT 636 FOR OP/ED): Mod: IPSPLIT | Performed by: SURGERY

## 2025-07-07 PROCEDURE — 82947 ASSAY GLUCOSE BLOOD QUANT: CPT | Mod: IPSPLIT

## 2025-07-07 PROCEDURE — 94760 N-INVAS EAR/PLS OXIMETRY 1: CPT | Mod: IPSPLIT

## 2025-07-07 PROCEDURE — 1100000001 HC PRIVATE ROOM DAILY: Mod: IPSPLIT

## 2025-07-07 PROCEDURE — 2500000002 HC RX 250 W HCPCS SELF ADMINISTERED DRUGS (ALT 637 FOR MEDICARE OP, ALT 636 FOR OP/ED): Mod: IPSPLIT | Performed by: PHYSICIAN ASSISTANT

## 2025-07-07 PROCEDURE — 84100 ASSAY OF PHOSPHORUS: CPT | Mod: IPSPLIT | Performed by: NURSE PRACTITIONER

## 2025-07-07 PROCEDURE — 74183 MRI ABD W/O CNTR FLWD CNTR: CPT | Performed by: RADIOLOGY

## 2025-07-07 PROCEDURE — 2500000004 HC RX 250 GENERAL PHARMACY W/ HCPCS (ALT 636 FOR OP/ED): Mod: IPSPLIT | Performed by: HOSPITALIST

## 2025-07-07 PROCEDURE — 99233 SBSQ HOSP IP/OBS HIGH 50: CPT | Performed by: NURSE PRACTITIONER

## 2025-07-07 PROCEDURE — A9575 INJ GADOTERATE MEGLUMI 0.1ML: HCPCS | Mod: IPSPLIT | Performed by: INTERNAL MEDICINE

## 2025-07-07 RX ORDER — POTASSIUM CHLORIDE 20 MEQ/1
40 TABLET, EXTENDED RELEASE ORAL ONCE
Status: COMPLETED | OUTPATIENT
Start: 2025-07-07 | End: 2025-07-07

## 2025-07-07 RX ORDER — GADOTERATE MEGLUMINE 376.9 MG/ML
24 INJECTION INTRAVENOUS
Status: COMPLETED | OUTPATIENT
Start: 2025-07-07 | End: 2025-07-07

## 2025-07-07 RX ORDER — PANTOPRAZOLE SODIUM 40 MG/1
40 TABLET, DELAYED RELEASE ORAL
Status: DISCONTINUED | OUTPATIENT
Start: 2025-07-08 | End: 2025-07-09 | Stop reason: HOSPADM

## 2025-07-07 RX ADMIN — MEROPENEM AND SODIUM CHLORIDE 1 G: 1 INJECTION, SOLUTION INTRAVENOUS at 23:39

## 2025-07-07 RX ADMIN — BUSPIRONE HYDROCHLORIDE 15 MG: 10 TABLET ORAL at 10:55

## 2025-07-07 RX ADMIN — GABAPENTIN 600 MG: 300 CAPSULE ORAL at 16:22

## 2025-07-07 RX ADMIN — NORTRIPTYLINE HYDROCHLORIDE 25 MG: 25 CAPSULE ORAL at 21:27

## 2025-07-07 RX ADMIN — GABAPENTIN 600 MG: 300 CAPSULE ORAL at 12:34

## 2025-07-07 RX ADMIN — PANTOPRAZOLE SODIUM 40 MG: 40 INJECTION, POWDER, LYOPHILIZED, FOR SOLUTION INTRAVENOUS at 10:55

## 2025-07-07 RX ADMIN — INSULIN LISPRO 3 UNITS: 100 INJECTION, SOLUTION INTRAVENOUS; SUBCUTANEOUS at 23:39

## 2025-07-07 RX ADMIN — GADOTERATE MEGLUMINE 24 ML: 376.9 INJECTION INTRAVENOUS at 10:22

## 2025-07-07 RX ADMIN — ROSUVASTATIN CALCIUM 40 MG: 10 TABLET, FILM COATED ORAL at 21:26

## 2025-07-07 RX ADMIN — Medication 2 L/MIN: at 09:07

## 2025-07-07 RX ADMIN — PAROXETINE HYDROCHLORIDE 30 MG: 20 TABLET, FILM COATED ORAL at 21:26

## 2025-07-07 RX ADMIN — FAMOTIDINE 20 MG: 20 TABLET, FILM COATED ORAL at 21:27

## 2025-07-07 RX ADMIN — MEROPENEM AND SODIUM CHLORIDE 1 G: 1 INJECTION, SOLUTION INTRAVENOUS at 10:55

## 2025-07-07 RX ADMIN — Medication 2 L/MIN: at 20:54

## 2025-07-07 RX ADMIN — GABAPENTIN 900 MG: 300 CAPSULE ORAL at 21:26

## 2025-07-07 RX ADMIN — POTASSIUM CHLORIDE 40 MEQ: 1500 TABLET, EXTENDED RELEASE ORAL at 10:55

## 2025-07-07 RX ADMIN — INSULIN LISPRO 6 UNITS: 100 INJECTION, SOLUTION INTRAVENOUS; SUBCUTANEOUS at 12:43

## 2025-07-07 RX ADMIN — BUSPIRONE HYDROCHLORIDE 15 MG: 10 TABLET ORAL at 15:16

## 2025-07-07 RX ADMIN — INSULIN HUMAN 20 UNITS: 100 INJECTION, SUSPENSION SUBCUTANEOUS at 10:56

## 2025-07-07 RX ADMIN — POLYETHYLENE GLYCOL 3350 17 G: 17 POWDER, FOR SOLUTION ORAL at 21:28

## 2025-07-07 RX ADMIN — BUSPIRONE HYDROCHLORIDE 15 MG: 10 TABLET ORAL at 21:26

## 2025-07-07 RX ADMIN — FAMOTIDINE 20 MG: 20 TABLET, FILM COATED ORAL at 10:54

## 2025-07-07 RX ADMIN — ENOXAPARIN SODIUM 40 MG: 40 INJECTION SUBCUTANEOUS at 21:27

## 2025-07-07 RX ADMIN — POLYETHYLENE GLYCOL 3350 17 G: 17 POWDER, FOR SOLUTION ORAL at 10:59

## 2025-07-07 RX ADMIN — MEROPENEM AND SODIUM CHLORIDE 1 G: 1 INJECTION, SOLUTION INTRAVENOUS at 16:22

## 2025-07-07 ASSESSMENT — PAIN - FUNCTIONAL ASSESSMENT
PAIN_FUNCTIONAL_ASSESSMENT: 0-10

## 2025-07-07 ASSESSMENT — COGNITIVE AND FUNCTIONAL STATUS - GENERAL
DAILY ACTIVITIY SCORE: 21
MOBILITY SCORE: 23
DRESSING REGULAR UPPER BODY CLOTHING: A LITTLE
DRESSING REGULAR LOWER BODY CLOTHING: A LITTLE
HELP NEEDED FOR BATHING: A LITTLE
TURNING FROM BACK TO SIDE WHILE IN FLAT BAD: A LITTLE

## 2025-07-07 ASSESSMENT — ACTIVITIES OF DAILY LIVING (ADL): LACK_OF_TRANSPORTATION: NO

## 2025-07-07 ASSESSMENT — PAIN SCALES - GENERAL
PAINLEVEL_OUTOF10: 1
PAINLEVEL_OUTOF10: 1
PAINLEVEL_OUTOF10: 0 - NO PAIN

## 2025-07-07 ASSESSMENT — PAIN DESCRIPTION - DESCRIPTORS: DESCRIPTORS: ACHING

## 2025-07-07 NOTE — PROGRESS NOTES
07/07/25 1122   Discharge Planning   Living Arrangements Alone   Support Systems Friends/neighbors;Family members   Assistance Needed Patient lives in an apartment alone.  He's independent with ADLS, IADLS, ambulation and drives.  He is employed and works from home.  He has DME if needed:  walker, cane, grab bars.  He has a CPAP but doesn't use home oxygen. He drove himself to the hospital.   Type of Residence Private residence   Number of Stairs to Enter Residence 2   Number of Stairs Within Residence 0   Do you have animals or pets at home? No   Expected Discharge Disposition Home   Does the patient need discharge transport arranged? No  (Car at hospital)   Housing Stability   In the last 12 months, was there a time when you were not able to pay the mortgage or rent on time? N   In the past 12 months, how many times have you moved where you were living? 0   At any time in the past 12 months, were you homeless or living in a shelter (including now)? N   Transportation Needs   In the past 12 months, has lack of transportation kept you from medical appointments or from getting medications? no   In the past 12 months, has lack of transportation kept you from meetings, work, or from getting things needed for daily living? No   Stroke Family Assessment   Stroke Family Assessment Needed No   Intensity of Service   Intensity of Service 0-30 min      Confirmed address and phone number.  PCP is Trudy Gordon.     CT PLAN:  Home

## 2025-07-07 NOTE — CARE PLAN
The patient's goals for the shift include Pain control    The clinical goals for the shift include patient will tolerate full liquid diet this shift    Over the shift, fem line removed, patient tolerated well, Tegaderm applied over area. MRCP completed, see results. Patient remains on a full liquid diet and tolerating well.

## 2025-07-08 ENCOUNTER — APPOINTMENT (OUTPATIENT)
Dept: ENDOCRINOLOGY | Facility: CLINIC | Age: 55
End: 2025-07-08
Payer: COMMERCIAL

## 2025-07-08 PROBLEM — E83.31: Status: ACTIVE | Noted: 2025-07-08

## 2025-07-08 PROBLEM — G93.41 METABOLIC ENCEPHALOPATHY: Status: ACTIVE | Noted: 2025-07-08

## 2025-07-08 PROBLEM — E11.10 DIABETIC KETOACIDOSIS WITHOUT COMA ASSOCIATED WITH TYPE 2 DIABETES MELLITUS: Status: ACTIVE | Noted: 2025-07-08

## 2025-07-08 PROBLEM — M90.80: Status: ACTIVE | Noted: 2025-07-08

## 2025-07-08 PROBLEM — E87.29 HIGH ANION GAP METABOLIC ACIDOSIS: Status: ACTIVE | Noted: 2025-07-08

## 2025-07-08 PROBLEM — E87.6 HYPOKALEMIA: Status: ACTIVE | Noted: 2025-07-08

## 2025-07-08 LAB
ANION GAP SERPL CALC-SCNC: 14 MMOL/L (ref 10–20)
BASOPHILS # BLD AUTO: 0.01 X10*3/UL (ref 0–0.1)
BASOPHILS NFR BLD AUTO: 0.3 %
BUN SERPL-MCNC: 7 MG/DL (ref 6–23)
CALCIUM SERPL-MCNC: 8.9 MG/DL (ref 8.6–10.3)
CHLORIDE SERPL-SCNC: 101 MMOL/L (ref 98–107)
CO2 SERPL-SCNC: 27 MMOL/L (ref 21–32)
CREAT SERPL-MCNC: 0.64 MG/DL (ref 0.5–1.3)
EGFRCR SERPLBLD CKD-EPI 2021: >90 ML/MIN/1.73M*2
EOSINOPHIL # BLD AUTO: 0.16 X10*3/UL (ref 0–0.7)
EOSINOPHIL NFR BLD AUTO: 5.1 %
ERYTHROCYTE [DISTWIDTH] IN BLOOD BY AUTOMATED COUNT: 13.1 % (ref 11.5–14.5)
GLUCOSE BLD MANUAL STRIP-MCNC: 158 MG/DL (ref 74–99)
GLUCOSE BLD MANUAL STRIP-MCNC: 186 MG/DL (ref 74–99)
GLUCOSE BLD MANUAL STRIP-MCNC: 198 MG/DL (ref 74–99)
GLUCOSE BLD MANUAL STRIP-MCNC: 206 MG/DL (ref 74–99)
GLUCOSE SERPL-MCNC: 150 MG/DL (ref 74–99)
HCT VFR BLD AUTO: 30.4 % (ref 41–52)
HGB BLD-MCNC: 10.4 G/DL (ref 13.5–17.5)
HOLD SPECIMEN: NORMAL
IMM GRANULOCYTES # BLD AUTO: 0.05 X10*3/UL (ref 0–0.7)
IMM GRANULOCYTES NFR BLD AUTO: 1.6 % (ref 0–0.9)
LYMPHOCYTES # BLD AUTO: 1.48 X10*3/UL (ref 1.2–4.8)
LYMPHOCYTES NFR BLD AUTO: 47 %
MCH RBC QN AUTO: 31 PG (ref 26–34)
MCHC RBC AUTO-ENTMCNC: 34.2 G/DL (ref 32–36)
MCV RBC AUTO: 91 FL (ref 80–100)
MONOCYTES # BLD AUTO: 0.32 X10*3/UL (ref 0.1–1)
MONOCYTES NFR BLD AUTO: 10.2 %
NEUTROPHILS # BLD AUTO: 1.13 X10*3/UL (ref 1.2–7.7)
NEUTROPHILS NFR BLD AUTO: 35.8 %
NRBC BLD-RTO: 0 /100 WBCS (ref 0–0)
PLATELET # BLD AUTO: 130 X10*3/UL (ref 150–450)
POTASSIUM SERPL-SCNC: 3.3 MMOL/L (ref 3.5–5.3)
RBC # BLD AUTO: 3.35 X10*6/UL (ref 4.5–5.9)
SODIUM SERPL-SCNC: 139 MMOL/L (ref 136–145)
WBC # BLD AUTO: 3.2 X10*3/UL (ref 4.4–11.3)

## 2025-07-08 PROCEDURE — 80048 BASIC METABOLIC PNL TOTAL CA: CPT | Mod: IPSPLIT | Performed by: STUDENT IN AN ORGANIZED HEALTH CARE EDUCATION/TRAINING PROGRAM

## 2025-07-08 PROCEDURE — 99233 SBSQ HOSP IP/OBS HIGH 50: CPT | Performed by: NURSE PRACTITIONER

## 2025-07-08 PROCEDURE — 2500000001 HC RX 250 WO HCPCS SELF ADMINISTERED DRUGS (ALT 637 FOR MEDICARE OP): Mod: IPSPLIT | Performed by: NURSE PRACTITIONER

## 2025-07-08 PROCEDURE — 2500000005 HC RX 250 GENERAL PHARMACY W/O HCPCS: Mod: IPSPLIT | Performed by: NURSE PRACTITIONER

## 2025-07-08 PROCEDURE — 94760 N-INVAS EAR/PLS OXIMETRY 1: CPT | Mod: IPSPLIT

## 2025-07-08 PROCEDURE — 85025 COMPLETE CBC W/AUTO DIFF WBC: CPT | Mod: IPSPLIT | Performed by: STUDENT IN AN ORGANIZED HEALTH CARE EDUCATION/TRAINING PROGRAM

## 2025-07-08 PROCEDURE — 2500000002 HC RX 250 W HCPCS SELF ADMINISTERED DRUGS (ALT 637 FOR MEDICARE OP, ALT 636 FOR OP/ED): Mod: IPSPLIT | Performed by: NURSE PRACTITIONER

## 2025-07-08 PROCEDURE — 1100000001 HC PRIVATE ROOM DAILY: Mod: IPSPLIT

## 2025-07-08 PROCEDURE — 36415 COLL VENOUS BLD VENIPUNCTURE: CPT | Mod: IPSPLIT | Performed by: STUDENT IN AN ORGANIZED HEALTH CARE EDUCATION/TRAINING PROGRAM

## 2025-07-08 PROCEDURE — 2500000004 HC RX 250 GENERAL PHARMACY W/ HCPCS (ALT 636 FOR OP/ED): Mod: IPSPLIT | Performed by: HOSPITALIST

## 2025-07-08 PROCEDURE — 2500000001 HC RX 250 WO HCPCS SELF ADMINISTERED DRUGS (ALT 637 FOR MEDICARE OP): Mod: IPSPLIT | Performed by: PHYSICIAN ASSISTANT

## 2025-07-08 PROCEDURE — 99232 SBSQ HOSP IP/OBS MODERATE 35: CPT | Performed by: SURGERY

## 2025-07-08 PROCEDURE — 2500000004 HC RX 250 GENERAL PHARMACY W/ HCPCS (ALT 636 FOR OP/ED): Mod: IPSPLIT | Performed by: PHYSICIAN ASSISTANT

## 2025-07-08 PROCEDURE — 2500000004 HC RX 250 GENERAL PHARMACY W/ HCPCS (ALT 636 FOR OP/ED): Mod: IPSPLIT | Performed by: NURSE PRACTITIONER

## 2025-07-08 PROCEDURE — 82947 ASSAY GLUCOSE BLOOD QUANT: CPT | Mod: IPSPLIT

## 2025-07-08 PROCEDURE — 2500000002 HC RX 250 W HCPCS SELF ADMINISTERED DRUGS (ALT 637 FOR MEDICARE OP, ALT 636 FOR OP/ED): Mod: IPSPLIT | Performed by: PHYSICIAN ASSISTANT

## 2025-07-08 PROCEDURE — 2500000001 HC RX 250 WO HCPCS SELF ADMINISTERED DRUGS (ALT 637 FOR MEDICARE OP): Mod: IPSPLIT | Performed by: HOSPITALIST

## 2025-07-08 RX ORDER — POTASSIUM CHLORIDE 20 MEQ/1
40 TABLET, EXTENDED RELEASE ORAL 2 TIMES DAILY
Status: COMPLETED | OUTPATIENT
Start: 2025-07-08 | End: 2025-07-08

## 2025-07-08 RX ADMIN — POTASSIUM CHLORIDE 40 MEQ: 1500 TABLET, EXTENDED RELEASE ORAL at 08:46

## 2025-07-08 RX ADMIN — INSULIN LISPRO 3 UNITS: 100 INJECTION, SOLUTION INTRAVENOUS; SUBCUTANEOUS at 08:44

## 2025-07-08 RX ADMIN — PAROXETINE HYDROCHLORIDE 30 MG: 20 TABLET, FILM COATED ORAL at 20:44

## 2025-07-08 RX ADMIN — MEROPENEM AND SODIUM CHLORIDE 1 G: 1 INJECTION, SOLUTION INTRAVENOUS at 08:45

## 2025-07-08 RX ADMIN — METFORMIN HYDROCHLORIDE 1000 MG: 500 TABLET, FILM COATED ORAL at 16:59

## 2025-07-08 RX ADMIN — Medication 2 L/MIN: at 23:26

## 2025-07-08 RX ADMIN — POLYETHYLENE GLYCOL 3350 17 G: 17 POWDER, FOR SOLUTION ORAL at 08:46

## 2025-07-08 RX ADMIN — GABAPENTIN 600 MG: 300 CAPSULE ORAL at 12:26

## 2025-07-08 RX ADMIN — FAMOTIDINE 20 MG: 20 TABLET, FILM COATED ORAL at 08:46

## 2025-07-08 RX ADMIN — GABAPENTIN 600 MG: 300 CAPSULE ORAL at 08:43

## 2025-07-08 RX ADMIN — INSULIN HUMAN 20 UNITS: 100 INJECTION, SUSPENSION SUBCUTANEOUS at 17:00

## 2025-07-08 RX ADMIN — NORTRIPTYLINE HYDROCHLORIDE 25 MG: 25 CAPSULE ORAL at 20:44

## 2025-07-08 RX ADMIN — INSULIN LISPRO 3 UNITS: 100 INJECTION, SOLUTION INTRAVENOUS; SUBCUTANEOUS at 20:45

## 2025-07-08 RX ADMIN — INSULIN LISPRO 6 UNITS: 100 INJECTION, SOLUTION INTRAVENOUS; SUBCUTANEOUS at 12:26

## 2025-07-08 RX ADMIN — BUSPIRONE HYDROCHLORIDE 15 MG: 10 TABLET ORAL at 16:59

## 2025-07-08 RX ADMIN — BUSPIRONE HYDROCHLORIDE 15 MG: 10 TABLET ORAL at 08:46

## 2025-07-08 RX ADMIN — GABAPENTIN 600 MG: 300 CAPSULE ORAL at 17:00

## 2025-07-08 RX ADMIN — PANTOPRAZOLE SODIUM 40 MG: 40 TABLET, DELAYED RELEASE ORAL at 06:32

## 2025-07-08 RX ADMIN — BUSPIRONE HYDROCHLORIDE 15 MG: 10 TABLET ORAL at 20:44

## 2025-07-08 RX ADMIN — FAMOTIDINE 20 MG: 20 TABLET, FILM COATED ORAL at 20:43

## 2025-07-08 RX ADMIN — GABAPENTIN 900 MG: 300 CAPSULE ORAL at 20:44

## 2025-07-08 RX ADMIN — METFORMIN HYDROCHLORIDE 1000 MG: 500 TABLET, FILM COATED ORAL at 10:04

## 2025-07-08 RX ADMIN — ENOXAPARIN SODIUM 40 MG: 40 INJECTION SUBCUTANEOUS at 20:44

## 2025-07-08 RX ADMIN — ROSUVASTATIN CALCIUM 40 MG: 10 TABLET, FILM COATED ORAL at 20:43

## 2025-07-08 RX ADMIN — POTASSIUM CHLORIDE 40 MEQ: 1500 TABLET, EXTENDED RELEASE ORAL at 20:47

## 2025-07-08 RX ADMIN — INSULIN LISPRO 3 UNITS: 100 INJECTION, SOLUTION INTRAVENOUS; SUBCUTANEOUS at 17:00

## 2025-07-08 ASSESSMENT — COGNITIVE AND FUNCTIONAL STATUS - GENERAL
HELP NEEDED FOR BATHING: A LITTLE
DAILY ACTIVITIY SCORE: 21
DRESSING REGULAR LOWER BODY CLOTHING: A LITTLE
MOBILITY SCORE: 24
DRESSING REGULAR UPPER BODY CLOTHING: A LITTLE

## 2025-07-08 ASSESSMENT — PAIN SCALES - GENERAL
PAINLEVEL_OUTOF10: 0 - NO PAIN

## 2025-07-08 ASSESSMENT — PAIN - FUNCTIONAL ASSESSMENT
PAIN_FUNCTIONAL_ASSESSMENT: 0-10

## 2025-07-08 NOTE — PROGRESS NOTES
"Jono Quinn is a 55 y.o. male on day 4 of admission presenting with Abdominal pain, unspecified abdominal location.    Subjective   Feeling much better.  MRCP confirmed interstitial edematous pancreatitis of the head.       Objective     Physical Exam  Constitutional:       Appearance: Normal appearance.   Abdominal:      Palpations: Abdomen is soft. There is no mass.      Tenderness: There is no abdominal tenderness. There is no guarding.         Last Recorded Vitals  Blood pressure 133/81, pulse 79, temperature 36.5 °C (97.7 °F), temperature source Temporal, resp. rate 18, height 1.78 m (5' 10.08\"), weight 122 kg (268 lb 4.8 oz), SpO2 94%.  Intake/Output last 3 Shifts:  I/O last 3 completed shifts:  In: 2030 (16.7 mL/kg) [P.O.:1380; I.V.:450 (3.7 mL/kg); IV Piggyback:200]  Out: 4325 (35.5 mL/kg) [Urine:4325 (1 mL/kg/hr)]  Weight: 121.7 kg     Relevant Results  Review of MRCP performed 7/7/2025     IMPRESSION:  1. Persisting findings of focal acute interstitial edematous  pancreatitis without fluid collection involving the head/uncinate  process.  2. No biliary tract stone, obstruction, or dilatation. What was  described as a dilated common bile duct on recent CT correlates with  a small duodenal diverticulum which was also present on prior MRI.  3. Hepatosplenomegaly      Assessment & Plan  Abdominal pain, unspecified abdominal location    Type 1 diabetes mellitus with ketoacidosis without coma    Acute pancreatitis, unspecified complication status, unspecified pancreatitis type ([facility]HS-HCC)    Duodenitis    Impression -Resolved euglycemic diabetic ketoacidosis, asymptomatic pancreatitis.   Plan - Follow  conservatively.  Advance diet      Fermin Bynum MD    "

## 2025-07-08 NOTE — NURSING NOTE
Patient transferred to med/surg via bed. Just got an alert that patient meets sepsis criteria for HR=96, RR=22. Sepsis treatment ongoing with Meropenem.Madeline Bustillo CNP, and Sonal Laguerre RN, made aware.

## 2025-07-08 NOTE — CARE PLAN
The patient's goals for the shift include eating regular food.    The clinical goals for the shift include increasing activity, tolerating upgraded diet with increased pain or nausea, monitor blood glucose, provide diabetic education, and transfer to med/surg.

## 2025-07-08 NOTE — PROGRESS NOTES
"Jono Quinn is a 55 y.o. male on day 4 of admission presenting with Abdominal pain, unspecified abdominal location.    Subjective   He is resting in bed this morning, alert and oriented.  He denies pain at this time, diet tolerated well.  We discussed his continued improvement, lab results and likely discharge tomorrow if improvement continues.  All questions answered.     Objective     Physical Exam  Constitutional:       General: He is not in acute distress.     Appearance: He is not ill-appearing.   HENT:      Head: Normocephalic and atraumatic.      Mouth/Throat:      Mouth: Mucous membranes are moist.   Eyes:      General: No scleral icterus.        Right eye: No discharge.         Left eye: No discharge.   Cardiovascular:      Rate and Rhythm: Normal rate and regular rhythm.      Pulses: Normal pulses.      Heart sounds: Normal heart sounds.   Pulmonary:      Effort: Pulmonary effort is normal. No respiratory distress.      Breath sounds: Normal breath sounds. No wheezing or rales.   Chest:      Chest wall: No tenderness.   Abdominal:      Palpations: Abdomen is soft.      Tenderness: There is no guarding or rebound.      Comments: Mild right lower quadrant abdominal pain with palpation   Musculoskeletal:         General: No swelling or tenderness.   Skin:     General: Skin is warm and dry.      Capillary Refill: Capillary refill takes less than 2 seconds.   Neurological:      General: No focal deficit present.      Mental Status: He is alert and oriented to person, place, and time.   Psychiatric:         Mood and Affect: Mood normal.         Behavior: Behavior normal.       Last Recorded Vitals  Blood pressure (!) 150/93, pulse 77, temperature 36.4 °C (97.5 °F), temperature source Temporal, resp. rate 16, height 1.78 m (5' 10.08\"), weight 122 kg (268 lb 4.8 oz), SpO2 95%.  Intake/Output last 3 Shifts:  I/O last 3 completed shifts:  In: 1430 (11.8 mL/kg) [P.O.:780; I.V.:450 (3.7 mL/kg); IV Piggyback:200]  Out: " 3375 (27.7 mL/kg) [Urine:3375 (0.8 mL/kg/hr)]  Weight: 121.7 kg     Scheduled medications  Scheduled Medications[1]  Continuous medications  Continuous Medications[2]  PRN medications  PRN Medications[3]    Relevant Results  MRCP pancreas w and wo IV contrast  Result Date: 7/7/2025  1. Persisting findings of focal acute interstitial edematous pancreatitis without fluid collection involving the head/uncinate process. 2. No biliary tract stone, obstruction, or dilatation. What was described as a dilated common bile duct on recent CT correlates with a small duodenal diverticulum which was also present on prior MRI. 3. Hepatosplenomegaly.     MACRO: None   Signed by: Shan Campos 7/7/2025 1:29 PM Dictation workstation:   [id number]    ECG 12 lead  Result Date: 7/6/2025  Normal sinus rhythm Right bundle branch block Abnormal ECG When compared with ECG of 12-JUN-2025 15:29, (unconfirmed) No significant change was found See ED provider note for full interpretation and clinical correlation Confirmed by Kendra Martínez ([id number]) on 7/6/2025 8:44:24 PM    XR abdomen 2 views supine and erect or decub  Result Date: 7/4/2025  Gaseous distention of the stomach. Otherwise nonspecific, nonobstructive bowel gas pattern.   MACRO: None.   Signed by: Philomena Kim 7/4/2025 12:19 PM Dictation workstation:   [id number]    CT angio chest for pulmonary embolism  CT abdomen pelvis w IV contrast  Result Date: 7/3/2025  1. There is a large amount of edema around the head of the pancreas. The duodenum which passes through this area also appears inflamed. This may represent pancreatitis and duodenitis. 2. The common bile duct is dilated with a diameter of approximately 1.3 cm. This may be due to the the previous cholecystectomy or inflammation in the pancreas. 3. No pulmonary emboli or acute pulmonary pathology. Signed by Ady Cruz MD    CT head wo IV contrast  Result Date: 7/3/2025  No CT evidence of acute intracranial abnormality.      MACRO: None   Signed by: Romulo Toure 7/3/2025 3:26 PM Dictation workstation:   [id number]     Latest Reference Range & Units 07/06/25 11:55 07/07/25 04:26 07/08/25 05:56   GLUCOSE 74 - 99 mg/dL 192 (H) 121 (H) 150 (H)   SODIUM 136 - 145 mmol/L 136 137 139   POTASSIUM 3.5 - 5.3 mmol/L 3.6 3.1 (L) 3.3 (L)   CHLORIDE 98 - 107 mmol/L 103 103 101   Bicarbonate 21 - 32 mmol/L 22 24 27   Anion Gap 10 - 20 mmol/L 15 13 14   Blood Urea Nitrogen 6 - 23 mg/dL 10 8 7   Creatinine 0.50 - 1.30 mg/dL 0.80 0.73 0.64   EGFR >60 mL/min/1.73m*2 >90 >90 >90   Calcium 8.6 - 10.3 mg/dL 8.7 8.5 (L) 8.9   PHOSPHORUS 2.5 - 4.9 mg/dL  2.7       Latest Reference Range & Units 07/06/25 02:02 07/07/25 04:26 07/08/25 05:56   WBC 4.4 - 11.3 x10*3/uL 3.6 (L) 3.1 (L) 3.2 (L)   nRBC 0.0 - 0.0 /100 WBCs 0.0 0.0 0.0   RBC 4.50 - 5.90 x10*6/uL 3.37 (L) 3.19 (L) 3.35 (L)   HEMOGLOBIN 13.5 - 17.5 g/dL 10.5 (L) 10.0 (L) 10.4 (L)   HEMATOCRIT 41.0 - 52.0 % 30.8 (L) 29.2 (L) 30.4 (L)   MCV 80 - 100 fL 91 92 91   MCH 26.0 - 34.0 pg 31.2 31.3 31.0   MCHC 32.0 - 36.0 g/dL 34.1 34.2 34.2   RED CELL DISTRIBUTION WIDTH 11.5 - 14.5 % 14.2 13.5 13.1   Platelets 150 - 450 x10*3/uL 141 (L) 132 (L) 130 (L)     Assessment & Plan  Abdominal pain, unspecified abdominal location    Acute pancreatitis, unspecified complication status, unspecified pancreatitis type ([facility]HS-HCC)    Type 1 diabetes mellitus with ketoacidosis without coma    Duodenitis    High anion gap metabolic acidosis    Diabetic ketoacidosis without coma associated with type 2 diabetes mellitus    GERD (gastroesophageal reflux disease)    Hypokalemia    Autosomal hypophosphatemic bone disease    Metabolic encephalopathy    Depression    Hypertriglyceridemia    Primary hypertension    Anion Gap metabolic acidosis  Euglycemic ketoacidosis, resolved  -ABG 7.21/15/113/6.0 > 7.18/11/108/4.1  -s/p 2 amp  bicarb  -recent VBG (7/5) 7.37/28/57/16.2  -+ ketones in urine  -urine tox negative  -Bicarb gtt,  insulin gtt, dc'd  -NPH 12U BID with q6h SSI   -Accucheck x 4 daily   -Hypoglycemic protocol  -Mentation significantly improved   -discontinued Jardiance, added as allergy  -BHB elevated @ 8.74      Pancreatitis w/ duodenitis  GERD  -Amylase, Lipase WNL  -CT a/p: 1. There is a large amount of edema around the head of the pancreas. The duodenum which passes through this area also appears inflamed. This may represent pancreatitis and duodenitis. 2. The common bile duct is dilated with a diameter of approximately 1.3 cm. This may be due to the the previous cholecystectomy or inflammation in the pancreas.  3. No pulmonary emboli or acute pulmonary pathology.  -General Surgery following  -KUB with gastric air c/f ileus. NGT placed to LIWS with good output post emesis.    -NGT Removed, full liquid diet.   -PPI for prophylaxis  -bowel regimen  -c/w IV meropenem, de-escalate as able  -metformin restarted  -MRCP1. Persisting findings of focal acute interstitial edematous pancreatitis without fluid collection involving the head/uncinate process.2. No biliary tract stone, obstruction, or dilatation. What was described as a dilated common bile duct on recent CT correlates with a small duodenal diverticulum which was also present on prior MRI. 3. Hepatosplenomegaly.     Hypokalemia  Hypophosphatemia  -repeat levels with next labs, replete per protocol  -K 3.1 > 3.3  -replete as needed   -Phos 2.7  -Mag 2.02  -Monitor  -Daily BMP     Metabolic encephalopathy 2/2 acidosis, resolved.   Depression  -Treat underlying  -Resume home Buspar, Paxil, nortrityline     HTN  HLD  -resume home Rosuvastatin  -monitor BP     GI ppx: PPI  DVT ppx: enoxaparin, SCDs  Fluids: As needed  Electrolytes: replace as needed  Nutrition: Full liquid diet  Adjuncts: PIV  Code Status:  Pt requires inpatient stay at this time.    BETH Cabral-CNP           [1] busPIRone, 15 mg, oral, TID  enoxaparin, 40 mg, subcutaneous, q24h  famotidine, 20 mg,  oral, BID   Or  famotidine, 20 mg, intravenous, BID  gabapentin, 600 mg, oral, TID  gabapentin, 900 mg, oral, Nightly  insulin lispro, 0-15 Units, subcutaneous, q4h  insulin NPH and regular human, 20 Units, subcutaneous, BID AC  meropenem, 1 g, intravenous, q8h  metFORMIN, 1,000 mg, oral, BID  nortriptyline, 25 mg, oral, Nightly  pantoprazole, 40 mg, oral, Daily before breakfast  PARoxetine, 30 mg, oral, Nightly  polyethylene glycol, 17 g, oral, BID  potassium chloride CR, 40 mEq, oral, BID  rosuvastatin, 40 mg, oral, Nightly     [2] sodium chloride 0.9%, 10 mL/hr, Last Rate: 10 mL/hr (07/05/25 0937)     [3] PRN medications: acetaminophen **OR** [DISCONTINUED] acetaminophen **OR** [DISCONTINUED] acetaminophen, acetaminophen **OR** [DISCONTINUED] acetaminophen **OR** [DISCONTINUED] acetaminophen, bisacodyl, dextrose, dextrose, dextrose, diphenhydrAMINE, glucagon, glucagon, HYDROmorphone, insulin regular, insulin regular, melatonin, ondansetron **OR** ondansetron, oxygen, sodium chloride 0.9%, sodium phosphates

## 2025-07-08 NOTE — PROGRESS NOTES
"Jono Quinn is a 55 y.o. male on day 4 of admission presenting with Abdominal pain, unspecified abdominal location.    Subjective   Patient lying in bed, no distress noted, alert and conversant.  States that he is hungry.  Denies nausea vomiting or diarrhea.  Endorses mild right lower quadrant abdominal pain.  Patient will have MRCP at 930.  Patient agreeable to plan of care.  All questions answered.       Objective     Physical Exam  Constitutional:       General: He is not in acute distress.     Appearance: He is not ill-appearing.   HENT:      Head: Normocephalic and atraumatic.      Mouth/Throat:      Mouth: Mucous membranes are moist.   Eyes:      General: No scleral icterus.        Right eye: No discharge.         Left eye: No discharge.   Cardiovascular:      Rate and Rhythm: Normal rate and regular rhythm.      Pulses: Normal pulses.      Heart sounds: Normal heart sounds.   Pulmonary:      Effort: Pulmonary effort is normal. No respiratory distress.      Breath sounds: Normal breath sounds. No wheezing or rales.   Chest:      Chest wall: No tenderness.   Abdominal:      Palpations: Abdomen is soft.      Tenderness: There is no guarding or rebound.      Comments: Mild right lower quadrant abdominal pain with palpation   Musculoskeletal:         General: No swelling or tenderness.   Skin:     General: Skin is warm and dry.      Capillary Refill: Capillary refill takes less than 2 seconds.   Neurological:      General: No focal deficit present.      Mental Status: He is alert and oriented to person, place, and time.   Psychiatric:         Mood and Affect: Mood normal.         Behavior: Behavior normal.         Last Recorded Vitals  Blood pressure 134/76, pulse 72, temperature 36.5 °C (97.7 °F), temperature source Temporal, resp. rate 25, height 1.78 m (5' 10.08\"), weight 122 kg (268 lb 4.8 oz), SpO2 98%.  Intake/Output last 3 Shifts:  I/O last 3 completed shifts:  In: 2030 (16.7 mL/kg) [P.O.:1380; I.V.:450 " (3.7 mL/kg); IV Piggyback:200]  Out: 4325 (35.5 mL/kg) [Urine:4325 (1 mL/kg/hr)]  Weight: 121.7 kg     Relevant Results  Scheduled medications  Scheduled Medications[1]  Continuous medications  Continuous Medications[2]  PRN medications  PRN Medications[3]   Latest Reference Range & Units 07/06/25 11:55 07/07/25 04:26   GLUCOSE 74 - 99 mg/dL 192 (H) 121 (H)   SODIUM 136 - 145 mmol/L 136 137   POTASSIUM 3.5 - 5.3 mmol/L 3.6 3.1 (L)   CHLORIDE 98 - 107 mmol/L 103 103   Bicarbonate 21 - 32 mmol/L 22 24   Anion Gap 10 - 20 mmol/L 15 13   Blood Urea Nitrogen 6 - 23 mg/dL 10 8   Creatinine 0.50 - 1.30 mg/dL 0.80 0.73   EGFR >60 mL/min/1.73m*2 >90 >90   Calcium 8.6 - 10.3 mg/dL 8.7 8.5 (L)   PHOSPHORUS 2.5 - 4.9 mg/dL  2.7   MAGNESIUM 1.60 - 2.40 mg/dL  2.02   WBC 4.4 - 11.3 x10*3/uL  3.1 (L)   nRBC 0.0 - 0.0 /100 WBCs  0.0   RBC 4.50 - 5.90 x10*6/uL  3.19 (L)   HEMOGLOBIN 13.5 - 17.5 g/dL  10.0 (L)   HEMATOCRIT 41.0 - 52.0 %  29.2 (L)   MCV 80 - 100 fL  92   MCH 26.0 - 34.0 pg  31.3   MCHC 32.0 - 36.0 g/dL  34.2   RED CELL DISTRIBUTION WIDTH 11.5 - 14.5 %  13.5   Platelets 150 - 450 x10*3/uL  132 (L)   Neutrophils % 40.0 - 80.0 %  40.9   Immature Granulocytes %, Automated 0.0 - 0.9 %  1.3 (H)   Lymphocytes % 13.0 - 44.0 %  37.6   Monocytes % 2.0 - 10.0 %  14.1   Eosinophils % 0.0 - 6.0 %  5.5   Basophils % 0.0 - 2.0 %  0.6   Neutrophils Absolute 1.20 - 7.70 x10*3/uL  1.27   Immature Granulocytes Absolute, Automated 0.00 - 0.70 x10*3/uL  0.04   Lymphocytes Absolute 1.20 - 4.80 x10*3/uL  1.17 (L)   Monocytes Absolute 0.10 - 1.00 x10*3/uL  0.44   Eosinophils Absolute 0.00 - 0.70 x10*3/uL  0.17   Basophils Absolute 0.00 - 0.10 x10*3/uL  0.02   (H): Data is abnormally high  (L): Data is abnormally low    MRCP pancreas w and wo IV contrast  Result Date: 7/7/2025  1. Persisting findings of focal acute interstitial edematous pancreatitis without fluid collection involving the head/uncinate process. 2. No biliary tract stone,  obstruction, or dilatation. What was described as a dilated common bile duct on recent CT correlates with a small duodenal diverticulum which was also present on prior MRI. 3. Hepatosplenomegaly.     MACRO: None   Signed by: Shan Campos 7/7/2025 1:29 PM Dictation workstation:   [id number]    XR abdomen 2 views supine and erect or decub  Result Date: 7/4/2025  Gaseous distention of the stomach. Otherwise nonspecific, nonobstructive bowel gas pattern.   MACRO: None.   Signed by: Philomena Kim 7/4/2025 12:19 PM Dictation workstation:   [id number]    XR chest abdomen for OG NG placement  Result Date: 7/4/2025  See above     MACRO: None   Signed by: Joseph Schoenberger 7/4/2025 11:58 AM Dictation workstation:   [id number]    CT angio chest for pulmonary embolism  Result Date: 7/3/2025  1. There is a large amount of edema around the head of the pancreas. The duodenum which passes through this area also appears inflamed. This may represent pancreatitis and duodenitis. 2. The common bile duct is dilated with a diameter of approximately 1.3 cm. This may be due to the the previous cholecystectomy or inflammation in the pancreas. 3. No pulmonary emboli or acute pulmonary pathology. Signed by Ady Cruz MD    CT abdomen pelvis w IV contrast  Result Date: 7/3/2025  1. There is a large amount of edema around the head of the pancreas. The duodenum which passes through this area also appears inflamed. This may represent pancreatitis and duodenitis. 2. The common bile duct is dilated with a diameter of approximately 1.3 cm. This may be due to the the previous cholecystectomy or inflammation in the pancreas. 3. No pulmonary emboli or acute pulmonary pathology. Signed by Ady Cruz MD    CT head wo IV contrast  Result Date: 7/3/2025  No CT evidence of acute intracranial abnormality.     MACRO: None   Signed by: Romulo Toure 7/3/2025 3:26 PM Dictation workstation:   [id number]     FINDINGS:  LIVER:  Enlarged,  right lobe measuring 25 cm in length .  No signal changes  of steatosis. No new focal lesions.      BILE DUCTS:  No dilatation. Common bile duct measures 6 mm. No stricture or  filling defect identified.      GALLBLADDER:  Absent.      PANCREAS:  Tiny duodenal diverticulum projecting into the head is unchanged.  Edema adjacent to the head and uncinate process persists. No focal  necrosis, mass, or fluid collection otherwise identified.      SPLEEN:  Enlarged measuring 16 cm in length.      ADRENAL GLANDS:  Unremarkable.      KIDNEYS:  Unremarkable without hydronephrosis.      LYMPH NODES:  No lymphadenopathy.      ABDOMINAL VESSELS:  Abdominal aorta is patent without aneurysm. The major visceral  arterial branches are patent. Major portal venous branches are  patent. IVC, hepatic veins, and renal veins are patent.      BOWEL:  No dilated bowel is visualized.      PERITONEUM/RETROPERITONEUM:  Trace upper abdominal free fluid.      BONES AND LOWER THORAX:  No abnormal marrow enhancement.  Grossly clear lung bases.        MRCP PANCREAS W AND WO IV CONTRAST   IMPRESSION:  1. Persisting findings of focal acute interstitial edematous  pancreatitis without fluid collection involving the head/uncinate  process.  2. No biliary tract stone, obstruction, or dilatation. What was  described as a dilated common bile duct on recent CT correlates with  a small duodenal diverticulum which was also present on prior MRI.  3. Hepatosplenomegaly.                              Assessment & Plan  Abdominal pain, unspecified abdominal location    Acute pancreatitis, unspecified complication status, unspecified pancreatitis type (HHS-HCC)    Type 1 diabetes mellitus with ketoacidosis without coma    Duodenitis    #Anion Gap  metabolic acidosis--Euglycemic ketoacidosis, resolved  -initial ABG 7.21/15/113/6.0 > 7.11/11/116/3.5 s/p 2 amp  bicarb 7.18/11/108/4.1. Most recent VBG this AM (7/5) 7.37/28/57/16.2  -+ ketones in urine.  - urine tox  negative  -d/c Bicarb gtt  -d/c insulin gtt  -NPH 12U BID with q6h SSI   -Accucheck x 4 daily   -Hypoglycemic protocol  -Mentation significantly improved closing of gap.   -discontinue Jardiance, added as allergy.   -BHB elevated @ 8.74        #Pancreatitis w/ duodenitis  #GERD  -Amylase, Lipase WNL  -pt c/o abd pain with CT showing pancreatic head edema.  -General Surgery following  -KUB with gastric air c/f ileus. NGT placed to LIWS with good output post emesis.  Removed this AM. Full liquid diet.   -PPI for prophylaxis  -monitor for bowel regimen  -c/w meropenem  -Hold Metformin  -MRCP1. Persisting findings of focal acute interstitial edematous  pancreatitis without fluid collection involving the head/uncinate  process.2. No biliary tract stone, obstruction, or dilatation. What was  described as a dilated common bile duct on recent CT correlates with  a small duodenal diverticulum which was also present on prior MRI.  3. Hepatosplenomegaly.     #Hypokalemia  #Hypophosphatemia  -repeat levels with next labs, replete per protocol  - K 3.1 40mEq K PO given   -Phos 2.7  -Mag 2.02  -Monitor  -Daily BMP     #Metabolic encephalopathy 2/2 acidosis, resolved.   #Depression  -Treat underlying  -Resume home Buspar, Paxil, nortrityline     #HTN  #HLD  -resume home Rosuvastatin  -monitor BP     GI ppx: PPI  DVT ppx: enoxaparin, SCDs  Fluids: As needed  Electrolytes: replace as needed  Nutrition: Full liquid diet  Adjuncts: PIV    Code Status:  Pt requires inpatient stay at this time.            BETH Dozier-CNP         [1] busPIRone, 15 mg, oral, TID  enoxaparin, 40 mg, subcutaneous, q24h  famotidine, 20 mg, oral, BID   Or  famotidine, 20 mg, intravenous, BID  gabapentin, 600 mg, oral, TID  gabapentin, 900 mg, oral, Nightly  insulin lispro, 0-15 Units, subcutaneous, q4h  insulin NPH and regular human, 20 Units, subcutaneous, BID AC  meropenem, 1 g, intravenous, q8h  [Held by provider] metFORMIN, 1,000 mg, oral,  BID  nortriptyline, 25 mg, oral, Nightly  pantoprazole, 40 mg, oral, Daily before breakfast  PARoxetine, 30 mg, oral, Nightly  polyethylene glycol, 17 g, oral, BID  rosuvastatin, 40 mg, oral, Nightly  [2] sodium chloride 0.9%, 10 mL/hr, Last Rate: 10 mL/hr (07/05/25 0937)  [3] PRN medications: acetaminophen **OR** [DISCONTINUED] acetaminophen **OR** [DISCONTINUED] acetaminophen, acetaminophen **OR** [DISCONTINUED] acetaminophen **OR** [DISCONTINUED] acetaminophen, bisacodyl, dextrose, dextrose, dextrose, diphenhydrAMINE, glucagon, glucagon, HYDROmorphone, insulin regular, insulin regular, melatonin, ondansetron **OR** ondansetron, oxygen, sodium chloride 0.9%, sodium phosphates

## 2025-07-08 NOTE — CARE PLAN
The patient's goals for the shift include Pain control    The clinical goals for the shift include increasing activity, tolerating upgraded diet with increased pain or nausea, monitor blood glucose, provide diabetic education, and transfer to med/surg.      Problem: Pain - Adult  Goal: Verbalizes/displays adequate comfort level or baseline comfort level  Outcome: Progressing     Problem: Safety - Adult  Goal: Free from fall injury  Outcome: Progressing     Problem: Discharge Planning  Goal: Discharge to home or other facility with appropriate resources  Outcome: Progressing     Problem: Chronic Conditions and Co-morbidities  Goal: Patient's chronic conditions and co-morbidity symptoms are monitored and maintained or improved  Outcome: Progressing     Problem: Nutrition  Goal: Nutrient intake appropriate for maintaining nutritional needs  Outcome: Progressing     Problem: Diabetes  Goal: Achieve decreasing blood glucose levels by end of shift  Outcome: Progressing  Goal: Increase stability of blood glucose readings by end of shift  Outcome: Progressing  Goal: Decrease in ketones present in urine by end of shift  Outcome: Progressing  Goal: Maintain electrolyte levels within acceptable range throughout shift  Outcome: Progressing  Goal: No changes in neurological exam by end of shift  Outcome: Progressing  Goal: Learn about and adhere to nutrition recommendations by end of shift  Outcome: Progressing  Goal: Vital signs within normal range for age by end of shift  Outcome: Progressing  Goal: Increase self care and/or family involovement by end of shift  Outcome: Progressing  Goal: Receive DSME education by end of shift  Outcome: Progressing   VVS. Pt complains of no pain. Pt has been free from falls and injury thoughout this sihft. No new orders at this time. Call light within reach.

## 2025-07-09 ENCOUNTER — TELEPHONE (OUTPATIENT)
Dept: ENDOCRINOLOGY | Facility: CLINIC | Age: 55
End: 2025-07-09
Payer: COMMERCIAL

## 2025-07-09 VITALS
TEMPERATURE: 97.5 F | HEART RATE: 80 BPM | WEIGHT: 268.3 LBS | OXYGEN SATURATION: 98 % | SYSTOLIC BLOOD PRESSURE: 139 MMHG | HEIGHT: 70 IN | RESPIRATION RATE: 18 BRPM | DIASTOLIC BLOOD PRESSURE: 81 MMHG | BODY MASS INDEX: 38.41 KG/M2

## 2025-07-09 DIAGNOSIS — Z79.4 TYPE 2 DIABETES MELLITUS WITHOUT COMPLICATION, WITH LONG-TERM CURRENT USE OF INSULIN: ICD-10-CM

## 2025-07-09 DIAGNOSIS — E11.9 TYPE 2 DIABETES MELLITUS WITHOUT COMPLICATION, WITH LONG-TERM CURRENT USE OF INSULIN: ICD-10-CM

## 2025-07-09 LAB
ANION GAP SERPL CALC-SCNC: 12 MMOL/L (ref 10–20)
ATRIAL RATE: 99 BPM
BASOPHILS # BLD AUTO: 0.03 X10*3/UL (ref 0–0.1)
BASOPHILS NFR BLD AUTO: 0.7 %
BUN SERPL-MCNC: 8 MG/DL (ref 6–23)
CALCIUM SERPL-MCNC: 9 MG/DL (ref 8.6–10.3)
CHLORIDE SERPL-SCNC: 100 MMOL/L (ref 98–107)
CO2 SERPL-SCNC: 28 MMOL/L (ref 21–32)
CREAT SERPL-MCNC: 0.63 MG/DL (ref 0.5–1.3)
EGFRCR SERPLBLD CKD-EPI 2021: >90 ML/MIN/1.73M*2
EOSINOPHIL # BLD AUTO: 0.15 X10*3/UL (ref 0–0.7)
EOSINOPHIL NFR BLD AUTO: 3.6 %
ERYTHROCYTE [DISTWIDTH] IN BLOOD BY AUTOMATED COUNT: 12.6 % (ref 11.5–14.5)
GLUCOSE BLD MANUAL STRIP-MCNC: 238 MG/DL (ref 74–99)
GLUCOSE BLD MANUAL STRIP-MCNC: 309 MG/DL (ref 74–99)
GLUCOSE SERPL-MCNC: 208 MG/DL (ref 74–99)
HCT VFR BLD AUTO: 32.7 % (ref 41–52)
HGB BLD-MCNC: 11 G/DL (ref 13.5–17.5)
IMM GRANULOCYTES # BLD AUTO: 0.06 X10*3/UL (ref 0–0.7)
IMM GRANULOCYTES NFR BLD AUTO: 1.4 % (ref 0–0.9)
LYMPHOCYTES # BLD AUTO: 1.76 X10*3/UL (ref 1.2–4.8)
LYMPHOCYTES NFR BLD AUTO: 41.8 %
MCH RBC QN AUTO: 30.8 PG (ref 26–34)
MCHC RBC AUTO-ENTMCNC: 33.6 G/DL (ref 32–36)
MCV RBC AUTO: 92 FL (ref 80–100)
MONOCYTES # BLD AUTO: 0.41 X10*3/UL (ref 0.1–1)
MONOCYTES NFR BLD AUTO: 9.7 %
NEUTROPHILS # BLD AUTO: 1.8 X10*3/UL (ref 1.2–7.7)
NEUTROPHILS NFR BLD AUTO: 42.8 %
NRBC BLD-RTO: 0 /100 WBCS (ref 0–0)
P AXIS: 38 DEGREES
P OFFSET: 208 MS
P ONSET: 145 MS
PLATELET # BLD AUTO: 152 X10*3/UL (ref 150–450)
POTASSIUM SERPL-SCNC: 3.3 MMOL/L (ref 3.5–5.3)
PR INTERVAL: 158 MS
Q ONSET: 224 MS
QRS COUNT: 17 BEATS
QRS DURATION: 148 MS
QT INTERVAL: 424 MS
QTC CALCULATION(BAZETT): 544 MS
QTC FREDERICIA: 501 MS
R AXIS: 39 DEGREES
RBC # BLD AUTO: 3.57 X10*6/UL (ref 4.5–5.9)
SODIUM SERPL-SCNC: 137 MMOL/L (ref 136–145)
T AXIS: 24 DEGREES
T OFFSET: 436 MS
VENTRICULAR RATE: 99 BPM
WBC # BLD AUTO: 4.2 X10*3/UL (ref 4.4–11.3)

## 2025-07-09 PROCEDURE — 85025 COMPLETE CBC W/AUTO DIFF WBC: CPT | Mod: IPSPLIT | Performed by: NURSE PRACTITIONER

## 2025-07-09 PROCEDURE — 94760 N-INVAS EAR/PLS OXIMETRY 1: CPT | Mod: IPSPLIT

## 2025-07-09 PROCEDURE — 80048 BASIC METABOLIC PNL TOTAL CA: CPT | Mod: IPSPLIT | Performed by: NURSE PRACTITIONER

## 2025-07-09 PROCEDURE — 2500000001 HC RX 250 WO HCPCS SELF ADMINISTERED DRUGS (ALT 637 FOR MEDICARE OP): Mod: IPSPLIT | Performed by: NURSE PRACTITIONER

## 2025-07-09 PROCEDURE — 99232 SBSQ HOSP IP/OBS MODERATE 35: CPT | Performed by: SURGERY

## 2025-07-09 PROCEDURE — 82947 ASSAY GLUCOSE BLOOD QUANT: CPT | Mod: IPSPLIT

## 2025-07-09 PROCEDURE — 2500000002 HC RX 250 W HCPCS SELF ADMINISTERED DRUGS (ALT 637 FOR MEDICARE OP, ALT 636 FOR OP/ED): Mod: IPSPLIT | Performed by: NURSE PRACTITIONER

## 2025-07-09 PROCEDURE — 36415 COLL VENOUS BLD VENIPUNCTURE: CPT | Mod: IPSPLIT | Performed by: NURSE PRACTITIONER

## 2025-07-09 RX ORDER — BLOOD-GLUCOSE SENSOR
EACH MISCELLANEOUS
Qty: 9 EACH | Refills: 3 | Status: CANCELLED | OUTPATIENT
Start: 2025-07-09

## 2025-07-09 RX ORDER — POTASSIUM CHLORIDE 20 MEQ/1
40 TABLET, EXTENDED RELEASE ORAL ONCE
Status: COMPLETED | OUTPATIENT
Start: 2025-07-09 | End: 2025-07-09

## 2025-07-09 RX ADMIN — BUSPIRONE HYDROCHLORIDE 15 MG: 10 TABLET ORAL at 08:47

## 2025-07-09 RX ADMIN — PANTOPRAZOLE SODIUM 40 MG: 40 TABLET, DELAYED RELEASE ORAL at 06:50

## 2025-07-09 RX ADMIN — INSULIN HUMAN 20 UNITS: 100 INJECTION, SUSPENSION SUBCUTANEOUS at 08:42

## 2025-07-09 RX ADMIN — GABAPENTIN 600 MG: 300 CAPSULE ORAL at 08:47

## 2025-07-09 RX ADMIN — INSULIN LISPRO 12 UNITS: 100 INJECTION, SOLUTION INTRAVENOUS; SUBCUTANEOUS at 12:27

## 2025-07-09 RX ADMIN — FAMOTIDINE 20 MG: 20 TABLET, FILM COATED ORAL at 08:48

## 2025-07-09 RX ADMIN — INSULIN LISPRO 6 UNITS: 100 INJECTION, SOLUTION INTRAVENOUS; SUBCUTANEOUS at 08:45

## 2025-07-09 RX ADMIN — METFORMIN HYDROCHLORIDE 1000 MG: 500 TABLET, FILM COATED ORAL at 08:48

## 2025-07-09 RX ADMIN — GABAPENTIN 600 MG: 300 CAPSULE ORAL at 12:28

## 2025-07-09 RX ADMIN — POTASSIUM CHLORIDE 40 MEQ: 1500 TABLET, EXTENDED RELEASE ORAL at 11:00

## 2025-07-09 ASSESSMENT — COGNITIVE AND FUNCTIONAL STATUS - GENERAL
DAILY ACTIVITIY SCORE: 21
MOBILITY SCORE: 24
DRESSING REGULAR LOWER BODY CLOTHING: A LITTLE
DRESSING REGULAR UPPER BODY CLOTHING: A LITTLE
HELP NEEDED FOR BATHING: A LITTLE

## 2025-07-09 ASSESSMENT — PAIN - FUNCTIONAL ASSESSMENT
PAIN_FUNCTIONAL_ASSESSMENT: 0-10
PAIN_FUNCTIONAL_ASSESSMENT: 0-10

## 2025-07-09 ASSESSMENT — PAIN SCALES - GENERAL
PAINLEVEL_OUTOF10: 0 - NO PAIN
PAINLEVEL_OUTOF10: 0 - NO PAIN

## 2025-07-09 NOTE — PROGRESS NOTES
"Jono Quinn is a 55 y.o. male on day 5 of admission presenting with Abdominal pain, unspecified abdominal location.    Subjective   Doing well.  No pain.  Tolerating oral diet.       Objective     Physical Exam  Constitutional:       Appearance: Normal appearance.   Abdominal:      Palpations: Abdomen is soft. There is no mass.      Tenderness: There is no abdominal tenderness. There is no guarding.         Last Recorded Vitals  Blood pressure 142/78, pulse 86, temperature 36.4 °C (97.5 °F), temperature source Temporal, resp. rate 17, height 1.78 m (5' 10.08\"), weight 122 kg (268 lb 4.8 oz), SpO2 (!) 88%.  Intake/Output last 3 Shifts:  I/O last 3 completed shifts:  In: 1950.6 (16 mL/kg) [P.O.:1380; I.V.:450 (3.7 mL/kg); IV Piggyback:120.6]  Out: 2675 (22 mL/kg) [Urine:2675 (0.6 mL/kg/hr)]  Weight: 121.7 kg     Relevant Results                  Assessment & Plan  Abdominal pain, unspecified abdominal location    Type 1 diabetes mellitus with ketoacidosis without coma    Acute pancreatitis, unspecified complication status, unspecified pancreatitis type (HHS-HCC)    Duodenitis    Depression    GERD (gastroesophageal reflux disease)    Hypertriglyceridemia    Primary hypertension    High anion gap metabolic acidosis    Diabetic ketoacidosis without coma associated with type 2 diabetes mellitus    Hypokalemia    Autosomal hypophosphatemic bone disease    Metabolic encephalopathy    Plan-stable for discharge and surgical point of view.  Discussed consuming low glycemic diet and high-protein diet.  Follow as needed      Fermin Bynum MD    "

## 2025-07-09 NOTE — DISCHARGE INSTR - OTHER ORDERS
Thank you for choosing [facility] for your Health Care needs.  Also, thank you for allowing us to take you and your families preferences into account when determining your discharge plan.  Stay well!     You may receive a survey in the mail within the next couple weeks. Please take the time to complete it and return it. Your input is ALWAYS important to us.     It was nice seeing you again!  Your Care Transition Team - Trista     For questions about your medications listed on your discharge instructions, please call the Nurses Station at [phone].

## 2025-07-09 NOTE — PROGRESS NOTES
07/09/25 1040   Discharge Planning   Support Systems Friends/neighbors;Family members   Assistance Needed Patient lives in an apartment alone. He's independent with ADLS, IADLS, ambulation and drives. He is employed and works from home. He has DME if needed: walker, cane, grab bars. He has a CPAP but doesn't use home oxygen. He drove himself to the hospital.   Type of Residence Private residence   Number of Stairs to Enter Residence 2   Number of Stairs Within Residence 0   Do you have animals or pets at home? No   Home or Post Acute Services None   Does the patient need discharge transport arranged? No  (Car at hospital)   Stroke Family Assessment   Stroke Family Assessment Needed No   Intensity of Service   Intensity of Service 0-30 min     Patient medically ready for discharge.  Patient follow up information on discharge instructions.  Patient will be returning home.     12:57 pm  Appt scheduled with PCP for 7/16.      DC PLAN:  Home

## 2025-07-09 NOTE — CARE PLAN
The patient's goals for the shift include Pain control    The clinical goals for the shift include Pt will have no c/o abd pain throughout shift.    Pt remained safe and stable over the shift. VSS. No complaints of abd pain. Over the shift, the patient did make progress toward the following goals.       Problem: Pain - Adult  Goal: Verbalizes/displays adequate comfort level or baseline comfort level  Outcome: Progressing     Problem: Safety - Adult  Goal: Free from fall injury  Outcome: Progressing     Problem: Discharge Planning  Goal: Discharge to home or other facility with appropriate resources  Outcome: Progressing     Problem: Chronic Conditions and Co-morbidities  Goal: Patient's chronic conditions and co-morbidity symptoms are monitored and maintained or improved  Outcome: Progressing     Problem: Nutrition  Goal: Nutrient intake appropriate for maintaining nutritional needs  Outcome: Progressing     Problem: Diabetes  Goal: Achieve decreasing blood glucose levels by end of shift  Outcome: Progressing  Goal: Increase stability of blood glucose readings by end of shift  Outcome: Progressing  Goal: Decrease in ketones present in urine by end of shift  Outcome: Progressing  Goal: Maintain electrolyte levels within acceptable range throughout shift  Outcome: Progressing  Goal: No changes in neurological exam by end of shift  Outcome: Progressing  Goal: Learn about and adhere to nutrition recommendations by end of shift  Outcome: Progressing  Goal: Vital signs within normal range for age by end of shift  Outcome: Progressing  Goal: Increase self care and/or family involovement by end of shift  Outcome: Progressing  Goal: Receive DSME education by end of shift  Outcome: Progressing     Problem: Pain  Goal: Takes deep breaths with improved pain control throughout the shift  Outcome: Progressing  Goal: Turns in bed with improved pain control throughout the shift  Outcome: Progressing  Goal: Walks with improved pain  control throughout the shift  Outcome: Progressing  Goal: Performs ADL's with improved pain control throughout shift  Outcome: Progressing  Goal: Participates in PT with improved pain control throughout the shift  Outcome: Progressing  Goal: Free from opioid side effects throughout the shift  Outcome: Progressing  Goal: Free from acute confusion related to pain meds throughout the shift  Outcome: Progressing     Problem: Skin  Goal: Decreased wound size/increased tissue granulation at next dressing change  Outcome: Progressing  Goal: Participates in plan/prevention/treatment measures  Outcome: Progressing  Goal: Prevent/manage excess moisture  Outcome: Progressing  Goal: Prevent/minimize sheer/friction injuries  Outcome: Progressing  Goal: Promote/optimize nutrition  Outcome: Progressing  Goal: Promote skin healing  Outcome: Progressing

## 2025-07-09 NOTE — NURSING NOTE
Wound Care Consult     Visit Date: 7/9/2025      Patient Name: Jono Quinn         MRN: [id number]           YOB: 1970    Consulted for wound care. A 55 y.o. male patient admitted for   Shortness of breath [R06.02]  Abnormal abdominal CT scan [R93.5]  Abdominal pain, unspecified abdominal location [R10.9]   Medical History[1]   Surgical History[2]   Scheduled medications  Scheduled Medications[3]  Continuous medications  Continuous Medications[4]  PRN medications  PRN Medications[5]     Allergies[6]     No results found for the last 90 days.         Pertinent Labs:   Albumin   Date Value Ref Range Status   07/04/2025 4.2 3.4 - 5.0 g/dL Final     ALBUMIN (MG/L) IN URINE   Date Value Ref Range Status   07/22/2023 <7.0 Not Established mg/L Final     Albumin, Urine Random   Date Value Ref Range Status   10/26/2024 <7.0 Not established mg/L Final       Wound Assessment:       Reason for Consult: Consulted for wound care,         Wound History: photo on admission    Wound Team Assessment: new photo taken on exam, rash with satilite lesions are resolving. Interdry provided to patient and scrotal sling placed with interdry. Reviewed need to keep skin folds dry, and yeast/fungal preventatibe measures. Teach back obtained from patient. No pressure injuries identified, Gina Shume RN bedside nurse updated, continue pressure injury prevention interventions and nursing to continue to follow provider orders. Re consult wound RN PRN.    Cher GERMAINN RN [facility]  7/9/2025  1:58 PM         [1]   Past Medical History:  Diagnosis Date    Coronary artery disease     COVID-19 11/23/2020    Pneumonia due to COVID-19 virus    Depression, unspecified 10/26/2022    Depression    Diabetes mellitus (Multi)     Hypertension     Ketoacidosis 07/03/2025    Euglycemic    Obstructive sleep apnea (adult) (pediatric) 07/11/2022    LITZY on CPAP    Other conditions influencing health status 05/16/2022    Diabetes type 2, uncontrolled     Personal history of other diseases of the digestive system 11/19/2021    H/O acute pancreatitis    Personal history of other diseases of the nervous system and sense organs     History of sleep apnea    Psoriasis     Pure hypercholesterolemia, unspecified 10/26/2022    Hypercholesterolemia    Testicular hypofunction 07/01/2021    Hypogonadism male    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   [2]   Past Surgical History:  Procedure Laterality Date    ABDOMINAL SURGERY      CARDIAC CATHETERIZATION N/A 12/06/2023    Procedure: Left Heart Cath;  Surgeon: Lucas Pino MD;  Location: [facility] Cardiac Cath Lab;  Service: Cardiovascular;  Laterality: N/A;  12/6/ am for C 59192 for CAD with angina I25.119 and abnormal cardiac CTA R93.1  Auth # for [employer]:  [id number]--fmtph 11/10/23-05/08/24    CHOLECYSTECTOMY      CRANIOPLASTY FOR CRANIAL DEFECT      CT ANGIO CORONARY ART WITH HEARTFLOW IF SCORE >30%  10/24/2023    CT ANGIO CORONARY ART WITH HEARTFLOW IF SCORE >30% 10/24/2023 U CT    OTHER SURGICAL HISTORY  07/13/2020    Tonsillectomy with adenoidectomy    OTHER SURGICAL HISTORY  07/13/2020    Complete colonoscopy    OTHER SURGICAL HISTORY  03/01/2022    Nasal septoplasty    OTHER SURGICAL HISTORY  03/01/2022    Submucous resection of nasal turbinate   [3] busPIRone, 15 mg, oral, TID  enoxaparin, 40 mg, subcutaneous, q24h  famotidine, 20 mg, oral, BID   Or  famotidine, 20 mg, intravenous, BID  gabapentin, 600 mg, oral, TID  gabapentin, 900 mg, oral, Nightly  insulin lispro, 0-15 Units, subcutaneous, q4h  insulin NPH and regular human, 20 Units, subcutaneous, BID AC  metFORMIN, 1,000 mg, oral, BID  nortriptyline, 25 mg, oral, Nightly  pantoprazole, 40 mg, oral, Daily before breakfast  PARoxetine, 30 mg, oral, Nightly  polyethylene glycol, 17 g, oral, BID  rosuvastatin, 40 mg, oral, Nightly  [4] sodium chloride 0.9%, 10 mL/hr, Last Rate: 10 mL/hr (07/08/25 1832)  [5] PRN medications: acetaminophen  **OR** [DISCONTINUED] acetaminophen **OR** [DISCONTINUED] acetaminophen, acetaminophen **OR** [DISCONTINUED] acetaminophen **OR** [DISCONTINUED] acetaminophen, bisacodyl, dextrose, dextrose, dextrose, diphenhydrAMINE, glucagon, glucagon, HYDROmorphone, insulin regular, insulin regular, melatonin, ondansetron **OR** ondansetron, oxygen, sodium chloride 0.9%, sodium phosphates  [6]   Allergies  Allergen Reactions    Jardiance [Empagliflozin] Other     Euglycemic Ketoacidosis    Perfume Anaphylaxis     Pt state that super flower smells like roses, sweat pea, cherry blossoms ect. Make his throat start to close up.     Fluticasone Propionate Swelling    Liraglutide Other     Episode of acute panc while on this, though other possible causes at this time as well    Lisinopril Unknown    Other Hives     Flowers and perfumes    Pollen Extracts Itching    Zosyn [Piperacillin-Tazobactam] Rash

## 2025-07-09 NOTE — DISCHARGE SUMMARY
"Discharge Diagnosis  Abdominal pain, unspecified abdominal location           Issues Requiring Follow-Up  ***    Discharge Meds     Medication List      CONTINUE taking these medications     BD Ultra-Fine Mini Pen Needle 31 gauge x 3/16\" needle; Generic drug: pen   needle, diabetic; Four times daily   Dexcom G7 Sensor device; Generic drug: blood-glucose sensor; For   continuous glucose monitoring.  Change every 10 days.     ASK your doctor about these medications     aspirin 81 mg EC tablet   busPIRone 15 mg tablet; Commonly known as: Buspar   fenofibrate micronized 200 mg capsule; Commonly known as: Lofibra; Take   1 capsule (200 mg) by mouth once daily.   gabapentin 300 mg capsule; Commonly known as: Neurontin; Take 3 capsules   (900 mg) by mouth 3 times a day. Taking 600 mg in the AM, 600 mg at lunch,   600 mg at dinner, and 900 mg at night   ginseng 100 mg capsule   icosapent ethyL 1 gram capsule; Commonly known as: Vascepa; Take 2   capsules (2 g) by mouth 2 times daily (morning and late afternoon).   insulin lispro 100 unit/mL pen; Commonly known as: HumaLOG KwikPen   Insulin; Inject 44-64 Units under the skin 3 times a day before meals.   Take as directed per insulin instructions.   metFORMIN 1,000 mg tablet; Commonly known as: Glucophage; TAKE 1 TABLET   BY MOUTH TWICE DAILY (MORNING  AND  LATE  AFTERNOON)   multivitamin capsule   nortriptyline 25 mg capsule; Commonly known as: Pamelor   PARoxetine 30 mg tablet; Commonly known as: Paxil; TAKE 1 TABLET (30 MG)   BY MOUTH ONCE DAILY AT BEDTIME.   rosuvastatin 40 mg tablet; Commonly known as: Crestor; Take 1 tablet (40   mg) by mouth once daily.   sodium,potassium,mag sulfates 17.5-3.13-1.6 gram solution; Commonly   known as: Suprep; Take one bottle twice as directed by the prep   instructions   Toujeo Max U-300 SoloStar 300 unit/mL (3 mL) pen; Generic drug: insulin   glargine; Inject 110 Units under the skin once daily at bedtime.   vitamin B complex tablet   " Vitamin D3 50 mcg (2,000 units) capsule; Generic drug: cholecalciferol       Test Results Pending At Discharge  Pending Labs       No current pending labs.            Hospital Course   ***    Pertinent Physical Exam At Time of Discharge  Physical Exam  Constitutional:       General: He is not in acute distress.     Appearance: He is not ill-appearing.   HENT:      Head: Normocephalic and atraumatic.      Mouth/Throat:      Mouth: Mucous membranes are moist.   Eyes:      General: No scleral icterus.        Right eye: No discharge.         Left eye: No discharge.      Conjunctiva/sclera: Conjunctivae normal.   Cardiovascular:      Rate and Rhythm: Normal rate and regular rhythm.      Pulses: Normal pulses.      Heart sounds: Normal heart sounds. No murmur heard.     No gallop.   Pulmonary:      Effort: Pulmonary effort is normal.      Breath sounds: Normal breath sounds.   Abdominal:      General: There is no distension.      Palpations: Abdomen is soft. There is no mass.      Tenderness: There is no abdominal tenderness.   Musculoskeletal:         General: No swelling, tenderness or deformity.   Skin:     General: Skin is warm and dry.      Capillary Refill: Capillary refill takes less than 2 seconds.      Coloration: Skin is pale.   Neurological:      General: No focal deficit present.      Mental Status: He is alert.   Psychiatric:         Mood and Affect: Mood normal.         Behavior: Behavior normal.         Outpatient Follow-Up  Future Appointments   Date Time Provider Department [place]   7/16/2025  4:00 PM Jon Gonzalez MD [id number] [facility]   8/4/2025 12:00 PM GEN PAT GENPAT [facility]   8/12/2025  3:30 PM Trudy Gordon MD [id number] [facility]   8/18/2025  8:20 AM Ruben Worthy MD [email or web address] [facility]   9/9/2025  3:20 PM Vazquez Marquez MD [id number] [facility]   9/22/2025  3:40 PM Vazquez Marquez MD [id number] [facility]         Tawny Castillo APRN-CNP

## 2025-07-10 ENCOUNTER — TELEPHONE (OUTPATIENT)
Dept: PRIMARY CARE | Facility: CLINIC | Age: 55
End: 2025-07-10
Payer: COMMERCIAL

## 2025-07-10 DIAGNOSIS — Z79.4 TYPE 2 DIABETES MELLITUS WITHOUT COMPLICATION, WITH LONG-TERM CURRENT USE OF INSULIN: ICD-10-CM

## 2025-07-10 DIAGNOSIS — E11.9 TYPE 2 DIABETES MELLITUS WITHOUT COMPLICATION, WITH LONG-TERM CURRENT USE OF INSULIN: ICD-10-CM

## 2025-07-10 RX ORDER — BLOOD-GLUCOSE SENSOR
EACH MISCELLANEOUS
Qty: 9 EACH | Refills: 3 | Status: CANCELLED | OUTPATIENT
Start: 2025-07-10

## 2025-07-10 RX ORDER — BLOOD-GLUCOSE SENSOR
EACH MISCELLANEOUS
Qty: 6 EACH | Refills: 0 | OUTPATIENT
Start: 2025-07-10

## 2025-07-10 NOTE — TELEPHONE ENCOUNTER
Transition of Care    Inpatient facility: [facility]  Discharge diagnosis: Abdominal Pain  Discharged to: Home  Discharge date: 7/9/25  Initial Call date: 7/10/25  Spoke with patient/caregiver: Patient                                                                     Do you need assistance  visits prior to your PCP visit: No  Home health care ordered: No  Have you been contacted by home care and have a start of care date: No  Are you taking medications as prescribed at discharge: Yes    Referral to [facility] Pharmacist: No  Patient advised to bring all medications to PCP follow-up appointment.  Patient advised to follow discharge instructions until provider follow-up.  TCM visit date: 7/16/25  TCM provider visit with: ANDREW Gordon MD

## 2025-07-12 DIAGNOSIS — E11.9 TYPE 2 DIABETES MELLITUS WITHOUT COMPLICATION, WITH LONG-TERM CURRENT USE OF INSULIN: ICD-10-CM

## 2025-07-12 DIAGNOSIS — Z79.4 TYPE 2 DIABETES MELLITUS WITHOUT COMPLICATION, WITH LONG-TERM CURRENT USE OF INSULIN: ICD-10-CM

## 2025-07-12 RX ORDER — BLOOD-GLUCOSE SENSOR
EACH MISCELLANEOUS
Qty: 9 EACH | Refills: 3 | Status: SHIPPED | OUTPATIENT
Start: 2025-07-12

## 2025-07-16 ENCOUNTER — OFFICE VISIT (OUTPATIENT)
Dept: PRIMARY CARE | Facility: CLINIC | Age: 55
End: 2025-07-16
Payer: COMMERCIAL

## 2025-07-16 ENCOUNTER — APPOINTMENT (OUTPATIENT)
Dept: SLEEP MEDICINE | Facility: CLINIC | Age: 55
End: 2025-07-16
Payer: COMMERCIAL

## 2025-07-16 VITALS
SYSTOLIC BLOOD PRESSURE: 116 MMHG | DIASTOLIC BLOOD PRESSURE: 66 MMHG | WEIGHT: 267.8 LBS | HEART RATE: 85 BPM | OXYGEN SATURATION: 98 % | TEMPERATURE: 98.3 F | BODY MASS INDEX: 38.34 KG/M2

## 2025-07-16 DIAGNOSIS — Z79.4 TYPE 2 DIABETES MELLITUS WITH OTHER SPECIFIED COMPLICATION, WITH LONG-TERM CURRENT USE OF INSULIN: ICD-10-CM

## 2025-07-16 DIAGNOSIS — I50.9 CONGESTIVE HEART FAILURE, UNSPECIFIED HF CHRONICITY, UNSPECIFIED HEART FAILURE TYPE: ICD-10-CM

## 2025-07-16 DIAGNOSIS — E11.69 TYPE 2 DIABETES MELLITUS WITH OTHER SPECIFIED COMPLICATION, WITH LONG-TERM CURRENT USE OF INSULIN: ICD-10-CM

## 2025-07-16 DIAGNOSIS — E78.00 HYPERCHOLESTEREMIA: ICD-10-CM

## 2025-07-16 DIAGNOSIS — F32.A DEPRESSION, UNSPECIFIED DEPRESSION TYPE: ICD-10-CM

## 2025-07-16 DIAGNOSIS — K85.30 DRUG-INDUCED ACUTE PANCREATITIS WITHOUT INFECTION OR NECROSIS (HHS-HCC): Primary | ICD-10-CM

## 2025-07-16 DIAGNOSIS — E66.01 CLASS 2 SEVERE OBESITY DUE TO EXCESS CALORIES WITH SERIOUS COMORBIDITY AND BODY MASS INDEX (BMI) OF 38.0 TO 38.9 IN ADULT: ICD-10-CM

## 2025-07-16 DIAGNOSIS — E66.812 CLASS 2 SEVERE OBESITY DUE TO EXCESS CALORIES WITH SERIOUS COMORBIDITY AND BODY MASS INDEX (BMI) OF 38.0 TO 38.9 IN ADULT: ICD-10-CM

## 2025-07-16 DIAGNOSIS — I25.10 CORONARY ARTERY DISEASE INVOLVING NATIVE CORONARY ARTERY OF NATIVE HEART WITHOUT ANGINA PECTORIS: ICD-10-CM

## 2025-07-16 PROCEDURE — 3051F HG A1C>EQUAL 7.0%<8.0%: CPT | Performed by: INTERNAL MEDICINE

## 2025-07-16 PROCEDURE — 3074F SYST BP LT 130 MM HG: CPT | Performed by: INTERNAL MEDICINE

## 2025-07-16 PROCEDURE — 3078F DIAST BP <80 MM HG: CPT | Performed by: INTERNAL MEDICINE

## 2025-07-16 PROCEDURE — 99496 TRANSJ CARE MGMT HIGH F2F 7D: CPT | Performed by: INTERNAL MEDICINE

## 2025-07-16 PROCEDURE — 1036F TOBACCO NON-USER: CPT | Performed by: INTERNAL MEDICINE

## 2025-07-16 NOTE — PROGRESS NOTES
Subjective   Patient ID: Jono Quinn is a 55 y.o. male who presents for Hospital Follow-up (TCM-  CHF-  Abdominal pain- Constipation/Ketoacidosis- stopped JArdiance).  HPI    TCM    Dx abdominal pain / pancreatitis       better       Jardiance stopped      Rec's rev'd     Rash on ankles better  Derm consult not done     retinopathy with macular edema  Retinal specialist following / missed last appt  Counseled     H/o pancreatitis 4rd bout (including this one)     H/o [place] palsy    CAD / hypercholesterolemia/ CHF on rx   Cardio following  Cath 12-6 -23      thyroid nodule   Thyroid ultrasound neg 10-23     DM type II -insulin-dependent  FBS high  Continue meds  Follow blood sugars closely.  HBA1C 8  8-24  Endo following   Poor dietary habits discussed / changed     GERD  on diet  Pepcid daily prn     Depression  better on therapy possible side effects  Psych following     Vitamin D deficiency on suplementation     Diet and exercise reviewed  Stop eating pizza and junk foods since has been eating out more    Review of Systems   All other systems reviewed and are negative.      Objective   /66   Pulse 85   Temp 36.8 °C (98.3 °F)   Wt 121 kg (267 lb 12.8 oz)   SpO2 98%   BMI 38.34 kg/m²   Lab Results   Component Value Date    WBC 4.2 (L) 07/09/2025    HGB 11.0 (L) 07/09/2025    HCT 32.7 (L) 07/09/2025     07/09/2025    CHOL 108 10/26/2024    TRIG 472 (H) 10/26/2024    HDL 27.6 10/26/2024    LDLDIRECT 32 03/05/2022    ALT 13 07/04/2025    AST 14 07/04/2025     07/09/2025    K 3.3 (L) 07/09/2025     07/09/2025    CREATININE 0.63 07/09/2025    BUN 8 07/09/2025    CO2 28 07/09/2025    TSH 1.93 10/26/2024    INR 0.9 08/27/2024    HGBA1C 7.5 (H) 03/22/2025           Physical Exam  Vitals reviewed.   Constitutional:       Appearance: Normal appearance. He is obese.   HENT:      Head: Normocephalic and atraumatic.      Mouth/Throat:      Pharynx: No posterior oropharyngeal erythema.     Eyes:       General: No scleral icterus.     Conjunctiva/sclera: Conjunctivae normal.      Pupils: Pupils are equal, round, and reactive to light.       Cardiovascular:      Rate and Rhythm: Normal rate and regular rhythm.      Heart sounds: Normal heart sounds.   Pulmonary:      Effort: No respiratory distress.      Breath sounds: No wheezing.   Abdominal:      General: Abdomen is flat. Bowel sounds are normal. There is no distension.      Palpations: Abdomen is soft. There is no mass.      Tenderness: There is no abdominal tenderness. There is no rebound.     Musculoskeletal:         General: Normal range of motion.      Cervical back: Normal range of motion and neck supple.     Skin:     General: Skin is warm and dry.     Neurological:      General: No focal deficit present.      Mental Status: He is alert and oriented to person, place, and time. Mental status is at baseline.     Psychiatric:         Mood and Affect: Mood normal.         Behavior: Behavior normal.         Thought Content: Thought content normal.         Judgment: Judgment normal.         Problem List Items Addressed This Visit           ICD-10-CM    Depression F32.A    Acute pancreatitis - Primary K85.90    Coronary artery disease involving native coronary artery of native heart without angina pectoris I25.10     Other Visit Diagnoses         Codes      Type 2 diabetes mellitus with other specified complication, with long-term current use of insulin     E11.69, Z79.4      Congestive heart failure, unspecified HF chronicity, unspecified heart failure type     I50.9      Hypercholesteremia     E78.00      Class 2 severe obesity due to excess calories with serious comorbidity and body mass index (BMI) of 38.0 to 38.9 in adult     E66.812, E66.01, Z68.38          Assessment/Plan      TCM    Dx abdominal pain / pancreatitis       better       Jardiance stopped      Rec's rev'd     Rash on ankles better  Derm consult not done     retinopathy with macular  edema  Retinal specialist following / missed last appt  Counseled     H/o pancreatitis 4rd bout (including this one)     H/o [place] palsy    CAD / hypercholesterolemia/ CHF on rx   Cardio following  Cath 12-6 -23      thyroid nodule   Thyroid ultrasound neg 10-23     DM type II -insulin-dependent  FBS high  Continue meds  Follow blood sugars closely.  HBA1C 8  8-24  Endo following   Poor dietary habits discussed / changed     GERD  on diet  Pepcid daily prn     Depression  better on therapy possible side effects  Psych following     Vitamin D deficiency on suplementation     Diet and exercise reviewed  Stop eating pizza and junk foods since has been eating out more    Colonoscopy ordered    Prostate 10-24  Colonoscopy none  CT chest lung cancer screening n/a  immunizations rev'd flu  BMI 38.3     Follow up as scheduled  / yearly physical

## 2025-07-24 LAB
ATRIAL RATE: 100 BPM
ATRIAL RATE: 99 BPM
P AXIS: 33 DEGREES
P AXIS: 38 DEGREES
P OFFSET: 208 MS
P OFFSET: 208 MS
P ONSET: 145 MS
P ONSET: 151 MS
PR INTERVAL: 150 MS
PR INTERVAL: 158 MS
Q ONSET: 224 MS
Q ONSET: 226 MS
QRS COUNT: 17 BEATS
QRS COUNT: 17 BEATS
QRS DURATION: 142 MS
QRS DURATION: 148 MS
QT INTERVAL: 390 MS
QT INTERVAL: 424 MS
QTC CALCULATION(BAZETT): 503 MS
QTC CALCULATION(BAZETT): 544 MS
QTC FREDERICIA: 462 MS
QTC FREDERICIA: 501 MS
R AXIS: 39 DEGREES
R AXIS: 65 DEGREES
T AXIS: 24 DEGREES
T AXIS: 26 DEGREES
T OFFSET: 421 MS
T OFFSET: 436 MS
VENTRICULAR RATE: 100 BPM
VENTRICULAR RATE: 99 BPM

## 2025-07-28 ENCOUNTER — APPOINTMENT (OUTPATIENT)
Dept: ENDOCRINOLOGY | Facility: CLINIC | Age: 55
End: 2025-07-28
Payer: COMMERCIAL

## 2025-07-28 VITALS
DIASTOLIC BLOOD PRESSURE: 77 MMHG | HEART RATE: 84 BPM | WEIGHT: 273 LBS | SYSTOLIC BLOOD PRESSURE: 130 MMHG | BODY MASS INDEX: 39.08 KG/M2

## 2025-07-28 DIAGNOSIS — E11.9 TYPE 2 DIABETES MELLITUS WITHOUT COMPLICATION, WITH LONG-TERM CURRENT USE OF INSULIN: ICD-10-CM

## 2025-07-28 DIAGNOSIS — Z79.4 TYPE 2 DIABETES MELLITUS WITHOUT COMPLICATION, WITH LONG-TERM CURRENT USE OF INSULIN: ICD-10-CM

## 2025-07-28 LAB — POC HEMOGLOBIN A1C: 7.7 % (ref 4.2–6.5)

## 2025-07-28 PROCEDURE — 3075F SYST BP GE 130 - 139MM HG: CPT | Performed by: INTERNAL MEDICINE

## 2025-07-28 PROCEDURE — 3051F HG A1C>EQUAL 7.0%<8.0%: CPT | Performed by: INTERNAL MEDICINE

## 2025-07-28 PROCEDURE — 3078F DIAST BP <80 MM HG: CPT | Performed by: INTERNAL MEDICINE

## 2025-07-28 PROCEDURE — 99214 OFFICE O/P EST MOD 30 MIN: CPT | Performed by: INTERNAL MEDICINE

## 2025-07-28 PROCEDURE — 95251 CONT GLUC MNTR ANALYSIS I&R: CPT | Performed by: INTERNAL MEDICINE

## 2025-07-28 PROCEDURE — 1036F TOBACCO NON-USER: CPT | Performed by: INTERNAL MEDICINE

## 2025-07-28 PROCEDURE — 83036 HEMOGLOBIN GLYCOSYLATED A1C: CPT | Performed by: INTERNAL MEDICINE

## 2025-07-28 RX ORDER — BLOOD-GLUCOSE SENSOR
EACH MISCELLANEOUS
Qty: 3 EACH | Refills: 11 | Status: SHIPPED | OUTPATIENT
Start: 2025-07-28

## 2025-07-28 ASSESSMENT — ENCOUNTER SYMPTOMS
FREQUENCY: 0
MYALGIAS: 1
DIZZINESS: 1
HEADACHES: 0
WEAKNESS: 1
POLYDIPSIA: 0
ABDOMINAL PAIN: 1
DIARRHEA: 1
NUMBNESS: 1
SHORTNESS OF BREATH: 1
BACK PAIN: 1
COUGH: 0
NERVOUS/ANXIOUS: 0
APPETITE CHANGE: 0
VOMITING: 1
NAUSEA: 1
ARTHRALGIAS: 0
CONSTIPATION: 1
UNEXPECTED WEIGHT CHANGE: 1
SORE THROAT: 0
FEVER: 0

## 2025-07-28 NOTE — LETTER
July 28, 2025     Trudy Gordon MD  [address]  [facility] [address]    Patient: Jono Quinn   YOB: 1970   Date of Visit: 7/28/2025       Dear Dr. Trudy Gordon MD:    Thank you for referring Jono Quinn to me for evaluation. Below are my notes for this consultation.  If you have questions, please do not hesitate to call me. I look forward to following your patient along with you.       Sincerely,     Vazquez Marquez MD      CC: Lucas Pino MD  ______________________________________________________________________________________    History Of Present Illness  Jono Quinn is a 55 y.o. male     Duration of type 2 diabetes mellitus:  15-20 years  Complications:  Retinopathy  Neuropathy  Cardiovascular disease  Dysautonomia     Pancreatitis x4    Admitted 3 weeks ago with DKA and pancreatitis     Toujeo 110 units at bedtime  Lispro 44 units QAC add 4:50 over glucose 150 mg/dl  Metformin 1000 mg BID    Jardiance discontinued July 2025 due to DKA       DexCom G7.  Last sensor failed at 2 days.   Patient is testing glucose 1440 times daily  Records reviewed, on file     Last eye exam:  February 2025  History of laser surgery right eye     Cardiology:  Dr. KATIE Pino  Hyperlipidemia  Hypertriglyceridemia  Hereditary per patient  Rosuvastatin 40 mg/day  Fenofibrate 200 mg/day  Vascepa      History of alcoholism, stopped drinking in 2003         Past Medical History  He has a past medical history of Coronary artery disease, COVID-19 (11/23/2020), Depression, unspecified (10/26/2022), Diabetes mellitus (Multi), Hypertension, Ketoacidosis (07/03/2025), Obstructive sleep apnea (adult) (pediatric) (07/11/2022), Other conditions influencing health status (05/16/2022), Personal history of other diseases of the digestive system (11/19/2021), Personal history of other diseases of the nervous system and sense organs, Psoriasis, Pure hypercholesterolemia, unspecified (10/26/2022),  "Testicular hypofunction (07/01/2021), and 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    Surgical History  He has a past surgical history that includes Other surgical history (07/13/2020); Other surgical history (07/13/2020); Other surgical history (03/01/2022); Other surgical history (03/01/2022); CT angio coronary art with heartflow if score >30% (10/24/2023); Cardiac catheterization (N/A, 12/06/2023); Cholecystectomy; Abdominal surgery; and Cranioplasty for cranial defect.     Social History  He reports that he has never smoked. He has never been exposed to tobacco smoke. He has never used smokeless tobacco. He reports that he does not drink alcohol and does not use drugs.    Family History  Family History[1]    Medications  Current Outpatient Medications   Medication Instructions   • aspirin 81 mg, Once   • BD Ultra-Fine Mini Pen Needle 31 gauge x 3/16\" needle Four times daily   • busPIRone (BUSPAR) 15 mg, 3 times daily   • cholecalciferol (VITAMIN D3) 100 mcg, Daily   • Dexcom G7 Sensor device For continuous glucose monitoring.  Change every 10 days.   • fenofibrate micronized (LOFIBRA) 200 mg, oral, Daily   • gabapentin (NEURONTIN) 900 mg, oral, 3 times daily, Taking 600 mg in the AM, 600 mg at lunch, 600 mg at dinner, and 900 mg at night   • ginseng 200 mg, Daily RT   • icosapent ethyL (VASCEPA) 2 g, oral, 2 times daily (morning and late afternoon)   • insulin lispro (HUMALOG KWIKPEN INSULIN) 44-64 Units, subcutaneous, 3 times daily before meals, Take as directed per insulin instructions.   • metFORMIN (Glucophage) 1,000 mg tablet TAKE 1 TABLET BY MOUTH TWICE DAILY (MORNING  AND  LATE  AFTERNOON)   • multivitamin capsule 1 tablet, Daily   • nortriptyline (PAMELOR) 25 mg, Nightly   • PARoxetine (PAXIL) 30 mg, oral, Nightly   • rosuvastatin (CRESTOR) 40 mg, oral, Daily   • Toujeo Max U-300 SoloStar 110 Units, subcutaneous, Nightly   • vitamin B complex tablet 1 tablet, Daily       Allergies  Jardiance [empagliflozin], " Perfume, Fluticasone propionate, Liraglutide, Lisinopril, Other, Pollen extracts, and Zosyn [piperacillin-tazobactam]    Review of Systems   Constitutional:  Positive for unexpected weight change. Negative for appetite change and fever.   HENT:  Negative for sore throat.         Denies dry mouth   Eyes:  Negative for visual disturbance.   Respiratory:  Positive for shortness of breath. Negative for cough.    Cardiovascular:  Negative for chest pain.   Gastrointestinal:  Positive for abdominal pain, constipation, diarrhea, nausea and vomiting.   Endocrine: Negative for polydipsia and polyuria.   Genitourinary:  Negative for frequency.   Musculoskeletal:  Positive for back pain and myalgias. Negative for arthralgias.   Skin:  Positive for rash.   Neurological:  Positive for dizziness, weakness and numbness. Negative for headaches.   Psychiatric/Behavioral:  The patient is not nervous/anxious.          Last Recorded Vitals  Blood pressure 130/77, pulse 84, weight 124 kg (273 lb).    Physical Exam  Constitutional:       General: He is not in acute distress.     Appearance: He is obese.   HENT:      Head: Normocephalic.      Mouth/Throat:      Mouth: Mucous membranes are moist.     Eyes:      Extraocular Movements: Extraocular movements intact.     Neck:      Thyroid: No thyroid mass or thyromegaly.     Cardiovascular:      Pulses:           Radial pulses are 2+ on the right side and 2+ on the left side.      Comments: Trace nonpitting edema at ankles.  Lymphadenopathy:      Cervical: No cervical adenopathy.     Neurological:      Mental Status: He is alert.      Motor: No tremor.     Psychiatric:         Mood and Affect: Affect normal.          Relevant Results  Glucose (mg/dL)   Date Value   07/09/2025 208 (H)   07/08/2025 150 (H)   07/07/2025 121 (H)     POC HEMOGLOBIN A1c (%)   Date Value   07/28/2025 7.7 (A)   02/19/2025 7.2 (A)   05/03/2024 7.2 (A)     HEMOGLOBIN A1c (% of total Hgb)   Date Value   03/22/2025 7.5  (H)     Hemoglobin A1C (%)   Date Value   08/27/2024 8.0 (H)     Bicarbonate (mmol/L)   Date Value   07/09/2025 28   07/08/2025 27   07/07/2025 24     Urea Nitrogen (mg/dL)   Date Value   07/09/2025 8   07/08/2025 7   07/07/2025 8     Creatinine (mg/dL)   Date Value   07/09/2025 0.63   07/08/2025 0.64   07/07/2025 0.73     Lab Results   Component Value Date    CHOL 108 10/26/2024    CHOL 200 (H) 08/27/2024    CHOL 126 11/18/2023     Lab Results   Component Value Date    HDL 27.6 10/26/2024    HDL 17.2 08/27/2024    HDL 24.0 11/18/2023     Lab Results   Component Value Date    LDLCALC  10/26/2024      Comment:      The calculation of LDL and VLDL are inaccurate when the Triglycerides are greater than 400 mg/dL or when the patient is non-fasting. If LDL measurement is necessary contact the testing laboratory for an alternative LDL assay.                                  Near   Borderline      AGE      Desirable  Optimal    High     High     Very High     0-19 Y     0 - 109     ---    110-129   >/= 130     ----    20-24 Y     0 - 119     ---    120-159   >/= 160     ----      >24 Y     0 -  99   100-129  130-159   160-189     >/=190      LDLCALC  08/27/2024      Comment:      The calculation of LDL and VLDL are inaccurate when the Triglycerides are greater than 400 mg/dL or when the patient is non-fasting. If LDL measurement is necessary contact the testing laboratory for an alternative LDL assay.        LDLCALC  11/18/2023      Comment:      The calculation of LDL and VLDL are inaccurate when the Triglycerides are greater than 400 mg/dL or when the patient is non-fasting. If LDL measurement is necessary contact the testing laboratory for an alternative LDL assay.                                  Near   Borderline      AGE      Desirable  Optimal    High     High     Very High     0-19 Y     0 - 109     ---    110-129   >/= 130     ----    20-24 Y     0 - 119     ---    120-159   >/= 160     ----      >24 Y     0 -  99    100-129  130-159   160-189     >/=190       Lab Results   Component Value Date    TRIG 472 (H) 10/26/2024    TRIG 1,814 (H) 08/27/2024    TRIG 1,102 (H) 11/18/2023     Lab Results   Component Value Date    TSH 1.93 10/26/2024       CGM INTERPRETATION:  Patient is adhering to and benefitting from continuous glucose monitoring.   Model: DexCom G7   Period reviewed:  10 days  Testing glucose 1440 times daily    Average blood sugar 182 mg/dL, glucose management indicator 7.7%    Glucose less than 70 mg/dL equals 0% of time worn  Glucose ranging between 70 to 180 mg/dL represented by 51% of time worn  Glucose ranging greater than 180 mg/dL represented by 49% of time worn    >72 hours of data reviewed in order to inform diabetes treatment plan decision making, patient currently at risk for recurrent hypoglycemia safety concerns      IMPRESSION  TYPE 2 DIABETES MELLITUS   LONG TERM CURRENT INSULIN USE   Rapid A1c 7.7%  Recent DKA with pancreatitis.  DKA not likely caused by empagliflozin but absence of severe hyperglycemia was due to empagliflozin  SGLT2-inhibitor now contraindicated due to history of DKA      RECOMMENDATIONS  Continue current program  Stay off Jardiance    Follow up 3 months  Review DexCom at all appointments            [1]  Family History  Problem Relation Name Age of Onset   • Prostate cancer Father     • Narcolepsy Brother          with cataplexy        [1]  Family History  Problem Relation Name Age of Onset   • Prostate cancer Father     • Narcolepsy Brother          with cataplexy

## 2025-07-28 NOTE — PATIENT INSTRUCTIONS
A1c 7.7%    RECOMMENDATIONS  Continue current program  Stay off Jardiance    Follow up 3 months  Review DexCom at all appointments

## 2025-07-28 NOTE — PROGRESS NOTES
History Of Present Illness  Jono Quinn is a 55 y.o. male     Duration of type 2 diabetes mellitus:  15-20 years  Complications:  Retinopathy  Neuropathy  Cardiovascular disease  Dysautonomia     Pancreatitis x4    Admitted 3 weeks ago with DKA and pancreatitis     Toujeo 110 units at bedtime  Lispro 44 units QAC add 4:50 over glucose 150 mg/dl  Metformin 1000 mg BID    Jardiance discontinued July 2025 due to DKA       DexCom G7.  Last sensor failed at 2 days.   Patient is testing glucose 1440 times daily  Records reviewed, on file     Last eye exam:  February 2025  History of laser surgery right eye     Cardiology:  Dr. KATIE Pino  Hyperlipidemia  Hypertriglyceridemia  Hereditary per patient  Rosuvastatin 40 mg/day  Fenofibrate 200 mg/day  Vascepa      History of alcoholism, stopped drinking in 2003         Past Medical History  He has a past medical history of Coronary artery disease, COVID-19 (11/23/2020), Depression, unspecified (10/26/2022), Diabetes mellitus (Multi), Hypertension, Ketoacidosis (07/03/2025), Obstructive sleep apnea (adult) (pediatric) (07/11/2022), Other conditions influencing health status (05/16/2022), Personal history of other diseases of the digestive system (11/19/2021), Personal history of other diseases of the nervous system and sense organs, Psoriasis, Pure hypercholesterolemia, unspecified (10/26/2022), Testicular hypofunction (07/01/2021), and 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    Surgical History  He has a past surgical history that includes Other surgical history (07/13/2020); Other surgical history (07/13/2020); Other surgical history (03/01/2022); Other surgical history (03/01/2022); CT angio coronary art with heartflow if score >30% (10/24/2023); Cardiac catheterization (N/A, 12/06/2023); Cholecystectomy; Abdominal surgery; and Cranioplasty for cranial defect.     Social History  He reports that he has never smoked. He has never been exposed to tobacco smoke. He has never  "used smokeless tobacco. He reports that he does not drink alcohol and does not use drugs.    Family History  Family History[1]    Medications  Current Outpatient Medications   Medication Instructions    aspirin 81 mg, Once    BD Ultra-Fine Mini Pen Needle 31 gauge x 3/16\" needle Four times daily    busPIRone (BUSPAR) 15 mg, 3 times daily    cholecalciferol (VITAMIN D3) 100 mcg, Daily    Dexcom G7 Sensor device For continuous glucose monitoring.  Change every 10 days.    fenofibrate micronized (LOFIBRA) 200 mg, oral, Daily    gabapentin (NEURONTIN) 900 mg, oral, 3 times daily, Taking 600 mg in the AM, 600 mg at lunch, 600 mg at dinner, and 900 mg at night    ginseng 200 mg, Daily RT    icosapent ethyL (VASCEPA) 2 g, oral, 2 times daily (morning and late afternoon)    insulin lispro (HUMALOG KWIKPEN INSULIN) 44-64 Units, subcutaneous, 3 times daily before meals, Take as directed per insulin instructions.    metFORMIN (Glucophage) 1,000 mg tablet TAKE 1 TABLET BY MOUTH TWICE DAILY (MORNING  AND  LATE  AFTERNOON)    multivitamin capsule 1 tablet, Daily    nortriptyline (PAMELOR) 25 mg, Nightly    PARoxetine (PAXIL) 30 mg, oral, Nightly    rosuvastatin (CRESTOR) 40 mg, oral, Daily    Toujeo Max U-300 SoloStar 110 Units, subcutaneous, Nightly    vitamin B complex tablet 1 tablet, Daily       Allergies  Jardiance [empagliflozin], Perfume, Fluticasone propionate, Liraglutide, Lisinopril, Other, Pollen extracts, and Zosyn [piperacillin-tazobactam]    Review of Systems   Constitutional:  Positive for unexpected weight change. Negative for appetite change and fever.   HENT:  Negative for sore throat.         Denies dry mouth   Eyes:  Negative for visual disturbance.   Respiratory:  Positive for shortness of breath. Negative for cough.    Cardiovascular:  Negative for chest pain.   Gastrointestinal:  Positive for abdominal pain, constipation, diarrhea, nausea and vomiting.   Endocrine: Negative for polydipsia and polyuria. "   Genitourinary:  Negative for frequency.   Musculoskeletal:  Positive for back pain and myalgias. Negative for arthralgias.   Skin:  Positive for rash.   Neurological:  Positive for dizziness, weakness and numbness. Negative for headaches.   Psychiatric/Behavioral:  The patient is not nervous/anxious.          Last Recorded Vitals  Blood pressure 130/77, pulse 84, weight 124 kg (273 lb).    Physical Exam  Constitutional:       General: He is not in acute distress.     Appearance: He is obese.   HENT:      Head: Normocephalic.      Mouth/Throat:      Mouth: Mucous membranes are moist.     Eyes:      Extraocular Movements: Extraocular movements intact.     Neck:      Thyroid: No thyroid mass or thyromegaly.     Cardiovascular:      Pulses:           Radial pulses are 2+ on the right side and 2+ on the left side.      Comments: Trace nonpitting edema at ankles.  Lymphadenopathy:      Cervical: No cervical adenopathy.     Neurological:      Mental Status: He is alert.      Motor: No tremor.     Psychiatric:         Mood and Affect: Affect normal.          Relevant Results  Glucose (mg/dL)   Date Value   07/09/2025 208 (H)   07/08/2025 150 (H)   07/07/2025 121 (H)     POC HEMOGLOBIN A1c (%)   Date Value   07/28/2025 7.7 (A)   02/19/2025 7.2 (A)   05/03/2024 7.2 (A)     HEMOGLOBIN A1c (% of total Hgb)   Date Value   03/22/2025 7.5 (H)     Hemoglobin A1C (%)   Date Value   08/27/2024 8.0 (H)     Bicarbonate (mmol/L)   Date Value   07/09/2025 28   07/08/2025 27   07/07/2025 24     Urea Nitrogen (mg/dL)   Date Value   07/09/2025 8   07/08/2025 7   07/07/2025 8     Creatinine (mg/dL)   Date Value   07/09/2025 0.63   07/08/2025 0.64   07/07/2025 0.73     Lab Results   Component Value Date    CHOL 108 10/26/2024    CHOL 200 (H) 08/27/2024    CHOL 126 11/18/2023     Lab Results   Component Value Date    HDL 27.6 10/26/2024    HDL 17.2 08/27/2024    HDL 24.0 11/18/2023     Lab Results   Component Value Date    LDLCALC   10/26/2024      Comment:      The calculation of LDL and VLDL are inaccurate when the Triglycerides are greater than 400 mg/dL or when the patient is non-fasting. If LDL measurement is necessary contact the testing laboratory for an alternative LDL assay.                                  Near   Borderline      AGE      Desirable  Optimal    High     High     Very High     0-19 Y     0 - 109     ---    110-129   >/= 130     ----    20-24 Y     0 - 119     ---    120-159   >/= 160     ----      >24 Y     0 -  99   100-129  130-159   160-189     >/=190      LDLCALC  08/27/2024      Comment:      The calculation of LDL and VLDL are inaccurate when the Triglycerides are greater than 400 mg/dL or when the patient is non-fasting. If LDL measurement is necessary contact the testing laboratory for an alternative LDL assay.        LDLCALC  11/18/2023      Comment:      The calculation of LDL and VLDL are inaccurate when the Triglycerides are greater than 400 mg/dL or when the patient is non-fasting. If LDL measurement is necessary contact the testing laboratory for an alternative LDL assay.                                  Near   Borderline      AGE      Desirable  Optimal    High     High     Very High     0-19 Y     0 - 109     ---    110-129   >/= 130     ----    20-24 Y     0 - 119     ---    120-159   >/= 160     ----      >24 Y     0 -  99   100-129  130-159   160-189     >/=190       Lab Results   Component Value Date    TRIG 472 (H) 10/26/2024    TRIG 1,814 (H) 08/27/2024    TRIG 1,102 (H) 11/18/2023     Lab Results   Component Value Date    TSH 1.93 10/26/2024       CGM INTERPRETATION:  Patient is adhering to and benefitting from continuous glucose monitoring.   Model: DexCom G7   Period reviewed:  10 days  Testing glucose 1440 times daily    Average blood sugar 182 mg/dL, glucose management indicator 7.7%    Glucose less than 70 mg/dL equals 0% of time worn  Glucose ranging between 70 to 180 mg/dL represented by 51%  of time worn  Glucose ranging greater than 180 mg/dL represented by 49% of time worn    >72 hours of data reviewed in order to inform diabetes treatment plan decision making, patient currently at risk for recurrent hypoglycemia safety concerns      IMPRESSION  TYPE 2 DIABETES MELLITUS   LONG TERM CURRENT INSULIN USE   Rapid A1c 7.7%  Recent DKA with pancreatitis.  DKA not likely caused by empagliflozin but absence of severe hyperglycemia was due to empagliflozin  SGLT2-inhibitor now contraindicated due to history of DKA      RECOMMENDATIONS  Continue current program  Stay off Jardiance    Follow up 3 months  Review DexCom at all appointments         [1]   Family History  Problem Relation Name Age of Onset    Prostate cancer Father      Narcolepsy Brother          with cataplexy

## 2025-08-04 ENCOUNTER — PRE-ADMISSION TESTING (OUTPATIENT)
Dept: PREADMISSION TESTING | Facility: HOSPITAL | Age: 55
End: 2025-08-04
Payer: COMMERCIAL

## 2025-08-04 VITALS — BODY MASS INDEX: 38.22 KG/M2 | WEIGHT: 267 LBS

## 2025-08-04 RX ORDER — FUROSEMIDE 40 MG/1
40 TABLET ORAL DAILY PRN
COMMUNITY
Start: 2025-07-30

## 2025-08-04 ASSESSMENT — DUKE ACTIVITY SCORE INDEX (DASI)
CAN YOU DO HEAVY WORK AROUND THE HOUSE LIKE SCRUBBING FLOORS OR LIFTING AND MOVING HEAVY FURNITURE: YES
CAN YOU WALK A BLOCK OR TWO ON LEVEL GROUND: YES
CAN YOU DO YARD WORK LIKE RAKING LEAVES, WEEDING OR PUSHING A MOWER: YES
CAN YOU PARTICIPATE IN STRENOUS SPORTS LIKE SWIMMING, SINGLES TENNIS, FOOTBALL, BASKETBALL, OR SKIING: NO
CAN YOU CLIMB A FLIGHT OF STAIRS OR WALK UP A HILL: YES
CAN YOU RUN A SHORT DISTANCE: NO
CAN YOU WALK INDOORS, SUCH AS AROUND YOUR HOUSE: YES
CAN YOU PARTICIPATE IN MODERATE RECREATIONAL ACTIVITIES LIKE GOLF, BOWLING, DANCING, DOUBLES TENNIS OR THROWING A BASEBALL OR FOOTBALL: NO
CAN YOU TAKE CARE OF YOURSELF (EAT, DRESS, BATHE, OR USE TOILET): YES
CAN YOU DO MODERATE WORK AROUND THE HOUSE LIKE VACUUMING, SWEEPING FLOORS OR CARRYING GROCERIES: YES
CAN YOU DO LIGHT WORK AROUND THE HOUSE LIKE DUSTING OR WASHING DISHES: YES

## 2025-08-04 NOTE — PREPROCEDURE INSTRUCTIONS
"   Medication List            Accurate as of August 4, 2025 12:58 PM. Always use your most recent med list.                aspirin 81 mg EC tablet  Medication Adjustments for Surgery: Do Not take on the morning of surgery     BD Ultra-Fine Mini Pen Needle 31 gauge x 3/16\" needle  Generic drug: pen needle, diabetic  Four times daily  Medication Adjustments for Surgery: Take/Use as prescribed     busPIRone 15 mg tablet  Commonly known as: Buspar  Medication Adjustments for Surgery: Do Not take on the morning of surgery     Dexcom G7 Sensor device  Generic drug: blood-glucose sensor  For continuous glucose monitoring.  Change every 10 days.  Medication Adjustments for Surgery: Take/Use as prescribed     fenofibrate micronized 200 mg capsule  Commonly known as: Lofibra  Take 1 capsule (200 mg) by mouth once daily.  Medication Adjustments for Surgery: Do Not take on the morning of surgery     furosemide 40 mg tablet  Commonly known as: Lasix  Medication Adjustments for Surgery: Do Not take on the morning of surgery     gabapentin 300 mg capsule  Commonly known as: Neurontin  Take 3 capsules (900 mg) by mouth 3 times a day. Taking 600 mg in the AM, 600 mg at lunch, 600 mg at dinner, and 900 mg at night  Medication Adjustments for Surgery: Take on the morning of surgery     ginseng 100 mg capsule  Additional Medication Adjustments for Surgery: Take last dose 7 days before surgery     icosapent ethyL 1 gram capsule  Commonly known as: Vascepa  Take 2 capsules (2 g) by mouth 2 times daily (morning and late afternoon).  Additional Medication Adjustments for Surgery: Take last dose 7 days before surgery     insulin lispro 100 unit/mL pen  Commonly known as: HumaLOG KwikPen Insulin  Inject 44-64 Units under the skin 3 times a day before meals. Take as directed per insulin instructions.  Medication Adjustments for Surgery: Do Not take on the morning of surgery     metFORMIN 1,000 mg tablet  Commonly known as: Glucophage  TAKE 1 " TABLET BY MOUTH TWICE DAILY (MORNING  AND  LATE  AFTERNOON)  Medication Adjustments for Surgery: Do Not take on the morning of surgery     multivitamin capsule  Additional Medication Adjustments for Surgery: Take last dose 7 days before surgery     nortriptyline 25 mg capsule  Commonly known as: Pamelor  Medication Adjustments for Surgery: Do Not take on the morning of surgery     PARoxetine 30 mg tablet  Commonly known as: Paxil  TAKE 1 TABLET (30 MG) BY MOUTH ONCE DAILY AT BEDTIME.  Medication Adjustments for Surgery: Do Not take on the morning of surgery     rosuvastatin 40 mg tablet  Commonly known as: Crestor  Take 1 tablet (40 mg) by mouth once daily.  Medication Adjustments for Surgery: Do Not take on the morning of surgery     Toujeo Max U-300 SoloStar 300 unit/mL (3 mL) pen  Generic drug: insulin glargine  Inject 110 Units under the skin once daily at bedtime.  Additional Medication Adjustments for Surgery: Take half of your usual dose the night before     vitamin B complex tablet  Additional Medication Adjustments for Surgery: Take last dose 7 days before surgery     Vitamin D3 50 mcg (2,000 units) capsule  Generic drug: cholecalciferol  Additional Medication Adjustments for Surgery: Take last dose 7 days before surgery                       NPO Instructions:     Follow PREP instructions. Call us if your prep is changed for new instructions.     Diet Instructions    5 days before your procedure:    · No nuts or seeds such as sesame and poppy seeds  · No beans, raw (fresh) fruits, vegetables with seeds, corn, popcorn  · No whole grains such as oatmeal, multigrain, or quinoa.      1 day before your exam, stop all solid foods. You can only have clear liquids that you can see through:    · Water, clear juice, broth  · Gelatin, jello, popsicles, sport drinks (avoid red or purple color drinks)  · Black tea or coffee with no cream    Follow the colonoscopy prep instructions to clean out your bowel.  On the day of  exam, nothing by mouth for at least 3 hours before your procedure time.      Exam Day - Bring the following:    · Photo ID and health insurance card  · List of all medications you take and any allergies  · Advance directive and/or durable health care power  document if you have them.  · CPAP, BIPAP machine, portable oxygen tank if you use them at home.      If you are a female of childbearing age, you may be asked to perform a urine pregnancy test or sign a pregnancy waiver.    For your safety, you will not be allowed to drive home yourself, use a taxi, bus, or rideshKitNipBox service such as Uber. You must have a designated  to drive you home.    Do not hesitate to contact the [facility] endoscopy center that your procedure is scheduled at or the doctor office performing your procedure for any questions. List of contact information for the endoscopy centers can be found at the end of this document.    PLEASE refrain from gum, candy, mints, and smoking in the morning.    STOP DRINKING 3 HOURS PRIOR TO YOUR PROCEDURE.     Additional Instructions:      Review your medication instructions, take indicated medications and STOP those when applicable.  Wear comfortable, loose fitting clothing.  Please remove ALL jewelry and body piercing's.   All valuables should be left at home.  Bring a glass case or container/solution for contacts.  Bring Photo ID and Insurance card.     Park in back of hospital by ER. Come up to Second floor-OUTPATIENT dept to check-in.    You MUST have an adult  with you. NO DRIVING FOR 24 HOURS AFTER SURGERY.     If you get ill at all the week before your procedure- CALL YOUR DOCTOR/SURGEON.  Any illness might lead to your procedure being delayed.    Please call us if you are started on any new medications before your procedure.      Call Outpatient dept at [phone] between 1-3pm the day before your procedure (Friday for Monday procedure), for your arrival  time.

## 2025-08-05 DIAGNOSIS — E11.311 DIABETIC RETINOPATHY OF BOTH EYES WITH MACULAR EDEMA ASSOCIATED WITH TYPE 2 DIABETES MELLITUS, UNSPECIFIED RETINOPATHY SEVERITY: ICD-10-CM

## 2025-08-05 RX ORDER — METFORMIN HYDROCHLORIDE 1000 MG/1
TABLET ORAL
Qty: 180 TABLET | Refills: 0 | Status: SHIPPED | OUTPATIENT
Start: 2025-08-05

## 2025-08-12 ENCOUNTER — APPOINTMENT (OUTPATIENT)
Dept: PRIMARY CARE | Facility: CLINIC | Age: 55
End: 2025-08-12
Payer: COMMERCIAL

## 2025-08-12 VITALS
DIASTOLIC BLOOD PRESSURE: 68 MMHG | HEART RATE: 98 BPM | OXYGEN SATURATION: 98 % | TEMPERATURE: 97.1 F | BODY MASS INDEX: 39.54 KG/M2 | SYSTOLIC BLOOD PRESSURE: 120 MMHG | WEIGHT: 276.2 LBS | HEIGHT: 70 IN

## 2025-08-12 DIAGNOSIS — E66.812 CLASS 2 SEVERE OBESITY DUE TO EXCESS CALORIES WITH SERIOUS COMORBIDITY AND BODY MASS INDEX (BMI) OF 39.0 TO 39.9 IN ADULT: ICD-10-CM

## 2025-08-12 DIAGNOSIS — F32.A DEPRESSION, UNSPECIFIED DEPRESSION TYPE: ICD-10-CM

## 2025-08-12 DIAGNOSIS — Z12.5 ENCOUNTER FOR SCREENING FOR MALIGNANT NEOPLASM OF PROSTATE: ICD-10-CM

## 2025-08-12 DIAGNOSIS — I25.10 CORONARY ARTERY DISEASE INVOLVING NATIVE CORONARY ARTERY OF NATIVE HEART WITHOUT ANGINA PECTORIS: ICD-10-CM

## 2025-08-12 DIAGNOSIS — E55.9 VITAMIN D DEFICIENCY: ICD-10-CM

## 2025-08-12 DIAGNOSIS — E66.01 CLASS 2 SEVERE OBESITY DUE TO EXCESS CALORIES WITH SERIOUS COMORBIDITY AND BODY MASS INDEX (BMI) OF 39.0 TO 39.9 IN ADULT: ICD-10-CM

## 2025-08-12 DIAGNOSIS — E78.00 HYPERCHOLESTEREMIA: ICD-10-CM

## 2025-08-12 DIAGNOSIS — K21.9 GASTROESOPHAGEAL REFLUX DISEASE WITHOUT ESOPHAGITIS: ICD-10-CM

## 2025-08-12 DIAGNOSIS — E11.69 TYPE 2 DIABETES MELLITUS WITH OTHER SPECIFIED COMPLICATION, WITH LONG-TERM CURRENT USE OF INSULIN: ICD-10-CM

## 2025-08-12 DIAGNOSIS — Z00.00 ANNUAL PHYSICAL EXAM: Primary | ICD-10-CM

## 2025-08-12 DIAGNOSIS — Z79.4 TYPE 2 DIABETES MELLITUS WITH OTHER SPECIFIED COMPLICATION, WITH LONG-TERM CURRENT USE OF INSULIN: ICD-10-CM

## 2025-08-12 PROCEDURE — 99396 PREV VISIT EST AGE 40-64: CPT | Performed by: INTERNAL MEDICINE

## 2025-08-12 PROCEDURE — 3051F HG A1C>EQUAL 7.0%<8.0%: CPT | Performed by: INTERNAL MEDICINE

## 2025-08-12 PROCEDURE — 3008F BODY MASS INDEX DOCD: CPT | Performed by: INTERNAL MEDICINE

## 2025-08-12 PROCEDURE — 99214 OFFICE O/P EST MOD 30 MIN: CPT | Performed by: INTERNAL MEDICINE

## 2025-08-12 PROCEDURE — 3078F DIAST BP <80 MM HG: CPT | Performed by: INTERNAL MEDICINE

## 2025-08-12 PROCEDURE — 3074F SYST BP LT 130 MM HG: CPT | Performed by: INTERNAL MEDICINE

## 2025-08-12 PROCEDURE — 1036F TOBACCO NON-USER: CPT | Performed by: INTERNAL MEDICINE

## 2025-08-12 ASSESSMENT — PATIENT HEALTH QUESTIONNAIRE - PHQ9
2. FEELING DOWN, DEPRESSED OR HOPELESS: MORE THAN HALF THE DAYS
1. LITTLE INTEREST OR PLEASURE IN DOING THINGS: SEVERAL DAYS
SUM OF ALL RESPONSES TO PHQ9 QUESTIONS 1 AND 2: 3

## 2025-08-12 ASSESSMENT — ANXIETY QUESTIONNAIRES
3. WORRYING TOO MUCH ABOUT DIFFERENT THINGS: SEVERAL DAYS
IF YOU CHECKED OFF ANY PROBLEMS ON THIS QUESTIONNAIRE, HOW DIFFICULT HAVE THESE PROBLEMS MADE IT FOR YOU TO DO YOUR WORK, TAKE CARE OF THINGS AT HOME, OR GET ALONG WITH OTHER PEOPLE: SOMEWHAT DIFFICULT
7. FEELING AFRAID AS IF SOMETHING AWFUL MIGHT HAPPEN: NOT AT ALL
GAD7 TOTAL SCORE: 5
1. FEELING NERVOUS, ANXIOUS, OR ON EDGE: SEVERAL DAYS
6. BECOMING EASILY ANNOYED OR IRRITABLE: SEVERAL DAYS
2. NOT BEING ABLE TO STOP OR CONTROL WORRYING: SEVERAL DAYS
5. BEING SO RESTLESS THAT IT IS HARD TO SIT STILL: NOT AT ALL
4. TROUBLE RELAXING: SEVERAL DAYS

## 2025-08-18 ENCOUNTER — HOSPITAL ENCOUNTER (OUTPATIENT)
Dept: GASTROENTEROLOGY | Facility: HOSPITAL | Age: 55
Discharge: HOME | End: 2025-08-18
Payer: COMMERCIAL

## 2025-08-18 VITALS
BODY MASS INDEX: 38.65 KG/M2 | DIASTOLIC BLOOD PRESSURE: 90 MMHG | WEIGHT: 270 LBS | OXYGEN SATURATION: 100 % | TEMPERATURE: 97.3 F | HEIGHT: 70 IN | SYSTOLIC BLOOD PRESSURE: 142 MMHG | RESPIRATION RATE: 16 BRPM | HEART RATE: 75 BPM

## 2025-08-18 DIAGNOSIS — F32.A DEPRESSION, UNSPECIFIED DEPRESSION TYPE: ICD-10-CM

## 2025-08-18 DIAGNOSIS — Z12.11 ENCOUNTER FOR SCREENING FOR MALIGNANT NEOPLASM OF COLON: ICD-10-CM

## 2025-08-18 LAB — GLUCOSE BLD MANUAL STRIP-MCNC: 204 MG/DL (ref 74–99)

## 2025-08-18 PROCEDURE — 3700000002 HC GENERAL ANESTHESIA TIME - EACH INCREMENTAL 1 MINUTE

## 2025-08-18 PROCEDURE — 7100000010 HC PHASE TWO TIME - EACH INCREMENTAL 1 MINUTE

## 2025-08-18 PROCEDURE — 82947 ASSAY GLUCOSE BLOOD QUANT: CPT

## 2025-08-18 PROCEDURE — 2500000004 HC RX 250 GENERAL PHARMACY W/ HCPCS (ALT 636 FOR OP/ED): Performed by: NURSE ANESTHETIST, CERTIFIED REGISTERED

## 2025-08-18 PROCEDURE — 45385 COLONOSCOPY W/LESION REMOVAL: CPT | Performed by: SURGERY

## 2025-08-18 PROCEDURE — 45380 COLONOSCOPY AND BIOPSY: CPT | Performed by: SURGERY

## 2025-08-18 PROCEDURE — 7100000009 HC PHASE TWO TIME - INITIAL BASE CHARGE

## 2025-08-18 PROCEDURE — 3700000001 HC GENERAL ANESTHESIA TIME - INITIAL BASE CHARGE

## 2025-08-18 RX ORDER — PROPOFOL 10 MG/ML
INJECTION, EMULSION INTRAVENOUS AS NEEDED
Status: DISCONTINUED | OUTPATIENT
Start: 2025-08-18 | End: 2025-08-18

## 2025-08-18 RX ORDER — PAROXETINE 30 MG/1
60 TABLET, FILM COATED ORAL NIGHTLY
Start: 2025-08-18

## 2025-08-18 RX ORDER — SODIUM CHLORIDE, SODIUM LACTATE, POTASSIUM CHLORIDE, CALCIUM CHLORIDE 600; 310; 30; 20 MG/100ML; MG/100ML; MG/100ML; MG/100ML
100 INJECTION, SOLUTION INTRAVENOUS CONTINUOUS
Status: ACTIVE | OUTPATIENT
Start: 2025-08-18 | End: 2025-08-18

## 2025-08-18 RX ORDER — MIDAZOLAM HYDROCHLORIDE 2 MG/2ML
INJECTION, SOLUTION INTRAMUSCULAR; INTRAVENOUS AS NEEDED
Status: DISCONTINUED | OUTPATIENT
Start: 2025-08-18 | End: 2025-08-18

## 2025-08-18 RX ORDER — LIDOCAINE HYDROCHLORIDE 20 MG/ML
INJECTION, SOLUTION EPIDURAL; INFILTRATION; INTRACAUDAL; PERINEURAL AS NEEDED
Status: DISCONTINUED | OUTPATIENT
Start: 2025-08-18 | End: 2025-08-18

## 2025-08-18 RX ADMIN — PROPOFOL 80 MG: 10 INJECTION, EMULSION INTRAVENOUS at 08:52

## 2025-08-18 RX ADMIN — PROPOFOL 50 MG: 10 INJECTION, EMULSION INTRAVENOUS at 09:12

## 2025-08-18 RX ADMIN — LIDOCAINE HYDROCHLORIDE 40 MG: 20 INJECTION, SOLUTION EPIDURAL; INFILTRATION; INTRACAUDAL; PERINEURAL at 08:48

## 2025-08-18 RX ADMIN — PROPOFOL 50 MG: 10 INJECTION, EMULSION INTRAVENOUS at 09:22

## 2025-08-18 RX ADMIN — PROPOFOL 100 MG: 10 INJECTION, EMULSION INTRAVENOUS at 08:48

## 2025-08-18 RX ADMIN — PROPOFOL 50 MG: 10 INJECTION, EMULSION INTRAVENOUS at 09:17

## 2025-08-18 RX ADMIN — PROPOFOL 50 MG: 10 INJECTION, EMULSION INTRAVENOUS at 09:00

## 2025-08-18 RX ADMIN — MIDAZOLAM HYDROCHLORIDE 2 MG: 1 INJECTION, SOLUTION INTRAMUSCULAR; INTRAVENOUS at 08:52

## 2025-08-18 RX ADMIN — PROPOFOL 30 MG: 10 INJECTION, EMULSION INTRAVENOUS at 09:09

## 2025-08-18 RX ADMIN — PROPOFOL 50 MG: 10 INJECTION, EMULSION INTRAVENOUS at 09:04

## 2025-08-18 RX ADMIN — PROPOFOL 50 MG: 10 INJECTION, EMULSION INTRAVENOUS at 08:57

## 2025-08-18 ASSESSMENT — PAIN SCALES - GENERAL
PAIN_LEVEL: 0
PAINLEVEL_OUTOF10: 0 - NO PAIN
PAINLEVEL_OUTOF10: 1
PAINLEVEL_OUTOF10: 0 - NO PAIN

## 2025-08-18 ASSESSMENT — PAIN - FUNCTIONAL ASSESSMENT
PAIN_FUNCTIONAL_ASSESSMENT: 0-10
PAIN_FUNCTIONAL_ASSESSMENT: UNABLE TO SELF-REPORT
PAIN_FUNCTIONAL_ASSESSMENT: 0-10

## 2025-08-20 ASSESSMENT — PAIN SCALES - GENERAL: PAINLEVEL_OUTOF10: 0 - NO PAIN

## 2025-08-26 LAB
LABORATORY COMMENT REPORT: NORMAL
PATH REPORT.FINAL DX SPEC: NORMAL
PATH REPORT.GROSS SPEC: NORMAL
PATH REPORT.MICROSCOPIC SPEC OTHER STN: NORMAL
PATH REPORT.RELEVANT HX SPEC: NORMAL
PATH REPORT.TOTAL CANCER: NORMAL

## 2025-08-30 ENCOUNTER — HOSPITAL ENCOUNTER (EMERGENCY)
Facility: HOSPITAL | Age: 55
Discharge: ANOTHER HOSPITAL | End: 2025-08-31
Attending: STUDENT IN AN ORGANIZED HEALTH CARE EDUCATION/TRAINING PROGRAM
Payer: COMMERCIAL

## 2025-08-30 ENCOUNTER — APPOINTMENT (OUTPATIENT)
Dept: RADIOLOGY | Facility: HOSPITAL | Age: 55
End: 2025-08-30
Payer: COMMERCIAL

## 2025-08-30 DIAGNOSIS — K85.90 ACUTE PANCREATITIS, UNSPECIFIED COMPLICATION STATUS, UNSPECIFIED PANCREATITIS TYPE (HHS-HCC): Primary | ICD-10-CM

## 2025-08-30 DIAGNOSIS — E78.1 HYPERTRIGLYCERIDEMIA: ICD-10-CM

## 2025-08-30 LAB
ALBUMIN SERPL BCP-MCNC: 4.4 G/DL (ref 3.4–5)
ALP SERPL-CCNC: 93 U/L (ref 33–120)
ALT SERPL W P-5'-P-CCNC: 22 U/L (ref 10–52)
ANION GAP SERPL CALC-SCNC: 16 MMOL/L (ref 10–20)
APPEARANCE UR: CLEAR
AST SERPL W P-5'-P-CCNC: 22 U/L (ref 9–39)
BASOPHILS # BLD AUTO: 0.05 X10*3/UL (ref 0–0.1)
BASOPHILS NFR BLD AUTO: 0.7 %
BILIRUB SERPL-MCNC: 0.7 MG/DL (ref 0–1.2)
BILIRUB UR STRIP.AUTO-MCNC: NEGATIVE MG/DL
BUN SERPL-MCNC: 16 MG/DL (ref 6–23)
CALCIUM SERPL-MCNC: 9.4 MG/DL (ref 8.6–10.3)
CHLORIDE SERPL-SCNC: 97 MMOL/L (ref 98–107)
CO2 SERPL-SCNC: 22 MMOL/L (ref 21–32)
COLOR UR: ABNORMAL
CREAT SERPL-MCNC: 0.77 MG/DL (ref 0.5–1.3)
EGFRCR SERPLBLD CKD-EPI 2021: >90 ML/MIN/1.73M*2
EOSINOPHIL # BLD AUTO: 0.15 X10*3/UL (ref 0–0.7)
EOSINOPHIL NFR BLD AUTO: 2 %
ERYTHROCYTE [DISTWIDTH] IN BLOOD BY AUTOMATED COUNT: 13.5 % (ref 11.5–14.5)
GLUCOSE SERPL-MCNC: 297 MG/DL (ref 74–99)
GLUCOSE UR STRIP.AUTO-MCNC: ABNORMAL MG/DL
HCT VFR BLD AUTO: 34.4 % (ref 41–52)
HGB BLD-MCNC: 12.1 G/DL (ref 13.5–17.5)
IMM GRANULOCYTES # BLD AUTO: 0.08 X10*3/UL (ref 0–0.7)
IMM GRANULOCYTES NFR BLD AUTO: 1.1 % (ref 0–0.9)
KETONES UR STRIP.AUTO-MCNC: ABNORMAL MG/DL
LEUKOCYTE ESTERASE UR QL STRIP.AUTO: NEGATIVE
LIPASE SERPL-CCNC: 246 U/L (ref 9–82)
LYMPHOCYTES # BLD AUTO: 1.23 X10*3/UL (ref 1.2–4.8)
LYMPHOCYTES NFR BLD AUTO: 16.5 %
MCH RBC QN AUTO: 31.2 PG (ref 26–34)
MCHC RBC AUTO-ENTMCNC: 35.2 G/DL (ref 32–36)
MCV RBC AUTO: 89 FL (ref 80–100)
MONOCYTES # BLD AUTO: 0.46 X10*3/UL (ref 0.1–1)
MONOCYTES NFR BLD AUTO: 6.2 %
NEUTROPHILS # BLD AUTO: 5.47 X10*3/UL (ref 1.2–7.7)
NEUTROPHILS NFR BLD AUTO: 73.5 %
NITRITE UR QL STRIP.AUTO: NEGATIVE
NRBC BLD-RTO: 0 /100 WBCS (ref 0–0)
PH UR STRIP.AUTO: 7 [PH]
PLATELET # BLD AUTO: 130 X10*3/UL (ref 150–450)
POTASSIUM SERPL-SCNC: 4.1 MMOL/L (ref 3.5–5.3)
PROT SERPL-MCNC: 6.8 G/DL (ref 6.4–8.2)
PROT UR STRIP.AUTO-MCNC: NEGATIVE MG/DL
RBC # BLD AUTO: 3.88 X10*6/UL (ref 4.5–5.9)
RBC # UR STRIP.AUTO: NEGATIVE MG/DL
SODIUM SERPL-SCNC: 131 MMOL/L (ref 136–145)
SP GR UR STRIP.AUTO: 1.02
TRIGL SERPL-MCNC: 1163 MG/DL (ref 0–149)
UROBILINOGEN UR STRIP.AUTO-MCNC: NORMAL MG/DL
WBC # BLD AUTO: 7.4 X10*3/UL (ref 4.4–11.3)

## 2025-08-30 PROCEDURE — 99285 EMERGENCY DEPT VISIT HI MDM: CPT | Mod: 25 | Performed by: STUDENT IN AN ORGANIZED HEALTH CARE EDUCATION/TRAINING PROGRAM

## 2025-08-30 PROCEDURE — 84478 ASSAY OF TRIGLYCERIDES: CPT | Performed by: STUDENT IN AN ORGANIZED HEALTH CARE EDUCATION/TRAINING PROGRAM

## 2025-08-30 PROCEDURE — 83690 ASSAY OF LIPASE: CPT | Performed by: STUDENT IN AN ORGANIZED HEALTH CARE EDUCATION/TRAINING PROGRAM

## 2025-08-30 PROCEDURE — 2550000001 HC RX 255 CONTRASTS: Performed by: STUDENT IN AN ORGANIZED HEALTH CARE EDUCATION/TRAINING PROGRAM

## 2025-08-30 PROCEDURE — 96361 HYDRATE IV INFUSION ADD-ON: CPT

## 2025-08-30 PROCEDURE — 96376 TX/PRO/DX INJ SAME DRUG ADON: CPT

## 2025-08-30 PROCEDURE — 81003 URINALYSIS AUTO W/O SCOPE: CPT | Performed by: STUDENT IN AN ORGANIZED HEALTH CARE EDUCATION/TRAINING PROGRAM

## 2025-08-30 PROCEDURE — 36415 COLL VENOUS BLD VENIPUNCTURE: CPT | Performed by: STUDENT IN AN ORGANIZED HEALTH CARE EDUCATION/TRAINING PROGRAM

## 2025-08-30 PROCEDURE — 96374 THER/PROPH/DIAG INJ IV PUSH: CPT | Mod: 59

## 2025-08-30 PROCEDURE — 74177 CT ABD & PELVIS W/CONTRAST: CPT

## 2025-08-30 PROCEDURE — 96375 TX/PRO/DX INJ NEW DRUG ADDON: CPT

## 2025-08-30 PROCEDURE — 74177 CT ABD & PELVIS W/CONTRAST: CPT | Performed by: RADIOLOGY

## 2025-08-30 PROCEDURE — 2500000004 HC RX 250 GENERAL PHARMACY W/ HCPCS (ALT 636 FOR OP/ED): Mod: JZ | Performed by: STUDENT IN AN ORGANIZED HEALTH CARE EDUCATION/TRAINING PROGRAM

## 2025-08-30 PROCEDURE — 85025 COMPLETE CBC W/AUTO DIFF WBC: CPT | Performed by: STUDENT IN AN ORGANIZED HEALTH CARE EDUCATION/TRAINING PROGRAM

## 2025-08-30 PROCEDURE — 80053 COMPREHEN METABOLIC PANEL: CPT | Performed by: STUDENT IN AN ORGANIZED HEALTH CARE EDUCATION/TRAINING PROGRAM

## 2025-08-30 RX ORDER — DEXTROSE MONOHYDRATE 50 MG/ML
125 INJECTION, SOLUTION INTRAVENOUS CONTINUOUS
Status: DISCONTINUED | OUTPATIENT
Start: 2025-08-31 | End: 2025-08-31 | Stop reason: HOSPADM

## 2025-08-30 RX ORDER — DEXTROSE 50 % IN WATER (D50W) INTRAVENOUS SYRINGE
25
Status: DISCONTINUED | OUTPATIENT
Start: 2025-08-30 | End: 2025-08-31 | Stop reason: HOSPADM

## 2025-08-30 RX ORDER — DEXTROSE 50 % IN WATER (D50W) INTRAVENOUS SYRINGE
12.5
Status: DISCONTINUED | OUTPATIENT
Start: 2025-08-30 | End: 2025-08-31 | Stop reason: HOSPADM

## 2025-08-30 RX ADMIN — HYDROMORPHONE HYDROCHLORIDE 0.5 MG: 0.5 INJECTION, SOLUTION INTRAMUSCULAR; INTRAVENOUS; SUBCUTANEOUS at 22:20

## 2025-08-30 RX ADMIN — SODIUM CHLORIDE, SODIUM LACTATE, POTASSIUM CHLORIDE, AND CALCIUM CHLORIDE 1000 ML: .6; .31; .03; .02 INJECTION, SOLUTION INTRAVENOUS at 21:16

## 2025-08-30 RX ADMIN — IOHEXOL 75 ML: 350 INJECTION, SOLUTION INTRAVENOUS at 21:50

## 2025-08-30 RX ADMIN — HYDROMORPHONE HYDROCHLORIDE 0.5 MG: 0.5 INJECTION, SOLUTION INTRAMUSCULAR; INTRAVENOUS; SUBCUTANEOUS at 21:16

## 2025-08-30 ASSESSMENT — PAIN DESCRIPTION - PROGRESSION
CLINICAL_PROGRESSION: GRADUALLY WORSENING
CLINICAL_PROGRESSION: GRADUALLY IMPROVING
CLINICAL_PROGRESSION: GRADUALLY WORSENING

## 2025-08-30 ASSESSMENT — PAIN DESCRIPTION - ORIENTATION
ORIENTATION: MID

## 2025-08-30 ASSESSMENT — PAIN DESCRIPTION - ONSET
ONSET: PROGRESSIVE
ONSET: ONGOING

## 2025-08-30 ASSESSMENT — PAIN DESCRIPTION - FREQUENCY
FREQUENCY: CONSTANT/CONTINUOUS

## 2025-08-30 ASSESSMENT — PAIN SCALES - GENERAL
PAINLEVEL_OUTOF10: 8
PAINLEVEL_OUTOF10: 9
PAINLEVEL_OUTOF10: 6

## 2025-08-30 ASSESSMENT — PAIN DESCRIPTION - PAIN TYPE
TYPE: ACUTE PAIN

## 2025-08-30 ASSESSMENT — PAIN DESCRIPTION - LOCATION
LOCATION: ABDOMEN

## 2025-08-30 ASSESSMENT — PAIN DESCRIPTION - DESCRIPTORS
DESCRIPTORS: BURNING;CRAMPING

## 2025-08-30 ASSESSMENT — PAIN - FUNCTIONAL ASSESSMENT
PAIN_FUNCTIONAL_ASSESSMENT: 0-10

## 2025-08-31 ENCOUNTER — HOSPITAL ENCOUNTER (INPATIENT)
Facility: HOSPITAL | Age: 55
DRG: 642 | End: 2025-08-31
Attending: INTERNAL MEDICINE | Admitting: INTERNAL MEDICINE
Payer: COMMERCIAL

## 2025-08-31 VITALS
BODY MASS INDEX: 38.65 KG/M2 | DIASTOLIC BLOOD PRESSURE: 70 MMHG | TEMPERATURE: 97 F | OXYGEN SATURATION: 98 % | SYSTOLIC BLOOD PRESSURE: 115 MMHG | HEART RATE: 82 BPM | HEIGHT: 70 IN | WEIGHT: 270 LBS | RESPIRATION RATE: 22 BRPM

## 2025-08-31 PROBLEM — K85.90 PANCREATITIS, UNSPECIFIED PANCREATITIS TYPE (HHS-HCC): Status: ACTIVE | Noted: 2025-08-31

## 2025-08-31 LAB
GLUCOSE BLD MANUAL STRIP-MCNC: 216 MG/DL (ref 74–99)
GLUCOSE BLD MANUAL STRIP-MCNC: 220 MG/DL (ref 74–99)
GLUCOSE BLD MANUAL STRIP-MCNC: 244 MG/DL (ref 74–99)
GLUCOSE BLD MANUAL STRIP-MCNC: 244 MG/DL (ref 74–99)
GLUCOSE BLD MANUAL STRIP-MCNC: 250 MG/DL (ref 74–99)
GLUCOSE BLD MANUAL STRIP-MCNC: 259 MG/DL (ref 74–99)
GLUCOSE BLD MANUAL STRIP-MCNC: 260 MG/DL (ref 74–99)

## 2025-08-31 PROCEDURE — 82947 ASSAY GLUCOSE BLOOD QUANT: CPT

## 2025-08-31 PROCEDURE — 2500000004 HC RX 250 GENERAL PHARMACY W/ HCPCS (ALT 636 FOR OP/ED): Performed by: STUDENT IN AN ORGANIZED HEALTH CARE EDUCATION/TRAINING PROGRAM

## 2025-08-31 PROCEDURE — 96376 TX/PRO/DX INJ SAME DRUG ADON: CPT

## 2025-08-31 PROCEDURE — 96374 THER/PROPH/DIAG INJ IV PUSH: CPT

## 2025-08-31 PROCEDURE — 2500000004 HC RX 250 GENERAL PHARMACY W/ HCPCS (ALT 636 FOR OP/ED): Mod: JZ

## 2025-08-31 RX ADMIN — INSULIN HUMAN 2 UNITS/HR: 1 INJECTION, SOLUTION INTRAVENOUS at 00:15

## 2025-08-31 RX ADMIN — DEXTROSE 125 ML/HR: 5 SOLUTION INTRAVENOUS at 00:23

## 2025-08-31 RX ADMIN — HYDROMORPHONE HYDROCHLORIDE 0.5 MG: 0.5 INJECTION, SOLUTION INTRAMUSCULAR; INTRAVENOUS; SUBCUTANEOUS at 07:51

## 2025-08-31 RX ADMIN — HYDROMORPHONE HYDROCHLORIDE 0.5 MG: 0.5 INJECTION, SOLUTION INTRAMUSCULAR; INTRAVENOUS; SUBCUTANEOUS at 04:33

## 2025-08-31 SDOH — ECONOMIC STABILITY: FOOD INSECURITY
WITHIN THE PAST 12 MONTHS, YOU WORRIED THAT YOUR FOOD WOULD RUN OUT BEFORE YOU GOT THE MONEY TO BUY MORE.: PATIENT DECLINED

## 2025-08-31 SDOH — HEALTH STABILITY: MENTAL HEALTH: HOW OFTEN DO YOU HAVE SIX OR MORE DRINKS ON ONE OCCASION?: NEVER

## 2025-08-31 SDOH — ECONOMIC STABILITY: HOUSING INSECURITY: IN THE LAST 12 MONTHS, WAS THERE A TIME WHEN YOU WERE NOT ABLE TO PAY THE MORTGAGE OR RENT ON TIME?: PATIENT DECLINED

## 2025-08-31 SDOH — SOCIAL STABILITY: SOCIAL INSECURITY: WITHIN THE LAST YEAR, HAVE YOU BEEN AFRAID OF YOUR PARTNER OR EX-PARTNER?: NO

## 2025-08-31 SDOH — SOCIAL STABILITY: SOCIAL INSECURITY: DO YOU FEEL ANYONE HAS EXPLOITED OR TAKEN ADVANTAGE OF YOU FINANCIALLY OR OF YOUR PERSONAL PROPERTY?: NO

## 2025-08-31 SDOH — SOCIAL STABILITY: SOCIAL INSECURITY: HAVE YOU HAD ANY THOUGHTS OF HARMING ANYONE ELSE?: NO

## 2025-08-31 SDOH — SOCIAL STABILITY: SOCIAL INSECURITY: DO YOU FEEL UNSAFE GOING BACK TO THE PLACE WHERE YOU ARE LIVING?: NO

## 2025-08-31 SDOH — SOCIAL STABILITY: SOCIAL INSECURITY: WITHIN THE LAST YEAR, HAVE YOU BEEN HUMILIATED OR EMOTIONALLY ABUSED IN OTHER WAYS BY YOUR PARTNER OR EX-PARTNER?: NO

## 2025-08-31 SDOH — SOCIAL STABILITY: SOCIAL INSECURITY: HAS ANYONE EVER THREATENED TO HURT YOUR FAMILY OR YOUR PETS?: NO

## 2025-08-31 SDOH — HEALTH STABILITY: MENTAL HEALTH: HOW MANY DRINKS CONTAINING ALCOHOL DO YOU HAVE ON A TYPICAL DAY WHEN YOU ARE DRINKING?: PATIENT DOES NOT DRINK

## 2025-08-31 SDOH — SOCIAL STABILITY: SOCIAL INSECURITY: ABUSE: ADULT

## 2025-08-31 SDOH — SOCIAL STABILITY: SOCIAL INSECURITY: ARE THERE ANY APPARENT SIGNS OF INJURIES/BEHAVIORS THAT COULD BE RELATED TO ABUSE/NEGLECT?: NO

## 2025-08-31 SDOH — SOCIAL STABILITY: SOCIAL INSECURITY: DOES ANYONE TRY TO KEEP YOU FROM HAVING/CONTACTING OTHER FRIENDS OR DOING THINGS OUTSIDE YOUR HOME?: NO

## 2025-08-31 SDOH — SOCIAL STABILITY: SOCIAL INSECURITY: ARE YOU OR HAVE YOU BEEN THREATENED OR ABUSED PHYSICALLY, EMOTIONALLY, OR SEXUALLY BY ANYONE?: NO

## 2025-08-31 SDOH — ECONOMIC STABILITY: HOUSING INSECURITY: AT ANY TIME IN THE PAST 12 MONTHS, WERE YOU HOMELESS OR LIVING IN A SHELTER (INCLUDING NOW)?: NO

## 2025-08-31 SDOH — HEALTH STABILITY: MENTAL HEALTH: HOW OFTEN DO YOU HAVE A DRINK CONTAINING ALCOHOL?: NEVER

## 2025-08-31 SDOH — ECONOMIC STABILITY: FOOD INSECURITY: HOW HARD IS IT FOR YOU TO PAY FOR THE VERY BASICS LIKE FOOD, HOUSING, MEDICAL CARE, AND HEATING?: PATIENT DECLINED

## 2025-08-31 SDOH — HEALTH STABILITY: PHYSICAL HEALTH
ON AVERAGE, HOW MANY DAYS PER WEEK DO YOU ENGAGE IN MODERATE TO STRENUOUS EXERCISE (LIKE A BRISK WALK)?: PATIENT DECLINED

## 2025-08-31 SDOH — HEALTH STABILITY: PHYSICAL HEALTH: ON AVERAGE, HOW MANY MINUTES DO YOU ENGAGE IN EXERCISE AT THIS LEVEL?: PATIENT DECLINED

## 2025-08-31 SDOH — ECONOMIC STABILITY: FOOD INSECURITY: WITHIN THE PAST 12 MONTHS, THE FOOD YOU BOUGHT JUST DIDN'T LAST AND YOU DIDN'T HAVE MONEY TO GET MORE.: PATIENT DECLINED

## 2025-08-31 SDOH — ECONOMIC STABILITY: HOUSING INSECURITY: IN THE PAST 12 MONTHS, HOW MANY TIMES HAVE YOU MOVED WHERE YOU WERE LIVING?: 0

## 2025-08-31 SDOH — ECONOMIC STABILITY: INCOME INSECURITY
IN THE PAST 12 MONTHS HAS THE ELECTRIC, GAS, OIL, OR WATER COMPANY THREATENED TO SHUT OFF SERVICES IN YOUR HOME?: PATIENT DECLINED

## 2025-08-31 SDOH — SOCIAL STABILITY: SOCIAL INSECURITY: HAVE YOU HAD THOUGHTS OF HARMING ANYONE ELSE?: NO

## 2025-08-31 SDOH — ECONOMIC STABILITY: TRANSPORTATION INSECURITY: IN THE PAST 12 MONTHS, HAS LACK OF TRANSPORTATION KEPT YOU FROM MEDICAL APPOINTMENTS OR FROM GETTING MEDICATIONS?: NO

## 2025-08-31 ASSESSMENT — ENCOUNTER SYMPTOMS
DIAPHORESIS: 0
CONSTIPATION: 0
CHILLS: 1
BACK PAIN: 0
DIZZINESS: 0
AGITATION: 0
ABDOMINAL PAIN: 1
ABDOMINAL DISTENTION: 1
NAUSEA: 0
COLOR CHANGE: 0
DIARRHEA: 0
FATIGUE: 0
VOMITING: 0
DYSURIA: 0
FEVER: 0
APPETITE CHANGE: 1
ACTIVITY CHANGE: 0
UNEXPECTED WEIGHT CHANGE: 0
RECTAL PAIN: 0
DIFFICULTY URINATING: 0
PALPITATIONS: 0
NECK PAIN: 0
CONFUSION: 0
FLANK PAIN: 0
BLOOD IN STOOL: 0
FREQUENCY: 0

## 2025-08-31 ASSESSMENT — COGNITIVE AND FUNCTIONAL STATUS - GENERAL
MOBILITY SCORE: 24
PATIENT BASELINE BEDBOUND: NO
DAILY ACTIVITIY SCORE: 24

## 2025-08-31 ASSESSMENT — ACTIVITIES OF DAILY LIVING (ADL)
DRESSING YOURSELF: INDEPENDENT
ADEQUATE_TO_COMPLETE_ADL: YES
LACK_OF_TRANSPORTATION: NO
PATIENT'S MEMORY ADEQUATE TO SAFELY COMPLETE DAILY ACTIVITIES?: YES
FEEDING YOURSELF: INDEPENDENT
WALKS IN HOME: INDEPENDENT
HEARING - LEFT EAR: FUNCTIONAL
LACK_OF_TRANSPORTATION: NO
JUDGMENT_ADEQUATE_SAFELY_COMPLETE_DAILY_ACTIVITIES: YES
BATHING: INDEPENDENT
HEARING - RIGHT EAR: FUNCTIONAL
GROOMING: INDEPENDENT
TOILETING: INDEPENDENT

## 2025-08-31 ASSESSMENT — PATIENT HEALTH QUESTIONNAIRE - PHQ9
SUM OF ALL RESPONSES TO PHQ9 QUESTIONS 1 & 2: 0
1. LITTLE INTEREST OR PLEASURE IN DOING THINGS: NOT AT ALL
2. FEELING DOWN, DEPRESSED OR HOPELESS: NOT AT ALL

## 2025-08-31 ASSESSMENT — LIFESTYLE VARIABLES
HOW OFTEN DO YOU HAVE 6 OR MORE DRINKS ON ONE OCCASION: NEVER
AUDIT-C TOTAL SCORE: 0
HOW MANY STANDARD DRINKS CONTAINING ALCOHOL DO YOU HAVE ON A TYPICAL DAY: PATIENT DOES NOT DRINK
AUDIT-C TOTAL SCORE: 0
AUDIT-C TOTAL SCORE: 0
HOW OFTEN DO YOU HAVE A DRINK CONTAINING ALCOHOL: NEVER
SKIP TO QUESTIONS 9-10: 1
SKIP TO QUESTIONS 9-10: 1

## 2025-08-31 ASSESSMENT — PAIN SCALES - GENERAL
PAINLEVEL_OUTOF10: 3
PAINLEVEL_OUTOF10: 6
PAINLEVEL_OUTOF10: 5 - MODERATE PAIN
PAINLEVEL_OUTOF10: 0 - NO PAIN

## 2025-08-31 ASSESSMENT — PAIN DESCRIPTION - DESCRIPTORS: DESCRIPTORS: ACHING

## 2025-08-31 ASSESSMENT — PAIN - FUNCTIONAL ASSESSMENT
PAIN_FUNCTIONAL_ASSESSMENT: 0-10
PAIN_FUNCTIONAL_ASSESSMENT: 0-10

## 2025-09-01 ASSESSMENT — PAIN SCALES - GENERAL
PAINLEVEL_OUTOF10: 0 - NO PAIN
PAINLEVEL_OUTOF10: 0 - NO PAIN

## 2025-09-01 ASSESSMENT — PAIN - FUNCTIONAL ASSESSMENT: PAIN_FUNCTIONAL_ASSESSMENT: 0-10

## 2025-09-01 ASSESSMENT — ACTIVITIES OF DAILY LIVING (ADL): LACK_OF_TRANSPORTATION: NO

## 2025-09-02 PROBLEM — K85.90 PANCREATITIS, UNSPECIFIED PANCREATITIS TYPE (HHS-HCC): Status: RESOLVED | Noted: 2025-08-31 | Resolved: 2025-09-02

## 2025-09-02 LAB — HOLD SPECIMEN: NORMAL

## 2025-09-02 ASSESSMENT — PAIN - FUNCTIONAL ASSESSMENT
PAIN_FUNCTIONAL_ASSESSMENT: 0-10

## 2025-09-02 ASSESSMENT — PAIN SCALES - GENERAL
PAINLEVEL_OUTOF10: 0 - NO PAIN

## 2025-09-09 ENCOUNTER — APPOINTMENT (OUTPATIENT)
Dept: ENDOCRINOLOGY | Facility: CLINIC | Age: 55
End: 2025-09-09
Payer: COMMERCIAL

## 2025-09-22 ENCOUNTER — APPOINTMENT (OUTPATIENT)
Dept: ENDOCRINOLOGY | Facility: CLINIC | Age: 55
End: 2025-09-22
Payer: COMMERCIAL

## 2025-11-13 ENCOUNTER — APPOINTMENT (OUTPATIENT)
Dept: PRIMARY CARE | Facility: CLINIC | Age: 55
End: 2025-11-13
Payer: COMMERCIAL

## 2025-11-17 ENCOUNTER — APPOINTMENT (OUTPATIENT)
Dept: ENDOCRINOLOGY | Facility: CLINIC | Age: 55
End: 2025-11-17
Payer: COMMERCIAL

## (undated) DEVICE — ANGIO KIT, LEFT HEART, LF, CUSTOM

## (undated) DEVICE — MANIFOLD KIT, CUSTOM, GEAUGA

## (undated) DEVICE — CATHETER, ANGIO, IMPULSE, FL4, 5 FR X 100 CM

## (undated) DEVICE — CATHETER, ANGIO, EXPO, WRP, 5 FR X 100 CM

## (undated) DEVICE — CLOSURE DEVICE, VASCULAR, MYNX, 5FR

## (undated) DEVICE — ACCESS SYSTEM, PINNACLE PRECISION, 5FR X 10CM, ECHOGENIC NEEDLE